# Patient Record
Sex: FEMALE | Race: BLACK OR AFRICAN AMERICAN | Employment: OTHER | URBAN - METROPOLITAN AREA
[De-identification: names, ages, dates, MRNs, and addresses within clinical notes are randomized per-mention and may not be internally consistent; named-entity substitution may affect disease eponyms.]

---

## 2015-03-06 LAB
LEFT VENTRICULAR EJECTION FRACTION MODE: NORMAL
LV EF: 60 %

## 2018-12-13 ENCOUNTER — TELEPHONE (OUTPATIENT)
Dept: ADMINISTRATIVE | Age: 58
End: 2018-12-13

## 2019-01-25 ENCOUNTER — OFFICE VISIT (OUTPATIENT)
Dept: CARDIOLOGY CLINIC | Age: 59
End: 2019-01-25
Payer: MEDICAID

## 2019-01-25 VITALS
SYSTOLIC BLOOD PRESSURE: 138 MMHG | HEART RATE: 66 BPM | DIASTOLIC BLOOD PRESSURE: 80 MMHG | HEIGHT: 65 IN | OXYGEN SATURATION: 98 % | BODY MASS INDEX: 39.25 KG/M2 | RESPIRATION RATE: 20 BRPM | WEIGHT: 235.6 LBS

## 2019-01-25 DIAGNOSIS — E66.9 NON MORBID OBESITY: ICD-10-CM

## 2019-01-25 DIAGNOSIS — M54.42 CHRONIC MIDLINE LOW BACK PAIN WITH LEFT-SIDED SCIATICA: ICD-10-CM

## 2019-01-25 DIAGNOSIS — E11.9 CONTROLLED TYPE 2 DIABETES MELLITUS WITHOUT COMPLICATION, WITHOUT LONG-TERM CURRENT USE OF INSULIN (HCC): ICD-10-CM

## 2019-01-25 DIAGNOSIS — Z86.73 HISTORY OF CVA (CEREBROVASCULAR ACCIDENT): ICD-10-CM

## 2019-01-25 DIAGNOSIS — G89.29 CHRONIC MIDLINE LOW BACK PAIN WITH LEFT-SIDED SCIATICA: ICD-10-CM

## 2019-01-25 DIAGNOSIS — R06.02 SOBOE (SHORTNESS OF BREATH ON EXERTION): ICD-10-CM

## 2019-01-25 DIAGNOSIS — R94.31 ABNORMAL EKG: ICD-10-CM

## 2019-01-25 DIAGNOSIS — Z85.3 HX OF BREAST CANCER: ICD-10-CM

## 2019-01-25 DIAGNOSIS — I11.9 HYPERTENSIVE LEFT VENTRICULAR HYPERTROPHY, WITHOUT HEART FAILURE: ICD-10-CM

## 2019-01-25 DIAGNOSIS — I10 ESSENTIAL HYPERTENSION: Primary | ICD-10-CM

## 2019-01-25 PROBLEM — M54.41 CHRONIC MIDLINE LOW BACK PAIN WITH RIGHT-SIDED SCIATICA: Status: ACTIVE | Noted: 2019-01-25

## 2019-01-25 PROCEDURE — G8427 DOCREV CUR MEDS BY ELIG CLIN: HCPCS | Performed by: INTERNAL MEDICINE

## 2019-01-25 PROCEDURE — 93000 ELECTROCARDIOGRAM COMPLETE: CPT | Performed by: INTERNAL MEDICINE

## 2019-01-25 PROCEDURE — 3017F COLORECTAL CA SCREEN DOC REV: CPT | Performed by: INTERNAL MEDICINE

## 2019-01-25 PROCEDURE — 3046F HEMOGLOBIN A1C LEVEL >9.0%: CPT | Performed by: INTERNAL MEDICINE

## 2019-01-25 PROCEDURE — 1036F TOBACCO NON-USER: CPT | Performed by: INTERNAL MEDICINE

## 2019-01-25 PROCEDURE — 99204 OFFICE O/P NEW MOD 45 MIN: CPT | Performed by: INTERNAL MEDICINE

## 2019-01-25 PROCEDURE — G8484 FLU IMMUNIZE NO ADMIN: HCPCS | Performed by: INTERNAL MEDICINE

## 2019-01-25 PROCEDURE — 2022F DILAT RTA XM EVC RTNOPTHY: CPT | Performed by: INTERNAL MEDICINE

## 2019-01-25 PROCEDURE — G8417 CALC BMI ABV UP PARAM F/U: HCPCS | Performed by: INTERNAL MEDICINE

## 2019-01-25 RX ORDER — LISINOPRIL 20 MG/1
20 TABLET ORAL DAILY
Status: ON HOLD | COMMUNITY
End: 2020-02-08 | Stop reason: HOSPADM

## 2019-01-25 RX ORDER — MELOXICAM 7.5 MG/1
7.5 TABLET ORAL NIGHTLY
Status: ON HOLD | COMMUNITY
End: 2020-02-03

## 2019-01-25 RX ORDER — GLIMEPIRIDE 1 MG/1
1 TABLET ORAL
COMMUNITY
End: 2020-01-27 | Stop reason: ALTCHOICE

## 2019-01-25 RX ORDER — GABAPENTIN 300 MG/1
300 CAPSULE ORAL NIGHTLY
COMMUNITY
End: 2020-11-13

## 2019-01-25 RX ORDER — CYCLOBENZAPRINE HCL 10 MG
5-10 TABLET ORAL NIGHTLY PRN
COMMUNITY
End: 2019-11-06 | Stop reason: ALTCHOICE

## 2019-01-25 RX ORDER — LISINOPRIL AND HYDROCHLOROTHIAZIDE 25; 20 MG/1; MG/1
1 TABLET ORAL DAILY
COMMUNITY
End: 2019-11-06 | Stop reason: ALTCHOICE

## 2019-03-04 ENCOUNTER — TELEPHONE (OUTPATIENT)
Dept: CARDIOLOGY | Age: 59
End: 2019-03-04

## 2019-04-11 ENCOUNTER — HOSPITAL ENCOUNTER (OUTPATIENT)
Dept: CARDIOLOGY | Age: 59
Discharge: HOME | End: 2019-04-11
Payer: MEDICARE

## 2019-04-11 LAB
LV EF: 55 %
LVEF MODALITY: NORMAL

## 2019-04-11 PROCEDURE — 93306 TTE W/DOPPLER COMPLETE: CPT

## 2019-04-24 ENCOUNTER — HOSPITAL ENCOUNTER (OUTPATIENT)
Dept: CARDIOLOGY | Age: 59
Discharge: HOME | End: 2019-04-24
Payer: MEDICARE

## 2019-04-24 VITALS
BODY MASS INDEX: 39.15 KG/M2 | WEIGHT: 235 LBS | HEART RATE: 74 BPM | DIASTOLIC BLOOD PRESSURE: 80 MMHG | HEIGHT: 65 IN | SYSTOLIC BLOOD PRESSURE: 130 MMHG

## 2019-04-24 DIAGNOSIS — Z86.73 HISTORY OF CVA (CEREBROVASCULAR ACCIDENT): ICD-10-CM

## 2019-04-24 DIAGNOSIS — I10 ESSENTIAL HYPERTENSION: ICD-10-CM

## 2019-04-24 DIAGNOSIS — R94.31 ABNORMAL EKG: ICD-10-CM

## 2019-04-24 DIAGNOSIS — R06.02 SOBOE (SHORTNESS OF BREATH ON EXERTION): ICD-10-CM

## 2019-04-24 PROCEDURE — 3430000000 HC RX DIAGNOSTIC RADIOPHARMACEUTICAL: Performed by: INTERNAL MEDICINE

## 2019-04-24 PROCEDURE — 93018 CV STRESS TEST I&R ONLY: CPT | Performed by: INTERNAL MEDICINE

## 2019-04-24 PROCEDURE — A9502 TC99M TETROFOSMIN: HCPCS | Performed by: INTERNAL MEDICINE

## 2019-04-24 PROCEDURE — 6360000002 HC RX W HCPCS: Performed by: INTERNAL MEDICINE

## 2019-04-24 PROCEDURE — 93017 CV STRESS TEST TRACING ONLY: CPT

## 2019-04-24 PROCEDURE — 2580000003 HC RX 258: Performed by: INTERNAL MEDICINE

## 2019-04-24 PROCEDURE — 93016 CV STRESS TEST SUPVJ ONLY: CPT | Performed by: INTERNAL MEDICINE

## 2019-04-24 PROCEDURE — 78452 HT MUSCLE IMAGE SPECT MULT: CPT

## 2019-04-24 RX ORDER — SODIUM CHLORIDE 0.9 % (FLUSH) 0.9 %
10 SYRINGE (ML) INJECTION PRN
Status: DISCONTINUED | OUTPATIENT
Start: 2019-04-24 | End: 2019-04-25 | Stop reason: HOSPADM

## 2019-04-24 RX ADMIN — REGADENOSON 0.4 MG: 0.08 INJECTION, SOLUTION INTRAVENOUS at 10:51

## 2019-04-24 RX ADMIN — TETROFOSMIN 25 MILLICURIE: 0.23 INJECTION, POWDER, LYOPHILIZED, FOR SOLUTION INTRAVENOUS at 10:51

## 2019-04-24 RX ADMIN — Medication 10 ML: at 10:51

## 2019-04-24 RX ADMIN — Medication 10 ML: at 10:45

## 2019-04-24 RX ADMIN — Medication 10 ML: at 09:11

## 2019-04-24 RX ADMIN — TETROFOSMIN 8.5 MILLICURIE: 0.23 INJECTION, POWDER, LYOPHILIZED, FOR SOLUTION INTRAVENOUS at 09:11

## 2019-04-24 NOTE — PROCEDURES
[id number] [facility] and Vascular Lab - [address]  [phone]               Pharmacologic Stress Nuclear Gated SPECT Study    Name: Adrianne Malave Account Number: [de-identified]    :  1960          Sex: female         Date of Study:  2019    Height: 5' 5\" (165.1 cm)         Weight: 235 lb (106.6 kg)     Ordering Provider: Any Franklin          PCP: Imelda Centeno DO      Cardiologist:Jay Whelan             Interpreting Physician: Any Franklin  _________________________________________________________________________________    Indication:   Detecting the presence and location of coronary artery disease    Clinical History:   Patient has no known history of coronary artery disease. Resting ECG:     HR 69 bpm  Normal sinus rhythm    Procedure:   Pharmacologic stress testing was performed with regadenoson 0.4 mg for 15 seconds. The heart rate was 69 at baseline and say to 79 beats during the infusion. The blood pressure at baseline was 150/88 and blood pressure at the end of infusion was 140/88. Blood pressure response was normal during the stress procedure. The patient tolerated the infusion well. ECG during the infusion did not change. IMAGING: Myocardial perfusion imaging was performed at rest 30-35 minutes following the intravenous injection of 8.5 mCi of (Tc-tetrofosmin) followed by 10 ml of Normal Saline. As per infusion protocol, the patient was injected intravenously with 25.0 mCi of (Tc-tetrofosmin) followed by 10 ml of Normal Saline. Gated post-stress tomographic imaging was performed 45 minutes after stress. FINDINGS: The overall quality of the study was good. Left ventricular cavity size was noted to be normal.    Rotational analog analysis demonstrated no patient motion or abnormal extracardiac radioactivity.     A mild defect was present in the apical anterior wall(s) that was  small size on the post regadenoson images           The resting images are show no change. Gated SPECT left ventricular ejection fraction was calculated to be 55%, with normal myocardial thickening and wall motion. Impression:    1. Electrocardiographically normal regadenoson infusion with a clinicallynonischemic response. 2. Myocardial perfusion imaging showed no reversible ischemia, small fixed anteroapical defect. 3. Overall left ventricular systolic function was normal without regional wall motion abnormalities. EF 55%  4. Low risk general pharmacologic stress test    No recent study to compare. Thank you for sending your patient to this [facility].      Electronically signed by Darius Stallings MD

## 2019-05-02 ENCOUNTER — OFFICE VISIT (OUTPATIENT)
Dept: CARDIOLOGY CLINIC | Age: 59
End: 2019-05-02
Payer: MEDICARE

## 2019-05-02 VITALS
SYSTOLIC BLOOD PRESSURE: 132 MMHG | DIASTOLIC BLOOD PRESSURE: 86 MMHG | BODY MASS INDEX: 38.32 KG/M2 | WEIGHT: 230 LBS | RESPIRATION RATE: 16 BRPM | HEART RATE: 72 BPM | HEIGHT: 65 IN

## 2019-05-02 DIAGNOSIS — E11.9 CONTROLLED TYPE 2 DIABETES MELLITUS WITHOUT COMPLICATION, WITHOUT LONG-TERM CURRENT USE OF INSULIN (HCC): ICD-10-CM

## 2019-05-02 DIAGNOSIS — M54.42 CHRONIC MIDLINE LOW BACK PAIN WITH LEFT-SIDED SCIATICA: ICD-10-CM

## 2019-05-02 DIAGNOSIS — G89.29 CHRONIC MIDLINE LOW BACK PAIN WITH LEFT-SIDED SCIATICA: ICD-10-CM

## 2019-05-02 DIAGNOSIS — I10 ESSENTIAL HYPERTENSION: Primary | Chronic | ICD-10-CM

## 2019-05-02 DIAGNOSIS — Z86.73 HISTORY OF CVA (CEREBROVASCULAR ACCIDENT): ICD-10-CM

## 2019-05-02 DIAGNOSIS — R94.31 ABNORMAL EKG: ICD-10-CM

## 2019-05-02 DIAGNOSIS — I11.9 HYPERTENSIVE LEFT VENTRICULAR HYPERTROPHY, WITHOUT HEART FAILURE: ICD-10-CM

## 2019-05-02 PROCEDURE — 3046F HEMOGLOBIN A1C LEVEL >9.0%: CPT | Performed by: INTERNAL MEDICINE

## 2019-05-02 PROCEDURE — 3017F COLORECTAL CA SCREEN DOC REV: CPT | Performed by: INTERNAL MEDICINE

## 2019-05-02 PROCEDURE — G8417 CALC BMI ABV UP PARAM F/U: HCPCS | Performed by: INTERNAL MEDICINE

## 2019-05-02 PROCEDURE — 99214 OFFICE O/P EST MOD 30 MIN: CPT | Performed by: INTERNAL MEDICINE

## 2019-05-02 PROCEDURE — 1036F TOBACCO NON-USER: CPT | Performed by: INTERNAL MEDICINE

## 2019-05-02 PROCEDURE — G8427 DOCREV CUR MEDS BY ELIG CLIN: HCPCS | Performed by: INTERNAL MEDICINE

## 2019-05-02 PROCEDURE — 93000 ELECTROCARDIOGRAM COMPLETE: CPT | Performed by: INTERNAL MEDICINE

## 2019-05-02 PROCEDURE — 2022F DILAT RTA XM EVC RTNOPTHY: CPT | Performed by: INTERNAL MEDICINE

## 2019-05-02 NOTE — PROGRESS NOTES
syncope. Active at home   No orthopnea   Try to watch diet   Compliant with all medications       Past Medical History:   Diagnosis Date    [facility] 2000, 2005    right    Chest pain 02/23/2012    admitted    [facility]     [facility]     Hypertension     Ligament tear     right foot            Past Surgical History:   Procedure Laterality Date    APPENDECTOMY      BREAST RECONSTRUCTION Right     BREAST SURGERY  2000    right masectomy    FOOT SURGERY      right    HYSTERECTOMY      fibroids    KNEE ARTHROSCOPY      left    MASTECTOMY Right 2000    TONSILLECTOMY            Family History   Problem Relation Age of Onset    Stroke Mother     Heart Failure Mother     Other Mother         ICD    Pacemaker Father     Heart Failure Father     Hypertension Sister           Social History     Tobacco Use    Smoking status: Former Smoker     Packs/day: 0.20     Years: 2.00     Pack years: 0.40     Types: Cigarettes    Smokeless tobacco: Never Used    Tobacco comment: as a teen   Substance Use Topics    Alcohol use:  Yes     Alcohol/week: 3.6 oz     Types: 6 Standard drinks or equivalent per week     Comment: social         Review of Systems:  HEENT: negative for acute visual symptoms or auditory problems, no dysphagia  Constitutional: negative for fever and chills, or significant weight loss  Respiratory: negative for cough, wheezing, or hemoptysis  Cardiovascular: negative for chest pain, palpitations, and dyspnea  Gastrointestinal: negative for abdominal pain, diarrhea, nausea and vomiting  Endocrine: Negative for polyuria and polydyspsia  Genitourinary:negative for dysuria and hematuria  Derm: negative for rash and skin lesion(s)  Neurological: negative for tingling, numbness, weakness, seizures and tremors  Endocrine: negative for polydipsia and polyuria  Musculoskeletal: negative for pain or tenderness  Psychiatric: negative for anxiety, depression, or suicidal ideations         O:  COMPLETE PHYSICAL EXAM:       /86   Pulse 72   Resp 16   Ht 5' 5\" (1.651 m)   Wt 230 lb (104.3 kg)   BMI 38.27 kg/m²       General:   Patient alert, comfortable, no distress. Appears stated age. HEENT:    Pupils equal, no icterus, no nasal drainage, tongue moist.   Neck:              No masses, Thyroid not palpable. Chest:   Normal configuration, non tender. Lungs:   Clear to auscultation bilaterally, few scattered rhonchi. Cardiovascular:  Regular rhythm, 1/6 systolic murmur, No S3,  no palpable thrills, No elevated JVD, No carotid bruit. Abdomen:  Soft, Non tender, Bowel sounds normal, no pulsatile abdominal aorta,    Extremities:  No edema. Distal pulses palpable. No cyanosis, no clubbing. Skin:   Good turgor, warm and dry, no cyanosis. Musculoskeletal: No joint swelling or deformity. Neuro:   Cranial nerves grossly intact; No focal neurologic deficit. Psych:   Alert, good mood and effect. REVIEW OF DIAGNOSTIC TESTS:        Electrocardiogram: NSR, Inferior, lateral ST/T chages, OLD      2D Echo: OK     Stress Test: OK               A/P:   ASSESSMENT / PLAN:    Hector was seen today for results. Diagnoses and all orders for this visit:    Essential hypertension - Stable  -     EKG 12 Lead    Hypertensive left ventricular hypertrophy, without heart failure - Stable    Controlled type 2 diabetes mellitus without complication, without long-term current use of insulin (HCC)    Chronic midline low back pain with left-sided sciatica    Abnormal EKG - OLD, inferior and lateral ST/T changes    History of CVA (cerebrovascular accident)  With left side weakness in  2015? - No residual weakness     Preventive Cardiology: Low cholesterol diet, regular exercise as tolerate, and gradual weight loss discussed. Monitor BP and heart rates. Tet sdiscussed   All questions answered about cardiac diagnoses and cardiac medications.    Continue

## 2019-08-06 ENCOUNTER — TELEPHONE (OUTPATIENT)
Dept: CARDIOLOGY CLINIC | Age: 59
End: 2019-08-06

## 2019-08-06 NOTE — TELEPHONE ENCOUNTER
Cleared for her procedure from cardiac stand point - Low cardiac risk  Ok to hold ASA, Plavix- she takes them for her stroke

## 2019-08-06 NOTE — TELEPHONE ENCOUNTER
[facility] [phone]). Carlos Martinez has to have a LEEP procedure by Dr. Alyx Thompson at the end of this month. She is on Plavix and ASA. You see her yearly, last seen in May. Are you able to clear her and can she be off Plavix and/or ASA and what is the risk. Please advise.

## 2019-08-22 ENCOUNTER — HOSPITAL ENCOUNTER (OUTPATIENT)
Age: 59
Discharge: HOME | End: 2019-08-24

## 2019-08-22 PROCEDURE — 88307 TISSUE EXAM BY PATHOLOGIST: CPT

## 2019-10-24 ENCOUNTER — TELEPHONE (OUTPATIENT)
Dept: ONCOLOGY | Age: 59
End: 2019-10-24

## 2019-10-24 ENCOUNTER — TELEPHONE (OUTPATIENT)
Dept: CASE MANAGEMENT | Age: 59
End: 2019-10-24

## 2019-10-24 ENCOUNTER — HOSPITAL ENCOUNTER (OUTPATIENT)
Dept: RADIATION ONCOLOGY | Age: 59
Discharge: HOME | End: 2019-10-24
Payer: MEDICARE

## 2019-10-24 VITALS
HEART RATE: 88 BPM | RESPIRATION RATE: 16 BRPM | DIASTOLIC BLOOD PRESSURE: 86 MMHG | BODY MASS INDEX: 38.61 KG/M2 | SYSTOLIC BLOOD PRESSURE: 140 MMHG | TEMPERATURE: 97.8 F | WEIGHT: 232 LBS

## 2019-10-24 DIAGNOSIS — C53.0 MALIGNANT NEOPLASM OF ENDOCERVIX (HCC): Primary | ICD-10-CM

## 2019-10-24 PROCEDURE — 99204 OFFICE O/P NEW MOD 45 MIN: CPT | Performed by: RADIOLOGY

## 2019-10-24 PROCEDURE — 99205 OFFICE O/P NEW HI 60 MIN: CPT

## 2019-10-25 ENCOUNTER — TELEPHONE (OUTPATIENT)
Dept: RADIATION ONCOLOGY | Age: 59
End: 2019-10-25

## 2019-10-31 ENCOUNTER — TELEPHONE (OUTPATIENT)
Dept: CASE MANAGEMENT | Age: 59
End: 2019-10-31

## 2019-10-31 ENCOUNTER — HOSPITAL ENCOUNTER (OUTPATIENT)
Dept: RADIATION ONCOLOGY | Age: 59
Discharge: HOME | End: 2019-10-31
Attending: RADIOLOGY
Payer: MEDICARE

## 2019-11-06 ENCOUNTER — OFFICE VISIT (OUTPATIENT)
Dept: ONCOLOGY | Age: 59
End: 2019-11-06
Payer: MEDICARE

## 2019-11-06 ENCOUNTER — HOSPITAL ENCOUNTER (OUTPATIENT)
Dept: INFUSION THERAPY | Age: 59
Discharge: HOME | End: 2019-11-06
Payer: MEDICARE

## 2019-11-06 VITALS
HEIGHT: 65 IN | DIASTOLIC BLOOD PRESSURE: 94 MMHG | BODY MASS INDEX: 38.7 KG/M2 | WEIGHT: 232.3 LBS | SYSTOLIC BLOOD PRESSURE: 176 MMHG | HEART RATE: 73 BPM | TEMPERATURE: 97.6 F

## 2019-11-06 DIAGNOSIS — C53.0 CANCER OF ENDOCERVIX (HCC): ICD-10-CM

## 2019-11-06 DIAGNOSIS — C53.0 CANCER OF ENDOCERVIX (HCC): Primary | ICD-10-CM

## 2019-11-06 DIAGNOSIS — C53.9 MALIGNANT NEOPLASM OF CERVIX, UNSPECIFIED SITE (HCC): Primary | ICD-10-CM

## 2019-11-06 LAB
ALBUMIN SERPL-MCNC: 4 G/DL (ref 3.5–5.2)
ALP BLD-CCNC: 101 U/L (ref 35–104)
ALT SERPL-CCNC: 13 U/L (ref 0–32)
ANION GAP SERPL CALCULATED.3IONS-SCNC: 9 MMOL/L (ref 7–16)
AST SERPL-CCNC: 16 U/L (ref 0–31)
BASOPHILS ABSOLUTE: 0.02 E9/L (ref 0–0.2)
BASOPHILS RELATIVE PERCENT: 0.4 % (ref 0–2)
BILIRUB SERPL-MCNC: 0.3 MG/DL (ref 0–1.2)
BUN BLDV-MCNC: 14 MG/DL (ref 8–23)
CA 125: 9.1 U/ML (ref 0–35)
CALCIUM SERPL-MCNC: 9.7 MG/DL (ref 8.6–10.2)
CHLORIDE BLD-SCNC: 109 MMOL/L (ref 98–107)
CO2: 27 MMOL/L (ref 22–29)
CREAT SERPL-MCNC: 0.9 MG/DL (ref 0.5–1)
EOSINOPHILS ABSOLUTE: 0.11 E9/L (ref 0.05–0.5)
EOSINOPHILS RELATIVE PERCENT: 2.3 % (ref 0–6)
GFR AFRICAN AMERICAN: >60
GFR NON-AFRICAN AMERICAN: >60 ML/MIN/1.73
GLUCOSE BLD-MCNC: 124 MG/DL (ref 74–99)
HCT VFR BLD CALC: 41.1 % (ref 34–48)
HEMOGLOBIN: 12.8 G/DL (ref 11.5–15.5)
IMMATURE GRANULOCYTES #: 0.02 E9/L
IMMATURE GRANULOCYTES %: 0.4 % (ref 0–5)
LYMPHOCYTES ABSOLUTE: 1.47 E9/L (ref 1.5–4)
LYMPHOCYTES RELATIVE PERCENT: 30.9 % (ref 20–42)
MCH RBC QN AUTO: 25.6 PG (ref 26–35)
MCHC RBC AUTO-ENTMCNC: 31.1 % (ref 32–34.5)
MCV RBC AUTO: 82.2 FL (ref 80–99.9)
MONOCYTES ABSOLUTE: 0.47 E9/L (ref 0.1–0.95)
MONOCYTES RELATIVE PERCENT: 9.9 % (ref 2–12)
NEUTROPHILS ABSOLUTE: 2.66 E9/L (ref 1.8–7.3)
NEUTROPHILS RELATIVE PERCENT: 56.1 % (ref 43–80)
PDW BLD-RTO: 14.4 FL (ref 11.5–15)
PLATELET # BLD: 294 E9/L (ref 130–450)
PMV BLD AUTO: 11.1 FL (ref 7–12)
POTASSIUM SERPL-SCNC: 4 MMOL/L (ref 3.5–5)
RBC # BLD: 5 E12/L (ref 3.5–5.5)
SODIUM BLD-SCNC: 145 MMOL/L (ref 132–146)
TOTAL PROTEIN: 7.6 G/DL (ref 6.4–8.3)
WBC # BLD: 4.8 E9/L (ref 4.5–11.5)

## 2019-11-06 PROCEDURE — 99205 OFFICE O/P NEW HI 60 MIN: CPT | Performed by: INTERNAL MEDICINE

## 2019-11-06 PROCEDURE — G8484 FLU IMMUNIZE NO ADMIN: HCPCS | Performed by: INTERNAL MEDICINE

## 2019-11-06 PROCEDURE — 86304 IMMUNOASSAY TUMOR CA 125: CPT

## 2019-11-06 PROCEDURE — G8417 CALC BMI ABV UP PARAM F/U: HCPCS | Performed by: INTERNAL MEDICINE

## 2019-11-06 PROCEDURE — G8427 DOCREV CUR MEDS BY ELIG CLIN: HCPCS | Performed by: INTERNAL MEDICINE

## 2019-11-06 PROCEDURE — 85025 COMPLETE CBC W/AUTO DIFF WBC: CPT

## 2019-11-06 PROCEDURE — 99214 OFFICE O/P EST MOD 30 MIN: CPT

## 2019-11-06 PROCEDURE — 36415 COLL VENOUS BLD VENIPUNCTURE: CPT

## 2019-11-06 PROCEDURE — 80053 COMPREHEN METABOLIC PANEL: CPT

## 2019-11-07 ENCOUNTER — TELEPHONE (OUTPATIENT)
Dept: CASE MANAGEMENT | Age: 59
End: 2019-11-07

## 2019-11-07 ENCOUNTER — HOSPITAL ENCOUNTER (OUTPATIENT)
Dept: RADIATION ONCOLOGY | Age: 59
Discharge: HOME | End: 2019-11-07
Attending: RADIOLOGY
Payer: MEDICARE

## 2019-11-07 DIAGNOSIS — C53.9 MALIGNANT NEOPLASM OF CERVIX, UNSPECIFIED SITE (HCC): Primary | ICD-10-CM

## 2019-11-07 PROCEDURE — 77334 RADIATION TREATMENT AID(S): CPT | Performed by: RADIOLOGY

## 2019-11-12 ENCOUNTER — TELEPHONE (OUTPATIENT)
Dept: CASE MANAGEMENT | Age: 59
End: 2019-11-12

## 2019-11-18 PROCEDURE — 77300 RADIATION THERAPY DOSE PLAN: CPT | Performed by: RADIOLOGY

## 2019-11-18 PROCEDURE — 77301 RADIOTHERAPY DOSE PLAN IMRT: CPT | Performed by: RADIOLOGY

## 2019-11-18 PROCEDURE — 77338 DESIGN MLC DEVICE FOR IMRT: CPT | Performed by: RADIOLOGY

## 2019-11-20 ENCOUNTER — HOSPITAL ENCOUNTER (OUTPATIENT)
Dept: INFUSION THERAPY | Age: 59
Discharge: HOME | End: 2019-11-20
Payer: MEDICARE

## 2019-11-20 ENCOUNTER — HOSPITAL ENCOUNTER (OUTPATIENT)
Dept: RADIATION ONCOLOGY | Age: 59
Discharge: HOME | End: 2019-11-20
Attending: RADIOLOGY
Payer: MEDICARE

## 2019-11-20 ENCOUNTER — OFFICE VISIT (OUTPATIENT)
Dept: ONCOLOGY | Age: 59
End: 2019-11-20
Payer: MEDICARE

## 2019-11-20 VITALS
SYSTOLIC BLOOD PRESSURE: 170 MMHG | HEART RATE: 75 BPM | RESPIRATION RATE: 18 BRPM | DIASTOLIC BLOOD PRESSURE: 91 MMHG | TEMPERATURE: 98.3 F

## 2019-11-20 VITALS
TEMPERATURE: 97.6 F | DIASTOLIC BLOOD PRESSURE: 109 MMHG | HEART RATE: 86 BPM | SYSTOLIC BLOOD PRESSURE: 179 MMHG | BODY MASS INDEX: 36.96 KG/M2 | WEIGHT: 230 LBS | HEIGHT: 66 IN

## 2019-11-20 DIAGNOSIS — R11.0 NAUSEA: ICD-10-CM

## 2019-11-20 DIAGNOSIS — C53.9 MALIGNANT NEOPLASM OF CERVIX, UNSPECIFIED SITE (HCC): Primary | ICD-10-CM

## 2019-11-20 DIAGNOSIS — C53.0 CANCER OF ENDOCERVIX (HCC): ICD-10-CM

## 2019-11-20 LAB
ALBUMIN SERPL-MCNC: 4.1 G/DL (ref 3.5–5.2)
ALP BLD-CCNC: 120 U/L (ref 35–104)
ALT SERPL-CCNC: 16 U/L (ref 0–32)
ANION GAP SERPL CALCULATED.3IONS-SCNC: 13 MMOL/L (ref 7–16)
AST SERPL-CCNC: 19 U/L (ref 0–31)
BASOPHILS ABSOLUTE: 0.02 E9/L (ref 0–0.2)
BASOPHILS RELATIVE PERCENT: 0.4 % (ref 0–2)
BILIRUB SERPL-MCNC: <0.2 MG/DL (ref 0–1.2)
BUN BLDV-MCNC: 18 MG/DL (ref 8–23)
CA 125: 9.3 U/ML (ref 0–35)
CALCIUM SERPL-MCNC: 9.6 MG/DL (ref 8.6–10.2)
CHLORIDE BLD-SCNC: 108 MMOL/L (ref 98–107)
CO2: 22 MMOL/L (ref 22–29)
CREAT SERPL-MCNC: 0.8 MG/DL (ref 0.5–1)
EOSINOPHILS ABSOLUTE: 0.09 E9/L (ref 0.05–0.5)
EOSINOPHILS RELATIVE PERCENT: 1.8 % (ref 0–6)
GFR AFRICAN AMERICAN: >60
GFR NON-AFRICAN AMERICAN: >60 ML/MIN/1.73
GLUCOSE BLD-MCNC: 199 MG/DL (ref 74–99)
HCT VFR BLD CALC: 42 % (ref 34–48)
HEMOGLOBIN: 12.8 G/DL (ref 11.5–15.5)
IMMATURE GRANULOCYTES #: 0.02 E9/L
IMMATURE GRANULOCYTES %: 0.4 % (ref 0–5)
LYMPHOCYTES ABSOLUTE: 1.69 E9/L (ref 1.5–4)
LYMPHOCYTES RELATIVE PERCENT: 32.9 % (ref 20–42)
MAGNESIUM: 2.1 MG/DL (ref 1.6–2.6)
MCH RBC QN AUTO: 24.9 PG (ref 26–35)
MCHC RBC AUTO-ENTMCNC: 30.5 % (ref 32–34.5)
MCV RBC AUTO: 81.7 FL (ref 80–99.9)
MONOCYTES ABSOLUTE: 0.47 E9/L (ref 0.1–0.95)
MONOCYTES RELATIVE PERCENT: 9.1 % (ref 2–12)
NEUTROPHILS ABSOLUTE: 2.85 E9/L (ref 1.8–7.3)
NEUTROPHILS RELATIVE PERCENT: 55.4 % (ref 43–80)
PDW BLD-RTO: 14.3 FL (ref 11.5–15)
PLATELET # BLD: 256 E9/L (ref 130–450)
PMV BLD AUTO: 11.3 FL (ref 7–12)
POTASSIUM SERPL-SCNC: 3.5 MMOL/L (ref 3.5–5)
RBC # BLD: 5.14 E12/L (ref 3.5–5.5)
SODIUM BLD-SCNC: 143 MMOL/L (ref 132–146)
TOTAL PROTEIN: 7.8 G/DL (ref 6.4–8.3)
WBC # BLD: 5.1 E9/L (ref 4.5–11.5)

## 2019-11-20 PROCEDURE — 85025 COMPLETE CBC W/AUTO DIFF WBC: CPT

## 2019-11-20 PROCEDURE — 99214 OFFICE O/P EST MOD 30 MIN: CPT | Performed by: INTERNAL MEDICINE

## 2019-11-20 PROCEDURE — 36415 COLL VENOUS BLD VENIPUNCTURE: CPT

## 2019-11-20 PROCEDURE — G8484 FLU IMMUNIZE NO ADMIN: HCPCS | Performed by: INTERNAL MEDICINE

## 2019-11-20 PROCEDURE — 96415 CHEMO IV INFUSION ADDL HR: CPT

## 2019-11-20 PROCEDURE — 96366 THER/PROPH/DIAG IV INF ADDON: CPT

## 2019-11-20 PROCEDURE — 80053 COMPREHEN METABOLIC PANEL: CPT

## 2019-11-20 PROCEDURE — 83735 ASSAY OF MAGNESIUM: CPT

## 2019-11-20 PROCEDURE — 2580000003 HC RX 258: Performed by: INTERNAL MEDICINE

## 2019-11-20 PROCEDURE — G8427 DOCREV CUR MEDS BY ELIG CLIN: HCPCS | Performed by: INTERNAL MEDICINE

## 2019-11-20 PROCEDURE — G8417 CALC BMI ABV UP PARAM F/U: HCPCS | Performed by: INTERNAL MEDICINE

## 2019-11-20 PROCEDURE — 96413 CHEMO IV INFUSION 1 HR: CPT

## 2019-11-20 PROCEDURE — 86304 IMMUNOASSAY TUMOR CA 125: CPT

## 2019-11-20 PROCEDURE — 1036F TOBACCO NON-USER: CPT | Performed by: INTERNAL MEDICINE

## 2019-11-20 PROCEDURE — 6360000002 HC RX W HCPCS: Performed by: INTERNAL MEDICINE

## 2019-11-20 PROCEDURE — 77386 HC NTSTY MODUL RAD TX DLVR CPLX: CPT | Performed by: RADIOLOGY

## 2019-11-20 PROCEDURE — 96375 TX/PRO/DX INJ NEW DRUG ADDON: CPT

## 2019-11-20 PROCEDURE — 3017F COLORECTAL CA SCREEN DOC REV: CPT | Performed by: INTERNAL MEDICINE

## 2019-11-20 RX ORDER — SODIUM CHLORIDE 9 MG/ML
20 INJECTION, SOLUTION INTRAVENOUS ONCE
Status: CANCELLED | OUTPATIENT
Start: 2019-11-20

## 2019-11-20 RX ORDER — SODIUM CHLORIDE 0.9 % (FLUSH) 0.9 %
10 SYRINGE (ML) INJECTION PRN
Status: DISCONTINUED | OUTPATIENT
Start: 2019-11-20 | End: 2019-11-21 | Stop reason: HOSPADM

## 2019-11-20 RX ORDER — ONDANSETRON 4 MG/1
4 TABLET, FILM COATED ORAL EVERY 8 HOURS PRN
Qty: 120 TABLET | Refills: 1 | Status: ON HOLD | OUTPATIENT
Start: 2019-11-20 | End: 2020-02-03

## 2019-11-20 RX ORDER — SODIUM CHLORIDE 0.9 % (FLUSH) 0.9 %
10 SYRINGE (ML) INJECTION PRN
Status: CANCELLED | OUTPATIENT
Start: 2019-11-20

## 2019-11-20 RX ORDER — DEXAMETHASONE SODIUM PHOSPHATE 10 MG/ML
10 INJECTION, SOLUTION INTRAMUSCULAR; INTRAVENOUS ONCE
Status: CANCELLED | OUTPATIENT
Start: 2019-11-20

## 2019-11-20 RX ORDER — SODIUM CHLORIDE 9 MG/ML
20 INJECTION, SOLUTION INTRAVENOUS ONCE
Status: DISCONTINUED | OUTPATIENT
Start: 2019-11-20 | End: 2019-11-21 | Stop reason: HOSPADM

## 2019-11-20 RX ORDER — METHYLPREDNISOLONE SODIUM SUCCINATE 125 MG/2ML
125 INJECTION, POWDER, LYOPHILIZED, FOR SOLUTION INTRAMUSCULAR; INTRAVENOUS ONCE
Status: CANCELLED | OUTPATIENT
Start: 2019-11-20

## 2019-11-20 RX ORDER — EPINEPHRINE 1 MG/ML
0.3 INJECTION, SOLUTION, CONCENTRATE INTRAVENOUS PRN
Status: CANCELLED | OUTPATIENT
Start: 2019-11-20

## 2019-11-20 RX ORDER — SODIUM CHLORIDE 9 MG/ML
INJECTION, SOLUTION INTRAVENOUS CONTINUOUS
Status: CANCELLED | OUTPATIENT
Start: 2019-11-20

## 2019-11-20 RX ORDER — PALONOSETRON HYDROCHLORIDE 0.05 MG/ML
0.25 INJECTION, SOLUTION INTRAVENOUS ONCE
Status: COMPLETED | OUTPATIENT
Start: 2019-11-20 | End: 2019-11-20

## 2019-11-20 RX ORDER — PALONOSETRON HYDROCHLORIDE 0.05 MG/ML
0.25 INJECTION, SOLUTION INTRAVENOUS ONCE
Status: CANCELLED | OUTPATIENT
Start: 2019-11-20

## 2019-11-20 RX ORDER — PROCHLORPERAZINE MALEATE 10 MG
10 TABLET ORAL EVERY 6 HOURS PRN
Qty: 120 TABLET | Refills: 1 | Status: SHIPPED | OUTPATIENT
Start: 2019-11-20 | End: 2020-01-27 | Stop reason: ALTCHOICE

## 2019-11-20 RX ORDER — HEPARIN SODIUM (PORCINE) LOCK FLUSH IV SOLN 100 UNIT/ML 100 UNIT/ML
500 SOLUTION INTRAVENOUS PRN
Status: CANCELLED | OUTPATIENT
Start: 2019-11-20

## 2019-11-20 RX ORDER — DIPHENHYDRAMINE HYDROCHLORIDE 50 MG/ML
50 INJECTION INTRAMUSCULAR; INTRAVENOUS ONCE
Status: CANCELLED | OUTPATIENT
Start: 2019-11-20

## 2019-11-20 RX ORDER — SODIUM CHLORIDE 0.9 % (FLUSH) 0.9 %
5 SYRINGE (ML) INJECTION PRN
Status: CANCELLED | OUTPATIENT
Start: 2019-11-20

## 2019-11-20 RX ORDER — DEXAMETHASONE SODIUM PHOSPHATE 10 MG/ML
10 INJECTION INTRAMUSCULAR; INTRAVENOUS ONCE
Status: COMPLETED | OUTPATIENT
Start: 2019-11-20 | End: 2019-11-20

## 2019-11-20 RX ADMIN — FOSAPREPITANT 150 MG: 150 INJECTION, POWDER, LYOPHILIZED, FOR SOLUTION INTRAVENOUS at 09:43

## 2019-11-20 RX ADMIN — PALONOSETRON 0.25 MG: 0.25 INJECTION, SOLUTION INTRAVENOUS at 09:23

## 2019-11-20 RX ADMIN — DEXAMETHASONE SODIUM PHOSPHATE 10 MG: 10 INJECTION INTRAMUSCULAR; INTRAVENOUS at 09:24

## 2019-11-20 RX ADMIN — SODIUM CHLORIDE 20 ML/HR: 9 INJECTION, SOLUTION INTRAVENOUS at 09:23

## 2019-11-20 RX ADMIN — POTASSIUM CHLORIDE: 2 INJECTION, SOLUTION, CONCENTRATE INTRAVENOUS at 10:18

## 2019-11-20 RX ADMIN — Medication 10 ML: at 09:24

## 2019-11-20 NOTE — PROGRESS NOTES
Patient tolerated anti-cancer therapy treatment well without complaint. IV site removed and looks good. VSS. New Chemo education sheet provided to patient. Patient discharged ambulatory.

## 2019-11-21 ENCOUNTER — HOSPITAL ENCOUNTER (OUTPATIENT)
Dept: RADIATION ONCOLOGY | Age: 59
Discharge: HOME | End: 2019-11-21
Attending: RADIOLOGY
Payer: MEDICARE

## 2019-11-21 ENCOUNTER — TELEPHONE (OUTPATIENT)
Dept: CASE MANAGEMENT | Age: 59
End: 2019-11-21

## 2019-11-21 VITALS — BODY MASS INDEX: 38.35 KG/M2 | WEIGHT: 234 LBS

## 2019-11-21 DIAGNOSIS — C53.0 MALIGNANT NEOPLASM OF ENDOCERVIX (HCC): Primary | ICD-10-CM

## 2019-11-21 PROCEDURE — 77386 HC NTSTY MODUL RAD TX DLVR CPLX: CPT | Performed by: RADIOLOGY

## 2019-11-21 PROCEDURE — 99999 PR OFFICE/OUTPT VISIT,PROCEDURE ONLY: CPT | Performed by: RADIOLOGY

## 2019-11-22 ENCOUNTER — HOSPITAL ENCOUNTER (OUTPATIENT)
Dept: RADIATION ONCOLOGY | Age: 59
Discharge: HOME | End: 2019-11-22
Attending: RADIOLOGY
Payer: MEDICARE

## 2019-11-22 PROCEDURE — 77386 HC NTSTY MODUL RAD TX DLVR CPLX: CPT | Performed by: RADIOLOGY

## 2019-11-24 ENCOUNTER — HOSPITAL ENCOUNTER (OUTPATIENT)
Dept: RADIATION ONCOLOGY | Age: 59
Discharge: HOME | End: 2019-11-24
Attending: RADIOLOGY
Payer: MEDICARE

## 2019-11-24 PROCEDURE — 77386 HC NTSTY MODUL RAD TX DLVR CPLX: CPT | Performed by: RADIOLOGY

## 2019-11-25 ENCOUNTER — HOSPITAL ENCOUNTER (OUTPATIENT)
Dept: RADIATION ONCOLOGY | Age: 59
Discharge: HOME | End: 2019-11-25
Attending: RADIOLOGY
Payer: MEDICARE

## 2019-11-25 ENCOUNTER — TELEPHONE (OUTPATIENT)
Dept: CASE MANAGEMENT | Age: 59
End: 2019-11-25

## 2019-11-25 ENCOUNTER — APPOINTMENT (OUTPATIENT)
Dept: RADIATION ONCOLOGY | Age: 59
End: 2019-11-25
Attending: RADIOLOGY
Payer: MEDICARE

## 2019-11-25 VITALS — BODY MASS INDEX: 38.02 KG/M2 | WEIGHT: 232 LBS

## 2019-11-25 DIAGNOSIS — C53.0 MALIGNANT NEOPLASM OF ENDOCERVIX (HCC): Primary | ICD-10-CM

## 2019-11-25 PROCEDURE — 77336 RADIATION PHYSICS CONSULT: CPT | Performed by: RADIOLOGY

## 2019-11-25 PROCEDURE — 77386 HC NTSTY MODUL RAD TX DLVR CPLX: CPT | Performed by: RADIOLOGY

## 2019-11-25 PROCEDURE — 99999 PR OFFICE/OUTPT VISIT,PROCEDURE ONLY: CPT | Performed by: RADIOLOGY

## 2019-11-26 ENCOUNTER — HOSPITAL ENCOUNTER (OUTPATIENT)
Dept: RADIATION ONCOLOGY | Age: 59
Discharge: HOME | End: 2019-11-26
Attending: RADIOLOGY
Payer: MEDICARE

## 2019-11-26 PROCEDURE — 77386 HC NTSTY MODUL RAD TX DLVR CPLX: CPT | Performed by: RADIOLOGY

## 2019-11-27 ENCOUNTER — HOSPITAL ENCOUNTER (OUTPATIENT)
Dept: INFUSION THERAPY | Age: 59
Discharge: HOME | End: 2019-11-27
Payer: MEDICARE

## 2019-11-27 ENCOUNTER — HOSPITAL ENCOUNTER (OUTPATIENT)
Dept: RADIATION ONCOLOGY | Age: 59
Discharge: HOME | End: 2019-11-27
Attending: RADIOLOGY
Payer: MEDICARE

## 2019-11-27 ENCOUNTER — OFFICE VISIT (OUTPATIENT)
Dept: ONCOLOGY | Age: 59
End: 2019-11-27
Payer: MEDICARE

## 2019-11-27 VITALS
TEMPERATURE: 97.8 F | RESPIRATION RATE: 18 BRPM | DIASTOLIC BLOOD PRESSURE: 91 MMHG | SYSTOLIC BLOOD PRESSURE: 160 MMHG | HEART RATE: 82 BPM

## 2019-11-27 VITALS
WEIGHT: 231.5 LBS | HEART RATE: 78 BPM | HEIGHT: 66 IN | SYSTOLIC BLOOD PRESSURE: 145 MMHG | BODY MASS INDEX: 37.21 KG/M2 | OXYGEN SATURATION: 98 % | DIASTOLIC BLOOD PRESSURE: 93 MMHG | TEMPERATURE: 97.9 F

## 2019-11-27 DIAGNOSIS — C53.0 CANCER OF ENDOCERVIX (HCC): ICD-10-CM

## 2019-11-27 DIAGNOSIS — C53.9 MALIGNANT NEOPLASM OF CERVIX, UNSPECIFIED SITE (HCC): Primary | ICD-10-CM

## 2019-11-27 DIAGNOSIS — Z85.3 HX OF BREAST CANCER: ICD-10-CM

## 2019-11-27 LAB
ALBUMIN SERPL-MCNC: 3.8 G/DL (ref 3.5–5.2)
ALP BLD-CCNC: 106 U/L (ref 35–104)
ALT SERPL-CCNC: 16 U/L (ref 0–32)
ANION GAP SERPL CALCULATED.3IONS-SCNC: 12 MMOL/L (ref 7–16)
AST SERPL-CCNC: 17 U/L (ref 0–31)
BASOPHILS ABSOLUTE: 0.01 E9/L (ref 0–0.2)
BASOPHILS RELATIVE PERCENT: 0.2 % (ref 0–2)
BILIRUB SERPL-MCNC: 0.2 MG/DL (ref 0–1.2)
BUN BLDV-MCNC: 17 MG/DL (ref 8–23)
CA 125: 7.7 U/ML (ref 0–35)
CALCIUM SERPL-MCNC: 9.3 MG/DL (ref 8.6–10.2)
CHLORIDE BLD-SCNC: 103 MMOL/L (ref 98–107)
CO2: 24 MMOL/L (ref 22–29)
CREAT SERPL-MCNC: 0.8 MG/DL (ref 0.5–1)
EOSINOPHILS ABSOLUTE: 0.12 E9/L (ref 0.05–0.5)
EOSINOPHILS RELATIVE PERCENT: 2.9 % (ref 0–6)
GFR AFRICAN AMERICAN: >60
GFR NON-AFRICAN AMERICAN: >60 ML/MIN/1.73
GLUCOSE BLD-MCNC: 143 MG/DL (ref 74–99)
HCT VFR BLD CALC: 41.2 % (ref 34–48)
HEMOGLOBIN: 12.8 G/DL (ref 11.5–15.5)
IMMATURE GRANULOCYTES #: 0.02 E9/L
IMMATURE GRANULOCYTES %: 0.5 % (ref 0–5)
LYMPHOCYTES ABSOLUTE: 1.07 E9/L (ref 1.5–4)
LYMPHOCYTES RELATIVE PERCENT: 25.5 % (ref 20–42)
MAGNESIUM: 2 MG/DL (ref 1.6–2.6)
MCH RBC QN AUTO: 25.4 PG (ref 26–35)
MCHC RBC AUTO-ENTMCNC: 31.1 % (ref 32–34.5)
MCV RBC AUTO: 81.7 FL (ref 80–99.9)
MONOCYTES ABSOLUTE: 0.43 E9/L (ref 0.1–0.95)
MONOCYTES RELATIVE PERCENT: 10.2 % (ref 2–12)
NEUTROPHILS ABSOLUTE: 2.55 E9/L (ref 1.8–7.3)
NEUTROPHILS RELATIVE PERCENT: 60.7 % (ref 43–80)
PDW BLD-RTO: 14.2 FL (ref 11.5–15)
PLATELET # BLD: 249 E9/L (ref 130–450)
PMV BLD AUTO: 11.4 FL (ref 7–12)
POTASSIUM SERPL-SCNC: 4 MMOL/L (ref 3.5–5)
RBC # BLD: 5.04 E12/L (ref 3.5–5.5)
SODIUM BLD-SCNC: 139 MMOL/L (ref 132–146)
TOTAL PROTEIN: 7 G/DL (ref 6.4–8.3)
WBC # BLD: 4.2 E9/L (ref 4.5–11.5)

## 2019-11-27 PROCEDURE — 96375 TX/PRO/DX INJ NEW DRUG ADDON: CPT

## 2019-11-27 PROCEDURE — 96367 TX/PROPH/DG ADDL SEQ IV INF: CPT

## 2019-11-27 PROCEDURE — 80053 COMPREHEN METABOLIC PANEL: CPT

## 2019-11-27 PROCEDURE — 83735 ASSAY OF MAGNESIUM: CPT

## 2019-11-27 PROCEDURE — 86304 IMMUNOASSAY TUMOR CA 125: CPT

## 2019-11-27 PROCEDURE — 3017F COLORECTAL CA SCREEN DOC REV: CPT | Performed by: INTERNAL MEDICINE

## 2019-11-27 PROCEDURE — G8417 CALC BMI ABV UP PARAM F/U: HCPCS | Performed by: INTERNAL MEDICINE

## 2019-11-27 PROCEDURE — 6360000002 HC RX W HCPCS: Performed by: INTERNAL MEDICINE

## 2019-11-27 PROCEDURE — 36415 COLL VENOUS BLD VENIPUNCTURE: CPT

## 2019-11-27 PROCEDURE — 1036F TOBACCO NON-USER: CPT | Performed by: INTERNAL MEDICINE

## 2019-11-27 PROCEDURE — 99214 OFFICE O/P EST MOD 30 MIN: CPT | Performed by: INTERNAL MEDICINE

## 2019-11-27 PROCEDURE — 85025 COMPLETE CBC W/AUTO DIFF WBC: CPT

## 2019-11-27 PROCEDURE — 2580000003 HC RX 258: Performed by: INTERNAL MEDICINE

## 2019-11-27 PROCEDURE — 96413 CHEMO IV INFUSION 1 HR: CPT

## 2019-11-27 PROCEDURE — 77386 HC NTSTY MODUL RAD TX DLVR CPLX: CPT | Performed by: RADIOLOGY

## 2019-11-27 PROCEDURE — G8427 DOCREV CUR MEDS BY ELIG CLIN: HCPCS | Performed by: INTERNAL MEDICINE

## 2019-11-27 PROCEDURE — 96415 CHEMO IV INFUSION ADDL HR: CPT

## 2019-11-27 PROCEDURE — G8484 FLU IMMUNIZE NO ADMIN: HCPCS | Performed by: INTERNAL MEDICINE

## 2019-11-27 RX ORDER — DEXAMETHASONE SODIUM PHOSPHATE 10 MG/ML
10 INJECTION INTRAMUSCULAR; INTRAVENOUS ONCE
Status: COMPLETED | OUTPATIENT
Start: 2019-11-27 | End: 2019-11-27

## 2019-11-27 RX ORDER — PALONOSETRON HYDROCHLORIDE 0.05 MG/ML
0.25 INJECTION, SOLUTION INTRAVENOUS ONCE
Status: CANCELLED | OUTPATIENT
Start: 2019-11-27

## 2019-11-27 RX ORDER — SODIUM CHLORIDE 0.9 % (FLUSH) 0.9 %
10 SYRINGE (ML) INJECTION PRN
Status: DISCONTINUED | OUTPATIENT
Start: 2019-11-27 | End: 2019-11-28 | Stop reason: HOSPADM

## 2019-11-27 RX ORDER — METHYLPREDNISOLONE SODIUM SUCCINATE 125 MG/2ML
125 INJECTION, POWDER, LYOPHILIZED, FOR SOLUTION INTRAMUSCULAR; INTRAVENOUS ONCE
Status: CANCELLED | OUTPATIENT
Start: 2019-11-27

## 2019-11-27 RX ORDER — EPINEPHRINE 1 MG/ML
0.3 INJECTION, SOLUTION, CONCENTRATE INTRAVENOUS PRN
Status: CANCELLED | OUTPATIENT
Start: 2019-11-27

## 2019-11-27 RX ORDER — PALONOSETRON HYDROCHLORIDE 0.05 MG/ML
0.25 INJECTION, SOLUTION INTRAVENOUS ONCE
Status: COMPLETED | OUTPATIENT
Start: 2019-11-27 | End: 2019-11-27

## 2019-11-27 RX ORDER — SODIUM CHLORIDE 0.9 % (FLUSH) 0.9 %
5 SYRINGE (ML) INJECTION PRN
Status: CANCELLED | OUTPATIENT
Start: 2019-11-27

## 2019-11-27 RX ORDER — DEXAMETHASONE SODIUM PHOSPHATE 10 MG/ML
10 INJECTION, SOLUTION INTRAMUSCULAR; INTRAVENOUS ONCE
Status: CANCELLED | OUTPATIENT
Start: 2019-11-27

## 2019-11-27 RX ORDER — HEPARIN SODIUM (PORCINE) LOCK FLUSH IV SOLN 100 UNIT/ML 100 UNIT/ML
500 SOLUTION INTRAVENOUS PRN
Status: CANCELLED | OUTPATIENT
Start: 2019-11-27

## 2019-11-27 RX ORDER — SODIUM CHLORIDE 0.9 % (FLUSH) 0.9 %
10 SYRINGE (ML) INJECTION PRN
Status: CANCELLED | OUTPATIENT
Start: 2019-11-27

## 2019-11-27 RX ORDER — SODIUM CHLORIDE 9 MG/ML
20 INJECTION, SOLUTION INTRAVENOUS ONCE
Status: CANCELLED | OUTPATIENT
Start: 2019-11-27

## 2019-11-27 RX ORDER — SODIUM CHLORIDE 9 MG/ML
20 INJECTION, SOLUTION INTRAVENOUS ONCE
Status: DISCONTINUED | OUTPATIENT
Start: 2019-11-27 | End: 2019-11-28 | Stop reason: HOSPADM

## 2019-11-27 RX ORDER — DIPHENHYDRAMINE HYDROCHLORIDE 50 MG/ML
50 INJECTION INTRAMUSCULAR; INTRAVENOUS ONCE
Status: CANCELLED | OUTPATIENT
Start: 2019-11-27

## 2019-11-27 RX ORDER — SODIUM CHLORIDE 9 MG/ML
INJECTION, SOLUTION INTRAVENOUS CONTINUOUS
Status: CANCELLED | OUTPATIENT
Start: 2019-11-27

## 2019-11-27 RX ADMIN — FOSAPREPITANT 150 MG: 150 INJECTION, POWDER, LYOPHILIZED, FOR SOLUTION INTRAVENOUS at 09:07

## 2019-11-27 RX ADMIN — DEXAMETHASONE SODIUM PHOSPHATE 10 MG: 10 INJECTION INTRAMUSCULAR; INTRAVENOUS at 09:02

## 2019-11-27 RX ADMIN — POTASSIUM CHLORIDE: 2 INJECTION, SOLUTION, CONCENTRATE INTRAVENOUS at 09:44

## 2019-11-27 RX ADMIN — Medication 10 ML: at 08:46

## 2019-11-27 RX ADMIN — PALONOSETRON 0.25 MG: 0.25 INJECTION, SOLUTION INTRAVENOUS at 08:58

## 2019-11-27 RX ADMIN — SODIUM CHLORIDE 20 ML/HR: 9 INJECTION, SOLUTION INTRAVENOUS at 08:57

## 2019-11-27 RX ADMIN — Medication 10 ML: at 09:02

## 2019-11-29 ENCOUNTER — HOSPITAL ENCOUNTER (OUTPATIENT)
Dept: MRI IMAGING | Age: 59
Discharge: HOME | End: 2019-12-01
Payer: MEDICARE

## 2019-11-29 ENCOUNTER — APPOINTMENT (OUTPATIENT)
Dept: RADIATION ONCOLOGY | Age: 59
End: 2019-11-29
Attending: RADIOLOGY
Payer: MEDICARE

## 2019-11-29 DIAGNOSIS — C53.0 MALIGNANT NEOPLASM OF ENDOCERVIX (HCC): ICD-10-CM

## 2019-11-29 PROCEDURE — A9579 GAD-BASE MR CONTRAST NOS,1ML: HCPCS | Performed by: RADIOLOGY

## 2019-11-29 PROCEDURE — 72197 MRI PELVIS W/O & W/DYE: CPT

## 2019-11-29 PROCEDURE — 6360000004 HC RX CONTRAST MEDICATION: Performed by: RADIOLOGY

## 2019-11-29 RX ADMIN — GADOTERIDOL 20 ML: 279.3 INJECTION, SOLUTION INTRAVENOUS at 19:44

## 2019-12-02 ENCOUNTER — HOSPITAL ENCOUNTER (OUTPATIENT)
Dept: RADIATION ONCOLOGY | Age: 59
Discharge: HOME | End: 2019-12-02
Attending: RADIOLOGY
Payer: MEDICARE

## 2019-12-02 PROCEDURE — 77386 HC NTSTY MODUL RAD TX DLVR CPLX: CPT | Performed by: RADIOLOGY

## 2019-12-03 ENCOUNTER — HOSPITAL ENCOUNTER (OUTPATIENT)
Dept: RADIATION ONCOLOGY | Age: 59
Discharge: HOME | End: 2019-12-03
Attending: RADIOLOGY
Payer: MEDICARE

## 2019-12-03 PROCEDURE — 77386 HC NTSTY MODUL RAD TX DLVR CPLX: CPT | Performed by: RADIOLOGY

## 2019-12-04 ENCOUNTER — HOSPITAL ENCOUNTER (OUTPATIENT)
Dept: RADIATION ONCOLOGY | Age: 59
Discharge: HOME | End: 2019-12-04
Attending: RADIOLOGY
Payer: MEDICARE

## 2019-12-04 ENCOUNTER — HOSPITAL ENCOUNTER (OUTPATIENT)
Dept: INFUSION THERAPY | Age: 59
Discharge: HOME | End: 2019-12-04
Payer: MEDICARE

## 2019-12-04 ENCOUNTER — OFFICE VISIT (OUTPATIENT)
Dept: ONCOLOGY | Age: 59
End: 2019-12-04
Payer: MEDICARE

## 2019-12-04 VITALS — WEIGHT: 230 LBS | BODY MASS INDEX: 37.69 KG/M2

## 2019-12-04 VITALS
BODY MASS INDEX: 36.93 KG/M2 | TEMPERATURE: 97.7 F | HEART RATE: 97 BPM | SYSTOLIC BLOOD PRESSURE: 154 MMHG | HEIGHT: 66 IN | DIASTOLIC BLOOD PRESSURE: 86 MMHG | RESPIRATION RATE: 18 BRPM | WEIGHT: 229.8 LBS

## 2019-12-04 VITALS
SYSTOLIC BLOOD PRESSURE: 171 MMHG | RESPIRATION RATE: 18 BRPM | TEMPERATURE: 98.4 F | HEART RATE: 90 BPM | DIASTOLIC BLOOD PRESSURE: 94 MMHG

## 2019-12-04 DIAGNOSIS — C53.9 MALIGNANT NEOPLASM OF CERVIX, UNSPECIFIED SITE (HCC): Primary | ICD-10-CM

## 2019-12-04 DIAGNOSIS — Z85.3 HX OF BREAST CANCER: ICD-10-CM

## 2019-12-04 DIAGNOSIS — C53.0 CANCER OF ENDOCERVIX (HCC): ICD-10-CM

## 2019-12-04 DIAGNOSIS — C53.0 MALIGNANT NEOPLASM OF ENDOCERVIX (HCC): Primary | ICD-10-CM

## 2019-12-04 LAB
ALBUMIN SERPL-MCNC: 3.8 G/DL (ref 3.5–5.2)
ALP BLD-CCNC: 115 U/L (ref 35–104)
ALT SERPL-CCNC: 20 U/L (ref 0–32)
ANION GAP SERPL CALCULATED.3IONS-SCNC: 11 MMOL/L (ref 7–16)
AST SERPL-CCNC: 28 U/L (ref 0–31)
BASOPHILS ABSOLUTE: 0.01 E9/L (ref 0–0.2)
BASOPHILS RELATIVE PERCENT: 0.2 % (ref 0–2)
BILIRUB SERPL-MCNC: <0.2 MG/DL (ref 0–1.2)
BUN BLDV-MCNC: 21 MG/DL (ref 8–23)
CA 125: 8.4 U/ML (ref 0–35)
CALCIUM SERPL-MCNC: 9.3 MG/DL (ref 8.6–10.2)
CHLORIDE BLD-SCNC: 107 MMOL/L (ref 98–107)
CO2: 26 MMOL/L (ref 22–29)
CREAT SERPL-MCNC: 0.9 MG/DL (ref 0.5–1)
EOSINOPHILS ABSOLUTE: 0.1 E9/L (ref 0.05–0.5)
EOSINOPHILS RELATIVE PERCENT: 2.4 % (ref 0–6)
GFR AFRICAN AMERICAN: >60
GFR NON-AFRICAN AMERICAN: >60 ML/MIN/1.73
GLUCOSE BLD-MCNC: 155 MG/DL (ref 74–99)
HCT VFR BLD CALC: 39.2 % (ref 34–48)
HEMOGLOBIN: 12.3 G/DL (ref 11.5–15.5)
IMMATURE GRANULOCYTES #: 0.02 E9/L
IMMATURE GRANULOCYTES %: 0.5 % (ref 0–5)
LYMPHOCYTES ABSOLUTE: 0.63 E9/L (ref 1.5–4)
LYMPHOCYTES RELATIVE PERCENT: 15 % (ref 20–42)
MAGNESIUM: 1.9 MG/DL (ref 1.6–2.6)
MCH RBC QN AUTO: 25.8 PG (ref 26–35)
MCHC RBC AUTO-ENTMCNC: 31.4 % (ref 32–34.5)
MCV RBC AUTO: 82.4 FL (ref 80–99.9)
MONOCYTES ABSOLUTE: 0.4 E9/L (ref 0.1–0.95)
MONOCYTES RELATIVE PERCENT: 9.5 % (ref 2–12)
NEUTROPHILS ABSOLUTE: 3.03 E9/L (ref 1.8–7.3)
NEUTROPHILS RELATIVE PERCENT: 72.4 % (ref 43–80)
PDW BLD-RTO: 14.9 FL (ref 11.5–15)
PLATELET # BLD: 193 E9/L (ref 130–450)
PMV BLD AUTO: 11.2 FL (ref 7–12)
POTASSIUM SERPL-SCNC: 4.8 MMOL/L (ref 3.5–5)
RBC # BLD: 4.76 E12/L (ref 3.5–5.5)
SODIUM BLD-SCNC: 144 MMOL/L (ref 132–146)
TOTAL PROTEIN: 6.9 G/DL (ref 6.4–8.3)
WBC # BLD: 4.2 E9/L (ref 4.5–11.5)

## 2019-12-04 PROCEDURE — 2580000003 HC RX 258

## 2019-12-04 PROCEDURE — 85025 COMPLETE CBC W/AUTO DIFF WBC: CPT

## 2019-12-04 PROCEDURE — 83735 ASSAY OF MAGNESIUM: CPT

## 2019-12-04 PROCEDURE — 77336 RADIATION PHYSICS CONSULT: CPT | Performed by: RADIOLOGY

## 2019-12-04 PROCEDURE — 96415 CHEMO IV INFUSION ADDL HR: CPT

## 2019-12-04 PROCEDURE — 99214 OFFICE O/P EST MOD 30 MIN: CPT | Performed by: INTERNAL MEDICINE

## 2019-12-04 PROCEDURE — 80053 COMPREHEN METABOLIC PANEL: CPT

## 2019-12-04 PROCEDURE — 77386 HC NTSTY MODUL RAD TX DLVR CPLX: CPT | Performed by: RADIOLOGY

## 2019-12-04 PROCEDURE — G8427 DOCREV CUR MEDS BY ELIG CLIN: HCPCS | Performed by: INTERNAL MEDICINE

## 2019-12-04 PROCEDURE — 96413 CHEMO IV INFUSION 1 HR: CPT

## 2019-12-04 PROCEDURE — 6360000002 HC RX W HCPCS: Performed by: INTERNAL MEDICINE

## 2019-12-04 PROCEDURE — G8484 FLU IMMUNIZE NO ADMIN: HCPCS | Performed by: INTERNAL MEDICINE

## 2019-12-04 PROCEDURE — 2580000003 HC RX 258: Performed by: INTERNAL MEDICINE

## 2019-12-04 PROCEDURE — 96375 TX/PRO/DX INJ NEW DRUG ADDON: CPT

## 2019-12-04 PROCEDURE — G8417 CALC BMI ABV UP PARAM F/U: HCPCS | Performed by: INTERNAL MEDICINE

## 2019-12-04 PROCEDURE — 1036F TOBACCO NON-USER: CPT | Performed by: INTERNAL MEDICINE

## 2019-12-04 PROCEDURE — 86304 IMMUNOASSAY TUMOR CA 125: CPT

## 2019-12-04 PROCEDURE — 96366 THER/PROPH/DIAG IV INF ADDON: CPT

## 2019-12-04 PROCEDURE — 99999 PR OFFICE/OUTPT VISIT,PROCEDURE ONLY: CPT | Performed by: RADIOLOGY

## 2019-12-04 PROCEDURE — 3017F COLORECTAL CA SCREEN DOC REV: CPT | Performed by: INTERNAL MEDICINE

## 2019-12-04 RX ORDER — SODIUM CHLORIDE 9 MG/ML
INJECTION, SOLUTION INTRAVENOUS CONTINUOUS
Status: CANCELLED | OUTPATIENT
Start: 2019-12-04

## 2019-12-04 RX ORDER — DEXAMETHASONE SODIUM PHOSPHATE 10 MG/ML
10 INJECTION INTRAMUSCULAR; INTRAVENOUS ONCE
Status: COMPLETED | OUTPATIENT
Start: 2019-12-04 | End: 2019-12-04

## 2019-12-04 RX ORDER — METHYLPREDNISOLONE SODIUM SUCCINATE 125 MG/2ML
125 INJECTION, POWDER, LYOPHILIZED, FOR SOLUTION INTRAMUSCULAR; INTRAVENOUS ONCE
Status: CANCELLED | OUTPATIENT
Start: 2019-12-04

## 2019-12-04 RX ORDER — DIPHENHYDRAMINE HYDROCHLORIDE 50 MG/ML
50 INJECTION INTRAMUSCULAR; INTRAVENOUS ONCE
Status: CANCELLED | OUTPATIENT
Start: 2019-12-04

## 2019-12-04 RX ORDER — SODIUM CHLORIDE 9 MG/ML
20 INJECTION, SOLUTION INTRAVENOUS ONCE
Status: CANCELLED | OUTPATIENT
Start: 2019-12-04

## 2019-12-04 RX ORDER — SODIUM CHLORIDE 0.9 % (FLUSH) 0.9 %
5 SYRINGE (ML) INJECTION PRN
Status: CANCELLED | OUTPATIENT
Start: 2019-12-04

## 2019-12-04 RX ORDER — EPINEPHRINE 1 MG/ML
0.3 INJECTION, SOLUTION, CONCENTRATE INTRAVENOUS PRN
Status: CANCELLED | OUTPATIENT
Start: 2019-12-04

## 2019-12-04 RX ORDER — SODIUM CHLORIDE 9 MG/ML
INJECTION, SOLUTION INTRAVENOUS
Status: DISCONTINUED
Start: 2019-12-04 | End: 2019-12-05 | Stop reason: HOSPADM

## 2019-12-04 RX ORDER — PALONOSETRON HYDROCHLORIDE 0.05 MG/ML
0.25 INJECTION, SOLUTION INTRAVENOUS ONCE
Status: COMPLETED | OUTPATIENT
Start: 2019-12-04 | End: 2019-12-04

## 2019-12-04 RX ORDER — SODIUM CHLORIDE 9 MG/ML
20 INJECTION, SOLUTION INTRAVENOUS ONCE
Status: DISCONTINUED | OUTPATIENT
Start: 2019-12-04 | End: 2019-12-05 | Stop reason: HOSPADM

## 2019-12-04 RX ORDER — PALONOSETRON HYDROCHLORIDE 0.05 MG/ML
0.25 INJECTION, SOLUTION INTRAVENOUS ONCE
Status: CANCELLED | OUTPATIENT
Start: 2019-12-04

## 2019-12-04 RX ORDER — HEPARIN SODIUM (PORCINE) LOCK FLUSH IV SOLN 100 UNIT/ML 100 UNIT/ML
500 SOLUTION INTRAVENOUS PRN
Status: CANCELLED | OUTPATIENT
Start: 2019-12-04

## 2019-12-04 RX ORDER — SODIUM CHLORIDE 0.9 % (FLUSH) 0.9 %
10 SYRINGE (ML) INJECTION PRN
Status: CANCELLED | OUTPATIENT
Start: 2019-12-04

## 2019-12-04 RX ORDER — SODIUM CHLORIDE 0.9 % (FLUSH) 0.9 %
10 SYRINGE (ML) INJECTION PRN
Status: DISCONTINUED | OUTPATIENT
Start: 2019-12-04 | End: 2019-12-05 | Stop reason: HOSPADM

## 2019-12-04 RX ORDER — DEXAMETHASONE SODIUM PHOSPHATE 10 MG/ML
10 INJECTION, SOLUTION INTRAMUSCULAR; INTRAVENOUS ONCE
Status: CANCELLED | OUTPATIENT
Start: 2019-12-04

## 2019-12-04 RX ADMIN — DEXAMETHASONE SODIUM PHOSPHATE 10 MG: 10 INJECTION INTRAMUSCULAR; INTRAVENOUS at 09:05

## 2019-12-04 RX ADMIN — SODIUM CHLORIDE 20 ML/HR: 9 INJECTION, SOLUTION INTRAVENOUS at 09:04

## 2019-12-04 RX ADMIN — PALONOSETRON 0.25 MG: 0.25 INJECTION, SOLUTION INTRAVENOUS at 09:05

## 2019-12-04 RX ADMIN — Medication 10 ML: at 09:05

## 2019-12-04 RX ADMIN — POTASSIUM CHLORIDE: 2 INJECTION, SOLUTION, CONCENTRATE INTRAVENOUS at 10:06

## 2019-12-04 RX ADMIN — FOSAPREPITANT 150 MG: 150 INJECTION, POWDER, LYOPHILIZED, FOR SOLUTION INTRAVENOUS at 09:16

## 2019-12-04 NOTE — PROGRESS NOTES
Patient tolerated anti-cancer therapy treatment well without complaint. IV access removed and site looks good. Patient discharged ambulatory.

## 2019-12-05 ENCOUNTER — TELEPHONE (OUTPATIENT)
Dept: CASE MANAGEMENT | Age: 59
End: 2019-12-05

## 2019-12-05 ENCOUNTER — HOSPITAL ENCOUNTER (OUTPATIENT)
Dept: RADIATION ONCOLOGY | Age: 59
Discharge: HOME | End: 2019-12-05
Attending: RADIOLOGY
Payer: MEDICARE

## 2019-12-05 PROCEDURE — 77386 HC NTSTY MODUL RAD TX DLVR CPLX: CPT | Performed by: RADIOLOGY

## 2019-12-06 ENCOUNTER — HOSPITAL ENCOUNTER (OUTPATIENT)
Dept: RADIATION ONCOLOGY | Age: 59
Discharge: HOME | End: 2019-12-06
Attending: RADIOLOGY
Payer: MEDICARE

## 2019-12-06 PROCEDURE — 77386 HC NTSTY MODUL RAD TX DLVR CPLX: CPT | Performed by: RADIOLOGY

## 2019-12-09 ENCOUNTER — HOSPITAL ENCOUNTER (OUTPATIENT)
Dept: RADIATION ONCOLOGY | Age: 59
Discharge: HOME | End: 2019-12-09
Attending: RADIOLOGY
Payer: MEDICARE

## 2019-12-09 ENCOUNTER — TELEPHONE (OUTPATIENT)
Dept: RADIATION ONCOLOGY | Age: 59
End: 2019-12-09

## 2019-12-09 PROCEDURE — 77386 HC NTSTY MODUL RAD TX DLVR CPLX: CPT | Performed by: RADIOLOGY

## 2019-12-10 ENCOUNTER — HOSPITAL ENCOUNTER (OUTPATIENT)
Dept: RADIATION ONCOLOGY | Age: 59
Discharge: HOME | End: 2019-12-10
Attending: RADIOLOGY
Payer: MEDICARE

## 2019-12-10 PROCEDURE — 77386 HC NTSTY MODUL RAD TX DLVR CPLX: CPT | Performed by: RADIOLOGY

## 2019-12-11 ENCOUNTER — HOSPITAL ENCOUNTER (OUTPATIENT)
Dept: RADIATION ONCOLOGY | Age: 59
Discharge: HOME | End: 2019-12-11
Attending: RADIOLOGY
Payer: MEDICARE

## 2019-12-11 ENCOUNTER — HOSPITAL ENCOUNTER (OUTPATIENT)
Dept: INFUSION THERAPY | Age: 59
Discharge: HOME | End: 2019-12-11
Payer: MEDICARE

## 2019-12-11 ENCOUNTER — OFFICE VISIT (OUTPATIENT)
Dept: ONCOLOGY | Age: 59
End: 2019-12-11
Payer: MEDICARE

## 2019-12-11 VITALS
TEMPERATURE: 97.9 F | HEART RATE: 92 BPM | RESPIRATION RATE: 18 BRPM | DIASTOLIC BLOOD PRESSURE: 96 MMHG | SYSTOLIC BLOOD PRESSURE: 178 MMHG

## 2019-12-11 VITALS
HEIGHT: 66 IN | DIASTOLIC BLOOD PRESSURE: 105 MMHG | BODY MASS INDEX: 37.11 KG/M2 | OXYGEN SATURATION: 98 % | TEMPERATURE: 97.4 F | HEART RATE: 85 BPM | WEIGHT: 230.9 LBS | SYSTOLIC BLOOD PRESSURE: 168 MMHG

## 2019-12-11 DIAGNOSIS — Z85.3 HX OF BREAST CANCER: ICD-10-CM

## 2019-12-11 DIAGNOSIS — C53.9 MALIGNANT NEOPLASM OF CERVIX, UNSPECIFIED SITE (HCC): Primary | ICD-10-CM

## 2019-12-11 DIAGNOSIS — C53.0 CANCER OF ENDOCERVIX (HCC): ICD-10-CM

## 2019-12-11 DIAGNOSIS — C53.0 MALIGNANT NEOPLASM OF ENDOCERVIX (HCC): Primary | ICD-10-CM

## 2019-12-11 LAB
ALBUMIN SERPL-MCNC: 3.7 G/DL (ref 3.5–5.2)
ALP BLD-CCNC: 100 U/L (ref 35–104)
ALT SERPL-CCNC: 20 U/L (ref 0–32)
ANION GAP SERPL CALCULATED.3IONS-SCNC: 12 MMOL/L (ref 7–16)
AST SERPL-CCNC: 18 U/L (ref 0–31)
BASOPHILS ABSOLUTE: 0 E9/L (ref 0–0.2)
BASOPHILS RELATIVE PERCENT: 0.3 % (ref 0–2)
BILIRUB SERPL-MCNC: <0.2 MG/DL (ref 0–1.2)
BUN BLDV-MCNC: 15 MG/DL (ref 8–23)
CA 125: 8.5 U/ML (ref 0–35)
CALCIUM SERPL-MCNC: 9.5 MG/DL (ref 8.6–10.2)
CHLORIDE BLD-SCNC: 103 MMOL/L (ref 98–107)
CO2: 25 MMOL/L (ref 22–29)
CREAT SERPL-MCNC: 0.7 MG/DL (ref 0.5–1)
EOSINOPHILS ABSOLUTE: 0.08 E9/L (ref 0.05–0.5)
EOSINOPHILS RELATIVE PERCENT: 2.6 % (ref 0–6)
GFR AFRICAN AMERICAN: >60
GFR NON-AFRICAN AMERICAN: >60 ML/MIN/1.73
GLUCOSE BLD-MCNC: 163 MG/DL (ref 74–99)
HCT VFR BLD CALC: 39.7 % (ref 34–48)
HEMOGLOBIN: 12.4 G/DL (ref 11.5–15.5)
LYMPHOCYTES ABSOLUTE: 0.19 E9/L (ref 1.5–4)
LYMPHOCYTES RELATIVE PERCENT: 6.1 % (ref 20–42)
MAGNESIUM: 2.1 MG/DL (ref 1.6–2.6)
MCH RBC QN AUTO: 25.6 PG (ref 26–35)
MCHC RBC AUTO-ENTMCNC: 31.2 % (ref 32–34.5)
MCV RBC AUTO: 81.9 FL (ref 80–99.9)
MONOCYTES ABSOLUTE: 0.43 E9/L (ref 0.1–0.95)
MONOCYTES RELATIVE PERCENT: 14 % (ref 2–12)
NEUTROPHILS ABSOLUTE: 2.39 E9/L (ref 1.8–7.3)
NEUTROPHILS RELATIVE PERCENT: 77.2 % (ref 43–80)
NUCLEATED RED BLOOD CELLS: 0.9 /100 WBC
OVALOCYTES: ABNORMAL
PDW BLD-RTO: 15.5 FL (ref 11.5–15)
PLATELET # BLD: 177 E9/L (ref 130–450)
PMV BLD AUTO: 11.3 FL (ref 7–12)
POIKILOCYTES: ABNORMAL
POTASSIUM SERPL-SCNC: 4.1 MMOL/L (ref 3.5–5)
RBC # BLD: 4.85 E12/L (ref 3.5–5.5)
SODIUM BLD-SCNC: 140 MMOL/L (ref 132–146)
TOTAL PROTEIN: 7 G/DL (ref 6.4–8.3)
WBC # BLD: 3.1 E9/L (ref 4.5–11.5)

## 2019-12-11 PROCEDURE — 86304 IMMUNOASSAY TUMOR CA 125: CPT

## 2019-12-11 PROCEDURE — 2580000003 HC RX 258: Performed by: INTERNAL MEDICINE

## 2019-12-11 PROCEDURE — 83735 ASSAY OF MAGNESIUM: CPT

## 2019-12-11 PROCEDURE — 1036F TOBACCO NON-USER: CPT | Performed by: INTERNAL MEDICINE

## 2019-12-11 PROCEDURE — 99999 PR OFFICE/OUTPT VISIT,PROCEDURE ONLY: CPT | Performed by: RADIOLOGY

## 2019-12-11 PROCEDURE — 77336 RADIATION PHYSICS CONSULT: CPT | Performed by: RADIOLOGY

## 2019-12-11 PROCEDURE — 77386 HC NTSTY MODUL RAD TX DLVR CPLX: CPT | Performed by: RADIOLOGY

## 2019-12-11 PROCEDURE — 96415 CHEMO IV INFUSION ADDL HR: CPT

## 2019-12-11 PROCEDURE — 85025 COMPLETE CBC W/AUTO DIFF WBC: CPT

## 2019-12-11 PROCEDURE — 96413 CHEMO IV INFUSION 1 HR: CPT

## 2019-12-11 PROCEDURE — 96366 THER/PROPH/DIAG IV INF ADDON: CPT

## 2019-12-11 PROCEDURE — 80053 COMPREHEN METABOLIC PANEL: CPT

## 2019-12-11 PROCEDURE — G8427 DOCREV CUR MEDS BY ELIG CLIN: HCPCS | Performed by: INTERNAL MEDICINE

## 2019-12-11 PROCEDURE — G8417 CALC BMI ABV UP PARAM F/U: HCPCS | Performed by: INTERNAL MEDICINE

## 2019-12-11 PROCEDURE — 6360000002 HC RX W HCPCS: Performed by: INTERNAL MEDICINE

## 2019-12-11 PROCEDURE — 36415 COLL VENOUS BLD VENIPUNCTURE: CPT

## 2019-12-11 PROCEDURE — G8484 FLU IMMUNIZE NO ADMIN: HCPCS | Performed by: INTERNAL MEDICINE

## 2019-12-11 PROCEDURE — 99214 OFFICE O/P EST MOD 30 MIN: CPT | Performed by: INTERNAL MEDICINE

## 2019-12-11 PROCEDURE — 96375 TX/PRO/DX INJ NEW DRUG ADDON: CPT

## 2019-12-11 PROCEDURE — 3017F COLORECTAL CA SCREEN DOC REV: CPT | Performed by: INTERNAL MEDICINE

## 2019-12-11 RX ORDER — PALONOSETRON HYDROCHLORIDE 0.05 MG/ML
0.25 INJECTION, SOLUTION INTRAVENOUS ONCE
Status: COMPLETED | OUTPATIENT
Start: 2019-12-11 | End: 2019-12-11

## 2019-12-11 RX ORDER — SODIUM CHLORIDE 0.9 % (FLUSH) 0.9 %
10 SYRINGE (ML) INJECTION PRN
Status: CANCELLED | OUTPATIENT
Start: 2019-12-11

## 2019-12-11 RX ORDER — HEPARIN SODIUM (PORCINE) LOCK FLUSH IV SOLN 100 UNIT/ML 100 UNIT/ML
500 SOLUTION INTRAVENOUS PRN
Status: CANCELLED | OUTPATIENT
Start: 2019-12-11

## 2019-12-11 RX ORDER — DIPHENHYDRAMINE HYDROCHLORIDE 50 MG/ML
50 INJECTION INTRAMUSCULAR; INTRAVENOUS ONCE
Status: CANCELLED | OUTPATIENT
Start: 2019-12-11

## 2019-12-11 RX ORDER — DEXAMETHASONE SODIUM PHOSPHATE 10 MG/ML
10 INJECTION, SOLUTION INTRAMUSCULAR; INTRAVENOUS ONCE
Status: CANCELLED | OUTPATIENT
Start: 2019-12-11

## 2019-12-11 RX ORDER — SODIUM CHLORIDE 9 MG/ML
20 INJECTION, SOLUTION INTRAVENOUS ONCE
Status: DISCONTINUED | OUTPATIENT
Start: 2019-12-11 | End: 2019-12-12 | Stop reason: HOSPADM

## 2019-12-11 RX ORDER — PALONOSETRON HYDROCHLORIDE 0.05 MG/ML
0.25 INJECTION, SOLUTION INTRAVENOUS ONCE
Status: CANCELLED | OUTPATIENT
Start: 2019-12-11

## 2019-12-11 RX ORDER — SODIUM CHLORIDE 9 MG/ML
INJECTION, SOLUTION INTRAVENOUS CONTINUOUS
Status: CANCELLED | OUTPATIENT
Start: 2019-12-11

## 2019-12-11 RX ORDER — SODIUM CHLORIDE 0.9 % (FLUSH) 0.9 %
10 SYRINGE (ML) INJECTION PRN
Status: DISCONTINUED | OUTPATIENT
Start: 2019-12-11 | End: 2019-12-12 | Stop reason: HOSPADM

## 2019-12-11 RX ORDER — SODIUM CHLORIDE 9 MG/ML
20 INJECTION, SOLUTION INTRAVENOUS ONCE
Status: CANCELLED | OUTPATIENT
Start: 2019-12-11

## 2019-12-11 RX ORDER — SODIUM CHLORIDE 0.9 % (FLUSH) 0.9 %
5 SYRINGE (ML) INJECTION PRN
Status: CANCELLED | OUTPATIENT
Start: 2019-12-11

## 2019-12-11 RX ORDER — DEXAMETHASONE SODIUM PHOSPHATE 10 MG/ML
10 INJECTION INTRAMUSCULAR; INTRAVENOUS ONCE
Status: COMPLETED | OUTPATIENT
Start: 2019-12-11 | End: 2019-12-11

## 2019-12-11 RX ORDER — METHYLPREDNISOLONE SODIUM SUCCINATE 125 MG/2ML
125 INJECTION, POWDER, LYOPHILIZED, FOR SOLUTION INTRAMUSCULAR; INTRAVENOUS ONCE
Status: CANCELLED | OUTPATIENT
Start: 2019-12-11

## 2019-12-11 RX ORDER — EPINEPHRINE 1 MG/ML
0.3 INJECTION, SOLUTION, CONCENTRATE INTRAVENOUS PRN
Status: CANCELLED | OUTPATIENT
Start: 2019-12-11

## 2019-12-11 RX ADMIN — POTASSIUM CHLORIDE: 2 INJECTION, SOLUTION, CONCENTRATE INTRAVENOUS at 10:29

## 2019-12-11 RX ADMIN — FOSAPREPITANT 150 MG: 150 INJECTION, POWDER, LYOPHILIZED, FOR SOLUTION INTRAVENOUS at 09:55

## 2019-12-11 RX ADMIN — DEXAMETHASONE SODIUM PHOSPHATE 10 MG: 10 INJECTION INTRAMUSCULAR; INTRAVENOUS at 09:24

## 2019-12-11 RX ADMIN — PALONOSETRON 0.25 MG: 0.25 INJECTION, SOLUTION INTRAVENOUS at 09:24

## 2019-12-11 RX ADMIN — SODIUM CHLORIDE 20 ML/HR: 9 INJECTION, SOLUTION INTRAVENOUS at 09:24

## 2019-12-12 ENCOUNTER — HOSPITAL ENCOUNTER (OUTPATIENT)
Dept: RADIATION ONCOLOGY | Age: 59
Discharge: HOME | End: 2019-12-12
Attending: RADIOLOGY
Payer: MEDICARE

## 2019-12-12 ENCOUNTER — TELEPHONE (OUTPATIENT)
Dept: CASE MANAGEMENT | Age: 59
End: 2019-12-12

## 2019-12-12 PROCEDURE — 77386 HC NTSTY MODUL RAD TX DLVR CPLX: CPT | Performed by: RADIOLOGY

## 2019-12-13 ENCOUNTER — HOSPITAL ENCOUNTER (OUTPATIENT)
Dept: RADIATION ONCOLOGY | Age: 59
Discharge: HOME | End: 2019-12-13
Attending: RADIOLOGY
Payer: MEDICARE

## 2019-12-13 PROCEDURE — 77386 HC NTSTY MODUL RAD TX DLVR CPLX: CPT | Performed by: RADIOLOGY

## 2019-12-16 ENCOUNTER — HOSPITAL ENCOUNTER (OUTPATIENT)
Dept: RADIATION ONCOLOGY | Age: 59
Discharge: HOME | End: 2019-12-16
Attending: RADIOLOGY
Payer: MEDICARE

## 2019-12-16 DIAGNOSIS — R21 SKIN RASH: ICD-10-CM

## 2019-12-16 DIAGNOSIS — C53.0 MALIGNANT NEOPLASM OF ENDOCERVIX (HCC): Primary | ICD-10-CM

## 2019-12-16 PROCEDURE — 77386 HC NTSTY MODUL RAD TX DLVR CPLX: CPT | Performed by: RADIOLOGY

## 2019-12-17 ENCOUNTER — TELEPHONE (OUTPATIENT)
Dept: CASE MANAGEMENT | Age: 59
End: 2019-12-17

## 2019-12-17 ENCOUNTER — HOSPITAL ENCOUNTER (OUTPATIENT)
Dept: RADIATION ONCOLOGY | Age: 59
Discharge: HOME | End: 2019-12-17
Attending: RADIOLOGY
Payer: MEDICARE

## 2019-12-17 PROCEDURE — 77386 HC NTSTY MODUL RAD TX DLVR CPLX: CPT | Performed by: RADIOLOGY

## 2019-12-18 ENCOUNTER — TELEPHONE (OUTPATIENT)
Dept: CASE MANAGEMENT | Age: 59
End: 2019-12-18

## 2019-12-18 ENCOUNTER — HOSPITAL ENCOUNTER (OUTPATIENT)
Dept: RADIATION ONCOLOGY | Age: 59
Discharge: HOME | End: 2019-12-18
Attending: RADIOLOGY
Payer: MEDICARE

## 2019-12-18 DIAGNOSIS — C53.0 MALIGNANT NEOPLASM OF ENDOCERVIX (HCC): Primary | ICD-10-CM

## 2019-12-18 PROCEDURE — 99999 PR OFFICE/OUTPT VISIT,PROCEDURE ONLY: CPT | Performed by: RADIOLOGY

## 2019-12-18 PROCEDURE — 77336 RADIATION PHYSICS CONSULT: CPT | Performed by: RADIOLOGY

## 2019-12-18 PROCEDURE — 77386 HC NTSTY MODUL RAD TX DLVR CPLX: CPT | Performed by: RADIOLOGY

## 2019-12-19 ENCOUNTER — HOSPITAL ENCOUNTER (OUTPATIENT)
Dept: INFUSION THERAPY | Age: 59
Discharge: HOME | End: 2019-12-19
Payer: MEDICARE

## 2019-12-19 ENCOUNTER — OFFICE VISIT (OUTPATIENT)
Dept: ONCOLOGY | Age: 59
End: 2019-12-19
Payer: MEDICARE

## 2019-12-19 ENCOUNTER — HOSPITAL ENCOUNTER (OUTPATIENT)
Dept: RADIATION ONCOLOGY | Age: 59
End: 2019-12-19
Attending: RADIOLOGY
Payer: MEDICARE

## 2019-12-19 VITALS
BODY MASS INDEX: 37.35 KG/M2 | HEIGHT: 66 IN | OXYGEN SATURATION: 98 % | HEART RATE: 101 BPM | DIASTOLIC BLOOD PRESSURE: 116 MMHG | SYSTOLIC BLOOD PRESSURE: 192 MMHG | TEMPERATURE: 97.6 F | WEIGHT: 232.4 LBS

## 2019-12-19 VITALS
TEMPERATURE: 96.9 F | OXYGEN SATURATION: 98 % | HEART RATE: 96 BPM | DIASTOLIC BLOOD PRESSURE: 105 MMHG | SYSTOLIC BLOOD PRESSURE: 207 MMHG

## 2019-12-19 DIAGNOSIS — C53.9 MALIGNANT NEOPLASM OF CERVIX, UNSPECIFIED SITE (HCC): Primary | ICD-10-CM

## 2019-12-19 DIAGNOSIS — C53.0 CANCER OF ENDOCERVIX (HCC): ICD-10-CM

## 2019-12-19 DIAGNOSIS — Z85.3 HX OF BREAST CANCER: ICD-10-CM

## 2019-12-19 LAB
ALBUMIN SERPL-MCNC: 3.9 G/DL (ref 3.5–5.2)
ALP BLD-CCNC: 102 U/L (ref 35–104)
ALT SERPL-CCNC: 18 U/L (ref 0–32)
ANION GAP SERPL CALCULATED.3IONS-SCNC: 14 MMOL/L (ref 7–16)
ANISOCYTOSIS: ABNORMAL
AST SERPL-CCNC: 19 U/L (ref 0–31)
BASOPHILS ABSOLUTE: 0.01 E9/L (ref 0–0.2)
BASOPHILS RELATIVE PERCENT: 0.5 % (ref 0–2)
BILIRUB SERPL-MCNC: <0.2 MG/DL (ref 0–1.2)
BUN BLDV-MCNC: 12 MG/DL (ref 8–23)
CA 125: 9.7 U/ML (ref 0–35)
CALCIUM SERPL-MCNC: 9.2 MG/DL (ref 8.6–10.2)
CHLORIDE BLD-SCNC: 108 MMOL/L (ref 98–107)
CO2: 24 MMOL/L (ref 22–29)
CREAT SERPL-MCNC: 0.7 MG/DL (ref 0.5–1)
EOSINOPHILS ABSOLUTE: 0.04 E9/L (ref 0.05–0.5)
EOSINOPHILS RELATIVE PERCENT: 2 % (ref 0–6)
GFR AFRICAN AMERICAN: >60
GFR NON-AFRICAN AMERICAN: >60 ML/MIN/1.73
GLUCOSE BLD-MCNC: 154 MG/DL (ref 74–99)
HCT VFR BLD CALC: 37.8 % (ref 34–48)
HEMOGLOBIN: 12 G/DL (ref 11.5–15.5)
IMMATURE GRANULOCYTES #: 0.01 E9/L
IMMATURE GRANULOCYTES %: 0.5 % (ref 0–5)
LYMPHOCYTES ABSOLUTE: 0.33 E9/L (ref 1.5–4)
LYMPHOCYTES RELATIVE PERCENT: 16.8 % (ref 20–42)
MAGNESIUM: 1.8 MG/DL (ref 1.6–2.6)
MCH RBC QN AUTO: 26.1 PG (ref 26–35)
MCHC RBC AUTO-ENTMCNC: 31.7 % (ref 32–34.5)
MCV RBC AUTO: 82.4 FL (ref 80–99.9)
MONOCYTES ABSOLUTE: 0.34 E9/L (ref 0.1–0.95)
MONOCYTES RELATIVE PERCENT: 17.3 % (ref 2–12)
NEUTROPHILS ABSOLUTE: 1.23 E9/L (ref 1.8–7.3)
NEUTROPHILS RELATIVE PERCENT: 62.9 % (ref 43–80)
OVALOCYTES: ABNORMAL
PDW BLD-RTO: 16 FL (ref 11.5–15)
PLATELET # BLD: 147 E9/L (ref 130–450)
PMV BLD AUTO: 11 FL (ref 7–12)
POIKILOCYTES: ABNORMAL
POLYCHROMASIA: ABNORMAL
POTASSIUM SERPL-SCNC: 4.2 MMOL/L (ref 3.5–5)
RBC # BLD: 4.59 E12/L (ref 3.5–5.5)
SODIUM BLD-SCNC: 146 MMOL/L (ref 132–146)
TOTAL PROTEIN: 7.3 G/DL (ref 6.4–8.3)
WBC # BLD: 2 E9/L (ref 4.5–11.5)

## 2019-12-19 PROCEDURE — 80053 COMPREHEN METABOLIC PANEL: CPT

## 2019-12-19 PROCEDURE — 85025 COMPLETE CBC W/AUTO DIFF WBC: CPT

## 2019-12-19 PROCEDURE — 83735 ASSAY OF MAGNESIUM: CPT

## 2019-12-19 PROCEDURE — 96375 TX/PRO/DX INJ NEW DRUG ADDON: CPT

## 2019-12-19 PROCEDURE — 96366 THER/PROPH/DIAG IV INF ADDON: CPT

## 2019-12-19 PROCEDURE — 99214 OFFICE O/P EST MOD 30 MIN: CPT | Performed by: INTERNAL MEDICINE

## 2019-12-19 PROCEDURE — 96415 CHEMO IV INFUSION ADDL HR: CPT

## 2019-12-19 PROCEDURE — 6360000002 HC RX W HCPCS: Performed by: INTERNAL MEDICINE

## 2019-12-19 PROCEDURE — 2580000003 HC RX 258

## 2019-12-19 PROCEDURE — 96413 CHEMO IV INFUSION 1 HR: CPT

## 2019-12-19 PROCEDURE — 3017F COLORECTAL CA SCREEN DOC REV: CPT | Performed by: INTERNAL MEDICINE

## 2019-12-19 PROCEDURE — 86304 IMMUNOASSAY TUMOR CA 125: CPT

## 2019-12-19 PROCEDURE — 2580000003 HC RX 258: Performed by: INTERNAL MEDICINE

## 2019-12-19 PROCEDURE — G8417 CALC BMI ABV UP PARAM F/U: HCPCS | Performed by: INTERNAL MEDICINE

## 2019-12-19 PROCEDURE — G8427 DOCREV CUR MEDS BY ELIG CLIN: HCPCS | Performed by: INTERNAL MEDICINE

## 2019-12-19 PROCEDURE — 1036F TOBACCO NON-USER: CPT | Performed by: INTERNAL MEDICINE

## 2019-12-19 PROCEDURE — G8484 FLU IMMUNIZE NO ADMIN: HCPCS | Performed by: INTERNAL MEDICINE

## 2019-12-19 RX ORDER — SODIUM CHLORIDE 9 MG/ML
INJECTION, SOLUTION INTRAVENOUS
Status: DISCONTINUED
Start: 2019-12-19 | End: 2019-12-20 | Stop reason: HOSPADM

## 2019-12-19 RX ORDER — SODIUM CHLORIDE 9 MG/ML
20 INJECTION, SOLUTION INTRAVENOUS ONCE
Status: CANCELLED | OUTPATIENT
Start: 2019-12-19

## 2019-12-19 RX ORDER — SODIUM CHLORIDE 0.9 % (FLUSH) 0.9 %
5 SYRINGE (ML) INJECTION PRN
Status: CANCELLED | OUTPATIENT
Start: 2019-12-19

## 2019-12-19 RX ORDER — SODIUM CHLORIDE 9 MG/ML
INJECTION, SOLUTION INTRAVENOUS CONTINUOUS
Status: CANCELLED | OUTPATIENT
Start: 2019-12-19

## 2019-12-19 RX ORDER — DEXAMETHASONE SODIUM PHOSPHATE 10 MG/ML
10 INJECTION, SOLUTION INTRAMUSCULAR; INTRAVENOUS ONCE
Status: CANCELLED | OUTPATIENT
Start: 2019-12-19

## 2019-12-19 RX ORDER — SODIUM CHLORIDE 0.9 % (FLUSH) 0.9 %
10 SYRINGE (ML) INJECTION PRN
Status: DISCONTINUED | OUTPATIENT
Start: 2019-12-19 | End: 2019-12-20 | Stop reason: HOSPADM

## 2019-12-19 RX ORDER — HEPARIN SODIUM (PORCINE) LOCK FLUSH IV SOLN 100 UNIT/ML 100 UNIT/ML
500 SOLUTION INTRAVENOUS PRN
Status: CANCELLED | OUTPATIENT
Start: 2019-12-19

## 2019-12-19 RX ORDER — METHYLPREDNISOLONE SODIUM SUCCINATE 125 MG/2ML
125 INJECTION, POWDER, LYOPHILIZED, FOR SOLUTION INTRAMUSCULAR; INTRAVENOUS ONCE
Status: CANCELLED | OUTPATIENT
Start: 2019-12-19

## 2019-12-19 RX ORDER — EPINEPHRINE 1 MG/ML
0.3 INJECTION, SOLUTION, CONCENTRATE INTRAVENOUS PRN
Status: CANCELLED | OUTPATIENT
Start: 2019-12-19

## 2019-12-19 RX ORDER — PALONOSETRON HYDROCHLORIDE 0.05 MG/ML
0.25 INJECTION, SOLUTION INTRAVENOUS ONCE
Status: COMPLETED | OUTPATIENT
Start: 2019-12-19 | End: 2019-12-19

## 2019-12-19 RX ORDER — SODIUM CHLORIDE 0.9 % (FLUSH) 0.9 %
10 SYRINGE (ML) INJECTION PRN
Status: CANCELLED | OUTPATIENT
Start: 2019-12-19

## 2019-12-19 RX ORDER — PALONOSETRON HYDROCHLORIDE 0.05 MG/ML
0.25 INJECTION, SOLUTION INTRAVENOUS ONCE
Status: CANCELLED | OUTPATIENT
Start: 2019-12-19

## 2019-12-19 RX ORDER — SODIUM CHLORIDE 9 MG/ML
20 INJECTION, SOLUTION INTRAVENOUS ONCE
Status: DISCONTINUED | OUTPATIENT
Start: 2019-12-19 | End: 2019-12-20 | Stop reason: HOSPADM

## 2019-12-19 RX ORDER — DEXAMETHASONE SODIUM PHOSPHATE 10 MG/ML
10 INJECTION INTRAMUSCULAR; INTRAVENOUS ONCE
Status: COMPLETED | OUTPATIENT
Start: 2019-12-19 | End: 2019-12-19

## 2019-12-19 RX ORDER — DIPHENHYDRAMINE HYDROCHLORIDE 50 MG/ML
50 INJECTION INTRAMUSCULAR; INTRAVENOUS ONCE
Status: CANCELLED | OUTPATIENT
Start: 2019-12-19

## 2019-12-19 RX ADMIN — SODIUM CHLORIDE 20 ML/HR: 9 INJECTION, SOLUTION INTRAVENOUS at 11:20

## 2019-12-19 RX ADMIN — FOSAPREPITANT 150 MG: 150 INJECTION, POWDER, LYOPHILIZED, FOR SOLUTION INTRAVENOUS at 11:22

## 2019-12-19 RX ADMIN — POTASSIUM CHLORIDE: 2 INJECTION, SOLUTION, CONCENTRATE INTRAVENOUS at 12:26

## 2019-12-19 RX ADMIN — SODIUM CHLORIDE, PRESERVATIVE FREE 10 ML: 5 INJECTION INTRAVENOUS at 12:25

## 2019-12-19 RX ADMIN — PALONOSETRON 0.25 MG: 0.25 INJECTION, SOLUTION INTRAVENOUS at 11:59

## 2019-12-19 RX ADMIN — DEXAMETHASONE SODIUM PHOSPHATE 10 MG: 10 INJECTION INTRAMUSCULAR; INTRAVENOUS at 12:03

## 2019-12-19 NOTE — PROGRESS NOTES
Spoke with IV team via phone, IV team aware of patient need for IV access.  They will come when available

## 2019-12-19 NOTE — PROGRESS NOTES
Patient /105 - Patient said that she has not taken her BP meds for 3 days, but was planning on taking them when she got home today from her treatments. Patient stated, \"I don't want anything else, I'm ready to go. \" NP, Anthony Donahue made aware and at patient chairside for evaluation. Patient denies headache -  Tearful, telling provider that her family came in here and made her all anxious. BP teaching reinforced.  OK for discharge per NP.

## 2019-12-20 ENCOUNTER — HOSPITAL ENCOUNTER (OUTPATIENT)
Dept: RADIATION ONCOLOGY | Age: 59
Discharge: HOME | End: 2019-12-20
Attending: RADIOLOGY
Payer: MEDICARE

## 2019-12-20 PROCEDURE — 77386 HC NTSTY MODUL RAD TX DLVR CPLX: CPT | Performed by: RADIOLOGY

## 2019-12-23 ENCOUNTER — HOSPITAL ENCOUNTER (OUTPATIENT)
Dept: RADIATION ONCOLOGY | Age: 59
Discharge: HOME | End: 2019-12-23
Attending: RADIOLOGY
Payer: MEDICARE

## 2019-12-23 PROCEDURE — 77386 HC NTSTY MODUL RAD TX DLVR CPLX: CPT | Performed by: RADIOLOGY

## 2019-12-24 ENCOUNTER — HOSPITAL ENCOUNTER (OUTPATIENT)
Dept: RADIATION ONCOLOGY | Age: 59
Discharge: HOME | End: 2019-12-24
Attending: RADIOLOGY
Payer: MEDICARE

## 2019-12-24 VITALS
OXYGEN SATURATION: 97 % | WEIGHT: 231.4 LBS | SYSTOLIC BLOOD PRESSURE: 150 MMHG | RESPIRATION RATE: 16 BRPM | BODY MASS INDEX: 37.92 KG/M2 | DIASTOLIC BLOOD PRESSURE: 102 MMHG | HEART RATE: 102 BPM

## 2019-12-24 DIAGNOSIS — C53.0 MALIGNANT NEOPLASM OF ENDOCERVIX (HCC): Primary | ICD-10-CM

## 2019-12-24 PROCEDURE — 99999 PR OFFICE/OUTPT VISIT,PROCEDURE ONLY: CPT | Performed by: RADIOLOGY

## 2019-12-24 PROCEDURE — 77386 HC NTSTY MODUL RAD TX DLVR CPLX: CPT | Performed by: RADIOLOGY

## 2019-12-26 ENCOUNTER — HOSPITAL ENCOUNTER (OUTPATIENT)
Dept: RADIATION ONCOLOGY | Age: 59
End: 2019-12-26
Attending: RADIOLOGY
Payer: MEDICARE

## 2019-12-26 PROCEDURE — 77386 HC NTSTY MODUL RAD TX DLVR CPLX: CPT | Performed by: RADIOLOGY

## 2019-12-27 ENCOUNTER — HOSPITAL ENCOUNTER (OUTPATIENT)
Dept: RADIATION ONCOLOGY | Age: 59
Discharge: HOME | End: 2019-12-27
Attending: RADIOLOGY
Payer: MEDICARE

## 2019-12-27 PROCEDURE — 77336 RADIATION PHYSICS CONSULT: CPT | Performed by: RADIOLOGY

## 2019-12-27 PROCEDURE — 77386 HC NTSTY MODUL RAD TX DLVR CPLX: CPT | Performed by: RADIOLOGY

## 2019-12-30 ENCOUNTER — TELEPHONE (OUTPATIENT)
Dept: CASE MANAGEMENT | Age: 59
End: 2019-12-30

## 2020-01-10 ENCOUNTER — TELEPHONE (OUTPATIENT)
Dept: CASE MANAGEMENT | Age: 60
End: 2020-01-10

## 2020-01-10 NOTE — TELEPHONE ENCOUNTER
Patient stopped in the office to advise that her HDR brachytherapy / interstitial treatment had to be rescheduled from 1/6/20 - 1/10/20 to 1/13/20 - 1/16/20 due to her blood counts being low. She brought [facility] paperwork be filled out for her son. Patient also states that she is scheduled for her left breast mastectomy at [facility] per Dr. Rylee Jaimes on 2/4/20.

## 2020-01-29 ENCOUNTER — PREP FOR PROCEDURE (OUTPATIENT)
Dept: SURGERY | Age: 60
End: 2020-01-29

## 2020-01-29 RX ORDER — SODIUM CHLORIDE 9 MG/ML
INJECTION, SOLUTION INTRAVENOUS CONTINUOUS
Status: CANCELLED | OUTPATIENT
Start: 2020-01-29

## 2020-01-29 RX ORDER — SODIUM CHLORIDE 0.9 % (FLUSH) 0.9 %
10 SYRINGE (ML) INJECTION EVERY 12 HOURS SCHEDULED
Status: CANCELLED | OUTPATIENT
Start: 2020-01-29

## 2020-02-03 ENCOUNTER — HOSPITAL ENCOUNTER (INPATIENT)
Age: 60
LOS: 5 days | Discharge: HOME | DRG: 377 | End: 2020-02-08
Attending: EMERGENCY MEDICINE | Admitting: INTERNAL MEDICINE
Payer: MEDICARE

## 2020-02-03 ENCOUNTER — APPOINTMENT (OUTPATIENT)
Dept: GENERAL RADIOLOGY | Age: 60
DRG: 377 | End: 2020-02-03
Payer: MEDICARE

## 2020-02-03 ENCOUNTER — TELEPHONE (OUTPATIENT)
Dept: ONCOLOGY | Age: 60
End: 2020-02-03

## 2020-02-03 PROBLEM — K92.2 ACUTE GI BLEEDING: Status: ACTIVE | Noted: 2020-02-03

## 2020-02-03 LAB
ABO/RH: NORMAL
ANION GAP SERPL CALCULATED.3IONS-SCNC: 14 MMOL/L (ref 7–16)
ANISOCYTOSIS: ABNORMAL
ANTIBODY SCREEN: NORMAL
BASOPHILS ABSOLUTE: 0 E9/L (ref 0–0.2)
BASOPHILS RELATIVE PERCENT: 0 % (ref 0–2)
BLOOD BANK DISPENSE STATUS: NORMAL
BLOOD BANK PRODUCT CODE: NORMAL
BPU ID: NORMAL
BUN BLDV-MCNC: 28 MG/DL (ref 8–23)
CALCIUM SERPL-MCNC: 9.1 MG/DL (ref 8.6–10.2)
CHLORIDE BLD-SCNC: 105 MMOL/L (ref 98–107)
CO2: 22 MMOL/L (ref 22–29)
CREAT SERPL-MCNC: 1.1 MG/DL (ref 0.5–1)
DESCRIPTION BLOOD BANK: NORMAL
EKG ATRIAL RATE: 109 BPM
EKG P AXIS: 62 DEGREES
EKG P-R INTERVAL: 120 MS
EKG Q-T INTERVAL: 354 MS
EKG QRS DURATION: 80 MS
EKG QTC CALCULATION (BAZETT): 476 MS
EKG R AXIS: 33 DEGREES
EKG T AXIS: -123 DEGREES
EKG VENTRICULAR RATE: 109 BPM
EOSINOPHILS ABSOLUTE: 0.1 E9/L (ref 0.05–0.5)
EOSINOPHILS RELATIVE PERCENT: 1.8 % (ref 0–6)
GFR AFRICAN AMERICAN: >60
GFR NON-AFRICAN AMERICAN: >60 ML/MIN/1.73
GLUCOSE BLD-MCNC: 181 MG/DL (ref 74–99)
HCT VFR BLD CALC: 21.1 % (ref 34–48)
HCT VFR BLD CALC: 22.6 % (ref 34–48)
HEMOGLOBIN: 6.5 G/DL (ref 11.5–15.5)
HEMOGLOBIN: 7.1 G/DL (ref 11.5–15.5)
HYPOCHROMIA: ABNORMAL
LYMPHOCYTES ABSOLUTE: 0.16 E9/L (ref 1.5–4)
LYMPHOCYTES RELATIVE PERCENT: 2.6 % (ref 20–42)
MCH RBC QN AUTO: 27.7 PG (ref 26–35)
MCHC RBC AUTO-ENTMCNC: 30.8 % (ref 32–34.5)
MCV RBC AUTO: 89.8 FL (ref 80–99.9)
METAMYELOCYTES RELATIVE PERCENT: 0.9 % (ref 0–1)
METER GLUCOSE: 111 MG/DL (ref 74–99)
MONOCYTES ABSOLUTE: 0.22 E9/L (ref 0.1–0.95)
MONOCYTES RELATIVE PERCENT: 4.4 % (ref 2–12)
NEUTROPHILS ABSOLUTE: 4.86 E9/L (ref 1.8–7.3)
NEUTROPHILS RELATIVE PERCENT: 89.4 % (ref 43–80)
NUCLEATED RED BLOOD CELLS: 0 /100 WBC
OVALOCYTES: ABNORMAL
PDW BLD-RTO: 21 FL (ref 11.5–15)
PLATELET # BLD: 205 E9/L (ref 130–450)
PMV BLD AUTO: 10.9 FL (ref 7–12)
POIKILOCYTES: ABNORMAL
POLYCHROMASIA: ABNORMAL
POTASSIUM SERPL-SCNC: 4.4 MMOL/L (ref 3.5–5)
PRO-BNP: 449 PG/ML (ref 0–125)
PROMYELOCYTES PERCENT: 0.9 % (ref 0–0)
RBC # BLD: 2.35 E12/L (ref 3.5–5.5)
SODIUM BLD-SCNC: 141 MMOL/L (ref 132–146)
TEAR DROP CELLS: ABNORMAL
TROPONIN: 0.05 NG/ML (ref 0–0.03)
WBC # BLD: 5.4 E9/L (ref 4.5–11.5)

## 2020-02-03 PROCEDURE — 99285 EMERGENCY DEPT VISIT HI MDM: CPT

## 2020-02-03 PROCEDURE — C9113 INJ PANTOPRAZOLE SODIUM, VIA: HCPCS | Performed by: EMERGENCY MEDICINE

## 2020-02-03 PROCEDURE — 85025 COMPLETE CBC W/AUTO DIFF WBC: CPT

## 2020-02-03 PROCEDURE — 71046 X-RAY EXAM CHEST 2 VIEWS: CPT

## 2020-02-03 PROCEDURE — 6370000000 HC RX 637 (ALT 250 FOR IP): Performed by: INTERNAL MEDICINE

## 2020-02-03 PROCEDURE — 83880 ASSAY OF NATRIURETIC PEPTIDE: CPT

## 2020-02-03 PROCEDURE — 6360000002 HC RX W HCPCS: Performed by: EMERGENCY MEDICINE

## 2020-02-03 PROCEDURE — 93010 ELECTROCARDIOGRAM REPORT: CPT | Performed by: INTERNAL MEDICINE

## 2020-02-03 PROCEDURE — 2060000000 HC ICU INTERMEDIATE R&B

## 2020-02-03 PROCEDURE — 86900 BLOOD TYPING SEROLOGIC ABO: CPT

## 2020-02-03 PROCEDURE — 99223 1ST HOSP IP/OBS HIGH 75: CPT | Performed by: INTERNAL MEDICINE

## 2020-02-03 PROCEDURE — 82962 GLUCOSE BLOOD TEST: CPT

## 2020-02-03 PROCEDURE — 86920 COMPATIBILITY TEST SPIN: CPT

## 2020-02-03 PROCEDURE — 2580000003 HC RX 258: Performed by: EMERGENCY MEDICINE

## 2020-02-03 PROCEDURE — 84484 ASSAY OF TROPONIN QUANT: CPT

## 2020-02-03 PROCEDURE — 93005 ELECTROCARDIOGRAM TRACING: CPT | Performed by: PHYSICIAN ASSISTANT

## 2020-02-03 PROCEDURE — 85014 HEMATOCRIT: CPT

## 2020-02-03 PROCEDURE — 2580000003 HC RX 258: Performed by: INTERNAL MEDICINE

## 2020-02-03 PROCEDURE — 36415 COLL VENOUS BLD VENIPUNCTURE: CPT

## 2020-02-03 PROCEDURE — 85018 HEMOGLOBIN: CPT

## 2020-02-03 PROCEDURE — 86850 RBC ANTIBODY SCREEN: CPT

## 2020-02-03 PROCEDURE — 94760 N-INVAS EAR/PLS OXIMETRY 1: CPT

## 2020-02-03 PROCEDURE — 80048 BASIC METABOLIC PNL TOTAL CA: CPT

## 2020-02-03 PROCEDURE — 36430 TRANSFUSION BLD/BLD COMPNT: CPT

## 2020-02-03 PROCEDURE — 86901 BLOOD TYPING SEROLOGIC RH(D): CPT

## 2020-02-03 PROCEDURE — P9016 RBC LEUKOCYTES REDUCED: HCPCS

## 2020-02-03 RX ORDER — GABAPENTIN 300 MG/1
300 CAPSULE ORAL NIGHTLY
Status: DISCONTINUED | OUTPATIENT
Start: 2020-02-03 | End: 2020-02-08 | Stop reason: HOSPADM

## 2020-02-03 RX ORDER — DEXTROSE MONOHYDRATE 50 MG/ML
100 INJECTION, SOLUTION INTRAVENOUS PRN
Status: DISCONTINUED | OUTPATIENT
Start: 2020-02-03 | End: 2020-02-08 | Stop reason: HOSPADM

## 2020-02-03 RX ORDER — DEXTROSE AND SODIUM CHLORIDE 5; .45 G/100ML; G/100ML
INJECTION, SOLUTION INTRAVENOUS CONTINUOUS
Status: DISCONTINUED | OUTPATIENT
Start: 2020-02-03 | End: 2020-02-05

## 2020-02-03 RX ORDER — ONDANSETRON 2 MG/ML
4 INJECTION INTRAMUSCULAR; INTRAVENOUS EVERY 6 HOURS PRN
Status: DISCONTINUED | OUTPATIENT
Start: 2020-02-03 | End: 2020-02-08 | Stop reason: HOSPADM

## 2020-02-03 RX ORDER — ACETAMINOPHEN 325 MG/1
650 TABLET ORAL EVERY 4 HOURS PRN
Status: DISCONTINUED | OUTPATIENT
Start: 2020-02-03 | End: 2020-02-08 | Stop reason: HOSPADM

## 2020-02-03 RX ORDER — SODIUM CHLORIDE 0.9 % (FLUSH) 0.9 %
10 SYRINGE (ML) INJECTION PRN
Status: DISCONTINUED | OUTPATIENT
Start: 2020-02-03 | End: 2020-02-08 | Stop reason: HOSPADM

## 2020-02-03 RX ORDER — 0.9 % SODIUM CHLORIDE 0.9 %
20 INTRAVENOUS SOLUTION INTRAVENOUS ONCE
Status: COMPLETED | OUTPATIENT
Start: 2020-02-03 | End: 2020-02-03

## 2020-02-03 RX ORDER — ATORVASTATIN CALCIUM 40 MG/1
40 TABLET, FILM COATED ORAL NIGHTLY
Status: DISCONTINUED | OUTPATIENT
Start: 2020-02-03 | End: 2020-02-08 | Stop reason: HOSPADM

## 2020-02-03 RX ORDER — SODIUM CHLORIDE 0.9 % (FLUSH) 0.9 %
10 SYRINGE (ML) INJECTION EVERY 12 HOURS SCHEDULED
Status: DISCONTINUED | OUTPATIENT
Start: 2020-02-03 | End: 2020-02-08 | Stop reason: HOSPADM

## 2020-02-03 RX ORDER — PANTOPRAZOLE SODIUM 40 MG/10ML
40 INJECTION, POWDER, LYOPHILIZED, FOR SOLUTION INTRAVENOUS ONCE
Status: COMPLETED | OUTPATIENT
Start: 2020-02-03 | End: 2020-02-03

## 2020-02-03 RX ORDER — NICOTINE POLACRILEX 4 MG
15 LOZENGE BUCCAL PRN
Status: DISCONTINUED | OUTPATIENT
Start: 2020-02-03 | End: 2020-02-08 | Stop reason: HOSPADM

## 2020-02-03 RX ORDER — DEXTROSE MONOHYDRATE 25 G/50ML
12.5 INJECTION, SOLUTION INTRAVENOUS PRN
Status: DISCONTINUED | OUTPATIENT
Start: 2020-02-03 | End: 2020-02-08 | Stop reason: HOSPADM

## 2020-02-03 RX ADMIN — SODIUM CHLORIDE 20 ML: 9 INJECTION, SOLUTION INTRAVENOUS at 18:56

## 2020-02-03 RX ADMIN — DEXTROSE AND SODIUM CHLORIDE: 5; 450 INJECTION, SOLUTION INTRAVENOUS at 23:41

## 2020-02-03 RX ADMIN — ATORVASTATIN CALCIUM 40 MG: 40 TABLET, FILM COATED ORAL at 21:15

## 2020-02-03 RX ADMIN — ACETAMINOPHEN 650 MG: 325 TABLET ORAL at 22:48

## 2020-02-03 RX ADMIN — PANTOPRAZOLE SODIUM 40 MG: 40 INJECTION, POWDER, FOR SOLUTION INTRAVENOUS at 16:44

## 2020-02-03 RX ADMIN — Medication 10 ML: at 21:15

## 2020-02-03 RX ADMIN — GABAPENTIN 300 MG: 300 CAPSULE ORAL at 21:15

## 2020-02-03 ASSESSMENT — PAIN DESCRIPTION - FREQUENCY: FREQUENCY: CONTINUOUS

## 2020-02-03 ASSESSMENT — ENCOUNTER SYMPTOMS
VOMITING: 0
SHORTNESS OF BREATH: 1
SORE THROAT: 0
COUGH: 0
BACK PAIN: 0
SINUS PRESSURE: 0
ABDOMINAL DISTENTION: 0
NAUSEA: 0
EYE PAIN: 0
EYE DISCHARGE: 0
DIARRHEA: 0
EYE REDNESS: 0
WHEEZING: 0

## 2020-02-03 ASSESSMENT — PAIN DESCRIPTION - DESCRIPTORS
DESCRIPTORS: ACHING
DESCRIPTORS: ACHING;DISCOMFORT

## 2020-02-03 ASSESSMENT — PAIN SCALES - GENERAL
PAINLEVEL_OUTOF10: 3
PAINLEVEL_OUTOF10: 7
PAINLEVEL_OUTOF10: 0
PAINLEVEL_OUTOF10: 0

## 2020-02-03 ASSESSMENT — PAIN DESCRIPTION - ORIENTATION
ORIENTATION: RIGHT
ORIENTATION: LEFT

## 2020-02-03 ASSESSMENT — PAIN DESCRIPTION - PAIN TYPE
TYPE: ACUTE PAIN
TYPE: ACUTE PAIN

## 2020-02-03 ASSESSMENT — PAIN DESCRIPTION - ONSET: ONSET: ON-GOING

## 2020-02-03 ASSESSMENT — PAIN DESCRIPTION - LOCATION
LOCATION: ARM
LOCATION: ARM

## 2020-02-03 ASSESSMENT — PAIN - FUNCTIONAL ASSESSMENT: PAIN_FUNCTIONAL_ASSESSMENT: ACTIVITIES ARE NOT PREVENTED

## 2020-02-03 ASSESSMENT — PAIN DESCRIPTION - PROGRESSION: CLINICAL_PROGRESSION: GRADUALLY WORSENING

## 2020-02-03 NOTE — ED NOTES
Lab called Pt Hemoglobin 6.5 Critical Value. Dr. Jai Almendarez.      Paulette Aguayo, RN  02/03/20 0258

## 2020-02-03 NOTE — TELEPHONE ENCOUNTER
Called patient to reschedule cancelled 01/16/20 follow up appointment. Patient having surgery 02/04/20 to remove left breast, did not want to reschedule at this time. Will call our office to reschedule once she is recovered.

## 2020-02-03 NOTE — ED PROVIDER NOTES
79-year-old female who presents for evaluation of chest pain and shortness of breath. Patient reports the past 3 days experiencing exertional chest pressure/heaviness with associated shortness of breath and difficulty walking that she describes as a lightheadedness from walking. He has never experienced these symptoms before. She denies any current symptoms. Complains of an ache to her left upper arm as well. She denies any actual numbness to her arm to me. Of note patient has past history of stroke with baseline left-sided weakness and left-sided Bell's palsy that is at her baseline she denies any increased weakness. Of note patient recently underwent admission for cervical cancer and completed radiation few weeks ago. She is scheduled to have a left mastectomy for a lump tomorrow by Dr. Bibiana Toribio. Review of Systems   Constitutional: Negative for chills and fever. HENT: Negative for ear pain, sinus pressure and sore throat. Eyes: Negative for pain, discharge and redness. Respiratory: Positive for shortness of breath. Negative for cough and wheezing. Cardiovascular: Positive for chest pain. Negative for palpitations and leg swelling. Gastrointestinal: Negative for abdominal distention, diarrhea, nausea and vomiting. Genitourinary: Negative for dysuria and frequency. Musculoskeletal: Negative for arthralgias and back pain. Skin: Negative for rash and wound. Neurological: Positive for light-headedness. Negative for numbness and headaches. Weakness: at baseline. Hematological: Negative for adenopathy. All other systems reviewed and are negative. Physical Exam  Vitals signs and nursing note reviewed. Constitutional:       Appearance: She is well-developed. HENT:      Head: Normocephalic and atraumatic. Eyes:      Conjunctiva/sclera: Conjunctivae normal.   Neck:      Musculoskeletal: Normal range of motion and neck supple.    Cardiovascular:      Rate and Rhythm: Regular rhythm. Tachycardia present. Pulses: Normal pulses. Heart sounds: Normal heart sounds. No murmur. Pulmonary:      Effort: Pulmonary effort is normal. No respiratory distress. Breath sounds: Wheezing present. No rales. Abdominal:      General: Bowel sounds are normal.      Palpations: Abdomen is soft. Tenderness: There is no abdominal tenderness. There is no guarding or rebound. Skin:     General: Skin is warm and dry. Neurological:      General: No focal deficit present. Mental Status: She is alert and oriented to person, place, and time. Cranial Nerves: No cranial nerve deficit. Sensory: No sensory deficit. Motor: Weakness (left leg at baseline) present. Coordination: Coordination normal.      Comments: Left facial droop at baseline          Procedures     MDM       --------------------------------------------- PAST HISTORY ---------------------------------------------  Past Medical History:  has a past medical history of [facility] cancer ([facility] Breast cancer ([facility] CVA (cerebral vascular accident) ([facility] DM [facility]diabetes mellitus[facility] Hyperlipidemia, and Hypertension. Past Surgical History:  has a past surgical history that includes Breast surgery (2000); Hysterectomy; Appendectomy; Knee arthroscopy; Foot surgery; Tonsillectomy; Breast reconstruction (Right); Mastectomy (Right, 2000); Colonoscopy; and Upper gastrointestinal endoscopy. Social History:  reports that she has quit smoking. Her smoking use included cigarettes. She has a 0.40 pack-year smoking history. She has never used smokeless tobacco. She reports previous alcohol use of about 6.0 standard drinks of alcohol per week. She reports current drug use. Frequency: 2.00 times per week. Drug: Marijuana. Family History: family history includes Breast Cancer in her mother; Heart Failure in her father and mother; Hypertension in her sister;  Other in her mother; Pacemaker in her father; Stroke in her found to be acutely anemic her hemoglobin dropping from 12-6 in the last 2 months. Reevaluation patient does admit to black tarry stools recently. Patient is guaiac positive on exam with dark stools therefore she is given Protonix for likely upper GI bleed blood transfusion admit for further evaluation and treatment. Counseling: The emergency provider has spoken with the patient and discussed todays results, in addition to providing specific details for the plan of care and counseling regarding the diagnosis and prognosis. Questions are answered at this time and they are agreeable with the plan.          --------------------------------- IMPRESSION AND DISPOSITION ---------------------------------    IMPRESSION  1. Gastrointestinal hemorrhage, unspecified gastrointestinal hemorrhage type        DISPOSITION  Disposition: Admit to telemetry  Patient condition is stable    Current Discharge Medication List          NOTE: This report was transcribed using voice recognition software.  Every effort was made to ensure accuracy; however, inadvertent computerized transcription errors may be present           Paula Parmar DO  Resident  02/04/20 4516

## 2020-02-04 ENCOUNTER — ANESTHESIA (OUTPATIENT)
Dept: ENDOSCOPY | Age: 60
DRG: 377 | End: 2020-02-04
Payer: MEDICARE

## 2020-02-04 ENCOUNTER — ANESTHESIA EVENT (OUTPATIENT)
Dept: ENDOSCOPY | Age: 60
DRG: 377 | End: 2020-02-04
Payer: MEDICARE

## 2020-02-04 LAB
ALBUMIN SERPL-MCNC: 3.3 G/DL (ref 3.5–5.2)
ALP BLD-CCNC: 65 U/L (ref 35–104)
ALT SERPL-CCNC: 11 U/L (ref 0–32)
ANION GAP SERPL CALCULATED.3IONS-SCNC: 8 MMOL/L (ref 7–16)
ANISOCYTOSIS: ABNORMAL
APTT: 32.3 SEC (ref 24.5–35.1)
AST SERPL-CCNC: 33 U/L (ref 0–31)
BASOPHILS ABSOLUTE: 0.01 E9/L (ref 0–0.2)
BASOPHILS RELATIVE PERCENT: 0.2 % (ref 0–2)
BILIRUB SERPL-MCNC: 0.2 MG/DL (ref 0–1.2)
BUN BLDV-MCNC: 24 MG/DL (ref 8–23)
CALCIUM SERPL-MCNC: 8.8 MG/DL (ref 8.6–10.2)
CHLORIDE BLD-SCNC: 111 MMOL/L (ref 98–107)
CHOLESTEROL, TOTAL: 93 MG/DL (ref 0–199)
CK MB: 22.3 NG/ML (ref 0–4.3)
CK MB: 35.7 NG/ML (ref 0–4.3)
CO2: 21 MMOL/L (ref 22–29)
CREAT SERPL-MCNC: 1 MG/DL (ref 0.5–1)
EOSINOPHILS ABSOLUTE: 0.04 E9/L (ref 0.05–0.5)
EOSINOPHILS RELATIVE PERCENT: 0.9 % (ref 0–6)
GFR AFRICAN AMERICAN: >60
GFR NON-AFRICAN AMERICAN: >60 ML/MIN/1.73
GLUCOSE BLD-MCNC: 132 MG/DL (ref 74–99)
HCT VFR BLD CALC: 24 % (ref 34–48)
HCT VFR BLD CALC: 25.5 % (ref 34–48)
HDLC SERPL-MCNC: 29 MG/DL
HEMOGLOBIN: 7.3 G/DL (ref 11.5–15.5)
HEMOGLOBIN: 8.1 G/DL (ref 11.5–15.5)
IMMATURE GRANULOCYTES #: 0.03 E9/L
IMMATURE GRANULOCYTES %: 0.7 % (ref 0–5)
INR BLD: 1
LDL CHOLESTEROL CALCULATED: 35 MG/DL (ref 0–99)
LYMPHOCYTES ABSOLUTE: 0.5 E9/L (ref 1.5–4)
LYMPHOCYTES RELATIVE PERCENT: 11.8 % (ref 20–42)
MAGNESIUM: 2 MG/DL (ref 1.6–2.6)
MCH RBC QN AUTO: 28.1 PG (ref 26–35)
MCHC RBC AUTO-ENTMCNC: 30.4 % (ref 32–34.5)
MCV RBC AUTO: 92.3 FL (ref 80–99.9)
METER GLUCOSE: 127 MG/DL (ref 74–99)
METER GLUCOSE: 144 MG/DL (ref 74–99)
METER GLUCOSE: 153 MG/DL (ref 74–99)
MONOCYTES ABSOLUTE: 0.57 E9/L (ref 0.1–0.95)
MONOCYTES RELATIVE PERCENT: 13.4 % (ref 2–12)
NEUTROPHILS ABSOLUTE: 3.1 E9/L (ref 1.8–7.3)
NEUTROPHILS RELATIVE PERCENT: 73 % (ref 43–80)
OVALOCYTES: ABNORMAL
PDW BLD-RTO: 19.9 FL (ref 11.5–15)
PLATELET # BLD: 190 E9/L (ref 130–450)
PMV BLD AUTO: 10.8 FL (ref 7–12)
POIKILOCYTES: ABNORMAL
POLYCHROMASIA: ABNORMAL
POTASSIUM REFLEX MAGNESIUM: 4.1 MMOL/L (ref 3.5–5)
PROTHROMBIN TIME: 11.6 SEC (ref 9.3–12.4)
RBC # BLD: 2.6 E12/L (ref 3.5–5.5)
SODIUM BLD-SCNC: 140 MMOL/L (ref 132–146)
T4 FREE: 1.17 NG/DL (ref 0.93–1.7)
TOTAL CK: 263 U/L (ref 20–180)
TOTAL CK: 337 U/L (ref 20–180)
TOTAL PROTEIN: 5.8 G/DL (ref 6.4–8.3)
TRIGL SERPL-MCNC: 147 MG/DL (ref 0–149)
TROPONIN: 0.26 NG/ML (ref 0–0.03)
TROPONIN: 0.32 NG/ML (ref 0–0.03)
TROPONIN: 0.36 NG/ML (ref 0–0.03)
TSH SERPL DL<=0.05 MIU/L-ACNC: 1.5 UIU/ML (ref 0.27–4.2)
VLDLC SERPL CALC-MCNC: 29 MG/DL
WBC # BLD: 4.3 E9/L (ref 4.5–11.5)

## 2020-02-04 PROCEDURE — 83735 ASSAY OF MAGNESIUM: CPT

## 2020-02-04 PROCEDURE — 2580000003 HC RX 258: Performed by: INTERNAL MEDICINE

## 2020-02-04 PROCEDURE — 93005 ELECTROCARDIOGRAM TRACING: CPT | Performed by: NURSE PRACTITIONER

## 2020-02-04 PROCEDURE — 6370000000 HC RX 637 (ALT 250 FOR IP): Performed by: INTERNAL MEDICINE

## 2020-02-04 PROCEDURE — 99222 1ST HOSP IP/OBS MODERATE 55: CPT | Performed by: INTERNAL MEDICINE

## 2020-02-04 PROCEDURE — 36415 COLL VENOUS BLD VENIPUNCTURE: CPT

## 2020-02-04 PROCEDURE — 2060000000 HC ICU INTERMEDIATE R&B

## 2020-02-04 PROCEDURE — 85018 HEMOGLOBIN: CPT

## 2020-02-04 PROCEDURE — 82550 ASSAY OF CK (CPK): CPT

## 2020-02-04 PROCEDURE — 99232 SBSQ HOSP IP/OBS MODERATE 35: CPT | Performed by: INTERNAL MEDICINE

## 2020-02-04 PROCEDURE — C9113 INJ PANTOPRAZOLE SODIUM, VIA: HCPCS | Performed by: STUDENT IN AN ORGANIZED HEALTH CARE EDUCATION/TRAINING PROGRAM

## 2020-02-04 PROCEDURE — 82962 GLUCOSE BLOOD TEST: CPT

## 2020-02-04 PROCEDURE — 85610 PROTHROMBIN TIME: CPT

## 2020-02-04 PROCEDURE — 80053 COMPREHEN METABOLIC PANEL: CPT

## 2020-02-04 PROCEDURE — P9016 RBC LEUKOCYTES REDUCED: HCPCS

## 2020-02-04 PROCEDURE — 84443 ASSAY THYROID STIM HORMONE: CPT

## 2020-02-04 PROCEDURE — 80061 LIPID PANEL: CPT

## 2020-02-04 PROCEDURE — APPSS180 APP SPLIT SHARED TIME > 60 MINUTES: Performed by: NURSE PRACTITIONER

## 2020-02-04 PROCEDURE — 84484 ASSAY OF TROPONIN QUANT: CPT

## 2020-02-04 PROCEDURE — 82553 CREATINE MB FRACTION: CPT

## 2020-02-04 PROCEDURE — 85014 HEMATOCRIT: CPT

## 2020-02-04 PROCEDURE — 85025 COMPLETE CBC W/AUTO DIFF WBC: CPT

## 2020-02-04 PROCEDURE — 6360000002 HC RX W HCPCS: Performed by: STUDENT IN AN ORGANIZED HEALTH CARE EDUCATION/TRAINING PROGRAM

## 2020-02-04 PROCEDURE — 84439 ASSAY OF FREE THYROXINE: CPT

## 2020-02-04 PROCEDURE — 36430 TRANSFUSION BLD/BLD COMPNT: CPT

## 2020-02-04 PROCEDURE — 85730 THROMBOPLASTIN TIME PARTIAL: CPT

## 2020-02-04 RX ORDER — METOPROLOL SUCCINATE 25 MG/1
25 TABLET, EXTENDED RELEASE ORAL DAILY
Status: DISCONTINUED | OUTPATIENT
Start: 2020-02-04 | End: 2020-02-05

## 2020-02-04 RX ORDER — PANTOPRAZOLE SODIUM 40 MG/10ML
40 INJECTION, POWDER, LYOPHILIZED, FOR SOLUTION INTRAVENOUS 2 TIMES DAILY
Status: DISCONTINUED | OUTPATIENT
Start: 2020-02-04 | End: 2020-02-08 | Stop reason: HOSPADM

## 2020-02-04 RX ORDER — 0.9 % SODIUM CHLORIDE 0.9 %
20 INTRAVENOUS SOLUTION INTRAVENOUS ONCE
Status: COMPLETED | OUTPATIENT
Start: 2020-02-04 | End: 2020-02-04

## 2020-02-04 RX ADMIN — GABAPENTIN 300 MG: 300 CAPSULE ORAL at 21:22

## 2020-02-04 RX ADMIN — Medication 10 ML: at 21:23

## 2020-02-04 RX ADMIN — METOPROLOL SUCCINATE 25 MG: 25 TABLET, FILM COATED, EXTENDED RELEASE ORAL at 14:52

## 2020-02-04 RX ADMIN — Medication 10 ML: at 08:42

## 2020-02-04 RX ADMIN — PANTOPRAZOLE SODIUM 40 MG: 40 INJECTION, POWDER, FOR SOLUTION INTRAVENOUS at 21:23

## 2020-02-04 RX ADMIN — PANTOPRAZOLE SODIUM 40 MG: 40 INJECTION, POWDER, FOR SOLUTION INTRAVENOUS at 08:42

## 2020-02-04 RX ADMIN — ATORVASTATIN CALCIUM 40 MG: 40 TABLET, FILM COATED ORAL at 21:22

## 2020-02-04 RX ADMIN — DEXTROSE AND SODIUM CHLORIDE: 5; 450 INJECTION, SOLUTION INTRAVENOUS at 13:46

## 2020-02-04 RX ADMIN — SODIUM CHLORIDE 20 ML: 9 INJECTION, SOLUTION INTRAVENOUS at 14:52

## 2020-02-04 ASSESSMENT — PAIN SCALES - GENERAL
PAINLEVEL_OUTOF10: 0

## 2020-02-04 NOTE — PROGRESS NOTES
[facility]   History and Physical      CHIEF COMPLAINT:  Shortness of breath    History of Present Illness: pt is a 60 y.o. female who presents to the hospital for shortness of breath. Pt states that she was doing well on Friday and symptoms started on Saturday. Pt noted dark stools on Saturday and Sunday. As the weekend went on, pt noticed that she was having dizziness, lightheadedness, and increasing sob and snowden. Pt states that prior to coming to ER, she was feeling her symptoms worsen and felt like she was going to pass out. Pt denies fevers, chills,n/v, abd pain, diarrhea, constipation, hematochezia, change in urination, dysuria, or hematuria. Pt was supposed to have mastectomy tomorrow with her surgeon for breast cancer in left breast. Pt has had hx of cancer in right breast and cervical ca in which she has had chemo/radiation. approx 4 years ago, she had cva which affected her left side. Pt has noted worsening of her weakness on left side within last few days but no new neuro symptoms    REVIEW OF SYSTEMS:    A detailed system review was conducted with patient and is negative unless stated in hpi. PMH:  Past Medical History:   Diagnosis Date    Breast cancer ([address] 2000, 2005    right    Breast cancer ([address] 2020    left    CVA (cerebral vascular accident) ([address]     DM (diabetes mellitus) ([address]     Hyperlipidemia     Hypertension        Surgical History:  Past Surgical History:   Procedure Laterality Date    APPENDECTOMY      BREAST RECONSTRUCTION Right     BREAST SURGERY  2000    right masectomy    COLONOSCOPY      FOOT SURGERY      right    HYSTERECTOMY      fibroids    KNEE ARTHROSCOPY      left    MASTECTOMY Right 2000    TONSILLECTOMY      UPPER GASTROINTESTINAL ENDOSCOPY         Medications Prior to Admission:    Prior to Admission medications    Medication Sig Start Date End Date Taking?  Authorizing Provider   glimepiride (AMARYL) 1 MG tablet Take rigidity. Breast/Rectal/Genitourinary: not pertinent. Extremities:  Negative for lower extremity edema  Skin:  Warm and dry  Vascular: 2/4 Dorsalis Pedis pulses bilaterally. Neuro:  Cranial nerves 2-12 grossly intact, no focal weakness or change in sensation noted. Extraocular muscles intact. Pupils equal, round, reactive to light. LABS:  Recent Labs     20  1455      K 4.4      CO2 22   BUN 28*   CREATININE 1.1*   GLUCOSE 181*   CALCIUM 9.1       Recent Labs     20  1455   WBC 5.4   RBC 2.35*   HGB 6.5*   HCT 21.1*   MCV 89.8   MCH 27.7   MCHC 30.8*   RDW 21.0*      MPV 10.9       No results for input(s): POCGLU in the last 72 hours. CBC with Differential:    Lab Results   Component Value Date    WBC 5.4 2020    RBC 2.35 2020    HGB 6.5 2020    HCT 21.1 2020     2020    MCV 89.8 2020    MCH 27.7 2020    MCHC 30.8 2020    RDW 21.0 2020    NRBC 0.0 2020    METASPCT 0.9 2020    LYMPHOPCT 2.6 2020    PROMYELOPCT 0.9 2020    MONOPCT 4.4 2020    BASOPCT 0.0 2020    MONOSABS 0.22 2020    LYMPHSABS 0.16 2020    EOSABS 0.10 2020    BASOSABS 0.00 2020     BMP:    Lab Results   Component Value Date     2020    K 4.4 2020     2020    CO2 22 2020    BUN 28 2020    LABALBU 3.9 2019    LABALBU 4.2 2012    CREATININE 1.1 2020    CALCIUM 9.1 2020    GFRAA >60 2020    LABGLOM >60 2020    GLUCOSE 181 2020    GLUCOSE 96 2012       Radiology: Xr Chest Standard (2 Vw)    Result Date: 2/3/2020  Patient MRN:  [id number] : 1960 Age: 60 years Gender: Female Order Date:  2/3/2020 2:45 PM TECHNIQUE/NUMBER OF IMAGES/COMPARISON/CLINICAL HISTORY: Chest PA and lateral views Of the images 2 views Comparison 2012 Chest pain, difficult breathing.  FINDINGS: Patient had previous surgery to the area of the right breast. Lungs are clear, no infiltrates, consolidations or pleural effusions are seen. Heart has normal size, mediastinum unremarkable. There is no perihilar vascular congestion. No acute cardiopulmonary process. ASSESSMENT:      Active Problems:    Acute GI bleeding  Resolved Problems:    * No resolved hospital problems. *      PLAN:    1. Gi bleed with melena npo. Surgery consult. ppi. Iv fluids. Hold asa/plavix  2. Hypovolemia due to gi bleed iv fluids monitor bp  3. Anemia due to acute blood loss monitor hgb and transfuse prn  4. Left breast ca. Surgery consulted for gi bleed. Surgery likely will have to be postponed until more stable. 5. Dm type 2 controlled monitor bs and tx with insulin  6. htn monitor bp and tx as able  7.  Hyperlipidemia continue med            Electronically signed by Snehal Townsend DO on 2/3/2020 at 7:36 PM

## 2020-02-04 NOTE — CONSULTS
GENERAL SURGERY  CONSULT NOTE  2/4/2020    Physician Consulted: Dr. Nohemi Shaffer  Reason for Consult: GIB  Referring Physician: Dr. Clarence Frey is a 60 y.o. female who presents for evaluation of chest pain, SOB, R arm numbness for past 4 days, also states has had melenic stools since then. Denies any nausea, vomiting, abdominal pain. Hgb 6.5, down from 12s in December. Also found to have elevated troponin. No Hx of ulcers, reflux, NSAIDs. Scheduled to undergo Left Mastectomy today with Dr Nohemi Shaffer for DCIS  Underwent radiation 3 weeks ago for cervical [facility]. Last Colonoscopy normal in 2019. No prior EGD. Past Medical History:   Diagnosis Date    Breast cancer ([address] 2000, 2005    right    Breast cancer ([address] 2020    left    CVA (cerebral vascular accident) ([address]     DM (diabetes mellitus) ([address]     Hyperlipidemia     Hypertension        Past Surgical History:   Procedure Laterality Date    APPENDECTOMY      BREAST RECONSTRUCTION Right     BREAST SURGERY  2000    right masectomy    COLONOSCOPY      FOOT SURGERY      right    HYSTERECTOMY      fibroids    KNEE ARTHROSCOPY      left    MASTECTOMY Right 2000    TONSILLECTOMY      UPPER GASTROINTESTINAL ENDOSCOPY         Medications Prior to Admission    Prior to Admission medications    Medication Sig Start Date End Date Taking? Authorizing Provider   glimepiride (AMARYL) 1 MG tablet Take 1 mg by mouth every morning (before breakfast)   Yes Historical Provider, MD   gabapentin (NEURONTIN) 300 MG capsule Take 300 mg by mouth nightly. Natalie Rodrigues Historical Provider, MD   aspirin 81 MG tablet Take 81 mg by mouth daily Ld 1/25/2020   Yes Historical Provider, MD   lisinopril (PRINIVIL;ZESTRIL) 20 MG tablet Take 20 mg by mouth daily    Historical Provider, MD   metFORMIN (GLUCOPHAGE) 500 MG tablet Take 500 mg by mouth 2 times daily (with meals)    Historical Provider, MD   atorvastatin (LIPITOR) 40 MG tablet Take 1 tablet by mouth nightly. 3/17/15   Micky Jarvis MD   amLODIPine (NORVASC) 10 MG tablet Take 1 tablet by mouth daily. 3/17/15   Micky Jarvis MD   clopidogrel (PLAVIX) 75 MG tablet Take 1 tablet by mouth daily. 3/17/15   Micky Carrasco MD       Allergies   Allergen Reactions    Pcn [Penicillins] Hives and Rash       Family History   Problem Relation Age of Onset    Stroke Mother     Heart Failure Mother     Other Mother         ICD    Breast Cancer Mother     Pacemaker Father     Heart Failure Father     Hypertension Sister        Social History     Tobacco Use    Smoking status: Former Smoker     Packs/day: 0.20     Years: 2.00     Pack years: 0.40     Types: Cigarettes    Smokeless tobacco: Never Used    Tobacco comment: as a teen   Substance Use Topics    Alcohol use: Not Currently     Alcohol/week: 6.0 standard drinks     Types: 6 Standard drinks or equivalent per week     Comment: social    Drug use: Yes     Frequency: 2.0 times per week     Types: Marijuana         Review of Systems: pertinent ROS listed in HPI, all others negative       PHYSICAL EXAM:    Vitals:    02/03/20 2246   BP: 122/76   Pulse: 85   Resp: 18   Temp: 98.1 °F (36.7 °C)   SpO2:        GENERAL:  NAD. A&Ox3. HEAD:  Normocephalic. Atraumatic. EYES:   No scleral icterus. LUNGS:  No increased work of breathing. CARDIOVASCULAR: RR  ABDOMEN:  Soft, non-distended, non-tender. No guarding, rigidity, rebound. EXTREMITIES:   MAEx4. Atraumatic. No LE edema. SKIN:  Warm and dry      ASSESSMENT/PLAN:  Anemia & Melena -- suspect upper GI bleed  Presented with chest pain, SOB, elevated troponin    Initially scheduled for Mastectomy today for Left DCIS -- canceled  Will arrange for EGD today  Pain and nausea control prn  Trend Hgb -- Transfuse for Hb <7 or symptomatic  IV PPI BID  NPO, IVF    Plan discussed with Dr. Danielle Jones.     Morales Laird, DO  Resident, PGY-2  2/4/2020  6:36 AM    Seen/examined  Agree with above  Will reschedule mastectomy once acute issue resolved  Adrienne

## 2020-02-04 NOTE — PROGRESS NOTES
140   K 4.4 4.1    111*   CO2 22 21*   BUN 28* 24*   CREATININE 1.1* 1.0   GLUCOSE 181* 132*   CALCIUM 9.1 8.8       Recent Labs     02/03/20  1455 02/03/20  2350 02/04/20  0658   WBC 5.4  --  4.3*   RBC 2.35*  --  2.60*   HGB 6.5* 7.1* 7.3*   HCT 21.1* 22.6* 24.0*   MCV 89.8  --  92.3   MCH 27.7  --  28.1   MCHC 30.8*  --  30.4*   RDW 21.0*  --  19.9*     --  190   MPV 10.9  --  10.8       Labs and images reviewed     Radiology:   XR CHEST STANDARD (2 VW)   Final Result   No acute cardiopulmonary process. Assessment:    Active Problems:    Acute GI bleeding    Gastrointestinal hemorrhage    Anemia due to acute blood loss    Controlled type 2 diabetes mellitus without complication ([facility] [address] [address]  Resolved Problems:    * No resolved hospital problems. *      Plan:  Chest pain /Dyspnea/dizziness on admission with V tach on tele and elevated trops - Card is consulted. Currently CP free  ,will monitor on tele floor. Acute blood loss anemia secondary to acute GI bleed/melena-surgery consulted and planning EGD. On PPI/IV fluids. Hold Plavix and aspirin. Monitor H&H and transfuse as needed. Left breast CA-was supposed to have left mastectomy, surgery is consulted for GI bleed. Most likely surgery will be postponed and she will follow-up with them as outpatient. Hypertension-monitor and treat as needed. Type 2 diabetes-on insulin sliding scale    Hyperlipidemia-resume as per home. Cervical  CA status post chemoradiation-outpatient follow-up. Status post CVA with left weakness.     On SCDs for DVT prophylaxis full code    Full code              Electronically signed by Meron Zapata MD on 2/4/2020 at 8:21 AM

## 2020-02-04 NOTE — ANESTHESIA PRE PROCEDURE
Aroldo Hric, DO   40 mg at 02/03/20 2115    sodium chloride flush 0.9 % injection 10 mL  10 mL Intravenous 2 times per day Aorldo Hric, DO   10 mL at 02/04/20 0842    sodium chloride flush 0.9 % injection 10 mL  10 mL Intravenous PRN Aroldo Hric, DO        magnesium hydroxide (MILK OF MAGNESIA) 400 MG/5ML suspension 30 mL  30 mL Oral Daily PRN Aroldo Hric, DO        ondansetron (ZOFRAN) injection 4 mg  4 mg Intravenous Q6H PRN Aroldo Hric, DO        acetaminophen (TYLENOL) tablet 650 mg  650 mg Oral Q4H PRN Aroldo Hric, DO   650 mg at 02/03/20 2248    dextrose 5 % and 0.45 % sodium chloride infusion   Intravenous Continuous Aroldo Hric, DO 75 mL/hr at 02/04/20 1346      glucose (GLUTOSE) 40 % oral gel 15 g  15 g Oral PRN Aroldo Hric, DO        dextrose 50 % IV solution  12.5 g Intravenous PRN Aroldo Hric, DO        glucagon (rDNA) injection 1 mg  1 mg Intramuscular PRN Aroldo Hric, DO        dextrose 5 % solution  100 mL/hr Intravenous PRN Aroldo Hric, DO        insulin lispro (HUMALOG) injection vial 0-6 Units  0-6 Units Subcutaneous TID WC Aroldo Hric, DO   Stopped at 02/03/20 2016    insulin lispro (HUMALOG) injection vial 0-3 Units  0-3 Units Subcutaneous Nightly Aroldo Hric, DO   Stopped at 02/03/20 2016       Allergies:     Allergies   Allergen Reactions    Pcn [Penicillins] Hives and Rash       Problem List:    Patient Active Problem List   Diagnosis Code    Chest pain R07.9    Hypertension I10    Hx of breast cancer Z85.3    Hypertension I10    Breast cancer ([facility] C50.919    Left-sided weakness R53.1    Controlled type 2 diabetes mellitus without complication, without long-term current use of insulin (HCC) E11.9    History of CVA (cerebrovascular accident) Z80.78    Hypertensive left ventricular hypertrophy, without heart failure I11.9    Abnormal EKG R94.31    Chronic midline low back pain with left-sided sciatica M54.42, G89.29    Non morbid obesity E66.9    Cervical cancer ([facility] C53.9    Malignant neoplasm of endocervix (HCC) C53.0    Acute GI bleeding K92.2    Gastrointestinal hemorrhage K92.2    Anemia due to acute blood loss D62    Controlled type 2 diabetes mellitus without complication ([facility] Z19.1       Past Medical History:        Diagnosis Date    Breast cancer ([facility] 2000, 2005    right    Breast cancer ([facility] 2020    left    CVA (cerebral vascular accident) ([facility]     DM (diabetes mellitus) ([facility]     Hyperlipidemia     Hypertension        Past Surgical History:        Procedure Laterality Date    APPENDECTOMY      BREAST RECONSTRUCTION Right     BREAST SURGERY  2000    right masectomy    COLONOSCOPY      FOOT SURGERY      right    HYSTERECTOMY      fibroids    KNEE ARTHROSCOPY      left    MASTECTOMY Right 2000    TONSILLECTOMY      UPPER GASTROINTESTINAL ENDOSCOPY         Social History:    Social History     Tobacco Use    Smoking status: Former Smoker     Packs/day: 0.20     Years: 2.00     Pack years: 0.40     Types: Cigarettes    Smokeless tobacco: Never Used    Tobacco comment: as a teen   Substance Use Topics    Alcohol use: Not Currently     Alcohol/week: 6.0 standard drinks     Types: 6 Standard drinks or equivalent per week     Comment: social                                Counseling given: Not Answered  Comment: as a teen      Vital Signs (Current):   Vitals:    02/03/20 1950 02/03/20 2246 02/04/20 0400 02/04/20 0830   BP:  122/76  134/66   Pulse:  85  85   Resp:  18  16   Temp:  36.7 °C (98.1 °F)  36.6 °C (97.8 °F)   TempSrc:  Oral  Oral   SpO2: 97%   100%   Weight:   235 lb 3.2 oz (106.7 kg)    Height:                                                  BP Readings from Last 3 Encounters:   02/04/20 134/66   12/24/19 (!) 150/102   12/19/19 (!) 207/105       NPO Status:  Pt instructed not to eat or drink anything after midnight prior to procedure 2/5/2020                                                                               BMI:

## 2020-02-04 NOTE — PLAN OF CARE
Problem: Bleeding:  Goal: Will show no signs and symptoms of excessive bleeding  Description  Will show no signs and symptoms of excessive bleeding  Outcome: Ongoing     Problem: Safety:  Goal: Ability to remain free from injury will improve  Description  Ability to remain free from injury will improve  Outcome: Met This Shift     Problem: Falls - Risk of:  Goal: Will remain free from falls  Description  Will remain free from falls  Outcome: Met This Shift

## 2020-02-04 NOTE — CONSULTS
Inpatient Cardiology Consultation      Reason for Consult:  Chest Pain, SOB, NSVT, and elevated troponin    Consulting Physician: Dr Anabell Price    Requesting Physician:  Dr Teresa Cooper    Date of Consultation: 2/4/2020    HISTORY OF PRESENT ILLNESS: 59 yo AAF not previously known to any cardiologist.  Gladys Brenner: HTN, HLD, T2DM, obesity, breast carcinoma s/p mastectomy with return of R breast carcinoma in 2015 s/p XRT, cervical carcinoma currently undergoing XRT, chronic back pain, CVA in 2015, ex binge drinker, smoked in her 20's, 4/2019 non ischemic stress, and TTE 4/2019 EF 55% with no VHD. Dark stools x 3 days with chest pressure radiating to L arm with ROMERO improved with rest worse with ambulation. 2/3/2020 developed CP with diaphoresis and SOB and it did not resolve with rest presented to  [facility] 2/3/2020 for evaluation. Upon arrival /75  and ST. Na 141, Bun/Cr 28/1.1-->24/1.0, p-, troponin 0.05-->0.26-->0.32 with  and CKMB 35.7, T chol 93, HDL 29, LDL 35, triglycerides 147, albumin 3.3, AST 33, T protein 5.8, Hgb 6.5-->7.1-->7.3 transfused 1 unit PRBC, INR 1.0. 2 view CXR showing no CHF or infiltrates. Please note: past medical records were reviewed per electronic medical record (EMR) - see detailed reports under Past Medical/ Surgical History. Past Medical/Surgical History:   1. Smoked in her 19's  2. Hx Binge drinking  3. HTN  4. HLD  5. T2DM  6. history of R breast cancer in 2000 with recurrence in 2015 s/p total mastectomy with reconstruction/TRAM flap + chest wall XRT  7. CVA in 2015 with L sided weakness (minimal)  8. Hx Bell's palsy in 2016  9. 8/22/2019 LEEP-->Invasive poorly-differentiated adenocarcinoma. The invasive adenocarcinoma involves the deep (radial) excision margin.   10. FIGO stage IB invasive squamous cell carcinoma of the cervical stump (pt is s/p supracervical hysterectomy in the 80s), now s/p chemo-XRT to 45 Gy in 25 fx with concurrent weekly cisplatin, Current Facility-Administered Medications: pantoprazole (PROTONIX) injection 40 mg, 40 mg, Intravenous, BID  perflutren lipid microspheres (DEFINITY) injection 1.65 mg, 1.5 mL, Intravenous, ONCE PRN  metoprolol succinate (TOPROL XL) extended release tablet 25 mg, 25 mg, Oral, Daily  0.9 % sodium chloride bolus, 20 mL, Intravenous, Once  gabapentin (NEURONTIN) capsule 300 mg, 300 mg, Oral, Nightly  atorvastatin (LIPITOR) tablet 40 mg, 40 mg, Oral, Nightly  sodium chloride flush 0.9 % injection 10 mL, 10 mL, Intravenous, 2 times per day  sodium chloride flush 0.9 % injection 10 mL, 10 mL, Intravenous, PRN  magnesium hydroxide (MILK OF MAGNESIA) 400 MG/5ML suspension 30 mL, 30 mL, Oral, Daily PRN  ondansetron (ZOFRAN) injection 4 mg, 4 mg, Intravenous, Q6H PRN  acetaminophen (TYLENOL) tablet 650 mg, 650 mg, Oral, Q4H PRN  dextrose 5 % and 0.45 % sodium chloride infusion, , Intravenous, Continuous  glucose (GLUTOSE) 40 % oral gel 15 g, 15 g, Oral, PRN  dextrose 50 % IV solution, 12.5 g, Intravenous, PRN  glucagon (rDNA) injection 1 mg, 1 mg, Intramuscular, PRN  dextrose 5 % solution, 100 mL/hr, Intravenous, PRN  insulin lispro (HUMALOG) injection vial 0-6 Units, 0-6 Units, Subcutaneous, TID WC  insulin lispro (HUMALOG) injection vial 0-3 Units, 0-3 Units, Subcutaneous, Nightly    Allergies:  Pcn [penicillins]: rash/hives    Social History:    Smoked cigarettes in her 19's  Hx Bunge drinking  Hx Marijuana use  Activity: Lives alone in [facility] home. + CP and ROMERO with activity. Hasn't exercised at [facility] since 10/2019 (water aerobics). No assistive devices  Code Status: Full Code      Family History: Mother alive age [de-identified] with HTN, DM. No reported CAD or PCI  Father  age 80 from Alzheimer's. HTN, HLD, CHF, PPM.No reported CAD/PCI  Sister alive age 58 with HTN.  No reported CAD or DM        REVIEW OF SYSTEMS:     · Constitutional: Denies fatigue, fevers, chills or night sweats  · Eyes: Denies to GI bleed - Nonischemic stress 4/2019, - Check 2D Echo for any WMA and LV function, Needs Anemia w/u prior to invasive cardiac testing.     Severe Anemia due to GI bleed - s/p PRBC transfusion, Keep Hgb >8     GI bleed - Surgery on case, Cleared for Endosocpy - No further CP, No acute CHF;   Moderate cardiac risk (she is aware)     NS VT - Asymptomatic, Mag levels normal, Check Echo for LV Function     Hx of CVA     HTN - Controlled     Cervical cancer     Breast cancer, left              No family at bed side     Thank you for the consult.           Yessenia Torres MD  2/4/2020  [facility]) Cardiology

## 2020-02-04 NOTE — FLOWSHEET NOTE
Dr Dom Bolanos called on call for Dr Milla Edwards informed of pt admitted for Hgb of 6.5 and was to get surgery 2/4 with Dr Milla Edwards.

## 2020-02-04 NOTE — PROGRESS NOTES
Reported episode of V-tach and elevated troponin on a.m. labs to Dr. Betsy Ventura. New orders obtained to consult cardiology.   Electronically signed by Staci Carrera RN on 2/4/2020 at 9:49 AM

## 2020-02-04 NOTE — PROGRESS NOTES
[facility] Quality Flow/Interdisciplinary Rounds Progress Note        Quality Flow Rounds held on February 4, 2020    Disciplines Attending:  Bedside Nurse, ,  and Nursing Unit Leadership    Rivera Gama was admitted on 2/3/2020  2:18 PM    Anticipated Discharge Date:  Expected Discharge Date: 02/05/20    Disposition:    Rojas Score:  Rojas Scale Score: 21    Readmission Risk              Risk of Unplanned Readmission:        13           Discussed patient goal for the day, patient clinical progression, and barriers to discharge. The following Goal(s) of the Day/Commitment(s) have been identified:  Check surg plan, possible left side mastectomy today.       Sheeba Hollis  February 4, 2020

## 2020-02-04 NOTE — PROGRESS NOTES
Per [facility] Cardiology recommendation-patient hgb should be 8.0 or better before EGD, call placed to Dr. Funes Dural order. Per [facility] Cardiology ok for diet now and NPO after Midnight. Electronically signed by Renato Watson RN on 2/4/2020 at 12:23 PM     Message sent to Gen Surg resident to update on plan of care recommended by Cardiology.   Electronically signed by Renato Watson RN on 2/4/2020 at 12:26 PM

## 2020-02-04 NOTE — PROGRESS NOTES
Updated Endo on v-tach and elevated troponin. EGD on hold until eval by cardiology.   Electronically signed by Pino High RN on 2/4/2020 at 9:57 AM

## 2020-02-04 NOTE — PLAN OF CARE
Problem: Falls - Risk of:  Goal: Will remain free from falls  Description  Will remain free from falls  Outcome: Met This Shift     Problem: Safety:  Goal: Ability to remain free from injury will improve  Description  Ability to remain free from injury will improve  Outcome: Met This Shift

## 2020-02-05 VITALS — DIASTOLIC BLOOD PRESSURE: 78 MMHG | SYSTOLIC BLOOD PRESSURE: 122 MMHG | OXYGEN SATURATION: 100 %

## 2020-02-05 LAB
ALBUMIN SERPL-MCNC: 3.1 G/DL (ref 3.5–5.2)
ALP BLD-CCNC: 66 U/L (ref 35–104)
ALT SERPL-CCNC: 14 U/L (ref 0–32)
ANION GAP SERPL CALCULATED.3IONS-SCNC: 9 MMOL/L (ref 7–16)
AST SERPL-CCNC: 32 U/L (ref 0–31)
BILIRUB SERPL-MCNC: <0.2 MG/DL (ref 0–1.2)
BUN BLDV-MCNC: 18 MG/DL (ref 8–23)
CALCIUM SERPL-MCNC: 8.7 MG/DL (ref 8.6–10.2)
CHLORIDE BLD-SCNC: 111 MMOL/L (ref 98–107)
CK MB: 12.2 NG/ML (ref 0–4.3)
CK MB: 6.3 NG/ML (ref 0–4.3)
CK MB: 8 NG/ML (ref 0–4.3)
CO2: 21 MMOL/L (ref 22–29)
CREAT SERPL-MCNC: 1.2 MG/DL (ref 0.5–1)
EKG ATRIAL RATE: 92 BPM
EKG P AXIS: 33 DEGREES
EKG P-R INTERVAL: 126 MS
EKG Q-T INTERVAL: 338 MS
EKG QRS DURATION: 72 MS
EKG QTC CALCULATION (BAZETT): 417 MS
EKG R AXIS: -13 DEGREES
EKG T AXIS: -90 DEGREES
EKG VENTRICULAR RATE: 92 BPM
GFR AFRICAN AMERICAN: 55
GFR NON-AFRICAN AMERICAN: 55 ML/MIN/1.73
GLUCOSE BLD-MCNC: 126 MG/DL (ref 74–99)
HCT VFR BLD CALC: 25.1 % (ref 34–48)
HCT VFR BLD CALC: 26.3 % (ref 34–48)
HCT VFR BLD CALC: 27.1 % (ref 34–48)
HEMOGLOBIN: 7.9 G/DL (ref 11.5–15.5)
HEMOGLOBIN: 8.1 G/DL (ref 11.5–15.5)
HEMOGLOBIN: 8.6 G/DL (ref 11.5–15.5)
LV EF: 57 %
LVEF MODALITY: NORMAL
MAGNESIUM: 2 MG/DL (ref 1.6–2.6)
MCH RBC QN AUTO: 28.7 PG (ref 26–35)
MCHC RBC AUTO-ENTMCNC: 30 % (ref 32–34.5)
MCV RBC AUTO: 95.6 FL (ref 80–99.9)
METER GLUCOSE: 133 MG/DL (ref 74–99)
METER GLUCOSE: 135 MG/DL (ref 74–99)
METER GLUCOSE: 138 MG/DL (ref 74–99)
METER GLUCOSE: 148 MG/DL (ref 74–99)
PDW BLD-RTO: 19.4 FL (ref 11.5–15)
PLATELET # BLD: 180 E9/L (ref 130–450)
PMV BLD AUTO: 10.7 FL (ref 7–12)
POTASSIUM SERPL-SCNC: 4.1 MMOL/L (ref 3.5–5)
RBC # BLD: 2.75 E12/L (ref 3.5–5.5)
SODIUM BLD-SCNC: 141 MMOL/L (ref 132–146)
TOTAL CK: 158 U/L (ref 20–180)
TOTAL CK: 169 U/L (ref 20–180)
TOTAL CK: 197 U/L (ref 20–180)
TOTAL PROTEIN: 5.6 G/DL (ref 6.4–8.3)
TROPONIN: 0.47 NG/ML (ref 0–0.03)
TROPONIN: 0.56 NG/ML (ref 0–0.03)
TROPONIN: 0.61 NG/ML (ref 0–0.03)
WBC # BLD: 4.9 E9/L (ref 4.5–11.5)

## 2020-02-05 PROCEDURE — 6360000002 HC RX W HCPCS: Performed by: STUDENT IN AN ORGANIZED HEALTH CARE EDUCATION/TRAINING PROGRAM

## 2020-02-05 PROCEDURE — 6370000000 HC RX 637 (ALT 250 FOR IP): Performed by: NURSE PRACTITIONER

## 2020-02-05 PROCEDURE — 3E0G8GC INTRODUCTION OF OTHER THERAPEUTIC SUBSTANCE INTO UPPER GI, VIA NATURAL OR ARTIFICIAL OPENING ENDOSCOPIC: ICD-10-PCS | Performed by: SURGERY

## 2020-02-05 PROCEDURE — 83735 ASSAY OF MAGNESIUM: CPT

## 2020-02-05 PROCEDURE — 2580000003 HC RX 258: Performed by: NURSE ANESTHETIST, CERTIFIED REGISTERED

## 2020-02-05 PROCEDURE — 88342 IMHCHEM/IMCYTCHM 1ST ANTB: CPT

## 2020-02-05 PROCEDURE — 6370000000 HC RX 637 (ALT 250 FOR IP): Performed by: SURGERY

## 2020-02-05 PROCEDURE — 2580000003 HC RX 258: Performed by: SURGERY

## 2020-02-05 PROCEDURE — 82550 ASSAY OF CK (CPK): CPT

## 2020-02-05 PROCEDURE — 82962 GLUCOSE BLOOD TEST: CPT

## 2020-02-05 PROCEDURE — C9113 INJ PANTOPRAZOLE SODIUM, VIA: HCPCS | Performed by: STUDENT IN AN ORGANIZED HEALTH CARE EDUCATION/TRAINING PROGRAM

## 2020-02-05 PROCEDURE — 36415 COLL VENOUS BLD VENIPUNCTURE: CPT

## 2020-02-05 PROCEDURE — 93306 TTE W/DOPPLER COMPLETE: CPT

## 2020-02-05 PROCEDURE — 84484 ASSAY OF TROPONIN QUANT: CPT

## 2020-02-05 PROCEDURE — C9113 INJ PANTOPRAZOLE SODIUM, VIA: HCPCS | Performed by: SURGERY

## 2020-02-05 PROCEDURE — 0DB78ZX EXCISION OF STOMACH, PYLORUS, VIA NATURAL OR ARTIFICIAL OPENING ENDOSCOPIC, DIAGNOSTIC: ICD-10-PCS | Performed by: SURGERY

## 2020-02-05 PROCEDURE — 85018 HEMOGLOBIN: CPT

## 2020-02-05 PROCEDURE — 7100000010 HC PHASE II RECOVERY - FIRST 15 MIN: Performed by: SURGERY

## 2020-02-05 PROCEDURE — 2709999900 HC NON-CHARGEABLE SUPPLY: Performed by: SURGERY

## 2020-02-05 PROCEDURE — 82553 CREATINE MB FRACTION: CPT

## 2020-02-05 PROCEDURE — 85027 COMPLETE CBC AUTOMATED: CPT

## 2020-02-05 PROCEDURE — 99232 SBSQ HOSP IP/OBS MODERATE 35: CPT | Performed by: INTERNAL MEDICINE

## 2020-02-05 PROCEDURE — 3609012400 HC EGD TRANSORAL BIOPSY SINGLE/MULTIPLE: Performed by: SURGERY

## 2020-02-05 PROCEDURE — 3700000000 HC ANESTHESIA ATTENDED CARE: Performed by: SURGERY

## 2020-02-05 PROCEDURE — 85014 HEMATOCRIT: CPT

## 2020-02-05 PROCEDURE — 6360000002 HC RX W HCPCS: Performed by: SURGERY

## 2020-02-05 PROCEDURE — 7100000011 HC PHASE II RECOVERY - ADDTL 15 MIN: Performed by: SURGERY

## 2020-02-05 PROCEDURE — 99233 SBSQ HOSP IP/OBS HIGH 50: CPT | Performed by: INTERNAL MEDICINE

## 2020-02-05 PROCEDURE — 6360000002 HC RX W HCPCS: Performed by: NURSE ANESTHETIST, CERTIFIED REGISTERED

## 2020-02-05 PROCEDURE — 88305 TISSUE EXAM BY PATHOLOGIST: CPT

## 2020-02-05 PROCEDURE — 2580000003 HC RX 258: Performed by: INTERNAL MEDICINE

## 2020-02-05 PROCEDURE — 80053 COMPREHEN METABOLIC PANEL: CPT

## 2020-02-05 PROCEDURE — 2060000000 HC ICU INTERMEDIATE R&B

## 2020-02-05 PROCEDURE — 93010 ELECTROCARDIOGRAM REPORT: CPT | Performed by: INTERNAL MEDICINE

## 2020-02-05 RX ORDER — SODIUM CHLORIDE 9 MG/ML
INJECTION, SOLUTION INTRAVENOUS CONTINUOUS PRN
Status: DISCONTINUED | OUTPATIENT
Start: 2020-02-05 | End: 2020-02-05 | Stop reason: SDUPTHER

## 2020-02-05 RX ORDER — METOPROLOL SUCCINATE 50 MG/1
50 TABLET, EXTENDED RELEASE ORAL 2 TIMES DAILY
Status: DISCONTINUED | OUTPATIENT
Start: 2020-02-05 | End: 2020-02-08 | Stop reason: HOSPADM

## 2020-02-05 RX ORDER — METOPROLOL SUCCINATE 50 MG/1
50 TABLET, EXTENDED RELEASE ORAL DAILY
Status: DISCONTINUED | OUTPATIENT
Start: 2020-02-05 | End: 2020-02-05

## 2020-02-05 RX ORDER — PROPOFOL 10 MG/ML
INJECTION, EMULSION INTRAVENOUS PRN
Status: DISCONTINUED | OUTPATIENT
Start: 2020-02-05 | End: 2020-02-05 | Stop reason: SDUPTHER

## 2020-02-05 RX ORDER — SUCRALFATE 1 G/1
1 TABLET ORAL
Status: DISCONTINUED | OUTPATIENT
Start: 2020-02-05 | End: 2020-02-08 | Stop reason: HOSPADM

## 2020-02-05 RX ADMIN — Medication 10 ML: at 20:24

## 2020-02-05 RX ADMIN — SUCRALFATE 1 G: 1 TABLET ORAL at 20:25

## 2020-02-05 RX ADMIN — PROPOFOL 180 MG: 10 INJECTION, EMULSION INTRAVENOUS at 13:36

## 2020-02-05 RX ADMIN — PANTOPRAZOLE SODIUM 40 MG: 40 INJECTION, POWDER, FOR SOLUTION INTRAVENOUS at 20:24

## 2020-02-05 RX ADMIN — METOPROLOL SUCCINATE 50 MG: 50 TABLET, EXTENDED RELEASE ORAL at 20:25

## 2020-02-05 RX ADMIN — ATORVASTATIN CALCIUM 40 MG: 40 TABLET, FILM COATED ORAL at 20:25

## 2020-02-05 RX ADMIN — SODIUM CHLORIDE: 9 INJECTION, SOLUTION INTRAVENOUS at 13:21

## 2020-02-05 RX ADMIN — GABAPENTIN 300 MG: 300 CAPSULE ORAL at 20:25

## 2020-02-05 RX ADMIN — SUCRALFATE 1 G: 1 TABLET ORAL at 17:33

## 2020-02-05 RX ADMIN — METOPROLOL SUCCINATE 50 MG: 50 TABLET, EXTENDED RELEASE ORAL at 08:41

## 2020-02-05 RX ADMIN — PANTOPRAZOLE SODIUM 40 MG: 40 INJECTION, POWDER, FOR SOLUTION INTRAVENOUS at 08:42

## 2020-02-05 RX ADMIN — DEXTROSE AND SODIUM CHLORIDE: 5; 450 INJECTION, SOLUTION INTRAVENOUS at 06:17

## 2020-02-05 ASSESSMENT — PAIN SCALES - GENERAL
PAINLEVEL_OUTOF10: 0

## 2020-02-05 NOTE — PROGRESS NOTES
[facility]) Physicians        CARDIOLOGY                 INPATIENT PROGRESS NOTE          PATIENT SEEN IN FOLLOW UP FOR: Elevated Troponin, CP    Hospital Day: 3     Rona Prasad is a 60year old patient known to me       SUBJECTIVE: Denies any CP or SOB, No PND, No nausea, feeling better; No cough, No palpitaitons    ROS: Review of rest of 10 systems negative except as mentioned above    OBJECTIVE: No acute distress. See Assessment     Diagnostics:       Telemetry: sinus, brief WCT        Intake/Output Summary (Last 24 hours) at 2/5/2020 0820  Last data filed at 2/5/2020 0617  Gross per 24 hour   Intake 2333.01 ml   Output 1200 ml   Net 1133.01 ml       Labs:   CBC:   Recent Labs     02/04/20  0658 02/04/20  1840 02/05/20  0300   WBC 4.3*  --  4.9   HGB 7.3* 8.1* 7.9*   HCT 24.0* 25.5* 26.3*     --  180     BMP:   Recent Labs     02/04/20  0658 02/05/20  0300    141   K 4.1 4.1   CO2 21* 21*   BUN 24* 18   CREATININE 1.0 1.2*   LABGLOM >60 55   CALCIUM 8.8 8.7     Mag:   Recent Labs     02/04/20  1210   MG 2.0     Phos: No results for input(s): PHOS in the last 72 hours. TSH:   Recent Labs     02/04/20  0658   TSH 1.500     HgA1c:     BNP: No results for input(s): BNP in the last 72 hours.   PT/INR:   Recent Labs     02/04/20  0658   PROTIME 11.6   INR 1.0     APTT:  Recent Labs     02/04/20  0658   APTT 32.3     CARDIAC ENZYMES:  Recent Labs     02/04/20  0658 02/04/20  1210 02/04/20  1840   CKTOTAL  --  337* 263*   CKMB  --  35.7* 22.3*   TROPONINI 0.26* 0.32* 0.36*     FASTING LIPID PANEL:  Lab Results   Component Value Date    CHOL 93 02/04/2020    HDL 29 02/04/2020    LDLCALC 35 02/04/2020    TRIG 147 02/04/2020     LIVER PROFILE:  Recent Labs     02/04/20  0658 02/05/20  0300   AST 33* 32*   ALT 11 14   LABALBU 3.3* 3.1*       Current Inpatient Medications:   metoprolol succinate  50 mg Oral Daily    pantoprazole  40 mg Intravenous BID    gabapentin  300 mg Oral Nightly    further CP, No acute CHF;   Moderate cardiac risk (she is aware)     NS VT - Asymptomatic, Mag levels normal, Check Echo for LV Function, Increase beta blockers, Monitor BP/HR     Hx of CVA     HTN - Controlled     Cervical cancer     Breast cancer, left                 No family at bed side      Electronically signed by Melvin Mejia MD on 2/5/2020   [address]  Cardiology

## 2020-02-05 NOTE — PLAN OF CARE
Problem: Falls - Risk of:  Goal: Will remain free from falls  Description  Will remain free from falls  2/5/2020 1013 by Manju Ibarra RN  Outcome: Met This Shift  2/4/2020 2212 by Remi Maurice RN  Outcome: Met This Shift

## 2020-02-05 NOTE — OP NOTE
Olive Hatchet  YOB: 1960  [id number]    Pre-operative Diagnosis: UGI bleed    Post-operative Diagnosis: gastric and duodenal ulcers    Procedure: EGD with biopsies    Anesthesia: [facility]    Surgeon: Nikki Pradhan MD    Assistant: None    Estimated Blood Loss: none    Complications: none    Specimens: antrum    Procedure:  Pt was taken to the endoscopy suite and placed on the endoscopy table in a left lateral decubitus position. LMAC anesthesia was administered and a bite block was inserted. A lubricated gastroscope was inserted into the oropharynx and advanced into the esophagus. The esophagus was inspected throughout its length. There were no varices. No evidence of esophagitis. The GE junction was sharp and located at 36 cm. The stomach was entered and insufflated. There were small antral ulcers. Biopsies were taken for H Pylori. The pylorus was intubated. There was a large duodenal bulb ulcer without adherent clot or visible vessel. A smaller second portion ulcer was seen as well. The scope was pulled back into the antrum and retroflexed. The angle of the stomach was normal.  The proximal greater and lesser curves were normal.  The fundus was normal.  At the GE junction, there was no evidence of hiatal hernia. The stomach was deflated and the scope was withdrawn and removed. The patient tolerated the procedure well.     Impression: gastric and duodenal ulcers    Plan:PPI Carafate bland diet      Timmy Haas MD

## 2020-02-05 NOTE — PROGRESS NOTES
Inpatient Cardiology Progress note     PATIENT IS BEING FOLLOWED FOR: Chest Pain, SOB, NSVT, and elevated troponin    Abena Condon is a 60 y.o. AAF     SUBJECTIVE: Denies CP or SOB. OBJECTIVE: No apparent distress     ROS:  Consist: Denies fevers, chills or night sweats  Heart: Denies chest pain, palpitations, lightheadedness, dizziness or syncope  Lungs: Denies SOB, cough, wheezing, orthopnea or PND  GI: Denies abdominal pain, vomiting or diarrhea    PHYSICAL EXAM:   /71   Pulse 80   Temp 98.6 °F (37 °C) (Oral)   Resp 18   Ht 5' 5\" (1.651 m)   Wt 230 lb 8 oz (104.6 kg)   SpO2 100%   BMI 38.36 kg/m²    B/P Range last 24 hours: Systolic (34TQA), KLJ:351 , Min:109 , WMO:306    Diastolic (84PJU), ZAO:13, Min:66, Max:84  CONST:  Well developed, AAF who appears of stated age. Awake, alert and cooperative. No apparent distress. HEENT:   Head- Normocephalic, atraumatic   Eyes- Conjunctivae pink, anicteric  Throat- Oral mucosa pink and moist  Neck-  No stridor, trachea midline, no jugular venous distention. No carotid bruit. CHEST: Chest symmetrical and non-tender to palpation. No accessory muscle use or intercostal retractions  RESPIRATORY: Lung sounds - clear throughout fields   CARDIOVASCULAR:     Heart Ausculation- Regular rate and rhythm, no murmur. PV: No lower extremity edema. No varicosities. Pedal pulses palpable, no clubbing or cyanosis   ABDOMEN: Soft, non-tender to light palpation. Bowel sounds present. No palpable masses; no abdominal bruit  MS: Good muscle strength and tone. No atrophy or abnormal movements. : Deferred  SKIN: Warm and dry no statis dermatitis or ulcers   NEURO / PSYCH: Oriented to person, place and time. Speech clear and appropriate. Follows all commands. Pleasant affect         Intake/Output Summary (Last 24 hours) at 2/5/2020 0822  Last data filed at 2/5/2020 0617  Gross per 24 hour   Intake 2333.01 ml   Output 1200 ml   Net 1133.01 ml       Weight:    Wt 29 LDL 35 Triglycerides 147 on 2/4/2020  6. T2DM  7. Obesity  8. CVA in 2015 on Plavix/ASA (both on hold)  9. Cervical carcinoma undergoing XRT  10. R breast carcinoma in 2000 with recurrence in 2015 s/p total mastectomy with reconstruction/TRAM flap + chest wall XRT  11. L breast DCIS (was scheduled for mastectomy 2/4/2020 which was cancelled)  12. NSVT 2/4/2020 at 0930 (asymptomatic) again 2/4/2020 at 1825--> Magnesium 2.0/K+ 4.1  13. ARTURO    14. Hypoalbuminemia       PLAN:  1. Increase BB give dose this am prior to EGD  2. Will read TTE when done  3. EGD today  4. Further recommendations pending result of EGD and general surgery recommendations    Discussed with Dr David Walker  Electronically signed by Spencer Aleman. ROCHELLE Grimes on 2/5/2020 at 8:22 AM       Addendum:    As above, Agree    PATIENT SEEN IN FOLLOW UP FOR: Elevated Troponin, NS VT, CP     Hospital Day: 3     Shahida Farah is a 60year old patient known to me         SUBJECTIVE: Denies any CP or SOB, No PND, No nausea, feeling better; No cough, No palpitaitons     ROS: Review of rest of 10 systems negative except as mentioned above     OBJECTIVE: No acute distress. See Assessment      Diagnostics:       Telemetry: sinus, brief WCT           Intake/Output Summary (Last 24 hours) at 2/5/2020 0820  Last data filed at 2/5/2020 0617      Gross per 24 hour   Intake 2333.01 ml   Output 1200 ml   Net 1133.01 ml         Labs:   CBC:         Recent Labs     02/04/20  0658 02/04/20  1840 02/05/20  0300   WBC 4.3*  --  4.9   HGB 7.3* 8.1* 7.9*   HCT 24.0* 25.5* 26.3*     --  180      BMP:        Recent Labs     02/04/20  0658 02/05/20  0300    141   K 4.1 4.1   CO2 21* 21*   BUN 24* 18   CREATININE 1.0 1.2*   LABGLOM >60 55   CALCIUM 8.8 8.7      Mag:       Recent Labs     02/04/20  1210   MG 2.0      Phos: No results for input(s): PHOS in the last 72 hours.   TSH:       Recent Labs     02/04/20  0658   TSH 1.500      HgA1c:      BNP: No results for input(s): BNP in the last 72 hours. PT/INR:       Recent Labs     20  0658   PROTIME 11.6   INR 1.0      APTT:      Recent Labs     20  0658   APTT 32.3      CARDIAC ENZYMES:        Recent Labs     20  0658 20  1210 20  1840   CKTOTAL  --  337* 263*   CKMB  --  35.7* 22.3*   TROPONINI 0.26* 0.32* 0.36*      FASTING LIPID PANEL:        Lab Results   Component Value Date     CHOL 93 2020     HDL 29 2020     LDLCALC 35 2020     TRIG 147 2020      LIVER PROFILE:       Recent Labs     20  0658 20  0300   AST 33* 32*   ALT 11 14   LABALBU 3.3* 3.1*         Current Inpatient Medications:  Scheduled Medications    metoprolol succinate  50 mg Oral Daily    pantoprazole  40 mg Intravenous BID    gabapentin  300 mg Oral Nightly    atorvastatin  40 mg Oral Nightly    sodium chloride flush  10 mL Intravenous 2 times per day    insulin lispro  0-6 Units Subcutaneous TID WC    insulin lispro  0-3 Units Subcutaneous Nightly            IV Infusions (if any): Infusions Meds    dextrose 5 % and 0.45 % NaCl 75 mL/hr at 20 0617    dextrose                 PHYSICAL EXAM:      CONSTITUTIONAL:   /71   Pulse 80   Temp 98.6 °F (37 °C) (Oral)   Resp 18   Ht 5' 5\" (1.651 m)   Wt 230 lb 8 oz (104.6 kg)   SpO2 100%   BMI 38.36 kg/m²   Pulse  Av.3  Min: 80  Max: 97  Systolic (25QHV), CKH:661 , Min:109 , CHV:717    Diastolic (91BQK), RVA:15, Min:66, Max:84           In general, this is a well developed, well nourished who appears stated age. awake, alert, cooperative, no apparent distress     HEENT: eyes -conjunctivae pink,   Neck-  no stridor, no carotid bruit.  no jugular venous distention   RESPIRATORY: Chest non-tender to palpation.  No accessory muscles use.   Lung auscultation - clear to auscultation except few rhonchi  CARDIOVASCULAR:     Heart Inspection shows no noted pulsations  Heart Palpation - no palpable thrills  Heart Ausculation - Regular rate and rhythm, 1/6 systolic murmur, No s3 or rub. No lower extremity edema, no varicosities. Distal pulses palpable, no clubbing or cyanosis   ABDOMEN: Soft, nontender,  Obese, Bowel sounds present. MS: n/a. : Deferred  Rectal Exam: Deferred  SKIN: warm and dry  NEURO / PSYCH: oriented to person, place           Impression/Recommendations:     Elevated Troponin - Likely Type II NSTEMI from demand ischemia - Continue Betablockers, hold any anticaogulation and Aspirin due to GI bleed and severe anemia -  Nonischemic stress test in 4/2019.  Check 2D Echo for any WMA and LV function, Not stable for cardiac interventional procedures - Needs Anemia w/u prior to invasive cardiac testing.     Severe Anemia due to GI bleed - s/p PRBC transfusion, Keep Hgb >8     GI bleed - Surgery on case, Cleared for Endosocpy - No further CP, No acute CHF;  Moderate cardiac risk (she is aware)     NS VT - Asymptomatic, Mag levels normal, Check Echo for LV Function, Increase beta blockers, Monitor BP/HR     Hx of CVA     HTN - Controlled     Cervical cancer     Breast cancer, left                     No family at bed side        Electronically signed by Myra Tucker MD on 2/5/2020   [facility] Cardiology

## 2020-02-05 NOTE — PROGRESS NOTES
[facility] Quality Flow/Interdisciplinary Rounds Progress Note        Quality Flow Rounds held on February 5, 2020    Disciplines Attending:  Bedside Nurse, ,  and Nursing Unit Leadership    Rivera Gama was admitted on 2/3/2020  2:18 PM    Anticipated Discharge Date:  Expected Discharge Date: 02/05/20    Disposition:    Rojas Score:  Rojas Scale Score: 21    Readmission Risk              Risk of Unplanned Readmission:        12           Discussed patient goal for the day, patient clinical progression, and barriers to discharge.   The following Goal(s) of the Day/Commitment(s) have been identified:  Monitor H/H, for EGD today      Joanie Torres  February 5, 2020

## 2020-02-05 NOTE — PROGRESS NOTES
111* 111*   CO2 22 21* 21*   BUN 28* 24* 18   CREATININE 1.1* 1.0 1.2*   GLUCOSE 181* 132* 126*   CALCIUM 9.1 8.8 8.7       Recent Labs     02/03/20  1455  02/04/20  0658 02/04/20  1840 02/05/20  0300   WBC 5.4  --  4.3*  --  4.9   RBC 2.35*  --  2.60*  --  2.75*   HGB 6.5*   < > 7.3* 8.1* 7.9*   HCT 21.1*   < > 24.0* 25.5* 26.3*   MCV 89.8  --  92.3  --  95.6   MCH 27.7  --  28.1  --  28.7   MCHC 30.8*  --  30.4*  --  30.0*   RDW 21.0*  --  19.9*  --  19.4*     --  190  --  180   MPV 10.9  --  10.8  --  10.7    < > = values in this interval not displayed. Labs and images reviewed     Radiology:   XR CHEST STANDARD (2 VW)   Final Result   No acute cardiopulmonary process. Assessment:    Active Problems:    Acute GI bleeding    Gastrointestinal hemorrhage    Anemia due to acute blood loss    Controlled type 2 diabetes mellitus without complication ([address]  Resolved Problems:    * No resolved hospital problems. *      Plan:  Chest pain /Dyspnea/dizziness on admission with V tach on tele and elevated trops -likely sec to NSTEMI from demand ischemia , Card is following. Currently CP free  ,will monitor on tele floor. On BB,echo pending. Needs anemia work up prior to invasive cardiac testing. Had V tach K/Mg normal -monitor on tele ,no further v tach . Acute blood loss anemia secondary to acute GI bleed/melena-surgery following and is scheduled for  EGD today. On PPI/IV fluids. Hold Plavix and aspirin. Monitor H&H and transfuse as needed. as per card keep hgb >8    Left breast CA-was supposed to have left mastectomy, surgery is consulted for GI bleed. Most likely surgery will be postponed and she will follow-up with them as outpatient. Hypertension-on BB ,monitor. Type 2 diabetes-on insulin sliding scale    Hyperlipidemia-resume as per home. Cervical  CA status post chemoradiation-outpatient follow-up.     Status post CVA with left weakness.- on asa ,plavix but on hold sec to GIB

## 2020-02-05 NOTE — PLAN OF CARE
Problem: Falls - Risk of:  Goal: Will remain free from falls  Description  Will remain free from falls  Outcome: Met This Shift     Problem: Pain:  Goal: Control of acute pain  Description  Control of acute pain  Outcome: Met This Shift     Problem: Bleeding:  Goal: Will show no signs and symptoms of excessive bleeding  Description  Will show no signs and symptoms of excessive bleeding  Outcome: Ongoing

## 2020-02-05 NOTE — H&P
[facility]   History and Physical        CHIEF COMPLAINT:  Shortness of breath     History of Present Illness: pt is a 60 y.o. female who presents to the hospital for shortness of breath. Pt states that she was doing well on Friday and symptoms started on Saturday. Pt noted dark stools on Saturday and Sunday. As the weekend went on, pt noticed that she was having dizziness, lightheadedness, and increasing sob and snowden. Pt states that prior to coming to ER, she was feeling her symptoms worsen and felt like she was going to pass out. Pt denies fevers, chills,n/v, abd pain, diarrhea, constipation, hematochezia, change in urination, dysuria, or hematuria. Pt was supposed to have mastectomy tomorrow with her surgeon for breast cancer in left breast. Pt has had hx of cancer in right breast and cervical ca in which she has had chemo/radiation. approx 4 years ago, she had cva which affected her left side.  Pt has noted worsening of her weakness on left side within last few days but no new neuro symptoms     REVIEW OF SYSTEMS:     A detailed system review was conducted with patient and is negative unless stated in hpi.           PMH:  Past Medical History        Past Medical History:   Diagnosis Date    Breast cancer ([address] 2000, 2005     right    Breast cancer ([address] 2020     left    CVA (cerebral vascular accident) ([address]      DM (diabetes mellitus) ([address]      Hyperlipidemia      Hypertension              Surgical History:  Past Surgical History         Past Surgical History:   Procedure Laterality Date    APPENDECTOMY        BREAST RECONSTRUCTION Right      BREAST SURGERY   2000     right masectomy    COLONOSCOPY        FOOT SURGERY         right    HYSTERECTOMY         fibroids    KNEE ARTHROSCOPY         left    MASTECTOMY Right 2000    TONSILLECTOMY        UPPER GASTROINTESTINAL ENDOSCOPY                Medications Prior to Admission:    Home Medications           Prior to Admission medications    Medication Sig Start Date End Date Taking? Authorizing Provider   glimepiride (AMARYL) 1 MG tablet Take 1 mg by mouth every morning (before breakfast)     Yes Historical Provider, MD   gabapentin (NEURONTIN) 300 MG capsule Take 300 mg by mouth nightly. .     Yes Historical Provider, MD   aspirin 81 MG tablet Take 81 mg by mouth daily Ld 1/25/2020     Yes Historical Provider, MD   lisinopril (PRINIVIL;ZESTRIL) 20 MG tablet Take 20 mg by mouth daily       Historical Provider, MD   metFORMIN (GLUCOPHAGE) 500 MG tablet Take 500 mg by mouth 2 times daily (with meals)       Historical Provider, MD   atorvastatin (LIPITOR) 40 MG tablet Take 1 tablet by mouth nightly. 3/17/15     Micky Nunez MD   amLODIPine (NORVASC) 10 MG tablet Take 1 tablet by mouth daily. 3/17/15     Micky uNnez MD   clopidogrel (PLAVIX) 75 MG tablet Take 1 tablet by mouth daily. 3/17/15     iMcky Nunez MD            Allergies:    Pcn [penicillins]     Social History:    reports that she has quit smoking. Her smoking use included cigarettes. She has a 0.40 pack-year smoking history. She has never used smokeless tobacco. She reports previous alcohol use of about 6.0 standard drinks of alcohol per week. She reports current drug use. Frequency: 2.00 times per week. Drug: Marijuana.     Family History:   Family History             Problem Relation Age of Onset    Stroke Mother      Heart Failure Mother      Other Mother           ICD    Breast Cancer Mother      Pacemaker Father      Heart Failure Father      Hypertension Sister                       PHYSICAL EXAM:     Vitals:  BP (!) 101/59   Pulse 106   Temp 98.1 °F (36.7 °C) (Oral)   Resp 20   Ht 5' 5\" (1.651 m)   Wt 230 lb 12.8 oz (104.7 kg)   SpO2 98%   BMI 38.41 kg/m²      General:  Appears comfortable. Answers questions appropriately and cooperative with exam  HEENT:  Mucous membranes moist. No erythema, rhinorrhea, or post-nasal drip noted.   Neck: No carotid bruits. Heart:  Rhythm regular at rate of 100  Lungs:  CTA. No wheeze, rales, or rhonchi  Abdomen:  Positive bowel sounds positive. Soft. Non-tender. No guarding, rebound or rigidity. Breast/Rectal/Genitourinary: not pertinent. Extremities:  Negative for lower extremity edema  Skin:  Warm and dry  Vascular: 2/4 Dorsalis Pedis pulses bilaterally. Neuro:  Cranial nerves 2-12 grossly intact, no focal weakness or change in sensation noted. Extraocular muscles intact.   Pupils equal, round, reactive to light.                      LABS:      Recent Labs     20  1455      K 4.4      CO2 22   BUN 28*   CREATININE 1.1*   GLUCOSE 181*   CALCIUM 9.1             Recent Labs     20  1455   WBC 5.4   RBC 2.35*   HGB 6.5*   HCT 21.1*   MCV 89.8   MCH 27.7   MCHC 30.8*   RDW 21.0*      MPV 10.9         No results for input(s): POCGLU in the last 72 hours.     CBC with Differential:          Lab Results   Component Value Date     WBC 5.4 2020     RBC 2.35 2020     HGB 6.5 2020     HCT 21.1 2020      2020     MCV 89.8 2020     MCH 27.7 2020     MCHC 30.8 2020     RDW 21.0 2020     NRBC 0.0 2020     METASPCT 0.9 2020     LYMPHOPCT 2.6 2020     PROMYELOPCT 0.9 2020     MONOPCT 4.4 2020     BASOPCT 0.0 2020     MONOSABS 0.22 2020     LYMPHSABS 0.16 2020     EOSABS 0.10 2020     BASOSABS 0.00 2020      BMP:          Lab Results   Component Value Date      2020     K 4.4 2020      2020     CO2 22 2020     BUN 28 2020     LABALBU 3.9 2019     LABALBU 4.2 2012     CREATININE 1.1 2020     CALCIUM 9.1 2020     GFRAA >60 2020     LABGLOM >60 2020     GLUCOSE 181 2020     GLUCOSE 96 2012         Radiology: Xr Chest Standard (2 Vw)     Result Date: 2/3/2020  Patient MRN:  [id number] :

## 2020-02-06 LAB
ANION GAP SERPL CALCULATED.3IONS-SCNC: 11 MMOL/L (ref 7–16)
BUN BLDV-MCNC: 14 MG/DL (ref 8–23)
CALCIUM SERPL-MCNC: 8.5 MG/DL (ref 8.6–10.2)
CHLORIDE BLD-SCNC: 106 MMOL/L (ref 98–107)
CO2: 20 MMOL/L (ref 22–29)
CREAT SERPL-MCNC: 1.1 MG/DL (ref 0.5–1)
GFR AFRICAN AMERICAN: >60
GFR NON-AFRICAN AMERICAN: >60 ML/MIN/1.73
GLUCOSE BLD-MCNC: 120 MG/DL (ref 74–99)
HCT VFR BLD CALC: 24.3 % (ref 34–48)
HCT VFR BLD CALC: 25.1 % (ref 34–48)
HCT VFR BLD CALC: 26.2 % (ref 34–48)
HEMOGLOBIN: 7.6 G/DL (ref 11.5–15.5)
HEMOGLOBIN: 8.1 G/DL (ref 11.5–15.5)
HEMOGLOBIN: 8.2 G/DL (ref 11.5–15.5)
MAGNESIUM: 1.8 MG/DL (ref 1.6–2.6)
MCH RBC QN AUTO: 29.2 PG (ref 26–35)
MCHC RBC AUTO-ENTMCNC: 31.3 % (ref 32–34.5)
MCV RBC AUTO: 93.5 FL (ref 80–99.9)
METER GLUCOSE: 134 MG/DL (ref 74–99)
METER GLUCOSE: 141 MG/DL (ref 74–99)
METER GLUCOSE: 143 MG/DL (ref 74–99)
METER GLUCOSE: 181 MG/DL (ref 74–99)
PDW BLD-RTO: 19.4 FL (ref 11.5–15)
PLATELET # BLD: 174 E9/L (ref 130–450)
PMV BLD AUTO: 11 FL (ref 7–12)
POTASSIUM SERPL-SCNC: 4 MMOL/L (ref 3.5–5)
RBC # BLD: 2.6 E12/L (ref 3.5–5.5)
SODIUM BLD-SCNC: 137 MMOL/L (ref 132–146)
WBC # BLD: 5 E9/L (ref 4.5–11.5)

## 2020-02-06 PROCEDURE — 2580000003 HC RX 258: Performed by: SURGERY

## 2020-02-06 PROCEDURE — 82962 GLUCOSE BLOOD TEST: CPT

## 2020-02-06 PROCEDURE — 6360000002 HC RX W HCPCS: Performed by: SURGERY

## 2020-02-06 PROCEDURE — 2060000000 HC ICU INTERMEDIATE R&B

## 2020-02-06 PROCEDURE — 36415 COLL VENOUS BLD VENIPUNCTURE: CPT

## 2020-02-06 PROCEDURE — C9113 INJ PANTOPRAZOLE SODIUM, VIA: HCPCS | Performed by: SURGERY

## 2020-02-06 PROCEDURE — 99232 SBSQ HOSP IP/OBS MODERATE 35: CPT | Performed by: INTERNAL MEDICINE

## 2020-02-06 PROCEDURE — 83735 ASSAY OF MAGNESIUM: CPT

## 2020-02-06 PROCEDURE — 85018 HEMOGLOBIN: CPT

## 2020-02-06 PROCEDURE — 85027 COMPLETE CBC AUTOMATED: CPT

## 2020-02-06 PROCEDURE — 6370000000 HC RX 637 (ALT 250 FOR IP): Performed by: NURSE PRACTITIONER

## 2020-02-06 PROCEDURE — 85014 HEMATOCRIT: CPT

## 2020-02-06 PROCEDURE — 6370000000 HC RX 637 (ALT 250 FOR IP): Performed by: SURGERY

## 2020-02-06 PROCEDURE — 80048 BASIC METABOLIC PNL TOTAL CA: CPT

## 2020-02-06 RX ORDER — SUCRALFATE 1 G/1
1 TABLET ORAL
Qty: 120 TABLET | Refills: 3 | Status: ON HOLD | OUTPATIENT
Start: 2020-02-06 | End: 2020-06-13 | Stop reason: HOSPADM

## 2020-02-06 RX ORDER — 0.9 % SODIUM CHLORIDE 0.9 %
20 INTRAVENOUS SOLUTION INTRAVENOUS ONCE
Status: DISCONTINUED | OUTPATIENT
Start: 2020-02-06 | End: 2020-02-08 | Stop reason: HOSPADM

## 2020-02-06 RX ORDER — PANTOPRAZOLE SODIUM 40 MG/1
40 TABLET, DELAYED RELEASE ORAL
Qty: 180 TABLET | Refills: 1 | Status: ON HOLD | OUTPATIENT
Start: 2020-02-06 | End: 2020-06-13 | Stop reason: SDUPTHER

## 2020-02-06 RX ADMIN — INSULIN LISPRO 1 UNITS: 100 INJECTION, SOLUTION INTRAVENOUS; SUBCUTANEOUS at 18:17

## 2020-02-06 RX ADMIN — INSULIN LISPRO 1 UNITS: 100 INJECTION, SOLUTION INTRAVENOUS; SUBCUTANEOUS at 12:29

## 2020-02-06 RX ADMIN — PANTOPRAZOLE SODIUM 40 MG: 40 INJECTION, POWDER, FOR SOLUTION INTRAVENOUS at 20:22

## 2020-02-06 RX ADMIN — INSULIN LISPRO 1 UNITS: 100 INJECTION, SOLUTION INTRAVENOUS; SUBCUTANEOUS at 20:30

## 2020-02-06 RX ADMIN — ATORVASTATIN CALCIUM 40 MG: 40 TABLET, FILM COATED ORAL at 20:20

## 2020-02-06 RX ADMIN — METOPROLOL SUCCINATE 50 MG: 50 TABLET, EXTENDED RELEASE ORAL at 09:58

## 2020-02-06 RX ADMIN — SUCRALFATE 1 G: 1 TABLET ORAL at 20:20

## 2020-02-06 RX ADMIN — Medication 10 ML: at 09:59

## 2020-02-06 RX ADMIN — GABAPENTIN 300 MG: 300 CAPSULE ORAL at 20:20

## 2020-02-06 RX ADMIN — SUCRALFATE 1 G: 1 TABLET ORAL at 12:16

## 2020-02-06 RX ADMIN — Medication 10 ML: at 20:24

## 2020-02-06 RX ADMIN — PANTOPRAZOLE SODIUM 40 MG: 40 INJECTION, POWDER, FOR SOLUTION INTRAVENOUS at 09:58

## 2020-02-06 RX ADMIN — SUCRALFATE 1 G: 1 TABLET ORAL at 09:58

## 2020-02-06 RX ADMIN — SUCRALFATE 1 G: 1 TABLET ORAL at 18:16

## 2020-02-06 RX ADMIN — ACETAMINOPHEN 650 MG: 325 TABLET ORAL at 18:10

## 2020-02-06 RX ADMIN — METOPROLOL SUCCINATE 50 MG: 50 TABLET, EXTENDED RELEASE ORAL at 20:20

## 2020-02-06 ASSESSMENT — PAIN DESCRIPTION - ORIENTATION
ORIENTATION: LOWER;MID
ORIENTATION: LOWER;MID

## 2020-02-06 ASSESSMENT — PAIN DESCRIPTION - DESCRIPTORS
DESCRIPTORS: ACHING
DESCRIPTORS: ACHING

## 2020-02-06 ASSESSMENT — PAIN SCALES - GENERAL
PAINLEVEL_OUTOF10: 6
PAINLEVEL_OUTOF10: 3

## 2020-02-06 ASSESSMENT — PAIN DESCRIPTION - PAIN TYPE: TYPE: ACUTE PAIN

## 2020-02-06 ASSESSMENT — PAIN DESCRIPTION - LOCATION
LOCATION: ABDOMEN;BACK
LOCATION: ABDOMEN;BACK

## 2020-02-06 NOTE — PLAN OF CARE
Problem: Falls - Risk of:  Goal: Will remain free from falls  Description  Will remain free from falls  2/5/2020 2150 by Sai Warren RN  Outcome: Met This Shift     Problem: Bleeding:  Goal: Will show no signs and symptoms of excessive bleeding  Description  Will show no signs and symptoms of excessive bleeding  Outcome: Met This Shift     Problem: Cardiovascular  Goal: Hemodynamic stability  Outcome: Met This Shift     Problem: Cardiovascular  Goal: Anticoagulate/Hct stable  Outcome: Met This Shift

## 2020-02-06 NOTE — PROGRESS NOTES
[facility]   [facility] Note    Admitting Date and Time: 2/3/2020  2:18 PM  Admit Dx: Acute GI bleeding [K92.2]  Acute GI bleeding [K92.2]     Seen for follow on Acute blood loss anemia /GIB     Subjective:  Denies CP ,sob or abd pain. Uneventful night. D/w nursing. Will transfuse 1 PRBC today. She says card wants her to have stress test -tomorrow. ROS: denies fever, chills, cp, sob, n/v, HA unless stated above.      sucralfate  1 g Oral 4x Daily WC    metoprolol succinate  50 mg Oral BID    pantoprazole  40 mg Intravenous BID    gabapentin  300 mg Oral Nightly    atorvastatin  40 mg Oral Nightly    sodium chloride flush  10 mL Intravenous 2 times per day    insulin lispro  0-6 Units Subcutaneous TID WC    insulin lispro  0-3 Units Subcutaneous Nightly     perflutren lipid microspheres, 1.5 mL, ONCE PRN  sodium chloride flush, 10 mL, PRN  magnesium hydroxide, 30 mL, Daily PRN  ondansetron, 4 mg, Q6H PRN  acetaminophen, 650 mg, Q4H PRN  glucose, 15 g, PRN  dextrose, 12.5 g, PRN  glucagon (rDNA), 1 mg, PRN  dextrose, 100 mL/hr, PRN         Objective:    /72   Pulse 76   Temp 98.2 °F (36.8 °C) (Oral)   Resp 18   Ht 5' 5\" (1.651 m)   Wt 234 lb 11.2 oz (106.5 kg)   SpO2 97%   BMI 39.06 kg/m²   General Appearance: alert and oriented to person, place and time,overweight, in no acute distress  Skin: warm and dry, no rash or erythema  Head: normocephalic and atraumatic  Eyes: pupils equal, round, and reactive to light, extraocular eye movements intact, conjunctivae normal  Neck: supple and non-tender without mass  Pulmonary/Chest: clear to auscultation bilaterally  Cardiovascular: normal rate, regular rhythm, normal S1 and S2  Abdomen: soft, non-tender, non-distended, normal bowel sounds, no masses or organomegaly  Extremities: no cyanosis, clubbing  Musculoskeletal: normal range of motion, no joint swelling, deformity or tenderness  Neurologic:  no cranial nerve deficit,speech normal      Recent Labs     02/04/20  0658 02/05/20  0300 02/06/20  0210    141 137   K 4.1 4.1 4.0   * 111* 106   CO2 21* 21* 20*   BUN 24* 18 14   CREATININE 1.0 1.2* 1.1*   GLUCOSE 132* 126* 120*   CALCIUM 8.8 8.7 8.5*       Recent Labs     02/04/20  0658  02/05/20  0300 02/05/20  1010 02/05/20  1720 02/06/20  0210   WBC 4.3*  --  4.9  --   --  5.0   RBC 2.60*  --  2.75*  --   --  2.60*   HGB 7.3*   < > 7.9* 8.1* 8.6* 7.6*   HCT 24.0*   < > 26.3* 25.1* 27.1* 24.3*   MCV 92.3  --  95.6  --   --  93.5   MCH 28.1  --  28.7  --   --  29.2   MCHC 30.4*  --  30.0*  --   --  31.3*   RDW 19.9*  --  19.4*  --   --  19.4*     --  180  --   --  174   MPV 10.8  --  10.7  --   --  11.0    < > = values in this interval not displayed. Labs and images reviewed     Radiology:   XR CHEST STANDARD (2 VW)   Final Result   No acute cardiopulmonary process. Assessment:    Active Problems:    Acute GI bleeding    Gastrointestinal hemorrhage    Anemia due to acute blood loss    Controlled type 2 diabetes mellitus without complication ([facility]  Resolved Problems:    * No resolved hospital problems. *      Plan:  Chest pain /Dyspnea/dizziness on admission with V tach on tele and elevated trops -likely sec to NSTEMI from demand ischemia , Card is following. Currently CP free  ,will monitor on tele floor. On BB,echo pending. Needs anemia work up prior to invasive cardiac testing. Had V tach K/Mg normal -monitor on tele ,no further v tach . As above for st tomorrow. Acute blood loss anemia secondary to acute GI bleed/melena-surgery following ,EGD on 2/5 with gastric & Duodenal ulcers. On PPI/carafate/IV fluids. Hold Plavix and aspirin. Monitor H&H and transfuse as needed. as per card keep hgb >8    Left breast CA-was supposed to have left mastectomy, surgery is consulted for GI bleed. Most likely surgery will be postponed and she will follow-up with them as outpatient. Hypertension-on BB ,monitor.     Type 2

## 2020-02-06 NOTE — PROGRESS NOTES
succinate  50 mg Oral BID    pantoprazole  40 mg Intravenous BID    gabapentin  300 mg Oral Nightly    atorvastatin  40 mg Oral Nightly    sodium chloride flush  10 mL Intravenous 2 times per day    insulin lispro  0-6 Units Subcutaneous TID WC    insulin lispro  0-3 Units Subcutaneous Nightly       IV Infusions (if any):   dextrose           PHYSICAL EXAM:     CONSTITUTIONAL:   /87   Pulse 87   Temp 98.9 °F (37.2 °C) (Oral)   Resp 16   Ht 5' 5\" (1.651 m)   Wt 234 lb 11.2 oz (106.5 kg)   SpO2 98%   BMI 39.06 kg/m²   Pulse  Av.2  Min: 81  Max: 87  Systolic (94DGE), EUR:920 , Min:116 , OJ    Diastolic (26FGJ), MUP:88, Min:78, Max:108      In general, this is a well developed, well nourished who appears stated age. awake, alert, cooperative, no apparent distress     HEENT: eyes -conjunctivae pink,   Neck-  no stridor, no carotid bruit. no jugular venous distention   RESPIRATORY: Chest non-tender to palpation.  No accessory muscles use.   Lung auscultation - clear to auscultation except few rhonchi  CARDIOVASCULAR:     Heart Inspection shows no noted pulsations  Heart Palpation - no palpable thrills  Heart Ausculation - Regular rate and rhythm, 1/6 systolic murmur, No s3 or rub. No lower extremity edema, no varicosities. Distal pulses palpable, no clubbing or cyanosis   ABDOMEN: Soft, nontender,  Obese, Bowel sounds present. MS: n/a. : Deferred  Rectal Exam: Deferred  SKIN: warm and dry  NEURO / PSYCH: oriented to person, place           Impression/Recommendations:     Elevated Troponin - Likely Type II NSTEMI from demand ischemia - Continue Betablockers, hold any anticaogulation and Aspirin due to GI bleed and severe anemia -  Nonischemic stress test in 2019.  2D Echo reviewed;  Lexiscan stress prior to discharge once H/H stable and Hgb >8     Severe Anemia due to GI bleed - s/p PRBC transfusion, Keep Hgb >8     GI bleed - Surgery on case, had Endosocpy - No further CP, No acute CHIEF COMPLAINT: transfer for critical PS    HISTORY OF PRESENT ILLNESS: FEMALE SADE is a 4 day old girl born at 38w+6d to a 23 yo  complicated by oligohydramnios. 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 She was born via , Apgars 9,9 and was taken to the NICU where the diagnosis has been confirmed and she was started on sildenafil. He SpO2 has by in the low90s/high 80s. She has been tolerating adlib feeds and has intermittently been on CPAP with FiO2. [place] [facility] [place] balloon valvul[place].       REVIEW OF SYSTEMS:  Constitutional - no irritability, no fever, no recent weight loss, no poor weight gain.  Eyes - no conjunctivitis, no discharge.  Ears / Nose / Mouth / Throat - no rhinorrhea, no congestion, no stridor.  Respiratory - no tachypnea, no increased work of breathing, no cough.  Cardiovascular - no chest pain, no palpitations, no diaphoresis, no cyanosis, no syncope.  Gastrointestinal - no change in appetite, no vomiting, no diarrhea.  Genitourinary - no change in urination, no hematuria.  Integumentary - no rash, no jaundice, no pallor, no color change.  Musculoskeletal - no joint swelling, no joint stiffness.  Endocrine - no heat or cold intolerance, no jitteriness, no failure to thrive.  Hematologic / Lymphatic - no easy bruising, no bleeding, no lymphadenopathy.  Neurological - no seizures, no change in activity level, no developmental delay.  All Other Systems - reviewed, negative.    PAST MEDICAL HISTORY:  Birth History - The patient was born at 38.6 weeks gestation, with *no pregnancy or  complications.  Medical Problems - The patient has no other medical problems.  Hospitalizations - The patient has had no prior hospitalizations.  Allergies - No Known Allergies    PAST SURGICAL HISTORY:  The patient has had no prior surgeries.    MEDICATIONS:  alprostadil Infusion - Ped/Eligio 0.05MICROgram(s)/kG/Min IV Continuous <Continuous>  dextrose 10% + sodium chloride 0.225% with potassium chloride 10 mEq/L + calcium gluconate 4,000 mG/L -  250milliLiter(s) IV Continuous <Continuous>    FAMILY HISTORY:  There is no history of congenital heart disease, arrhythmias, or sudden cardiac death in family members.    SOCIAL HISTORY:  The patient lives in NICU    PHYSICAL EXAMINATION:  Vital signs - Weight (kg): 2.8 ( @ 17:16)  T(C): 37.3, Max: 37.8 ( @ 14:49)  HR: 140 (124 - 166)  BP: 64/39 (55/38 - 64/45)  ABP: --  RR: 39 (27 - 48)  SpO2: 91% (81% - 94%)  Wt(kg): --  CVP(mm Hg): --  General - non-dysmorphic appearance, well-developed, in no distress.  Skin - no rash, no desquamation, no cyanosis.  Eyes / ENT - no conjunctival injection, sclerae anicteric, external ears & nares normal, mucous membranes moist.  Pulmonary - normal inspiratory effort, no retractions, lungs clear to auscultation bilaterally, no wheezes, no rales.  Cardiovascular - normal rate, regular rhythm, normal S1 & S2, 3/6 harsh systolic ejection murmur along LSB, no rubs, no gallops, capillary refill < 2sec, normal pulses.  Gastrointestinal - soft, non-distended, non-tender, no hepatosplenomegaly (liver palpable *cm below right costal margin).  Musculoskeletal - no joint swelling, no clubbing, no edema.  Neurologic / Psychiatric - alert, oriented as age-appropriate, affect appropriate, moves all extremities, normal tone.    LABORATORY TESTS:                          18.2  CBC:   16.47 )-----------( 186   (17 @ 20:04)                          51.8               144   |  105   |  2                  Ca: 9.5    BMP:   ----------------------------< 109    M.3   (17 @ 20:04)             3.9    |  22    | 0.34               Ph: 5.1        CBG:   pH: 7.38 / pCO2: 42 / pO2: 42.2 / HCO3: 24 / Base Excess: -0.1 / Lactate: 1.9   (17 @ 20:50)      IMAGING STUDIES:  Electrocardiogram - pending    Telemetry -  normal sinus rhythm, occasional PAC, no arrhythmias.    Chest x-ray - (*date)     Echocardiogram - (*date)     Other - (*date) CHIEF COMPLAINT: transfer for critical PS    HISTORY OF PRESENT ILLNESS: FEMALE SADE is a 4 day old girl born at 38w+6d to a 23 yo  complicated by oligohydramnios. 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 She was born via , Apgars 9,9 and was taken to the NICU where the diagnosis has been confirmed and she was started on sildenafil. He SpO2 has by in the low90s/high 80s. She has been tolerating adlib feeds and has intermittently been on CPAP with FiO2. She was transferred to [facility] [place] for transcath pulmonary balloon valvuloplasty.       REVIEW OF SYSTEMS:  Constitutional - no irritability, no fever, no recent weight loss, no poor weight gain.  Eyes - no conjunctivitis, no discharge.  Ears / Nose / Mouth / Throat - no rhinorrhea, no congestion, no stridor.  Respiratory - no tachypnea, no increased work of breathing, no cough.  Cardiovascular - no chest pain, no palpitations, no diaphoresis, no cyanosis, no syncope.  Gastrointestinal - no change in appetite, no vomiting, no diarrhea.  Genitourinary - no change in urination, no hematuria.  Integumentary - no rash, no jaundice, no pallor, no color change.  Musculoskeletal - no joint swelling, no joint stiffness.  Endocrine - no heat or cold intolerance, no jitteriness, no failure to thrive.  Hematologic / Lymphatic - no easy bruising, no bleeding, no lymphadenopathy.  Neurological - no seizures, no change in activity level, no developmental delay.  All Other Systems - reviewed, negative.    PAST MEDICAL HISTORY:  Birth History - The patient was born at 38.6 weeks gestation, with *no pregnancy or  complications.  Medical Problems - The patient has no other medical problems.  Hospitalizations - The patient has had no prior hospitalizations.  Allergies - No Known Allergies    PAST SURGICAL HISTORY:  The patient has had no prior surgeries.    MEDICATIONS:  alprostadil Infusion - Ped/Eligio 0.05MICROgram(s)/kG/Min IV Continuous <Continuous>  dextrose 10% + sodium chloride 0.225% with potassium chloride 10 mEq/L + calcium gluconate 4,000 mG/L -  250milliLiter(s) IV Continuous <Continuous>    FAMILY HISTORY:  There is no history of congenital heart disease, arrhythmias, or sudden cardiac death in family members.    SOCIAL HISTORY:  The patient lives in NICU    PHYSICAL EXAMINATION:  Vital signs - Weight (kg): 2.8 ( @ 17:16)  T(C): 37.3, Max: 37.8 ( @ 14:49)  HR: 140 (124 - 166)  BP: 64/39 (55/38 - 64/45)  ABP: --  RR: 39 (27 - 48)  SpO2: 91% (81% - 94%)  Wt(kg): --  CVP(mm Hg): --  General - non-dysmorphic appearance, well-developed, in no distress.  Skin - no rash, no desquamation, no cyanosis.  Eyes / ENT - no conjunctival injection, sclerae anicteric, external ears & nares normal, mucous membranes moist.  Pulmonary - normal inspiratory effort, no retractions, lungs clear to auscultation bilaterally, no wheezes, no rales.  Cardiovascular - normal rate, regular rhythm, normal S1 & S2, 3/6 harsh systolic ejection murmur along LSB, no rubs, no gallops, capillary refill < 2sec, normal pulses.  Gastrointestinal - soft, non-distended, non-tender, no hepatosplenomegaly (liver palpable *cm below right costal margin).  Musculoskeletal - no joint swelling, no clubbing, no edema.  Neurologic / Psychiatric - alert, oriented as age-appropriate, affect appropriate, moves all extremities, normal tone.    LABORATORY TESTS:                          18.2  CBC:   16.47 )-----------( 186   (17 @ 20:04)                          51.8               144   |  105   |  2                  Ca: 9.5    BMP:   ----------------------------< 109    M.3   (17 @ 20:04)             3.9    |  22    | 0.34               Ph: 5.1        CBG:   pH: 7.38 / pCO2: 42 / pO2: 42.2 / HCO3: 24 / Base Excess: -0.1 / Lactate: 1.9   (17 @ 20:50)      IMAGING STUDIES:  Electrocardiogram - pending    Telemetry -  normal sinus rhythm, occasional PAC, no arrhythmias.    Chest x-ray - 16 clear lungs, no cardiomegaly    Echocardiogram - 16 prelim: normal cardiac architecture, severe valvar pulmonary stenosis, dysplastic and thickened pulmmonary valve, pulmonary valve annulus approximately 5mm, PSIG ~90mmHg across pulmonary valve, PFO with continuous right to left flow, RVH, normal function with no effusion. No PDA. CHIEF COMPLAINT: transfer for critical PS    HISTORY OF PRESENT ILLNESS: FEMALE SADE is a 4 day old girl born at 38w+6d to a 23 yo  complicated by oligohydramnios. 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 She was born via , Apgars 9,9 and was taken to the NICU where the diagnosis has been confirmed and she was started on Prostin. He SpO2 has by in the low90s/high 80s. She has been tolerating adlib feeds and has intermittently been on CPAP with FiO2. She was transferred to [facility] [place] transcath pulmonary balloon valvuloplasty.       REVIEW OF SYSTEMS:  Constitutional - no irritability, no fever, no recent weight loss, no poor weight gain.  Eyes - no conjunctivitis, no discharge.  Ears / Nose / Mouth / Throat - no rhinorrhea, no congestion, no stridor.  Respiratory - no tachypnea, no increased work of breathing, no cough.  Cardiovascular - no chest pain, no palpitations, no diaphoresis, no cyanosis, no syncope.  Gastrointestinal - no change in appetite, no vomiting, no diarrhea.  Genitourinary - no change in urination, no hematuria.  Integumentary - no rash, no jaundice, no pallor, no color change.  Musculoskeletal - no joint swelling, no joint stiffness.  Endocrine - no heat or cold intolerance, no jitteriness, no failure to thrive.  Hematologic / Lymphatic - no easy bruising, no bleeding, no lymphadenopathy.  Neurological - no seizures, no change in activity level, no developmental delay.  All Other Systems - reviewed, negative.    PAST MEDICAL HISTORY:  Birth History - The patient was born at 38.6 weeks gestation, with *no pregnancy or  complications.  Medical Problems - The patient has no other medical problems.  Hospitalizations - The patient has had no prior hospitalizations.  Allergies - No Known Allergies    PAST SURGICAL HISTORY:  The patient has had no prior surgeries.    MEDICATIONS:  alprostadil Infusion - Ped/Eligio 0.05MICROgram(s)/kG/Min IV Continuous <Continuous>  dextrose 10% + sodium chloride 0.225% with potassium chloride 10 mEq/L + calcium gluconate 4,000 mG/L -  250milliLiter(s) IV Continuous <Continuous>    FAMILY HISTORY:  There is no history of congenital heart disease, arrhythmias, or sudden cardiac death in family members.    SOCIAL HISTORY:  The patient lives in NICU    PHYSICAL EXAMINATION:  Vital signs - Weight (kg): 2.8 ( @ 17:16)  T(C): 37.3, Max: 37.8 ( @ 14:49)  HR: 140 (124 - 166)  BP: 64/39 (55/38 - 64/45)  ABP: --  RR: 39 (27 - 48)  SpO2: 91% (81% - 94%)  Wt(kg): --  CVP(mm Hg): --  General - non-dysmorphic appearance, well-developed, in no distress.  Skin - no rash, no desquamation, no cyanosis.  Eyes / ENT - no conjunctival injection, sclerae anicteric, external ears & nares normal, mucous membranes moist.  Pulmonary - normal inspiratory effort, no retractions, lungs clear to auscultation bilaterally, no wheezes, no rales.  Cardiovascular - normal rate, regular rhythm, normal S1 & S2, 3/6 harsh systolic ejection murmur along LSB, no rubs, no gallops, capillary refill < 2sec, normal pulses.  Gastrointestinal - soft, non-distended, non-tender, no hepatosplenomegaly (liver palpable *cm below right costal margin).  Musculoskeletal - no joint swelling, no clubbing, no edema.  Neurologic / Psychiatric - alert, oriented as age-appropriate, affect appropriate, moves all extremities, normal tone.    LABORATORY TESTS:                          18.2  CBC:   16.47 )-----------( 186   (17 @ 20:04)                          51.8               144   |  105   |  2                  Ca: 9.5    BMP:   ----------------------------< 109    M.3   (17 @ 20:04)             3.9    |  22    | 0.34               Ph: 5.1        CBG:   pH: 7.38 / pCO2: 42 / pO2: 42.2 / HCO3: 24 / Base Excess: -0.1 / Lactate: 1.9   (17 @ 20:50)      IMAGING STUDIES:  Electrocardiogram - pending    Telemetry -  normal sinus rhythm, occasional PAC, no arrhythmias.    Chest x-ray - 16 clear lungs, no cardiomegaly    Echocardiogram - 16 prelim: normal cardiac architecture, severe valvar pulmonary stenosis, dysplastic and thickened pulmmonary valve, pulmonary valve annulus approximately 5mm, PSIG ~90mmHg across pulmonary valve, PFO with continuous right to left flow, RVH, normal function with no effusion. No PDA. CHIEF COMPLAINT: transfer for critical PS    HISTORY OF PRESENT ILLNESS: FEMALE SADE is a 4 day old girl born at 38w+6d to a 21 yo  complicated by oligohydramnios. 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 She was born via , Apgars 9,9 and was taken to the NICU where the diagnosis has been confirmed and she was started on Prostin. He SpO2 has by in the low90s/high 80s. She has been tolerating adlib feeds and has intermittently been on CPAP with FiO2. She was transferred to [facility] [place] transc[place] pulmonary balloon valvuloplasty.       REVIEW OF SYSTEMS:  Constitutional - no irritability, no fever, no recent weight loss, no poor weight gain.  Eyes - no conjunctivitis, no discharge.  Ears / Nose / Mouth / Throat - no rhinorrhea, no congestion, no stridor.  Respiratory - no tachypnea, no increased work of breathing, no cough.  Cardiovascular - no chest pain, no palpitations, no diaphoresis, no cyanosis, no syncope.  Gastrointestinal - no change in appetite, no vomiting, no diarrhea.  Genitourinary - no change in urination, no hematuria.  Integumentary - no rash, no jaundice, no pallor, no color change.  Musculoskeletal - no joint swelling, no joint stiffness.  Endocrine - no heat or cold intolerance, no jitteriness, no failure to thrive.  Hematologic / Lymphatic - no easy bruising, no bleeding, no lymphadenopathy.  Neurological - no seizures, no change in activity level, no developmental delay.  All Other Systems - reviewed, negative.    PAST MEDICAL HISTORY:  Birth History - The patient was born at 38.6 weeks gestation, with *no pregnancy or  complications.  Medical Problems - The patient has no other medical problems.  Hospitalizations - The patient has had no prior hospitalizations.  Allergies - No Known Allergies    PAST SURGICAL HISTORY:  The patient has had no prior surgeries.    MEDICATIONS:  alprostadil Infusion - Ped/Eligio 0.05MICROgram(s)/kG/Min IV Continuous <Continuous>  dextrose 10% + sodium chloride 0.225% with potassium chloride 10 mEq/L + calcium gluconate 4,000 mG/L -  250milliLiter(s) IV Continuous <Continuous>    FAMILY HISTORY:  There is no history of congenital heart disease, arrhythmias, or sudden cardiac death in family members.    SOCIAL HISTORY:  The patient lives in NICU    PHYSICAL EXAMINATION:  Vital signs - Weight (kg): 2.8 ( @ 17:16)  T(C): 37.3, Max: 37.8 ( @ 14:49)  HR: 140 (124 - 166)  BP: 64/39 (55/38 - 64/45)  ABP: --  RR: 39 (27 - 48)  SpO2: 91% (81% - 94%)  Wt(kg): --  CVP(mm Hg): --  General - non-dysmorphic appearance, well-developed, in no distress.  Skin - no rash, no desquamation, no cyanosis.  Eyes / ENT - no conjunctival injection, sclerae anicteric, external ears & nares normal, mucous membranes moist.  Pulmonary - normal inspiratory effort, no retractions, lungs clear to auscultation bilaterally, no wheezes, no rales.  Cardiovascular - normal rate, regular rhythm, normal S1 & S2, 3/6 harsh systolic ejection murmur along LSB, no rubs, no gallops, capillary refill < 2sec, normal pulses.  Gastrointestinal - soft, non-distended, non-tender, no hepatosplenomegaly .  Musculoskeletal - no joint swelling, no clubbing, no edema.  Neurologic / Psychiatric - alert, oriented as age-appropriate, affect appropriate, moves all extremities, normal tone.    LABORATORY TESTS:                          18.2  CBC:   16.47 )-----------( 186   (17 @ 20:04)                          51.8               144   |  105   |  2                  Ca: 9.5    BMP:   ----------------------------< 109    M.3   (17 @ 20:04)             3.9    |  22    | 0.34               Ph: 5.1        CBG:   pH: 7.38 / pCO2: 42 / pO2: 42.2 / HCO3: 24 / Base Excess: -0.1 / Lactate: 1.9   (17 @ 20:50)      IMAGING STUDIES:  Electrocardiogram - pending    Telemetry -  normal sinus rhythm, occasional PAC, no arrhythmias.    Chest x-ray - 16 clear lungs, no cardiomegaly    Echocardiogram - 16   severe valvar pulmonary stenosis, dysplastic and thickened pulmmonary valve, pulmonary valve annulus approximately 5mm, PSIG ~90mmHg across pulmonary valve, PFO with right to left flow, RVH, normal function with no effusion. No PDA.

## 2020-02-06 NOTE — PROGRESS NOTES
GENERAL SURGERY  DAILY PROGRESS NOTE  2/6/2020    Subjective:  No issues overnight s/p EGD. Denies abdominal pain, n/v or bloody stools.      Objective:  /72   Pulse 76   Temp 98.2 °F (36.8 °C) (Oral)   Resp 18   Ht 5' 5\" (1.651 m)   Wt 234 lb 11.2 oz (106.5 kg)   SpO2 97%   BMI 39.06 kg/m²     GENERAL:  Laying in bed, awake, alert, cooperative, no apparent distress  HEAD: Normocephalic, atraumatic  EYES: No sclera icterus, pupils equal  LUNGS:  No increased work of breathing  CARDIOVASCULAR:  Reg rate  ABDOMEN:  Soft, non-tender, non-distended  EXTREMITIES: No edema or swelling  SKIN: Warm and dry    Assessment/Plan:  60 y.o. female with anemia and FOBT + stools  - EGD showed gastric and duodenal ulcers  - continue PPI and carafate  - Peptic ulcer diet  - if hgb stable today, ok for d/c from surgical perspective    Electronically signed by Fadia Schwartz MD on 2/6/2020 at 2:27 PM     Seen/examined  Agree with above  [email or web address]

## 2020-02-06 NOTE — CARE COORDINATION
CM & SW met with pt at bedside to discuss role, anticipated LOS & current plan of care. Discussed DX, current meds & TX. Reports living alone in split level home & uses cane to get around. She is iIn the process of obtaining [facility]p [facility] admission. States feeling better & plans to return home. States children are very supportive & assist her with anything she needs. Declines C. PCP is Dr. Kraig Jolley but is switching to Dr. Li in [facility]. Will continue to follow.

## 2020-02-07 ENCOUNTER — APPOINTMENT (OUTPATIENT)
Dept: NON INVASIVE DIAGNOSTICS | Age: 60
DRG: 377 | End: 2020-02-07
Payer: MEDICARE

## 2020-02-07 ENCOUNTER — APPOINTMENT (OUTPATIENT)
Dept: NUCLEAR MEDICINE | Age: 60
DRG: 377 | End: 2020-02-07
Payer: MEDICARE

## 2020-02-07 LAB
ANION GAP SERPL CALCULATED.3IONS-SCNC: 14 MMOL/L (ref 7–16)
BLOOD BANK DISPENSE STATUS: NORMAL
BLOOD BANK DISPENSE STATUS: NORMAL
BLOOD BANK PRODUCT CODE: NORMAL
BLOOD BANK PRODUCT CODE: NORMAL
BPU ID: NORMAL
BPU ID: NORMAL
BUN BLDV-MCNC: 13 MG/DL (ref 8–23)
CALCIUM SERPL-MCNC: 9.1 MG/DL (ref 8.6–10.2)
CHLORIDE BLD-SCNC: 107 MMOL/L (ref 98–107)
CO2: 21 MMOL/L (ref 22–29)
CREAT SERPL-MCNC: 0.9 MG/DL (ref 0.5–1)
DESCRIPTION BLOOD BANK: NORMAL
DESCRIPTION BLOOD BANK: NORMAL
GFR AFRICAN AMERICAN: >60
GFR NON-AFRICAN AMERICAN: >60 ML/MIN/1.73
GLUCOSE BLD-MCNC: 131 MG/DL (ref 74–99)
HCT VFR BLD CALC: 27.1 % (ref 34–48)
HCT VFR BLD CALC: 27.6 % (ref 34–48)
HEMOGLOBIN: 8.7 G/DL (ref 11.5–15.5)
HEMOGLOBIN: 8.9 G/DL (ref 11.5–15.5)
LV EF: 34 %
LVEF MODALITY: NORMAL
MCH RBC QN AUTO: 29.3 PG (ref 26–35)
MCHC RBC AUTO-ENTMCNC: 32.2 % (ref 32–34.5)
MCV RBC AUTO: 90.8 FL (ref 80–99.9)
METER GLUCOSE: 129 MG/DL (ref 74–99)
METER GLUCOSE: 133 MG/DL (ref 74–99)
METER GLUCOSE: 167 MG/DL (ref 74–99)
METER GLUCOSE: 208 MG/DL (ref 74–99)
PDW BLD-RTO: 19.1 FL (ref 11.5–15)
PLATELET # BLD: 203 E9/L (ref 130–450)
PMV BLD AUTO: 10.9 FL (ref 7–12)
POTASSIUM SERPL-SCNC: 4.2 MMOL/L (ref 3.5–5)
RBC # BLD: 3.04 E12/L (ref 3.5–5.5)
SODIUM BLD-SCNC: 142 MMOL/L (ref 132–146)
WBC # BLD: 7.3 E9/L (ref 4.5–11.5)

## 2020-02-07 PROCEDURE — 85018 HEMOGLOBIN: CPT

## 2020-02-07 PROCEDURE — 93016 CV STRESS TEST SUPVJ ONLY: CPT | Performed by: INTERNAL MEDICINE

## 2020-02-07 PROCEDURE — 36415 COLL VENOUS BLD VENIPUNCTURE: CPT

## 2020-02-07 PROCEDURE — 6370000000 HC RX 637 (ALT 250 FOR IP): Performed by: NURSE PRACTITIONER

## 2020-02-07 PROCEDURE — 85014 HEMATOCRIT: CPT

## 2020-02-07 PROCEDURE — 82962 GLUCOSE BLOOD TEST: CPT

## 2020-02-07 PROCEDURE — 6360000002 HC RX W HCPCS: Performed by: INTERNAL MEDICINE

## 2020-02-07 PROCEDURE — 6370000000 HC RX 637 (ALT 250 FOR IP): Performed by: SURGERY

## 2020-02-07 PROCEDURE — 80048 BASIC METABOLIC PNL TOTAL CA: CPT

## 2020-02-07 PROCEDURE — 99232 SBSQ HOSP IP/OBS MODERATE 35: CPT | Performed by: INTERNAL MEDICINE

## 2020-02-07 PROCEDURE — 2580000003 HC RX 258: Performed by: SURGERY

## 2020-02-07 PROCEDURE — 3430000000 HC RX DIAGNOSTIC RADIOPHARMACEUTICAL: Performed by: RADIOLOGY

## 2020-02-07 PROCEDURE — A9500 TC99M SESTAMIBI: HCPCS | Performed by: RADIOLOGY

## 2020-02-07 PROCEDURE — C9113 INJ PANTOPRAZOLE SODIUM, VIA: HCPCS | Performed by: SURGERY

## 2020-02-07 PROCEDURE — 2060000000 HC ICU INTERMEDIATE R&B

## 2020-02-07 PROCEDURE — 6360000002 HC RX W HCPCS: Performed by: SURGERY

## 2020-02-07 PROCEDURE — 85027 COMPLETE CBC AUTOMATED: CPT

## 2020-02-07 PROCEDURE — 93018 CV STRESS TEST I&R ONLY: CPT | Performed by: INTERNAL MEDICINE

## 2020-02-07 PROCEDURE — 78452 HT MUSCLE IMAGE SPECT MULT: CPT

## 2020-02-07 PROCEDURE — 93017 CV STRESS TEST TRACING ONLY: CPT

## 2020-02-07 RX ORDER — ASPIRIN 81 MG/1
81 TABLET, CHEWABLE ORAL DAILY
Status: DISCONTINUED | OUTPATIENT
Start: 2020-02-07 | End: 2020-02-07 | Stop reason: SDUPTHER

## 2020-02-07 RX ORDER — ISOSORBIDE MONONITRATE 30 MG/1
30 TABLET, EXTENDED RELEASE ORAL DAILY
Status: DISCONTINUED | OUTPATIENT
Start: 2020-02-07 | End: 2020-02-08 | Stop reason: HOSPADM

## 2020-02-07 RX ORDER — ASPIRIN 81 MG/1
81 TABLET ORAL DAILY
Status: DISCONTINUED | OUTPATIENT
Start: 2020-02-07 | End: 2020-02-08 | Stop reason: HOSPADM

## 2020-02-07 RX ADMIN — Medication 10 MILLICURIE: at 10:27

## 2020-02-07 RX ADMIN — INSULIN LISPRO 1 UNITS: 100 INJECTION, SOLUTION INTRAVENOUS; SUBCUTANEOUS at 14:04

## 2020-02-07 RX ADMIN — PANTOPRAZOLE SODIUM 40 MG: 40 INJECTION, POWDER, FOR SOLUTION INTRAVENOUS at 08:50

## 2020-02-07 RX ADMIN — METOPROLOL SUCCINATE 50 MG: 50 TABLET, EXTENDED RELEASE ORAL at 20:58

## 2020-02-07 RX ADMIN — SUCRALFATE 1 G: 1 TABLET ORAL at 08:49

## 2020-02-07 RX ADMIN — METOPROLOL SUCCINATE 50 MG: 50 TABLET, EXTENDED RELEASE ORAL at 08:49

## 2020-02-07 RX ADMIN — SUCRALFATE 1 G: 1 TABLET ORAL at 14:03

## 2020-02-07 RX ADMIN — Medication 10 ML: at 08:50

## 2020-02-07 RX ADMIN — ACETAMINOPHEN 650 MG: 325 TABLET ORAL at 20:58

## 2020-02-07 RX ADMIN — ACETAMINOPHEN 650 MG: 325 TABLET ORAL at 04:03

## 2020-02-07 RX ADMIN — SUCRALFATE 1 G: 1 TABLET ORAL at 17:26

## 2020-02-07 RX ADMIN — INSULIN LISPRO 2 UNITS: 100 INJECTION, SOLUTION INTRAVENOUS; SUBCUTANEOUS at 17:28

## 2020-02-07 RX ADMIN — ATORVASTATIN CALCIUM 40 MG: 40 TABLET, FILM COATED ORAL at 20:58

## 2020-02-07 RX ADMIN — SUCRALFATE 1 G: 1 TABLET ORAL at 20:58

## 2020-02-07 RX ADMIN — REGADENOSON 0.4 MG: 0.08 INJECTION, SOLUTION INTRAVENOUS at 10:56

## 2020-02-07 RX ADMIN — Medication 10 ML: at 20:59

## 2020-02-07 RX ADMIN — GABAPENTIN 300 MG: 300 CAPSULE ORAL at 20:58

## 2020-02-07 RX ADMIN — PANTOPRAZOLE SODIUM 40 MG: 40 INJECTION, POWDER, FOR SOLUTION INTRAVENOUS at 20:59

## 2020-02-07 RX ADMIN — ASPIRIN 81 MG: 81 TABLET, COATED ORAL at 17:26

## 2020-02-07 RX ADMIN — ISOSORBIDE MONONITRATE 30 MG: 30 TABLET, EXTENDED RELEASE ORAL at 14:03

## 2020-02-07 RX ADMIN — Medication 30 MILLICURIE: at 10:27

## 2020-02-07 ASSESSMENT — PAIN SCALES - GENERAL
PAINLEVEL_OUTOF10: 0
PAINLEVEL_OUTOF10: 8
PAINLEVEL_OUTOF10: 5
PAINLEVEL_OUTOF10: 0

## 2020-02-07 NOTE — PROGRESS NOTES
Stress test images reviewed by Dr Bam Ortiz  Add ASA 81 mg QD IF okay with general surgery  Add Imdur 30 mg QD  Ambulate in calderon  Okay from cardiology standpoint for her her L mastectomy (date to be determined)   Okay for discharge from cardiology standpoint when okay with others  Follow up with Dr Bam Ortiz in office. Electronically signed by Hermelinda Mujica.  ROCHELLE Pompa on 2/7/2020 at 1:04 PM

## 2020-02-07 NOTE — PROGRESS NOTES
[facility]) Physicians        CARDIOLOGY                 INPATIENT PROGRESS NOTE          PATIENT SEEN IN FOLLOW UP FOR:  Chest Pain,  and elevated troponin      Hospital Day: [address] is a 60year old patient known to me. SUBJECTIVE: Denies any CP or SOB, No cough or hemoptysis; No PND or orthopnea, No N/V/D; No fever    ROS: Review of rest of 10 systems negative except as mentioned above    OBJECTIVE: No acute distress. See Assessment     Diagnostics:       Telemetry: Sinus        Intake/Output Summary (Last 24 hours) at 2/7/2020 0840  Last data filed at 2/6/2020 1933  Gross per 24 hour   Intake 242 ml   Output 400 ml   Net -158 ml       Labs:   CBC:   Recent Labs     02/06/20  0210  02/06/20  2137 02/07/20  0420   WBC 5.0  --   --  7.3   HGB 7.6*   < > 8.2* 8.9*   HCT 24.3*   < > 26.2* 27.6*     --   --  203    < > = values in this interval not displayed. BMP:   Recent Labs     02/06/20  0210 02/07/20  0420    142   K 4.0 4.2   CO2 20* 21*   BUN 14 13   CREATININE 1.1* 0.9   LABGLOM >60 >60   CALCIUM 8.5* 9.1     Mag:   Recent Labs     02/05/20  0300 02/06/20  0210   MG 2.0 1.8     Phos: No results for input(s): PHOS in the last 72 hours. TSH:   No results for input(s): TSH in the last 72 hours. HgA1c:     BNP: No results for input(s): BNP in the last 72 hours. PT/INR:   No results for input(s): PROTIME, INR in the last 72 hours. APTT:  No results for input(s): APTT in the last 72 hours.   CARDIAC ENZYMES:  Recent Labs     02/05/20  0300 02/05/20  1010 02/05/20  1720   CKTOTAL 197* 169 158   CKMB 12.2* 8.0* 6.3*   TROPONINI 0.47* 0.56* 0.61*     FASTING LIPID PANEL:  Lab Results   Component Value Date    CHOL 93 02/04/2020    HDL 29 02/04/2020    LDLCALC 35 02/04/2020    TRIG 147 02/04/2020     LIVER PROFILE:  Recent Labs     02/05/20  0300   AST 32*   ALT 14   LABALBU 3.1*       Current Inpatient Medications:   sodium chloride  20 mL Intravenous Once   

## 2020-02-07 NOTE — PROGRESS NOTES
[facility] Quality Flow/Interdisciplinary Rounds Progress Note        Quality Flow Rounds held on February 7, 2020    Disciplines Attending:  Bedside Nurse, ,  and Nursing Unit Leadership    Corinne Carlisle was admitted on 2/3/2020  2:18 PM    Anticipated Discharge Date:  Expected Discharge Date: 02/05/20    Disposition:    Rojas Score:  Rojas Scale Score: 21    Readmission Risk              Risk of Unplanned Readmission:        12           Discussed patient goal for the day, patient clinical progression, and barriers to discharge. The following Goal(s) of the Day/Commitment(s) have been identified:  Stress test. Check Cardiology plan.       Lois Olivarez  February 7, 2020

## 2020-02-07 NOTE — PROGRESS NOTES
[facility] Progress Note    Admitting Date and Time: 2/3/2020  2:18 PM  Admit Dx: Acute GI bleeding [K92.2]  Acute GI bleeding [K92.2]    Subjective:  Patient is being followed for Acute GI bleeding [K92.2]  Acute GI bleeding [K92.2]   Patient feels better, denies any chest pain or shortness of breath. ROS: denies fever, chills, cp, sob, n/v, HA unless stated above.       aspirin  81 mg Oral Daily    isosorbide mononitrate  30 mg Oral Daily    sodium chloride  20 mL Intravenous Once    sucralfate  1 g Oral 4x Daily WC    metoprolol succinate  50 mg Oral BID    pantoprazole  40 mg Intravenous BID    gabapentin  300 mg Oral Nightly    atorvastatin  40 mg Oral Nightly    sodium chloride flush  10 mL Intravenous 2 times per day    insulin lispro  0-6 Units Subcutaneous TID WC    insulin lispro  0-3 Units Subcutaneous Nightly     perflutren lipid microspheres, 1.5 mL, ONCE PRN  sodium chloride flush, 10 mL, PRN  magnesium hydroxide, 30 mL, Daily PRN  ondansetron, 4 mg, Q6H PRN  acetaminophen, 650 mg, Q4H PRN  glucose, 15 g, PRN  dextrose, 12.5 g, PRN  glucagon (rDNA), 1 mg, PRN  dextrose, 100 mL/hr, PRN         Objective:    /78   Pulse 91   Temp 98.9 °F (37.2 °C) (Oral)   Resp 20   Ht 5' 5\" (1.651 m)   Wt 228 lb (103.4 kg)   SpO2 97%   BMI 37.94 kg/m²   General Appearance: alert and oriented to person, place and time and in no acute distress  Skin: warm and dry  Head: normocephalic and atraumatic  Eyes: pupils equal, round, and reactive to light, extraocular eye movements intact, conjunctivae normal  Neck: neck supple and non tender without mass   Pulmonary/Chest: clear to auscultation bilaterally- no wheezes, rales or rhonchi, normal air movement, no respiratory distress  Cardiovascular: normal rate, normal S1 and S2 and no carotid bruits  Abdomen: soft, non-tender, non-distended, normal bowel sounds, no masses or organomegaly  Extremities: no cyanosis, no clubbing and no

## 2020-02-08 VITALS
DIASTOLIC BLOOD PRESSURE: 81 MMHG | OXYGEN SATURATION: 98 % | SYSTOLIC BLOOD PRESSURE: 133 MMHG | WEIGHT: 229.9 LBS | TEMPERATURE: 98.6 F | HEART RATE: 83 BPM | HEIGHT: 65 IN | RESPIRATION RATE: 18 BRPM | BODY MASS INDEX: 38.3 KG/M2

## 2020-02-08 LAB
ANION GAP SERPL CALCULATED.3IONS-SCNC: 11 MMOL/L (ref 7–16)
BUN BLDV-MCNC: 15 MG/DL (ref 8–23)
CALCIUM SERPL-MCNC: 8.6 MG/DL (ref 8.6–10.2)
CHLORIDE BLD-SCNC: 104 MMOL/L (ref 98–107)
CO2: 22 MMOL/L (ref 22–29)
CREAT SERPL-MCNC: 1.2 MG/DL (ref 0.5–1)
GFR AFRICAN AMERICAN: 55
GFR NON-AFRICAN AMERICAN: 55 ML/MIN/1.73
GLUCOSE BLD-MCNC: 158 MG/DL (ref 74–99)
HCT VFR BLD CALC: 24.9 % (ref 34–48)
HEMOGLOBIN: 7.8 G/DL (ref 11.5–15.5)
MCH RBC QN AUTO: 28.8 PG (ref 26–35)
MCHC RBC AUTO-ENTMCNC: 31.3 % (ref 32–34.5)
MCV RBC AUTO: 91.9 FL (ref 80–99.9)
METER GLUCOSE: 131 MG/DL (ref 74–99)
PDW BLD-RTO: 18.9 FL (ref 11.5–15)
PLATELET # BLD: 194 E9/L (ref 130–450)
PMV BLD AUTO: 10.5 FL (ref 7–12)
POTASSIUM SERPL-SCNC: 3.6 MMOL/L (ref 3.5–5)
RBC # BLD: 2.71 E12/L (ref 3.5–5.5)
SODIUM BLD-SCNC: 137 MMOL/L (ref 132–146)
WBC # BLD: 5.5 E9/L (ref 4.5–11.5)

## 2020-02-08 PROCEDURE — C9113 INJ PANTOPRAZOLE SODIUM, VIA: HCPCS | Performed by: SURGERY

## 2020-02-08 PROCEDURE — 36415 COLL VENOUS BLD VENIPUNCTURE: CPT

## 2020-02-08 PROCEDURE — 6370000000 HC RX 637 (ALT 250 FOR IP): Performed by: SURGERY

## 2020-02-08 PROCEDURE — 80048 BASIC METABOLIC PNL TOTAL CA: CPT

## 2020-02-08 PROCEDURE — 82962 GLUCOSE BLOOD TEST: CPT

## 2020-02-08 PROCEDURE — 99239 HOSP IP/OBS DSCHRG MGMT >30: CPT | Performed by: INTERNAL MEDICINE

## 2020-02-08 PROCEDURE — 6370000000 HC RX 637 (ALT 250 FOR IP): Performed by: NURSE PRACTITIONER

## 2020-02-08 PROCEDURE — 2580000003 HC RX 258: Performed by: SURGERY

## 2020-02-08 PROCEDURE — 6360000002 HC RX W HCPCS: Performed by: SURGERY

## 2020-02-08 PROCEDURE — 85027 COMPLETE CBC AUTOMATED: CPT

## 2020-02-08 RX ORDER — METOPROLOL SUCCINATE 50 MG/1
50 TABLET, EXTENDED RELEASE ORAL 2 TIMES DAILY
Qty: 30 TABLET | Refills: 0 | Status: ON HOLD | OUTPATIENT
Start: 2020-02-08 | End: 2020-02-26 | Stop reason: SDUPTHER

## 2020-02-08 RX ADMIN — Medication 10 ML: at 09:09

## 2020-02-08 RX ADMIN — ASPIRIN 81 MG: 81 TABLET, COATED ORAL at 09:08

## 2020-02-08 RX ADMIN — PANTOPRAZOLE SODIUM 40 MG: 40 INJECTION, POWDER, FOR SOLUTION INTRAVENOUS at 09:08

## 2020-02-08 RX ADMIN — ISOSORBIDE MONONITRATE 30 MG: 30 TABLET, EXTENDED RELEASE ORAL at 09:08

## 2020-02-08 RX ADMIN — METOPROLOL SUCCINATE 50 MG: 50 TABLET, EXTENDED RELEASE ORAL at 09:08

## 2020-02-08 RX ADMIN — SUCRALFATE 1 G: 1 TABLET ORAL at 09:08

## 2020-02-08 ASSESSMENT — PAIN SCALES - GENERAL
PAINLEVEL_OUTOF10: 0
PAINLEVEL_OUTOF10: 0

## 2020-02-08 NOTE — DISCHARGE SUMMARY
31.3*   RDW 19.4*  --  19.1*  --  18.9*     --  203  --  194   MPV 11.0  --  10.9  --  10.5    < > = values in this interval not displayed. No results for input(s): POCGLU in the last 72 hours. Imaging:  Xr Chest Standard (2 Vw)    Result Date: 2/3/2020  Patient MRN:  [id number] : 1960 Age: 60 years Gender: Female Order Date:  2/3/2020 2:45 PM TECHNIQUE/NUMBER OF IMAGES/COMPARISON/CLINICAL HISTORY: Chest PA and lateral views Of the images 2 views Comparison 2012 Chest pain, difficult breathing. FINDINGS: Patient had previous surgery to the area of the right breast. Lungs are clear, no infiltrates, consolidations or pleural effusions are seen. Heart has normal size, mediastinum unremarkable. There is no perihilar vascular congestion. No acute cardiopulmonary process.       Patient Instructions:      Medication List      START taking these medications    metoprolol succinate 50 MG extended release tablet  Commonly known as:  TOPROL XL  Take 1 tablet by mouth 2 times daily     pantoprazole 40 MG tablet  Commonly known as:  PROTONIX  Take 1 tablet by mouth 2 times daily (before meals)     sucralfate 1 GM tablet  Commonly known as:  CARAFATE  Take 1 tablet by mouth 4 times daily (with meals and nightly)        CONTINUE taking these medications    aspirin 81 MG tablet     atorvastatin 40 MG tablet  Commonly known as:  LIPITOR  Take 1 tablet by mouth nightly.     gabapentin 300 MG capsule  Commonly known as:  NEURONTIN     glimepiride 1 MG tablet  Commonly known as:  AMARYL     metFORMIN 500 MG tablet  Commonly known as:  GLUCOPHAGE        STOP taking these medications    amLODIPine 10 MG tablet  Commonly known as:  NORVASC     clopidogrel 75 MG tablet  Commonly known as:  PLAVIX     lisinopril 20 MG tablet  Commonly known as:  PRINIVIL;ZESTRIL     Mobic 7.5 MG tablet  Generic drug:  meloxicam     ondansetron 4 MG tablet  Commonly known as:  Rasheed Settles           Where to Get Your Medications      These medications were sent to [address], [place] [address]    Phone:  [phone]   · pantoprazole 40 MG tablet  · sucralfate 1 GM tablet     You can get these medications from any pharmacy    Bring a paper prescription for each of these medications  · metoprolol succinate 50 MG extended release tablet           Note that more than 30 minutes was spent in preparing discharge papers, discussing discharge with patient, medication review, etc.    Signed:  Electronically signed by Yun Sinha MD on 2/8/2020 at 11:48 AM

## 2020-02-12 ENCOUNTER — TELEPHONE (OUTPATIENT)
Dept: CASE MANAGEMENT | Age: 60
End: 2020-02-12

## 2020-02-12 NOTE — TELEPHONE ENCOUNTER
While reviewing patient's chart, it was discovered that patient saw Dr. Inder Valdes on 1/8/2020 per referral of Dr. Dario Almeida. Patient has recently completed chemotherapy per Dr. Kamille Edwards along with concurrent radiation therapy per Dr. Dain Strickland. I called patient to inquire if she was going to be switching Medical Oncologists. Patient states Dr. Dario Almeida referred her to Dr. Inder Valdes one week prior to her HDR brachytherapy (including interstitial) at [facility] due to patient's blood counts being low. Dr. Inder Valdes was going to give patient GCSF 300 mcg x 3 doses to prevent neutropenic complications for 2/23/8871 scheduled surgery. Patient states that she did not realize that \"Dr. Inder Valdes and Dr. Kamille Edwards were the same kind of doctor\" and that she only saw Dr. Anuradha Araujo a one time thing\". Patient states that Dr. Oj Espino office recently called her to schedule a follow up appointment, she didn't understand why they called, and she would like to continue following with Dr. Kamille Edwards for Medical Oncology. I called Dr. Oj Espino office and spoke with Nancy Garcia RN to explain situation. Jocy Escobedo states that patient ended up not requiring GCSF previously as her blood counts were too high for insurance to approve. Jocy Escobedo will relay message to their scheduling department that patient wishes to continue care with Dr. Kamille Edwards. I also called [facility] again and spoke with Erick Nation requesting patient's 12/17/2019 breast core biopsy results and recent breast imaging again after many previous attempts. Erick Nation faxed these results over and all will be scanned into patient's Epic chart.      Patient advises that she is now scheduled for her left breast mastectomy per Dr. Chepe Ballesteros at [facility] on 2/25/2020 at 10 am.

## 2020-02-12 NOTE — TELEPHONE ENCOUNTER
I called patient to inquire if her left mastectomy surgery has been rescheduled yet. This was supposed to be done on 2/4/2020 but was canceled as patient was admitted into hospital secondary to a GI bleed. Patient states Dr. Francy Maravilla will probably do the surgery \"within the next few weeks\" but she does not have a date as of yet. Patient states she is very weak at this time. Patient states she received a blood transfusion and also had a heart attack while she was recently in-patient. Patient currently has the flu and is feeling \"lousy\". Patient lives alone but her son and daughter are available to help her with any needs. Patient states her previous pelvic radiation site feels fine and her skin looks good. Due to her current status, patient does not believe that she will feel well enough to keep her 2/20/2020 post radiation follow up appointment with Abbey Rodriguez CNP. I informed patient that I will cancel this appointment for her and will call her back next week to check on her again. She also does not currently have a follow up appointment scheduled with Dr. Madison Sheppard. Both appointments will be rescheduled asap depending on her health status and surgery.

## 2020-02-13 ENCOUNTER — PREP FOR PROCEDURE (OUTPATIENT)
Dept: SURGERY | Age: 60
End: 2020-02-13

## 2020-02-13 RX ORDER — SODIUM CHLORIDE 0.9 % (FLUSH) 0.9 %
10 SYRINGE (ML) INJECTION EVERY 12 HOURS SCHEDULED
Status: CANCELLED | OUTPATIENT
Start: 2020-02-13

## 2020-02-13 RX ORDER — SODIUM CHLORIDE 9 MG/ML
INJECTION, SOLUTION INTRAVENOUS CONTINUOUS
Status: CANCELLED | OUTPATIENT
Start: 2020-02-13

## 2020-02-20 NOTE — PROGRESS NOTES
from registration    [x] You can expect a call the business day prior to procedure to notify you if your arrival time changes    [x] If you receive a survey after surgery we would greatly appreciate your comments    [] Parent/guardian of a minor must accompany their child and remain on the premises  the entire time they are under our care     [] Pediatric patients may bring favorite toy, blanket or comfort item with them    [] A caregiver or family member must remain with the patient during their stay if they are mentally handicapped, have dementia, disoriented or unable to use a call light or would be a safety concern if left unattended    [x] Please notify surgeon if you develop any illness between now and time of surgery (cold, cough, sore throat, fever, nausea, vomiting) or any signs of infections  including skin, wounds, and dental.    [x]  The Outpatient Pharmacy is available to fill your prescription here on your day of surgery, ask your preop nurse for details    [x] Other instructions: Wear comfortable clothing    EDUCATIONAL MATERIALS PROVIDED:    [] PAT Preoperative Education Packet/Booklet     [] Medication List    [] Fluoroscopy Information Pamphlet    [] Transfusion bracelet applied with instructions    [] Joint replacement video reviewed    [] Shower with soap, lather and rinse well, and use CHG wipes provided the evening before surgery as instructed

## 2020-02-25 ENCOUNTER — ANESTHESIA (OUTPATIENT)
Dept: OPERATING ROOM | Age: 60
DRG: 582 | End: 2020-02-25
Payer: MEDICARE

## 2020-02-25 ENCOUNTER — ANESTHESIA EVENT (OUTPATIENT)
Dept: OPERATING ROOM | Age: 60
DRG: 582 | End: 2020-02-25
Payer: MEDICARE

## 2020-02-25 ENCOUNTER — HOSPITAL ENCOUNTER (INPATIENT)
Age: 60
LOS: 2 days | Discharge: HOME HEALTH | DRG: 582 | End: 2020-02-28
Attending: SURGERY | Admitting: SURGERY
Payer: MEDICARE

## 2020-02-25 VITALS — SYSTOLIC BLOOD PRESSURE: 92 MMHG | DIASTOLIC BLOOD PRESSURE: 59 MMHG | OXYGEN SATURATION: 100 % | TEMPERATURE: 95.9 F

## 2020-02-25 VITALS — DIASTOLIC BLOOD PRESSURE: 87 MMHG | SYSTOLIC BLOOD PRESSURE: 137 MMHG | OXYGEN SATURATION: 96 %

## 2020-02-25 PROBLEM — D05.12 INTRADUCTAL CARCINOMA OF LEFT BREAST: Status: ACTIVE | Noted: 2020-02-25

## 2020-02-25 LAB
ABO/RH: NORMAL
ALBUMIN SERPL-MCNC: 2.8 G/DL (ref 3.5–5.2)
ALP BLD-CCNC: 72 U/L (ref 35–104)
ALT SERPL-CCNC: 9 U/L (ref 0–32)
ANION GAP SERPL CALCULATED.3IONS-SCNC: 11 MMOL/L (ref 7–16)
ANION GAP SERPL CALCULATED.3IONS-SCNC: 14 MMOL/L (ref 7–16)
ANISOCYTOSIS: ABNORMAL
ANTIBODY SCREEN: NORMAL
AST SERPL-CCNC: 13 U/L (ref 0–31)
BASOPHILS ABSOLUTE: 0 E9/L (ref 0–0.2)
BASOPHILS RELATIVE PERCENT: 0 % (ref 0–2)
BILIRUB SERPL-MCNC: <0.2 MG/DL (ref 0–1.2)
BUN BLDV-MCNC: 17 MG/DL (ref 8–23)
BUN BLDV-MCNC: 19 MG/DL (ref 8–23)
CALCIUM SERPL-MCNC: 8 MG/DL (ref 8.6–10.2)
CALCIUM SERPL-MCNC: 9.4 MG/DL (ref 8.6–10.2)
CHLORIDE BLD-SCNC: 104 MMOL/L (ref 98–107)
CHLORIDE BLD-SCNC: 106 MMOL/L (ref 98–107)
CK MB: 1.6 NG/ML (ref 0–4.3)
CO2: 23 MMOL/L (ref 22–29)
CO2: 25 MMOL/L (ref 22–29)
CREAT SERPL-MCNC: 1.3 MG/DL (ref 0.5–1)
CREAT SERPL-MCNC: 1.3 MG/DL (ref 0.5–1)
EOSINOPHILS ABSOLUTE: 0 E9/L (ref 0.05–0.5)
EOSINOPHILS RELATIVE PERCENT: 0 % (ref 0–6)
GFR AFRICAN AMERICAN: 50
GFR AFRICAN AMERICAN: 50
GFR NON-AFRICAN AMERICAN: 50 ML/MIN/1.73
GFR NON-AFRICAN AMERICAN: 50 ML/MIN/1.73
GLUCOSE BLD-MCNC: 106 MG/DL (ref 74–99)
GLUCOSE BLD-MCNC: 314 MG/DL (ref 74–99)
HCT VFR BLD CALC: 20.9 % (ref 34–48)
HCT VFR BLD CALC: 23.2 % (ref 34–48)
HCT VFR BLD CALC: 30.1 % (ref 34–48)
HEMOGLOBIN: 6.2 G/DL (ref 11.5–15.5)
HEMOGLOBIN: 6.9 G/DL (ref 11.5–15.5)
HEMOGLOBIN: 9.2 G/DL (ref 11.5–15.5)
HYPOCHROMIA: ABNORMAL
IMMATURE GRANULOCYTES #: 0.7 E9/L
IMMATURE GRANULOCYTES %: 0.7 % (ref 0–5)
LYMPHOCYTES ABSOLUTE: 0 E9/L (ref 1.5–4)
LYMPHOCYTES RELATIVE PERCENT: 0 % (ref 20–42)
MAGNESIUM: 1.4 MG/DL (ref 1.6–2.6)
MCH RBC QN AUTO: 27.5 PG (ref 26–35)
MCH RBC QN AUTO: 27.6 PG (ref 26–35)
MCHC RBC AUTO-ENTMCNC: 29.7 % (ref 32–34.5)
MCHC RBC AUTO-ENTMCNC: 30.6 % (ref 32–34.5)
MCV RBC AUTO: 90.1 FL (ref 80–99.9)
MCV RBC AUTO: 92.9 FL (ref 80–99.9)
METER GLUCOSE: 283 MG/DL (ref 74–99)
METER GLUCOSE: 315 MG/DL (ref 74–99)
MONOCYTES ABSOLUTE: 0 E9/L (ref 0.1–0.95)
MONOCYTES RELATIVE PERCENT: 0 % (ref 2–12)
NEUTROPHILS ABSOLUTE: 0 E9/L (ref 1.8–7.3)
NEUTROPHILS RELATIVE PERCENT: 0 % (ref 43–80)
OVALOCYTES: ABNORMAL
PDW BLD-RTO: 18.9 FL (ref 11.5–15)
PDW BLD-RTO: 19.1 FL (ref 11.5–15)
PHOSPHORUS: 3.6 MG/DL (ref 2.5–4.5)
PLATELET # BLD: 328 E9/L (ref 130–450)
PLATELET # BLD: 385 E9/L (ref 130–450)
PMV BLD AUTO: 10.3 FL (ref 7–12)
PMV BLD AUTO: 10.5 FL (ref 7–12)
POIKILOCYTES: ABNORMAL
POLYCHROMASIA: ABNORMAL
POTASSIUM SERPL-SCNC: 4.4 MMOL/L (ref 3.5–5)
POTASSIUM SERPL-SCNC: 5 MMOL/L (ref 3.5–5)
RBC # BLD: 2.25 E12/L (ref 3.5–5.5)
RBC # BLD: 3.34 E12/L (ref 3.5–5.5)
SODIUM BLD-SCNC: 140 MMOL/L (ref 132–146)
SODIUM BLD-SCNC: 143 MMOL/L (ref 132–146)
TOTAL PROTEIN: 5.1 G/DL (ref 6.4–8.3)
TROPONIN: <0.01 NG/ML (ref 0–0.03)
WBC # BLD: 13.2 E9/L (ref 4.5–11.5)
WBC # BLD: 5.1 E9/L (ref 4.5–11.5)

## 2020-02-25 PROCEDURE — 3600000013 HC SURGERY LEVEL 3 ADDTL 15MIN: Performed by: SURGERY

## 2020-02-25 PROCEDURE — 2580000003 HC RX 258: Performed by: NURSE ANESTHETIST, CERTIFIED REGISTERED

## 2020-02-25 PROCEDURE — 6360000002 HC RX W HCPCS: Performed by: STUDENT IN AN ORGANIZED HEALTH CARE EDUCATION/TRAINING PROGRAM

## 2020-02-25 PROCEDURE — 2500000003 HC RX 250 WO HCPCS: Performed by: NURSE ANESTHETIST, CERTIFIED REGISTERED

## 2020-02-25 PROCEDURE — 6360000002 HC RX W HCPCS: Performed by: SURGERY

## 2020-02-25 PROCEDURE — 6360000002 HC RX W HCPCS: Performed by: NURSE ANESTHETIST, CERTIFIED REGISTERED

## 2020-02-25 PROCEDURE — 88307 TISSUE EXAM BY PATHOLOGIST: CPT

## 2020-02-25 PROCEDURE — 36430 TRANSFUSION BLD/BLD COMPNT: CPT

## 2020-02-25 PROCEDURE — 86850 RBC ANTIBODY SCREEN: CPT

## 2020-02-25 PROCEDURE — 85027 COMPLETE CBC AUTOMATED: CPT

## 2020-02-25 PROCEDURE — 86900 BLOOD TYPING SEROLOGIC ABO: CPT

## 2020-02-25 PROCEDURE — 85014 HEMATOCRIT: CPT

## 2020-02-25 PROCEDURE — 2709999900 HC NON-CHARGEABLE SUPPLY: Performed by: SURGERY

## 2020-02-25 PROCEDURE — 7100000000 HC PACU RECOVERY - FIRST 15 MIN: Performed by: SURGERY

## 2020-02-25 PROCEDURE — 6360000002 HC RX W HCPCS: Performed by: ANESTHESIOLOGY

## 2020-02-25 PROCEDURE — 0HRU078 REPLACEMENT OF LEFT BREAST USING SUPERFICIAL INFERIOR EPIGASTRIC ARTERY FLAP, OPEN APPROACH: ICD-10-PCS | Performed by: SURGERY

## 2020-02-25 PROCEDURE — 2500000003 HC RX 250 WO HCPCS: Performed by: SURGERY

## 2020-02-25 PROCEDURE — 6370000000 HC RX 637 (ALT 250 FOR IP): Performed by: STUDENT IN AN ORGANIZED HEALTH CARE EDUCATION/TRAINING PROGRAM

## 2020-02-25 PROCEDURE — 2580000003 HC RX 258: Performed by: STUDENT IN AN ORGANIZED HEALTH CARE EDUCATION/TRAINING PROGRAM

## 2020-02-25 PROCEDURE — 83735 ASSAY OF MAGNESIUM: CPT

## 2020-02-25 PROCEDURE — 2580000003 HC RX 258: Performed by: SURGERY

## 2020-02-25 PROCEDURE — 80048 BASIC METABOLIC PNL TOTAL CA: CPT

## 2020-02-25 PROCEDURE — 86923 COMPATIBILITY TEST ELECTRIC: CPT

## 2020-02-25 PROCEDURE — 82962 GLUCOSE BLOOD TEST: CPT

## 2020-02-25 PROCEDURE — 0HCU0ZZ EXTIRPATION OF MATTER FROM LEFT BREAST, OPEN APPROACH: ICD-10-PCS | Performed by: SURGERY

## 2020-02-25 PROCEDURE — L8000 MASTECTOMY BRA: HCPCS | Performed by: SURGERY

## 2020-02-25 PROCEDURE — 3700000000 HC ANESTHESIA ATTENDED CARE: Performed by: SURGERY

## 2020-02-25 PROCEDURE — P9016 RBC LEUKOCYTES REDUCED: HCPCS

## 2020-02-25 PROCEDURE — 85025 COMPLETE CBC W/AUTO DIFF WBC: CPT

## 2020-02-25 PROCEDURE — 86901 BLOOD TYPING SEROLOGIC RH(D): CPT

## 2020-02-25 PROCEDURE — 88360 TUMOR IMMUNOHISTOCHEM/MANUAL: CPT

## 2020-02-25 PROCEDURE — 3700000001 HC ADD 15 MINUTES (ANESTHESIA): Performed by: SURGERY

## 2020-02-25 PROCEDURE — 3600000003 HC SURGERY LEVEL 3 BASE: Performed by: SURGERY

## 2020-02-25 PROCEDURE — 85018 HEMOGLOBIN: CPT

## 2020-02-25 PROCEDURE — 0HTU0ZZ RESECTION OF LEFT BREAST, OPEN APPROACH: ICD-10-PCS | Performed by: SURGERY

## 2020-02-25 PROCEDURE — 7100000001 HC PACU RECOVERY - ADDTL 15 MIN: Performed by: SURGERY

## 2020-02-25 PROCEDURE — 82553 CREATINE MB FRACTION: CPT

## 2020-02-25 PROCEDURE — 36415 COLL VENOUS BLD VENIPUNCTURE: CPT

## 2020-02-25 PROCEDURE — 84484 ASSAY OF TROPONIN QUANT: CPT

## 2020-02-25 PROCEDURE — 80053 COMPREHEN METABOLIC PANEL: CPT

## 2020-02-25 PROCEDURE — 84100 ASSAY OF PHOSPHORUS: CPT

## 2020-02-25 RX ORDER — HYDROCODONE BITARTRATE AND ACETAMINOPHEN 5; 325 MG/1; MG/1
1 TABLET ORAL PRN
Status: DISCONTINUED | OUTPATIENT
Start: 2020-02-25 | End: 2020-02-25 | Stop reason: HOSPADM

## 2020-02-25 RX ORDER — PROPOFOL 10 MG/ML
INJECTION, EMULSION INTRAVENOUS PRN
Status: DISCONTINUED | OUTPATIENT
Start: 2020-02-25 | End: 2020-02-25 | Stop reason: SDUPTHER

## 2020-02-25 RX ORDER — SODIUM CHLORIDE 9 MG/ML
1000 INJECTION, SOLUTION INTRAVENOUS ONCE
Status: COMPLETED | OUTPATIENT
Start: 2020-02-25 | End: 2020-02-25

## 2020-02-25 RX ORDER — SODIUM CHLORIDE 0.9 % (FLUSH) 0.9 %
10 SYRINGE (ML) INJECTION EVERY 12 HOURS SCHEDULED
Status: DISCONTINUED | OUTPATIENT
Start: 2020-02-25 | End: 2020-02-28 | Stop reason: HOSPADM

## 2020-02-25 RX ORDER — NICOTINE POLACRILEX 4 MG
15 LOZENGE BUCCAL PRN
Status: DISCONTINUED | OUTPATIENT
Start: 2020-02-25 | End: 2020-02-28 | Stop reason: HOSPADM

## 2020-02-25 RX ORDER — GLIMEPIRIDE 1 MG/1
1 TABLET ORAL
Status: DISCONTINUED | OUTPATIENT
Start: 2020-02-26 | End: 2020-02-28 | Stop reason: HOSPADM

## 2020-02-25 RX ORDER — ONDANSETRON 2 MG/ML
4 INJECTION INTRAMUSCULAR; INTRAVENOUS EVERY 6 HOURS PRN
Status: DISCONTINUED | OUTPATIENT
Start: 2020-02-25 | End: 2020-02-28 | Stop reason: HOSPADM

## 2020-02-25 RX ORDER — HYDROCODONE BITARTRATE AND ACETAMINOPHEN 5; 325 MG/1; MG/1
2 TABLET ORAL PRN
Status: DISCONTINUED | OUTPATIENT
Start: 2020-02-25 | End: 2020-02-25 | Stop reason: HOSPADM

## 2020-02-25 RX ORDER — SODIUM CHLORIDE 9 MG/ML
INJECTION, SOLUTION INTRAVENOUS CONTINUOUS PRN
Status: DISCONTINUED | OUTPATIENT
Start: 2020-02-25 | End: 2020-02-25 | Stop reason: SDUPTHER

## 2020-02-25 RX ORDER — MORPHINE SULFATE 4 MG/ML
4 INJECTION, SOLUTION INTRAMUSCULAR; INTRAVENOUS
Status: DISCONTINUED | OUTPATIENT
Start: 2020-02-25 | End: 2020-02-27

## 2020-02-25 RX ORDER — LIDOCAINE HYDROCHLORIDE 20 MG/ML
INJECTION, SOLUTION EPIDURAL; INFILTRATION; INTRACAUDAL; PERINEURAL PRN
Status: DISCONTINUED | OUTPATIENT
Start: 2020-02-25 | End: 2020-02-25 | Stop reason: SDUPTHER

## 2020-02-25 RX ORDER — DEXAMETHASONE SODIUM PHOSPHATE 4 MG/ML
INJECTION, SOLUTION INTRA-ARTICULAR; INTRALESIONAL; INTRAMUSCULAR; INTRAVENOUS; SOFT TISSUE PRN
Status: DISCONTINUED | OUTPATIENT
Start: 2020-02-25 | End: 2020-02-25 | Stop reason: SDUPTHER

## 2020-02-25 RX ORDER — CLINDAMYCIN PHOSPHATE 900 MG/50ML
900 INJECTION INTRAVENOUS
Status: COMPLETED | OUTPATIENT
Start: 2020-02-25 | End: 2020-02-25

## 2020-02-25 RX ORDER — SODIUM CHLORIDE 0.9 % (FLUSH) 0.9 %
10 SYRINGE (ML) INJECTION EVERY 12 HOURS SCHEDULED
Status: DISCONTINUED | OUTPATIENT
Start: 2020-02-25 | End: 2020-02-25 | Stop reason: HOSPADM

## 2020-02-25 RX ORDER — ROCURONIUM BROMIDE 10 MG/ML
INJECTION, SOLUTION INTRAVENOUS PRN
Status: DISCONTINUED | OUTPATIENT
Start: 2020-02-25 | End: 2020-02-25 | Stop reason: SDUPTHER

## 2020-02-25 RX ORDER — ONDANSETRON 2 MG/ML
INJECTION INTRAMUSCULAR; INTRAVENOUS PRN
Status: DISCONTINUED | OUTPATIENT
Start: 2020-02-25 | End: 2020-02-25 | Stop reason: SDUPTHER

## 2020-02-25 RX ORDER — 0.9 % SODIUM CHLORIDE 0.9 %
20 INTRAVENOUS SOLUTION INTRAVENOUS ONCE
Status: DISCONTINUED | OUTPATIENT
Start: 2020-02-25 | End: 2020-02-25 | Stop reason: SDUPTHER

## 2020-02-25 RX ORDER — SUCRALFATE 1 G/1
1 TABLET ORAL
Status: DISCONTINUED | OUTPATIENT
Start: 2020-02-25 | End: 2020-02-28 | Stop reason: HOSPADM

## 2020-02-25 RX ORDER — MIDAZOLAM HYDROCHLORIDE 1 MG/ML
INJECTION INTRAMUSCULAR; INTRAVENOUS PRN
Status: DISCONTINUED | OUTPATIENT
Start: 2020-02-25 | End: 2020-02-25 | Stop reason: SDUPTHER

## 2020-02-25 RX ORDER — DEXTROSE MONOHYDRATE 50 MG/ML
100 INJECTION, SOLUTION INTRAVENOUS PRN
Status: DISCONTINUED | OUTPATIENT
Start: 2020-02-25 | End: 2020-02-28 | Stop reason: HOSPADM

## 2020-02-25 RX ORDER — GABAPENTIN 300 MG/1
300 CAPSULE ORAL NIGHTLY
Status: DISCONTINUED | OUTPATIENT
Start: 2020-02-25 | End: 2020-02-27

## 2020-02-25 RX ORDER — 0.9 % SODIUM CHLORIDE 0.9 %
20 INTRAVENOUS SOLUTION INTRAVENOUS ONCE
Status: DISCONTINUED | OUTPATIENT
Start: 2020-02-25 | End: 2020-02-28 | Stop reason: HOSPADM

## 2020-02-25 RX ORDER — SODIUM CHLORIDE 0.9 % (FLUSH) 0.9 %
10 SYRINGE (ML) INJECTION PRN
Status: DISCONTINUED | OUTPATIENT
Start: 2020-02-25 | End: 2020-02-28 | Stop reason: HOSPADM

## 2020-02-25 RX ORDER — SODIUM CHLORIDE 9 MG/ML
INJECTION, SOLUTION INTRAVENOUS CONTINUOUS
Status: DISCONTINUED | OUTPATIENT
Start: 2020-02-25 | End: 2020-02-25

## 2020-02-25 RX ORDER — LIDOCAINE HYDROCHLORIDE 20 MG/ML
INJECTION, SOLUTION INFILTRATION; PERINEURAL PRN
Status: DISCONTINUED | OUTPATIENT
Start: 2020-02-25 | End: 2020-02-25 | Stop reason: SDUPTHER

## 2020-02-25 RX ORDER — ATORVASTATIN CALCIUM 40 MG/1
40 TABLET, FILM COATED ORAL NIGHTLY
Status: DISCONTINUED | OUTPATIENT
Start: 2020-02-25 | End: 2020-02-28 | Stop reason: HOSPADM

## 2020-02-25 RX ORDER — CEFAZOLIN SODIUM 2 G/50ML
2 SOLUTION INTRAVENOUS ONCE
Status: COMPLETED | OUTPATIENT
Start: 2020-02-25 | End: 2020-02-25

## 2020-02-25 RX ORDER — MEPERIDINE HYDROCHLORIDE 25 MG/ML
25 INJECTION INTRAMUSCULAR; INTRAVENOUS; SUBCUTANEOUS EVERY 5 MIN PRN
Status: DISCONTINUED | OUTPATIENT
Start: 2020-02-25 | End: 2020-02-25 | Stop reason: HOSPADM

## 2020-02-25 RX ORDER — LIDOCAINE HYDROCHLORIDE 10 MG/ML
INJECTION, SOLUTION INFILTRATION; PERINEURAL PRN
Status: DISCONTINUED | OUTPATIENT
Start: 2020-02-25 | End: 2020-02-25 | Stop reason: ALTCHOICE

## 2020-02-25 RX ORDER — OXYCODONE HYDROCHLORIDE AND ACETAMINOPHEN 5; 325 MG/1; MG/1
1 TABLET ORAL EVERY 6 HOURS PRN
Qty: 20 TABLET | Refills: 0 | Status: SHIPPED | OUTPATIENT
Start: 2020-02-25 | End: 2020-03-01

## 2020-02-25 RX ORDER — SUCCINYLCHOLINE/SOD CL,ISO/PF 200MG/10ML
SYRINGE (ML) INTRAVENOUS PRN
Status: DISCONTINUED | OUTPATIENT
Start: 2020-02-25 | End: 2020-02-25 | Stop reason: SDUPTHER

## 2020-02-25 RX ORDER — CLINDAMYCIN PHOSPHATE 900 MG/50ML
INJECTION INTRAVENOUS
Status: DISPENSED
Start: 2020-02-25 | End: 2020-02-25

## 2020-02-25 RX ORDER — DIPHENHYDRAMINE HYDROCHLORIDE 50 MG/ML
12.5 INJECTION INTRAMUSCULAR; INTRAVENOUS
Status: DISCONTINUED | OUTPATIENT
Start: 2020-02-25 | End: 2020-02-25 | Stop reason: HOSPADM

## 2020-02-25 RX ORDER — OXYCODONE HYDROCHLORIDE 5 MG/1
5 TABLET ORAL EVERY 4 HOURS PRN
Status: DISCONTINUED | OUTPATIENT
Start: 2020-02-25 | End: 2020-02-27

## 2020-02-25 RX ORDER — FENTANYL CITRATE 50 UG/ML
INJECTION, SOLUTION INTRAMUSCULAR; INTRAVENOUS PRN
Status: DISCONTINUED | OUTPATIENT
Start: 2020-02-25 | End: 2020-02-25 | Stop reason: SDUPTHER

## 2020-02-25 RX ORDER — EPHEDRINE SULFATE/0.9% NACL/PF 50 MG/5 ML
SYRINGE (ML) INTRAVENOUS PRN
Status: DISCONTINUED | OUTPATIENT
Start: 2020-02-25 | End: 2020-02-25 | Stop reason: SDUPTHER

## 2020-02-25 RX ORDER — PANTOPRAZOLE SODIUM 40 MG/1
40 TABLET, DELAYED RELEASE ORAL
Status: DISCONTINUED | OUTPATIENT
Start: 2020-02-25 | End: 2020-02-27

## 2020-02-25 RX ORDER — FENTANYL CITRATE 50 UG/ML
INJECTION, SOLUTION INTRAMUSCULAR; INTRAVENOUS PRN
Status: DISCONTINUED | OUTPATIENT
Start: 2020-02-25 | End: 2020-02-25

## 2020-02-25 RX ORDER — DEXTROSE MONOHYDRATE 25 G/50ML
12.5 INJECTION, SOLUTION INTRAVENOUS PRN
Status: DISCONTINUED | OUTPATIENT
Start: 2020-02-25 | End: 2020-02-28 | Stop reason: HOSPADM

## 2020-02-25 RX ORDER — MORPHINE SULFATE 2 MG/ML
2 INJECTION, SOLUTION INTRAMUSCULAR; INTRAVENOUS
Status: DISCONTINUED | OUTPATIENT
Start: 2020-02-25 | End: 2020-02-27

## 2020-02-25 RX ORDER — ONDANSETRON 2 MG/ML
4 INJECTION INTRAMUSCULAR; INTRAVENOUS
Status: DISCONTINUED | OUTPATIENT
Start: 2020-02-25 | End: 2020-02-25 | Stop reason: HOSPADM

## 2020-02-25 RX ORDER — OXYCODONE HYDROCHLORIDE 5 MG/1
10 TABLET ORAL EVERY 4 HOURS PRN
Status: DISCONTINUED | OUTPATIENT
Start: 2020-02-25 | End: 2020-02-27

## 2020-02-25 RX ORDER — MEPERIDINE HYDROCHLORIDE 25 MG/ML
12.5 INJECTION INTRAMUSCULAR; INTRAVENOUS; SUBCUTANEOUS EVERY 5 MIN PRN
Status: DISCONTINUED | OUTPATIENT
Start: 2020-02-25 | End: 2020-02-25 | Stop reason: HOSPADM

## 2020-02-25 RX ORDER — METOPROLOL SUCCINATE 50 MG/1
50 TABLET, EXTENDED RELEASE ORAL 2 TIMES DAILY
Status: DISCONTINUED | OUTPATIENT
Start: 2020-02-25 | End: 2020-02-28 | Stop reason: HOSPADM

## 2020-02-25 RX ADMIN — FENTANYL CITRATE 50 MCG: 50 INJECTION, SOLUTION INTRAMUSCULAR; INTRAVENOUS at 09:48

## 2020-02-25 RX ADMIN — HYDROMORPHONE HYDROCHLORIDE 0.5 MG: 1 INJECTION, SOLUTION INTRAMUSCULAR; INTRAVENOUS; SUBCUTANEOUS at 19:55

## 2020-02-25 RX ADMIN — METOPROLOL SUCCINATE 50 MG: 50 TABLET, EXTENDED RELEASE ORAL at 22:25

## 2020-02-25 RX ADMIN — SUCRALFATE 1 G: 1 TABLET ORAL at 13:20

## 2020-02-25 RX ADMIN — HYDROMORPHONE HYDROCHLORIDE 0.5 MG: 1 INJECTION, SOLUTION INTRAMUSCULAR; INTRAVENOUS; SUBCUTANEOUS at 20:24

## 2020-02-25 RX ADMIN — LIDOCAINE HYDROCHLORIDE 100 MG: 20 INJECTION, SOLUTION INFILTRATION; PERINEURAL at 08:50

## 2020-02-25 RX ADMIN — ENOXAPARIN SODIUM 40 MG: 40 INJECTION SUBCUTANEOUS at 13:20

## 2020-02-25 RX ADMIN — OXYCODONE HYDROCHLORIDE 10 MG: 5 TABLET ORAL at 12:12

## 2020-02-25 RX ADMIN — PHENYLEPHRINE HYDROCHLORIDE 100 MCG: 10 INJECTION INTRAVENOUS at 18:57

## 2020-02-25 RX ADMIN — CEFAZOLIN SODIUM 2 G: 2 SOLUTION INTRAVENOUS at 18:59

## 2020-02-25 RX ADMIN — PHENYLEPHRINE HYDROCHLORIDE 100 MCG: 10 INJECTION INTRAVENOUS at 19:15

## 2020-02-25 RX ADMIN — SODIUM CHLORIDE: 9 INJECTION, SOLUTION INTRAVENOUS at 18:20

## 2020-02-25 RX ADMIN — PHENYLEPHRINE HYDROCHLORIDE 100 MCG: 10 INJECTION INTRAVENOUS at 19:00

## 2020-02-25 RX ADMIN — PROPOFOL 150 MG: 10 INJECTION, EMULSION INTRAVENOUS at 18:36

## 2020-02-25 RX ADMIN — Medication 10 MG: at 09:30

## 2020-02-25 RX ADMIN — MIDAZOLAM 2 MG: 1 INJECTION INTRAMUSCULAR; INTRAVENOUS at 08:43

## 2020-02-25 RX ADMIN — FENTANYL CITRATE 50 MCG: 50 INJECTION, SOLUTION INTRAMUSCULAR; INTRAVENOUS at 09:18

## 2020-02-25 RX ADMIN — LIDOCAINE HYDROCHLORIDE 100 MG: 20 INJECTION, SOLUTION EPIDURAL; INFILTRATION; INTRACAUDAL; PERINEURAL at 18:36

## 2020-02-25 RX ADMIN — ONDANSETRON HYDROCHLORIDE 4 MG: 2 INJECTION, SOLUTION INTRAMUSCULAR; INTRAVENOUS at 19:21

## 2020-02-25 RX ADMIN — SUCRALFATE 1 G: 1 TABLET ORAL at 22:25

## 2020-02-25 RX ADMIN — SODIUM CHLORIDE, PRESERVATIVE FREE 10 ML: 5 INJECTION INTRAVENOUS at 22:33

## 2020-02-25 RX ADMIN — HYDROMORPHONE HYDROCHLORIDE 0.5 MG: 1 INJECTION, SOLUTION INTRAMUSCULAR; INTRAVENOUS; SUBCUTANEOUS at 20:17

## 2020-02-25 RX ADMIN — MORPHINE SULFATE 2 MG: 2 INJECTION, SOLUTION INTRAMUSCULAR; INTRAVENOUS at 17:48

## 2020-02-25 RX ADMIN — SODIUM CHLORIDE, PRESERVATIVE FREE 10 ML: 5 INJECTION INTRAVENOUS at 17:48

## 2020-02-25 RX ADMIN — DEXAMETHASONE SODIUM PHOSPHATE 10 MG: 4 INJECTION, SOLUTION INTRAMUSCULAR; INTRAVENOUS at 08:54

## 2020-02-25 RX ADMIN — Medication 200 MG: at 18:37

## 2020-02-25 RX ADMIN — SODIUM CHLORIDE 1000 ML: 9 INJECTION, SOLUTION INTRAVENOUS at 16:49

## 2020-02-25 RX ADMIN — PHENYLEPHRINE HYDROCHLORIDE 100 MCG: 10 INJECTION INTRAVENOUS at 18:42

## 2020-02-25 RX ADMIN — ATORVASTATIN CALCIUM 40 MG: 40 TABLET, FILM COATED ORAL at 22:25

## 2020-02-25 RX ADMIN — PHENYLEPHRINE HYDROCHLORIDE 100 MCG: 10 INJECTION INTRAVENOUS at 18:46

## 2020-02-25 RX ADMIN — SODIUM CHLORIDE, PRESERVATIVE FREE 10 ML: 5 INJECTION INTRAVENOUS at 13:18

## 2020-02-25 RX ADMIN — PROPOFOL 150 MG: 10 INJECTION, EMULSION INTRAVENOUS at 08:50

## 2020-02-25 RX ADMIN — ROCURONIUM BROMIDE 10 MG: 10 SOLUTION INTRAVENOUS at 18:37

## 2020-02-25 RX ADMIN — SODIUM CHLORIDE: 9 INJECTION, SOLUTION INTRAVENOUS at 08:43

## 2020-02-25 RX ADMIN — FENTANYL CITRATE 100 MCG: 50 INJECTION, SOLUTION INTRAMUSCULAR; INTRAVENOUS at 08:50

## 2020-02-25 RX ADMIN — ONDANSETRON HYDROCHLORIDE 4 MG: 2 INJECTION, SOLUTION INTRAMUSCULAR; INTRAVENOUS at 08:54

## 2020-02-25 RX ADMIN — CLINDAMYCIN IN 5 PERCENT DEXTROSE 900 MG: 18 INJECTION, SOLUTION INTRAVENOUS at 08:43

## 2020-02-25 RX ADMIN — PHENYLEPHRINE HYDROCHLORIDE 100 MCG: 10 INJECTION INTRAVENOUS at 19:12

## 2020-02-25 ASSESSMENT — PULMONARY FUNCTION TESTS
PIF_VALUE: 22
PIF_VALUE: 21
PIF_VALUE: 24
PIF_VALUE: 17
PIF_VALUE: 1
PIF_VALUE: 21
PIF_VALUE: 4
PIF_VALUE: 21
PIF_VALUE: 15
PIF_VALUE: 1
PIF_VALUE: 2
PIF_VALUE: 1
PIF_VALUE: 0
PIF_VALUE: 21
PIF_VALUE: 0
PIF_VALUE: 22
PIF_VALUE: 25
PIF_VALUE: 0
PIF_VALUE: 15
PIF_VALUE: 22
PIF_VALUE: 20
PIF_VALUE: 20
PIF_VALUE: 21
PIF_VALUE: 1
PIF_VALUE: 0
PIF_VALUE: 15
PIF_VALUE: 20
PIF_VALUE: 20
PIF_VALUE: 21
PIF_VALUE: 16
PIF_VALUE: 21
PIF_VALUE: 21
PIF_VALUE: 24
PIF_VALUE: 0
PIF_VALUE: 17
PIF_VALUE: 22
PIF_VALUE: 22
PIF_VALUE: 17
PIF_VALUE: 15
PIF_VALUE: 25
PIF_VALUE: 21
PIF_VALUE: 20
PIF_VALUE: 22
PIF_VALUE: 0
PIF_VALUE: 15
PIF_VALUE: 21
PIF_VALUE: 20
PIF_VALUE: 24
PIF_VALUE: 21
PIF_VALUE: 15
PIF_VALUE: 0
PIF_VALUE: 21
PIF_VALUE: 20
PIF_VALUE: 20
PIF_VALUE: 0
PIF_VALUE: 21
PIF_VALUE: 21
PIF_VALUE: 29
PIF_VALUE: 21
PIF_VALUE: 1
PIF_VALUE: 15
PIF_VALUE: 23
PIF_VALUE: 20
PIF_VALUE: 15
PIF_VALUE: 21

## 2020-02-25 ASSESSMENT — PAIN SCALES - GENERAL
PAINLEVEL_OUTOF10: 0
PAINLEVEL_OUTOF10: 7
PAINLEVEL_OUTOF10: 6
PAINLEVEL_OUTOF10: 8
PAINLEVEL_OUTOF10: 8
PAINLEVEL_OUTOF10: 7
PAINLEVEL_OUTOF10: 8
PAINLEVEL_OUTOF10: 6
PAINLEVEL_OUTOF10: 8
PAINLEVEL_OUTOF10: 7
PAINLEVEL_OUTOF10: 6
PAINLEVEL_OUTOF10: 6
PAINLEVEL_OUTOF10: 0
PAINLEVEL_OUTOF10: 6

## 2020-02-25 ASSESSMENT — PAIN DESCRIPTION - DESCRIPTORS
DESCRIPTORS: DISCOMFORT

## 2020-02-25 ASSESSMENT — PAIN DESCRIPTION - ORIENTATION
ORIENTATION: LEFT

## 2020-02-25 ASSESSMENT — PAIN DESCRIPTION - FREQUENCY
FREQUENCY: CONTINUOUS

## 2020-02-25 ASSESSMENT — PAIN DESCRIPTION - ONSET: ONSET: GRADUAL

## 2020-02-25 ASSESSMENT — PAIN DESCRIPTION - PAIN TYPE
TYPE: SURGICAL PAIN

## 2020-02-25 ASSESSMENT — PAIN DESCRIPTION - PROGRESSION
CLINICAL_PROGRESSION: NOT CHANGED
CLINICAL_PROGRESSION: NOT CHANGED
CLINICAL_PROGRESSION: GRADUALLY IMPROVING

## 2020-02-25 ASSESSMENT — PAIN DESCRIPTION - LOCATION
LOCATION: BREAST

## 2020-02-25 NOTE — PROGRESS NOTES
Consulted to draw labs on patient. After finishing, patient states her left arm is numb, when I asked if she had that before or if she had told her RN or . She said no. RN notified of this and went into room immediately to assess this on patient. 2/25/2020 3:46 PM BERNIE KWON, VA-BC

## 2020-02-25 NOTE — H&P
[facility]  Name: Guerrero Garrido                 : 1960 Sex: F  Age: 60 yrs  Acct#:  [id number]             Patient was referred by Daniel Diaz M.D..  Patient's primary care provider is Mahnaz Zaragoza DO.     CHIEF COMPLAINT: Left breast cancer     HPI:  71-year-old female with a new diagnosis of left breast DCIS. She has a personal history of breast cancer on the right, status post mastectomy and reconstruction with TRAM flap. She had a recurrence of breast cancer on that right side and received radiation therapy. She is also currently undergoing radiation treatment for cervical cancer. A mammogram of the left side showed microcalcifications. Stereotactic core biopsy showed DCIS.     Meds Prior to Visit:  Glimepiride         Allergies:       PMH:  Health Maintenance:  Mammogram - (2019)  (Medical Problems)  Surgical Hx:  Total Hysterectomy  Excision Of Breast Mass - RT  LT Shoulder - (2019)  Appendectomy - ()  Colonoscopy - (2019)  Reviewed and updated. SURGERIES:[ ]  FH:  Father:  . Mother:  Alive. Reviewed and updated. [ ]  SH:  Personal Habits:  Smoking: Patient is a former smoker. Alcohol: Current Alcohol Use; Social.Drug Use: Current Drug User. Daily Caffeine: Does Not Consume Caffeine. Reviewed and updated. [ ]       Was the patient queried about smoking behavior? Yes  No  Does the patient currently smoke? Smoking: Patient is a former smoker. [ ]  ROS:  Const: Reports weight gain, but denies anorexia, anxiety, fatigue, night sweats and weight loss. Eyes: Denies eye symptoms. ENMT: Denies ear symptoms. Denies nasal symptoms. Denies mouth or throat symptoms. CV: Denies hypertension and other cardiovascular symptoms. Resp: Denies respiratory symptoms. GI: Denies hepatitis, liver disease and other gastrointestinal symptoms. Musculo: Denies musculoskeletal symptoms. Skin: Denies skin, hair and nail symptoms. Breast: Denies breast problems.   Neuro:

## 2020-02-25 NOTE — ANESTHESIA POSTPROCEDURE EVALUATION
Department of Anesthesiology  Postprocedure Note    Patient: Izabel Hickey  MRN: [id number]  [place]: 1960  Date of evaluation: 2/25/2020  Time:  11:41 AM     Procedure Summary     Date:  02/25/20 Room / Location:  [place] [address] / [address] [place] [address]    Anesthesia Start:  8392 Anesthesia Stop:  3935    Procedure:  LEFT BREAST SIMPLE MASTECTOMY (Left Breast) Diagnosis:  (BREAST CA)    Surgeon:  Lluvia Onofre MD Responsible Provider:  Farhan Maya MD    Anesthesia Type:  MAC, general ASA Status:  4          Anesthesia Type: MAC, general    Sherlyn Phase I: Sherlyn Score: 10    Sherlyn Phase II:      Last vitals: Reviewed and per EMR flowsheets.        Anesthesia Post Evaluation    Patient location during evaluation: PACU  Patient participation: complete - patient participated  Level of consciousness: awake and alert  Airway patency: patent  Nausea & Vomiting: no nausea and no vomiting  Complications: no  Cardiovascular status: hemodynamically stable  Respiratory status: acceptable  Hydration status: euvolemic

## 2020-02-25 NOTE — ANESTHESIA PRE PROCEDURE
Department of Anesthesiology  Preprocedure Note       Name:  Staci Martino   Age:  60 y.o.  :  1960                                          MRN:  [id number]         Date:  2020      Surgeon: Remberto Prasad):  Artur Shah MD    Procedure: LEFT BREAST SIMPLE MASTECTOMY (Left Breast)    Medications prior to admission:   Prior to Admission medications    Medication Sig Start Date End Date Taking? Authorizing Provider   metoprolol succinate (TOPROL XL) 50 MG extended release tablet Take 1 tablet by mouth 2 times daily 20  Yes Jessica Miller MD   sucralfate (CARAFATE) 1 GM tablet Take 1 tablet by mouth 4 times daily (with meals and nightly) 20  Yes Oneida Figueroa MD   pantoprazole (PROTONIX) 40 MG tablet Take 1 tablet by mouth 2 times daily (before meals) 20  Yes Oneida Figueroa MD   glimepiride (AMARYL) 1 MG tablet Take 1 mg by mouth every morning (before breakfast)   Yes Historical Provider, MD   metFORMIN (GLUCOPHAGE) 500 MG tablet Take 500 mg by mouth 2 times daily (with meals)   Yes Historical Provider, MD   gabapentin (NEURONTIN) 300 MG capsule Take 300 mg by mouth nightly. .   Yes Historical Provider, MD   atorvastatin (LIPITOR) 40 MG tablet Take 1 tablet by mouth nightly.  3/17/15  Yes Micky Suero MD   aspirin 81 MG tablet Take 81 mg by mouth daily Last dose 20   Yes Historical Provider, MD       Current medications:    Current Facility-Administered Medications   Medication Dose Route Frequency Provider Last Rate Last Dose    0.9 % sodium chloride infusion   Intravenous Continuous Artur Shah MD        clindamycin (CLEOCIN) 900 mg in dextrose 5 % 50 mL IVPB  900 mg Intravenous On Call to [address] [place] [address] MD Yelena        sodium chloride flush 0.9 % injection 10 mL  10 mL Intravenous 2 times per day Artur Shah MD        meperidine (DEMEROL) injection 12.5 mg  12.5 mg Intravenous Q5 Min PRN David Bae MD        diphenhydrAMINE (BENADRYL) injection 12.5 mg  12.5 mg Intravenous Once PRN Zoe Siddiqui MD        HYDROcodone-acetaminophen [facility]) 5-325 MG per tablet 1 tablet  1 tablet Oral PRN Zoe Siddiqui MD        Or    HYDROcodone-acetaminophen [facility]) 5-325 MG per tablet 2 tablet  2 tablet Oral PRN Zoe Siddiqui MD        HYDROmorphone (DILAUDID) injection 0.5 mg  0.5 mg Intravenous Q5 Min PRN Zoe Siddiqui MD        HYDROmorphone (DILAUDID) injection 0.25 mg  0.25 mg Intravenous Q5 Min PRN Zoe Siddiqui MD        clindamycin (CLEOCIN) 900 MG/50ML IVPB                Allergies:     Allergies   Allergen Reactions    Pcn [Penicillins] Hives and Rash       Problem List:    Patient Active Problem List   Diagnosis Code    Chest pain R07.9    Hypertension I10    Hx of breast cancer Z85.3    Hypertension I10    Breast cancer ([facility] C50.919    Left-sided weakness R53.1    Controlled type 2 diabetes mellitus without complication, without long-term current use of insulin (HCC) E11.9    History of CVA (cerebrovascular accident) Z80.78    Hypertensive left ventricular hypertrophy, without heart failure I11.9    Abnormal EKG R94.31    Chronic midline low back pain with left-sided sciatica M54.42, G89.29    Non morbid obesity E66.9    Cervical cancer (HCC) C53.9    Malignant neoplasm of endocervix (HCC) C53.0    Acute GI bleeding K92.2    Gastrointestinal hemorrhage K92.2    Anemia due to acute blood loss D62    Controlled type 2 diabetes mellitus without complication (HCC) K24.3    Elevated troponin R79.89    Shortness of breath R06.02    Wide-complex tachycardia (HCC) I47.2    Intraductal carcinoma of left breast D05.12       Past Medical History:        Diagnosis Date    Breast cancer ([facility] 2000, 2005    right    Breast cancer ([facility] 2020    left    CAD (coronary artery disease)     follows with Dr. Ava Evans CVA (cerebral vascular accident) ([facility] 2015    no deficits    DM (diabetes mellitus) ([facility]     H/O: GI bleed     Hyperlipidemia     Hypertension Past Surgical History:        Procedure Laterality Date    APPENDECTOMY      BREAST RECONSTRUCTION Right     BREAST SURGERY  2000    right masectomy    COLONOSCOPY      FOOT SURGERY      right    HYSTERECTOMY      fibroids    KNEE ARTHROSCOPY      left    MASTECTOMY Right 2000    TONSILLECTOMY      UPPER GASTROINTESTINAL ENDOSCOPY      UPPER GASTROINTESTINAL ENDOSCOPY N/A 2/5/2020    EGD BIOPSY performed by Lorene Mckeon MD at [facility] History:    Social History     Tobacco Use    Smoking status: Former Smoker     Packs/day: 0.20     Years: 2.00     Pack years: 0.40     Types: Cigarettes    Smokeless tobacco: Never Used    Tobacco comment: as a teen   Substance Use Topics    Alcohol use: Not Currently     Alcohol/week: 6.0 standard drinks     Types: 6 Standard drinks or equivalent per week                                Counseling given: Not Answered  Comment: as a teen      Vital Signs (Current):   Vitals:    02/20/20 1412   Weight: 222 lb (100.7 kg)   Height: 5' 5\" (1.651 m)                                              BP Readings from Last 3 Encounters:   02/08/20 133/81   02/05/20 122/78   12/24/19 (!) 150/102       NPO Status: Time of last liquid consumption: 2300Pt instructed not to eat or drink anything after midnight prior to procedure 2/5/2020                        Time of last solid consumption: 2300                        Date of last liquid consumption: 02/24/20                        Date of last solid food consumption: 02/24/20    BMI:   Wt Readings from Last 3 Encounters:   02/20/20 222 lb (100.7 kg)   02/08/20 229 lb 14.4 oz (104.3 kg)   12/24/19 231 lb 6.4 oz (105 kg)     Body mass index is 36.94 kg/m².     CBC:   Lab Results   Component Value Date    WBC 5.5 02/08/2020    RBC 2.71 02/08/2020    HGB 7.8 02/08/2020    HCT 24.9 02/08/2020    MCV 91.9 02/08/2020    RDW 18.9 02/08/2020     02/08/2020       CMP:   Lab Results   Component Value Date     full  Mouth opening: > = 3 FB Dental:    (+) edentulous      Pulmonary:Negative Pulmonary ROS breath sounds clear to auscultation                             Cardiovascular:  Exercise tolerance: poor (<4 METS),   (+) hypertension:, CAD:, CHF (Ejection fraction is 34 %): systolic, ROMERO:, hyperlipidemia      ECG reviewed  Rhythm: regular  Rate: normal  Echocardiogram reviewed  Stress test reviewed       Beta Blocker:  Dose within 24 Hrs      ROS comment: Pt presented 2/3/2020 with chest pain and ROMERO     Neuro/Psych:   (+) CVA (Left sided weakness): residual symptoms, neuromuscular disease:,             GI/Hepatic/Renal: Neg GI/Hepatic/Renal ROS            Endo/Other:    (+) DiabetesType II DM, , blood dyscrasia (Pt states she last took her plavix 1/28/2020): anticoagulation therapy:., malignancy/cancer (Right mastectomy). ROS comment: obese Abdominal:           Vascular: negative vascular ROS. Anesthesia Plan      MAC and general     ASA 4     (#4 LMA)  Induction: intravenous. MIPS: Postoperative opioids intended and Prophylactic antiemetics administered. Anesthetic plan and risks discussed with patient. Plan discussed with CRNA.                   Renan Leyva MD   2/25/2020

## 2020-02-25 NOTE — PROGRESS NOTES
Seen/examined  Chest wall hematoma  Hypotensive  Acute blood loss anemia  Return to OR for evacuation of hematoma and control of hemorrhage  Adrienne

## 2020-02-25 NOTE — SIGNIFICANT EVENT
RRT called. I was there along with Dr. Kyaw Mcwilliams. The patient was s/p L mastectomy this morning and passed out while going to the bathroom. She never lost pulses. The patient's initial SBP was in the upper 80's. HR in the 50's. She was bolused NS and her BP improved to 113/57. She had a moderate amount of blood in her PASCUAL which was emptied. A NR facemask was started and the patient was saturating 100%. The patient was AAOx3 and was able to tell me which procedure she just had done. She recalls taking oxycodone around 1 pm. Likely vasovagal response as the patient was in the restroom. Recommend continuing IVF and minimizing pain meds. Monitor hemodynamics. Critical Care/RRT time: 31 minutes.     Brenda Hawkins MD

## 2020-02-25 NOTE — OP NOTE
inferior. The wound was irrigated and hemostasis was achieved with cautery. A 15 fr round PASCUAL drain was then placed in the mastectomy site. The wound was irrigated with local anesthetic. The skin edges were cut to remove excess tissue with the cautery. Deep dermis was then approximated with 3-0 vicryl suture. Skin was closed with running 4-0 vicryl subcuticular stitch. Benzoin and steri strips were applied. Sterile dressings were placed as well as a surgical bra. All sponge and instrument counts were correct x2. Dr. Danielle Jones was present for the entire procedure. The patient tolerated the procedure well and was sent to PACU in stable condition.      Electronically signed by Allie Fisher MD on 2/25/20 at 10:21 AM

## 2020-02-26 PROBLEM — D62 POSTOPERATIVE ANEMIA DUE TO ACUTE BLOOD LOSS: Status: ACTIVE | Noted: 2020-02-26

## 2020-02-26 LAB
ALBUMIN SERPL-MCNC: 2.3 G/DL (ref 3.5–5.2)
ALP BLD-CCNC: 50 U/L (ref 35–104)
ALT SERPL-CCNC: 14 U/L (ref 0–32)
ANION GAP SERPL CALCULATED.3IONS-SCNC: 10 MMOL/L (ref 7–16)
ANION GAP SERPL CALCULATED.3IONS-SCNC: 12 MMOL/L (ref 7–16)
ANISOCYTOSIS: ABNORMAL
AST SERPL-CCNC: 90 U/L (ref 0–31)
BASOPHILS ABSOLUTE: 0.01 E9/L (ref 0–0.2)
BASOPHILS RELATIVE PERCENT: 0.1 % (ref 0–2)
BILIRUB SERPL-MCNC: <0.2 MG/DL (ref 0–1.2)
BUN BLDV-MCNC: 25 MG/DL (ref 8–23)
BUN BLDV-MCNC: 26 MG/DL (ref 8–23)
CALCIUM SERPL-MCNC: 6.4 MG/DL (ref 8.6–10.2)
CALCIUM SERPL-MCNC: 7.4 MG/DL (ref 8.6–10.2)
CHLORIDE BLD-SCNC: 109 MMOL/L (ref 98–107)
CHLORIDE BLD-SCNC: 114 MMOL/L (ref 98–107)
CO2: 16 MMOL/L (ref 22–29)
CO2: 20 MMOL/L (ref 22–29)
CREAT SERPL-MCNC: 1.7 MG/DL (ref 0.5–1)
CREAT SERPL-MCNC: 1.7 MG/DL (ref 0.5–1)
EOSINOPHILS ABSOLUTE: 0 E9/L (ref 0.05–0.5)
EOSINOPHILS RELATIVE PERCENT: 0 % (ref 0–6)
GFR AFRICAN AMERICAN: 37
GFR AFRICAN AMERICAN: 37
GFR NON-AFRICAN AMERICAN: 37 ML/MIN/1.73
GFR NON-AFRICAN AMERICAN: 37 ML/MIN/1.73
GLUCOSE BLD-MCNC: 108 MG/DL (ref 74–99)
GLUCOSE BLD-MCNC: 205 MG/DL (ref 74–99)
HCT VFR BLD CALC: 26.9 % (ref 34–48)
HCT VFR BLD CALC: 27 % (ref 34–48)
HCT VFR BLD CALC: 28.6 % (ref 34–48)
HCT VFR BLD CALC: 31.4 % (ref 34–48)
HCT VFR BLD CALC: 32.5 % (ref 34–48)
HEMOGLOBIN: 10.1 G/DL (ref 11.5–15.5)
HEMOGLOBIN: 8.5 G/DL (ref 11.5–15.5)
HEMOGLOBIN: 8.7 G/DL (ref 11.5–15.5)
HEMOGLOBIN: 8.8 G/DL (ref 11.5–15.5)
HEMOGLOBIN: 9.8 G/DL (ref 11.5–15.5)
IMMATURE GRANULOCYTES #: 0.04 E9/L
IMMATURE GRANULOCYTES %: 0.4 % (ref 0–5)
LYMPHOCYTES ABSOLUTE: 0.51 E9/L (ref 1.5–4)
LYMPHOCYTES RELATIVE PERCENT: 5 % (ref 20–42)
MAGNESIUM: 1.4 MG/DL (ref 1.6–2.6)
MCH RBC QN AUTO: 28.3 PG (ref 26–35)
MCH RBC QN AUTO: 29 PG (ref 26–35)
MCHC RBC AUTO-ENTMCNC: 31.2 % (ref 32–34.5)
MCHC RBC AUTO-ENTMCNC: 32.2 % (ref 32–34.5)
MCV RBC AUTO: 90 FL (ref 80–99.9)
MCV RBC AUTO: 90.8 FL (ref 80–99.9)
METER GLUCOSE: 143 MG/DL (ref 74–99)
METER GLUCOSE: 168 MG/DL (ref 74–99)
METER GLUCOSE: 197 MG/DL (ref 74–99)
MONOCYTES ABSOLUTE: 1.03 E9/L (ref 0.1–0.95)
MONOCYTES RELATIVE PERCENT: 10.1 % (ref 2–12)
NEUTROPHILS ABSOLUTE: 8.57 E9/L (ref 1.8–7.3)
NEUTROPHILS RELATIVE PERCENT: 84.4 % (ref 43–80)
OVALOCYTES: ABNORMAL
PDW BLD-RTO: 17 FL (ref 11.5–15)
PDW BLD-RTO: 17.5 FL (ref 11.5–15)
PHOSPHORUS: 3.6 MG/DL (ref 2.5–4.5)
PLATELET # BLD: 254 E9/L (ref 130–450)
PLATELET # BLD: 278 E9/L (ref 130–450)
PMV BLD AUTO: 10.4 FL (ref 7–12)
PMV BLD AUTO: 11 FL (ref 7–12)
POIKILOCYTES: ABNORMAL
POLYCHROMASIA: ABNORMAL
POTASSIUM REFLEX MAGNESIUM: 5.5 MMOL/L (ref 3.5–5)
POTASSIUM SERPL-SCNC: 3.8 MMOL/L (ref 3.5–5)
RBC # BLD: 3 E12/L (ref 3.5–5.5)
RBC # BLD: 3.46 E12/L (ref 3.5–5.5)
SODIUM BLD-SCNC: 140 MMOL/L (ref 132–146)
SODIUM BLD-SCNC: 141 MMOL/L (ref 132–146)
TOTAL PROTEIN: 4.3 G/DL (ref 6.4–8.3)
WBC # BLD: 10.2 E9/L (ref 4.5–11.5)
WBC # BLD: 12.5 E9/L (ref 4.5–11.5)

## 2020-02-26 PROCEDURE — 6360000002 HC RX W HCPCS: Performed by: SURGERY

## 2020-02-26 PROCEDURE — 6360000002 HC RX W HCPCS: Performed by: STUDENT IN AN ORGANIZED HEALTH CARE EDUCATION/TRAINING PROGRAM

## 2020-02-26 PROCEDURE — 80053 COMPREHEN METABOLIC PANEL: CPT

## 2020-02-26 PROCEDURE — 85025 COMPLETE CBC W/AUTO DIFF WBC: CPT

## 2020-02-26 PROCEDURE — 80048 BASIC METABOLIC PNL TOTAL CA: CPT

## 2020-02-26 PROCEDURE — 83735 ASSAY OF MAGNESIUM: CPT

## 2020-02-26 PROCEDURE — 2580000003 HC RX 258: Performed by: STUDENT IN AN ORGANIZED HEALTH CARE EDUCATION/TRAINING PROGRAM

## 2020-02-26 PROCEDURE — 85027 COMPLETE CBC AUTOMATED: CPT

## 2020-02-26 PROCEDURE — 2060000000 HC ICU INTERMEDIATE R&B

## 2020-02-26 PROCEDURE — 6370000000 HC RX 637 (ALT 250 FOR IP): Performed by: STUDENT IN AN ORGANIZED HEALTH CARE EDUCATION/TRAINING PROGRAM

## 2020-02-26 PROCEDURE — 82962 GLUCOSE BLOOD TEST: CPT

## 2020-02-26 PROCEDURE — 85014 HEMATOCRIT: CPT

## 2020-02-26 PROCEDURE — 93005 ELECTROCARDIOGRAM TRACING: CPT | Performed by: STUDENT IN AN ORGANIZED HEALTH CARE EDUCATION/TRAINING PROGRAM

## 2020-02-26 PROCEDURE — 2700000000 HC OXYGEN THERAPY PER DAY

## 2020-02-26 PROCEDURE — 84100 ASSAY OF PHOSPHORUS: CPT

## 2020-02-26 PROCEDURE — 36415 COLL VENOUS BLD VENIPUNCTURE: CPT

## 2020-02-26 PROCEDURE — 85018 HEMOGLOBIN: CPT

## 2020-02-26 PROCEDURE — 97161 PT EVAL LOW COMPLEX 20 MIN: CPT

## 2020-02-26 RX ORDER — MAGNESIUM SULFATE IN WATER 40 MG/ML
2 INJECTION, SOLUTION INTRAVENOUS ONCE
Status: COMPLETED | OUTPATIENT
Start: 2020-02-26 | End: 2020-02-26

## 2020-02-26 RX ORDER — METOPROLOL SUCCINATE 50 MG/1
50 TABLET, EXTENDED RELEASE ORAL 2 TIMES DAILY
Qty: 30 TABLET | Refills: 3 | Status: ON HOLD | OUTPATIENT
Start: 2020-02-26 | End: 2020-03-08 | Stop reason: HOSPADM

## 2020-02-26 RX ADMIN — SODIUM CHLORIDE, PRESERVATIVE FREE 10 ML: 5 INJECTION INTRAVENOUS at 09:52

## 2020-02-26 RX ADMIN — MAGNESIUM SULFATE HEPTAHYDRATE 2 G: 40 INJECTION, SOLUTION INTRAVENOUS at 20:12

## 2020-02-26 RX ADMIN — ONDANSETRON 4 MG: 2 INJECTION INTRAMUSCULAR; INTRAVENOUS at 11:22

## 2020-02-26 RX ADMIN — OXYCODONE HYDROCHLORIDE 10 MG: 5 TABLET ORAL at 05:30

## 2020-02-26 RX ADMIN — OXYCODONE HYDROCHLORIDE 10 MG: 5 TABLET ORAL at 19:42

## 2020-02-26 RX ADMIN — SUCRALFATE 1 G: 1 TABLET ORAL at 08:41

## 2020-02-26 RX ADMIN — OXYCODONE HYDROCHLORIDE 10 MG: 5 TABLET ORAL at 09:47

## 2020-02-26 RX ADMIN — METOPROLOL SUCCINATE 50 MG: 50 TABLET, EXTENDED RELEASE ORAL at 20:12

## 2020-02-26 RX ADMIN — MORPHINE SULFATE 4 MG: 4 INJECTION, SOLUTION INTRAMUSCULAR; INTRAVENOUS at 03:41

## 2020-02-26 RX ADMIN — OXYCODONE HYDROCHLORIDE 10 MG: 5 TABLET ORAL at 00:09

## 2020-02-26 RX ADMIN — METFORMIN HYDROCHLORIDE 500 MG: 500 TABLET ORAL at 08:42

## 2020-02-26 RX ADMIN — ATORVASTATIN CALCIUM 40 MG: 40 TABLET, FILM COATED ORAL at 20:12

## 2020-02-26 RX ADMIN — OXYCODONE HYDROCHLORIDE 10 MG: 5 TABLET ORAL at 23:43

## 2020-02-26 RX ADMIN — GABAPENTIN 300 MG: 300 CAPSULE ORAL at 20:12

## 2020-02-26 RX ADMIN — SODIUM CHLORIDE, PRESERVATIVE FREE 10 ML: 5 INJECTION INTRAVENOUS at 20:18

## 2020-02-26 RX ADMIN — GLIMEPIRIDE 1 MG: 1 TABLET ORAL at 06:30

## 2020-02-26 RX ADMIN — OXYCODONE HYDROCHLORIDE 5 MG: 5 TABLET ORAL at 16:08

## 2020-02-26 RX ADMIN — SUCRALFATE 1 G: 1 TABLET ORAL at 16:54

## 2020-02-26 RX ADMIN — ONDANSETRON 4 MG: 2 INJECTION INTRAMUSCULAR; INTRAVENOUS at 03:42

## 2020-02-26 RX ADMIN — SUCRALFATE 1 G: 1 TABLET ORAL at 20:12

## 2020-02-26 RX ADMIN — PANTOPRAZOLE SODIUM 40 MG: 40 TABLET, DELAYED RELEASE ORAL at 05:29

## 2020-02-26 RX ADMIN — METOPROLOL SUCCINATE 50 MG: 50 TABLET, EXTENDED RELEASE ORAL at 08:42

## 2020-02-26 RX ADMIN — PANTOPRAZOLE SODIUM 40 MG: 40 TABLET, DELAYED RELEASE ORAL at 16:08

## 2020-02-26 RX ADMIN — SUCRALFATE 1 G: 1 TABLET ORAL at 11:54

## 2020-02-26 RX ADMIN — METFORMIN HYDROCHLORIDE 500 MG: 500 TABLET ORAL at 16:08

## 2020-02-26 ASSESSMENT — PAIN DESCRIPTION - PAIN TYPE
TYPE: SURGICAL PAIN

## 2020-02-26 ASSESSMENT — PAIN DESCRIPTION - FREQUENCY
FREQUENCY: CONTINUOUS

## 2020-02-26 ASSESSMENT — PAIN SCALES - GENERAL
PAINLEVEL_OUTOF10: 4
PAINLEVEL_OUTOF10: 5
PAINLEVEL_OUTOF10: 7
PAINLEVEL_OUTOF10: 7
PAINLEVEL_OUTOF10: 8
PAINLEVEL_OUTOF10: 7
PAINLEVEL_OUTOF10: 7
PAINLEVEL_OUTOF10: 3
PAINLEVEL_OUTOF10: 4
PAINLEVEL_OUTOF10: 6
PAINLEVEL_OUTOF10: 3
PAINLEVEL_OUTOF10: 7

## 2020-02-26 ASSESSMENT — PAIN DESCRIPTION - ORIENTATION
ORIENTATION: LEFT

## 2020-02-26 ASSESSMENT — PAIN DESCRIPTION - PROGRESSION
CLINICAL_PROGRESSION: GRADUALLY WORSENING
CLINICAL_PROGRESSION: NOT CHANGED

## 2020-02-26 ASSESSMENT — PAIN DESCRIPTION - LOCATION
LOCATION: BREAST

## 2020-02-26 ASSESSMENT — PAIN DESCRIPTION - DESCRIPTORS
DESCRIPTORS: PRESSURE;SORE
DESCRIPTORS: PRESSURE

## 2020-02-26 ASSESSMENT — PAIN - FUNCTIONAL ASSESSMENT
PAIN_FUNCTIONAL_ASSESSMENT: ACTIVITIES ARE NOT PREVENTED
PAIN_FUNCTIONAL_ASSESSMENT: PREVENTS OR INTERFERES SOME ACTIVE ACTIVITIES AND ADLS

## 2020-02-26 ASSESSMENT — PAIN DESCRIPTION - ONSET
ONSET: ON-GOING
ONSET: GRADUAL

## 2020-02-26 NOTE — CARE COORDINATION
CM met with pt & friend at bedside to discuss role, anticipated LOS & current plan of care. Pt known to CM/TARUN from recent admission. Received order for home care. Discussed with pt & friend. [address] list provided to both per request. Pt chose [facility]. Referral made to [facility]. Will continue to follow. The Plan for Transition of Care is related to the following treatment goals: [address]    The Patient and/or patient representative (pt) was provided with a choice of provider and agrees   with the discharge plan. [x] Yes [] No    Freedom of choice list was provided with basic dialogue that supports the patient's individualized plan of care/goals, treatment preferences and shares the quality data associated with the providers.  [x] Yes [] No

## 2020-02-26 NOTE — PROGRESS NOTES
Perfect Serve sent to surgery with the following message: Patient had 5 beats of Vtach this morning. Dr. Marin Galarza from cardiology was following on her previous admission for Type II NSTEMI from demand ischemia. Hgb was 9.8 at 0400 and 8.8 at 1000.   Electronically signed by Nela Phillips RN on 2/26/2020 at 11:01 AM    :

## 2020-02-26 NOTE — ANESTHESIA POSTPROCEDURE EVALUATION
Department of Anesthesiology  Postprocedure Note    Patient: Lizbet Pavon  MRN: [id number]  YOB: 1960  Date of evaluation: 2/25/2020  Time:  8:00 PM     Procedure Summary     Date:  02/25/20 Room / Location:  [facility] OR [address] [place] [address]    Anesthesia Start:  4214 Anesthesia Stop:  1947    Procedure:  LEFT BREAST MASTECTOMY POST OP BLEED CONTROL, EVACUATION OF LEFT CHEST WALL HEMATOMA (Left Breast) Diagnosis:  (POST OP MASTECTOMY BLEED)    Surgeon:  General Neymar MD Responsible Provider:  Trey Bravo MD    Anesthesia Type:  general ASA Status:  5 - Emergent          Anesthesia Type: No value filed. Sherlyn Phase I: Sherlyn Score: 8    Sherlyn Phase II:      Last vitals: Reviewed and per EMR flowsheets.        Anesthesia Post Evaluation    Patient location during evaluation: PACU  Patient participation: complete - patient participated  Level of consciousness: awake and alert  Pain score: 3  Airway patency: patent  Nausea & Vomiting: no nausea  Complications: no  Cardiovascular status: blood pressure returned to baseline  Respiratory status: acceptable  Hydration status: euvolemic

## 2020-02-26 NOTE — OP NOTE
incision was dressed with sterile dressings and a surgical bra was placed. The patient tolerated the procedure well and was sent to PACU in stable condition.      Electronically signed by Ignacia Collazo MD on 2/25/20 at 7:41 PM

## 2020-02-26 NOTE — PROGRESS NOTES
Physical Therapy    Facility/Department: [facility] MED SURG  Initial Assessment    NAME: Cristina Wilks  : 1960  MRN: [id number]    Date of Service: 2020       REQUIRES PT FOLLOW UP: Yes       Patient Diagnosis(es): The encounter diagnosis was S/P mastectomy, left.     has a past medical history of Breast cancer ([facility] Breast cancer ([facility] CAD (coronary artery disease), CVA (cerebral vascular accident) ([facility] DM (diabetes mellitus) ([facility] H/O: GI bleed, Hyperlipidemia, and Hypertension. has a past surgical history that includes Breast surgery (); Hysterectomy; Appendectomy; Knee arthroscopy; Foot surgery; Tonsillectomy; Breast reconstruction (Right); Mastectomy (Right, ); Colonoscopy; Upper gastrointestinal endoscopy; Upper gastrointestinal endoscopy (N/A, 2020); Mastectomy, modified radical (Left, 2020); and Mastectomy, modified radical (Left, 2020). Evaluating Therapist: Kayla Leonard, PT     Referring Provider:  Cassidy Wilcox MD    Room #: 228   DIAGNOSIS: L breast CA s/p simple mastectomy and reconstruction 2020 . Returned to surgery for evacuation of hematoma and exploration of wound     PRECAUTIONS:  Falls     Social:  Pt lives alone  in a split level  floor plan  3  steps and  B  rails to enter. B HRs between levels per pt   Prior to admission pt walked with  std walker or rollator      Initial Evaluation  Date:  2020 Treatment      Short Term/ Long Term   Goals   Was pt agreeable to Eval/treatment? yes      Does pt have pain?   L upper chest/breast area      Bed Mobility  Rolling: NT   Supine to sit: independent    Sit to supine: independent   Scooting:  Independent    independent    Transfers Sit to stand:  SBA   Stand to sit: SBA    Stand pivot:  SBA   independent    Ambulation     100 feet with  ww  with  SBA    200  feet with ww  with  Independent        Stair negotiation: ascended and descended Pt refused    4-10  steps with  B  rail with S/ I    LE ROM  [facility] LE strength  4/ 5      AM- PAC RAW score  19/24            Pt is alert and Oriented x 3. Questionable historian      Balance: SBA , no LOB with gait, but all risk due to pain and weakness     Endurance: decreased        ASSESSMENT  Pt displays functional ability as noted in the objective portion of this evaluation. Treatment/Education:    Cues for safety with mobility and proper ww use. Pt refused steps, stating \" I ain't doing no steps today. \"     Pt educated on fall risk,  Safety with mobility        Patient response to education:   Pt verbalized understanding Pt demonstrated skill Pt requires further education in this area   x Needs cues  x       Comments:  Pt left  In bed after session, with call light in reach. Rehab potential is Good for reaching above PT goals. Pts/ family goals   1. None stated     Patient and or family understand(s) diagnosis, prognosis, and plan of care. -  Yes     PLAN  PT care will be provided in accordance with the objectives noted above. Whenever appropriate, clear delegation orders will be provided for nursing staff. Exercises and functional mobility practice will be used as well as appropriate assistive devices or modalities to obtain goals. Patient and family education will also be administered as needed. Frequency of treatments will be 2-5x/week x  days. Time in: 1044   Time out: 1054       Evaluation Time includes thorough review of current medical information, gathering information on past medical history/social history and prior level of function, completion of standardized testing/informal observation of tasks, assessment of data and education on plan of care and goals.     CPT codes:  [x] Low Complexity PT evaluation 30978  [] Moderate Complexity PT evaluation 53423  [] High Complexity PT evaluation 83293  [] PT Re-evaluation 68926  [] Gait training 07479  minutes  [] Therapeutic activities 49314  minutes  [] Therapeutic exercises 48540 minutes  [] Neuromuscular reeducation 87038  minutes       Man 18 number:  PT 3430

## 2020-02-26 NOTE — HOME CARE
Referral received for home health care. Met with patient at bedside. Candaceos verified. Will plan to see after discharge. Intake to follow.    Dalila Licona LPN/VINNY

## 2020-02-26 NOTE — ANESTHESIA PRE PROCEDURE
MD Gilmer        0.9 % sodium chloride bolus  20 mL Intravenous Once Curlene Night, MD        0.9 % sodium chloride bolus  20 mL Intravenous Once Curlene Night, MD           Allergies:     Allergies   Allergen Reactions    Pcn [Penicillins] Hives and Rash       Problem List:    Patient Active Problem List   Diagnosis Code    Chest pain R07.9    Hypertension I10    Hx of breast cancer Z85.3    Hypertension I10    Breast cancer ([facility] C50.919    Left-sided weakness R53.1    Controlled type 2 diabetes mellitus without complication, without long-term current use of insulin (HCC) E11.9    History of CVA (cerebrovascular accident) Z80.78    Hypertensive left ventricular hypertrophy, without heart failure I11.9    Abnormal EKG R94.31    Chronic midline low back pain with left-sided sciatica M54.42, G89.29    Non morbid obesity E66.9    Cervical cancer (HCC) C53.9    Malignant neoplasm of endocervix ([facility]) C53.0    Acute GI bleeding K92.2    Gastrointestinal hemorrhage K92.2    Anemia due to acute blood loss D62    Controlled type 2 diabetes mellitus without complication ([facility]) E46.6    Elevated troponin R79.89    Shortness of breath R06.02    Wide-complex tachycardia ([facility]) I47.2    Intraductal carcinoma of left breast D05.12       Past Medical History:        Diagnosis Date    [facility] ([facility] 2000, 2005    right    [facility] cancer ([facility] 2020    left    CAD (coronary artery disease)     follows with Dr. Isabel Harper CVA (cerebral vascular accident) ([facility] 2015    no deficits    DM (diabetes mellitus) ([facility]     H/O: GI bleed     Hyperlipidemia     Hypertension        Past Surgical History:        Procedure Laterality Date    APPENDECTOMY      BREAST RECONSTRUCTION Right     BREAST SURGERY  2000    right masectomy    COLONOSCOPY      FOOT SURGERY      right    HYSTERECTOMY      fibroids    KNEE ARTHROSCOPY      left    MASTECTOMY Right 2000    MASTECTOMY, MODIFIED RADICAL Left reviewed  Stress test reviewed       Beta Blocker:  Dose within 24 Hrs      ROS comment: Pt presented 2/3/2020 with chest pain and ROMERO     Neuro/Psych:   (+) CVA (Left sided weakness): residual symptoms, neuromuscular disease:,             GI/Hepatic/Renal: Neg GI/Hepatic/Renal ROS            Endo/Other:    (+) DiabetesType II DM, , blood dyscrasia (Pt states she last took her plavix 1/28/2020): anticoagulation therapy:., malignancy/cancer (Right mastectomy). ROS comment: Obese. Pt returning to OR. Post mastectomy earlier in the day with bleeding at surgical site and decreasing H/H. Abdominal:           Vascular: negative vascular ROS. Anesthesia Plan      general     ASA 5 - emergent       Induction: intravenous. BIS  MIPS: Postoperative opioids intended and Prophylactic antiemetics administered. Anesthetic plan and risks discussed with patient. Use of blood products discussed with patient whom consented to blood products. Plan discussed with attending.                   ANNA MARIE San - CRNA   2/25/2020

## 2020-02-27 ENCOUNTER — APPOINTMENT (OUTPATIENT)
Dept: GENERAL RADIOLOGY | Age: 60
DRG: 582 | End: 2020-02-27
Attending: SURGERY
Payer: MEDICARE

## 2020-02-27 LAB
ANION GAP SERPL CALCULATED.3IONS-SCNC: 12 MMOL/L (ref 7–16)
APTT: 29.3 SEC (ref 24.5–35.1)
BLOOD BANK DISPENSE STATUS: NORMAL
BLOOD BANK PRODUCT CODE: NORMAL
BPU ID: NORMAL
BUN BLDV-MCNC: 28 MG/DL (ref 8–23)
CALCIUM SERPL-MCNC: 8.5 MG/DL (ref 8.6–10.2)
CHLORIDE BLD-SCNC: 107 MMOL/L (ref 98–107)
CO2: 23 MMOL/L (ref 22–29)
CREAT SERPL-MCNC: 1.7 MG/DL (ref 0.5–1)
DESCRIPTION BLOOD BANK: NORMAL
EKG ATRIAL RATE: 64 BPM
EKG P AXIS: 35 DEGREES
EKG P-R INTERVAL: 126 MS
EKG Q-T INTERVAL: 416 MS
EKG QRS DURATION: 74 MS
EKG QTC CALCULATION (BAZETT): 429 MS
EKG R AXIS: -11 DEGREES
EKG T AXIS: -89 DEGREES
EKG VENTRICULAR RATE: 64 BPM
GFR AFRICAN AMERICAN: 37
GFR NON-AFRICAN AMERICAN: 37 ML/MIN/1.73
GLUCOSE BLD-MCNC: 101 MG/DL (ref 74–99)
HCT VFR BLD CALC: 21.6 % (ref 34–48)
HCT VFR BLD CALC: 27.9 % (ref 34–48)
HCT VFR BLD CALC: 33 % (ref 34–48)
HEMOGLOBIN: 10.5 G/DL (ref 11.5–15.5)
HEMOGLOBIN: 6.5 G/DL (ref 11.5–15.5)
HEMOGLOBIN: 8.6 G/DL (ref 11.5–15.5)
INR BLD: 1.1
MAGNESIUM: 2.2 MG/DL (ref 1.6–2.6)
METER GLUCOSE: 106 MG/DL (ref 74–99)
METER GLUCOSE: 109 MG/DL (ref 74–99)
METER GLUCOSE: 149 MG/DL (ref 74–99)
METER GLUCOSE: 171 MG/DL (ref 74–99)
POTASSIUM SERPL-SCNC: 4.7 MMOL/L (ref 3.5–5)
PROTHROMBIN TIME: 12.3 SEC (ref 9.3–12.4)
SODIUM BLD-SCNC: 142 MMOL/L (ref 132–146)

## 2020-02-27 PROCEDURE — 2580000003 HC RX 258: Performed by: STUDENT IN AN ORGANIZED HEALTH CARE EDUCATION/TRAINING PROGRAM

## 2020-02-27 PROCEDURE — 6370000000 HC RX 637 (ALT 250 FOR IP): Performed by: STUDENT IN AN ORGANIZED HEALTH CARE EDUCATION/TRAINING PROGRAM

## 2020-02-27 PROCEDURE — 6360000002 HC RX W HCPCS: Performed by: STUDENT IN AN ORGANIZED HEALTH CARE EDUCATION/TRAINING PROGRAM

## 2020-02-27 PROCEDURE — 2060000000 HC ICU INTERMEDIATE R&B

## 2020-02-27 PROCEDURE — P9016 RBC LEUKOCYTES REDUCED: HCPCS

## 2020-02-27 PROCEDURE — C9113 INJ PANTOPRAZOLE SODIUM, VIA: HCPCS | Performed by: STUDENT IN AN ORGANIZED HEALTH CARE EDUCATION/TRAINING PROGRAM

## 2020-02-27 PROCEDURE — 83735 ASSAY OF MAGNESIUM: CPT

## 2020-02-27 PROCEDURE — 85018 HEMOGLOBIN: CPT

## 2020-02-27 PROCEDURE — 97530 THERAPEUTIC ACTIVITIES: CPT

## 2020-02-27 PROCEDURE — 80048 BASIC METABOLIC PNL TOTAL CA: CPT

## 2020-02-27 PROCEDURE — 36430 TRANSFUSION BLD/BLD COMPNT: CPT

## 2020-02-27 PROCEDURE — 71045 X-RAY EXAM CHEST 1 VIEW: CPT

## 2020-02-27 PROCEDURE — 82962 GLUCOSE BLOOD TEST: CPT

## 2020-02-27 PROCEDURE — 85730 THROMBOPLASTIN TIME PARTIAL: CPT

## 2020-02-27 PROCEDURE — 36415 COLL VENOUS BLD VENIPUNCTURE: CPT

## 2020-02-27 PROCEDURE — 93010 ELECTROCARDIOGRAM REPORT: CPT | Performed by: INTERNAL MEDICINE

## 2020-02-27 PROCEDURE — 85014 HEMATOCRIT: CPT

## 2020-02-27 PROCEDURE — 85610 PROTHROMBIN TIME: CPT

## 2020-02-27 RX ORDER — SODIUM CHLORIDE 9 MG/ML
INJECTION, SOLUTION INTRAVENOUS CONTINUOUS
Status: DISCONTINUED | OUTPATIENT
Start: 2020-02-27 | End: 2020-02-28 | Stop reason: HOSPADM

## 2020-02-27 RX ORDER — HYDROCODONE BITARTRATE AND ACETAMINOPHEN 5; 325 MG/1; MG/1
2 TABLET ORAL EVERY 4 HOURS PRN
Status: DISCONTINUED | OUTPATIENT
Start: 2020-02-27 | End: 2020-02-28 | Stop reason: HOSPADM

## 2020-02-27 RX ORDER — 0.9 % SODIUM CHLORIDE 0.9 %
20 INTRAVENOUS SOLUTION INTRAVENOUS ONCE
Status: COMPLETED | OUTPATIENT
Start: 2020-02-27 | End: 2020-02-27

## 2020-02-27 RX ORDER — PANTOPRAZOLE SODIUM 40 MG/10ML
40 INJECTION, POWDER, LYOPHILIZED, FOR SOLUTION INTRAVENOUS 2 TIMES DAILY
Status: DISCONTINUED | OUTPATIENT
Start: 2020-02-27 | End: 2020-02-28 | Stop reason: HOSPADM

## 2020-02-27 RX ORDER — HYDROCODONE BITARTRATE AND ACETAMINOPHEN 5; 325 MG/1; MG/1
1 TABLET ORAL EVERY 4 HOURS PRN
Status: DISCONTINUED | OUTPATIENT
Start: 2020-02-27 | End: 2020-02-28 | Stop reason: HOSPADM

## 2020-02-27 RX ORDER — SODIUM CHLORIDE 9 MG/ML
10 INJECTION INTRAVENOUS 2 TIMES DAILY
Status: DISCONTINUED | OUTPATIENT
Start: 2020-02-27 | End: 2020-02-28 | Stop reason: HOSPADM

## 2020-02-27 RX ADMIN — SODIUM CHLORIDE 20 ML: 9 INJECTION, SOLUTION INTRAVENOUS at 17:16

## 2020-02-27 RX ADMIN — SODIUM CHLORIDE: 9 INJECTION, SOLUTION INTRAVENOUS at 12:26

## 2020-02-27 RX ADMIN — SODIUM CHLORIDE: 9 INJECTION, SOLUTION INTRAVENOUS at 21:16

## 2020-02-27 RX ADMIN — METFORMIN HYDROCHLORIDE 500 MG: 500 TABLET ORAL at 09:46

## 2020-02-27 RX ADMIN — SODIUM CHLORIDE, PRESERVATIVE FREE 5 ML: 5 INJECTION INTRAVENOUS at 21:31

## 2020-02-27 RX ADMIN — METOPROLOL SUCCINATE 50 MG: 50 TABLET, EXTENDED RELEASE ORAL at 21:30

## 2020-02-27 RX ADMIN — ATORVASTATIN CALCIUM 40 MG: 40 TABLET, FILM COATED ORAL at 21:29

## 2020-02-27 RX ADMIN — METFORMIN HYDROCHLORIDE 500 MG: 500 TABLET ORAL at 17:15

## 2020-02-27 RX ADMIN — ONDANSETRON 4 MG: 2 INJECTION INTRAMUSCULAR; INTRAVENOUS at 12:23

## 2020-02-27 RX ADMIN — HYDROCODONE BITARTRATE AND ACETAMINOPHEN 1 TABLET: 5; 325 TABLET ORAL at 17:19

## 2020-02-27 RX ADMIN — GLIMEPIRIDE 1 MG: 1 TABLET ORAL at 06:36

## 2020-02-27 RX ADMIN — PANTOPRAZOLE SODIUM 40 MG: 40 INJECTION, POWDER, FOR SOLUTION INTRAVENOUS at 21:29

## 2020-02-27 RX ADMIN — PANTOPRAZOLE SODIUM 40 MG: 40 TABLET, DELAYED RELEASE ORAL at 06:36

## 2020-02-27 RX ADMIN — METOPROLOL SUCCINATE 50 MG: 50 TABLET, EXTENDED RELEASE ORAL at 09:46

## 2020-02-27 RX ADMIN — SODIUM CHLORIDE, PRESERVATIVE FREE 10 ML: 5 INJECTION INTRAVENOUS at 09:47

## 2020-02-27 RX ADMIN — SUCRALFATE 1 G: 1 TABLET ORAL at 09:46

## 2020-02-27 RX ADMIN — HYDROCODONE BITARTRATE AND ACETAMINOPHEN 1 TABLET: 5; 325 TABLET ORAL at 21:30

## 2020-02-27 RX ADMIN — SUCRALFATE 1 G: 1 TABLET ORAL at 17:15

## 2020-02-27 RX ADMIN — SODIUM CHLORIDE 20 ML: 9 INJECTION, SOLUTION INTRAVENOUS at 13:57

## 2020-02-27 RX ADMIN — SUCRALFATE 1 G: 1 TABLET ORAL at 21:29

## 2020-02-27 RX ADMIN — SODIUM CHLORIDE 10 ML: 9 INJECTION INTRAMUSCULAR; INTRAVENOUS; SUBCUTANEOUS at 12:32

## 2020-02-27 RX ADMIN — OXYCODONE HYDROCHLORIDE 10 MG: 5 TABLET ORAL at 11:02

## 2020-02-27 RX ADMIN — SUCRALFATE 1 G: 1 TABLET ORAL at 11:02

## 2020-02-27 RX ADMIN — OXYCODONE HYDROCHLORIDE 10 MG: 5 TABLET ORAL at 06:36

## 2020-02-27 RX ADMIN — PANTOPRAZOLE SODIUM 40 MG: 40 INJECTION, POWDER, FOR SOLUTION INTRAVENOUS at 12:32

## 2020-02-27 ASSESSMENT — PAIN DESCRIPTION - PROGRESSION
CLINICAL_PROGRESSION: GRADUALLY WORSENING
CLINICAL_PROGRESSION: GRADUALLY WORSENING
CLINICAL_PROGRESSION: NOT CHANGED

## 2020-02-27 ASSESSMENT — PAIN DESCRIPTION - FREQUENCY
FREQUENCY: CONTINUOUS

## 2020-02-27 ASSESSMENT — PAIN - FUNCTIONAL ASSESSMENT
PAIN_FUNCTIONAL_ASSESSMENT: PREVENTS OR INTERFERES SOME ACTIVE ACTIVITIES AND ADLS

## 2020-02-27 ASSESSMENT — PAIN SCALES - GENERAL
PAINLEVEL_OUTOF10: 7
PAINLEVEL_OUTOF10: 6
PAINLEVEL_OUTOF10: 6
PAINLEVEL_OUTOF10: 2
PAINLEVEL_OUTOF10: 7

## 2020-02-27 ASSESSMENT — PAIN DESCRIPTION - LOCATION
LOCATION: BREAST;CHEST
LOCATION: BREAST
LOCATION: BREAST

## 2020-02-27 ASSESSMENT — PAIN DESCRIPTION - PAIN TYPE
TYPE: SURGICAL PAIN

## 2020-02-27 ASSESSMENT — PAIN DESCRIPTION - ORIENTATION
ORIENTATION: LEFT

## 2020-02-27 ASSESSMENT — PAIN DESCRIPTION - ONSET
ONSET: ON-GOING

## 2020-02-27 ASSESSMENT — PAIN DESCRIPTION - DESCRIPTORS
DESCRIPTORS: PRESSURE;SORE
DESCRIPTORS: SORE
DESCRIPTORS: SORE

## 2020-02-27 NOTE — CARE COORDINATION
Social work / Discharge Planning:       Social work updated liaison for [facility]) that patient has a discharge order and home care orders written.   Electronically signed by MAURICIO Murphy on 2/27/2020 at 9:13 AM

## 2020-02-27 NOTE — PROGRESS NOTES
Physical Therapy    Facility/Department: [facility]W Med/Surgical  Treatment note    NAME: Corinne Carlisle  : 1960  MRN: [id number]    Date of Service: 2020               Patient Diagnosis(es): The encounter diagnosis was S/P mastectomy, left.     has a past medical history of Breast cancer ([facility] Breast cancer ([facility] CAD (coronary artery disease), CVA (cerebral vascular accident) ([facility] DM (diabetes mellitus) ([facility] H/O: GI bleed, Hyperlipidemia, and Hypertension. has a past surgical history that includes Breast surgery (); Hysterectomy; Appendectomy; Knee arthroscopy; Foot surgery; Tonsillectomy; Breast reconstruction (Right); Mastectomy (Right, ); Colonoscopy; Upper gastrointestinal endoscopy; Upper gastrointestinal endoscopy (N/A, 2020); Mastectomy, modified radical (Left, 2020); and Mastectomy, modified radical (Left, 2020). Evaluating Therapist: Carlos Brasher, PT     Referring Provider:  Anahi Thurston MD    Room #: 920   DIAGNOSIS: L breast CA s/p simple mastectomy and reconstruction 2020 . Returned to surgery for evacuation of hematoma and exploration of wound     PRECAUTIONS:  Falls     Social:  Pt lives alone  in a split level  floor plan  3  steps and  B  rails to enter. B HRs between levels per pt   Prior to admission pt walked with  std walker or rollator      Initial Evaluation  Date:  2020 Treatment  2020    Short Term/ Long Term   Goals   Was pt agreeable to Eval/treatment?  yes  Yes, agreed to transfer bed to chair only to eat lunch    Does pt have pain?   L upper chest/breast area  L breast area    Bed Mobility  Rolling: NT   Supine to sit: independent    Sit to supine: independent   Scooting:  Independent  Rolling: NT  Supine to sit: Min A  Scooting: Min A to EOB  independent    Transfers Sit to stand:  SBA   Stand to sit: SBA    Stand pivot:  SBA Sit to stand: Min A  Stand to sit: Min A  Stand Pivot: Min A without A/D  independent    Ambulation     100

## 2020-02-27 NOTE — PROGRESS NOTES
Perfect serve to Dr. Abbey Bacon to report magnesium of 1.4 and calcium of 6.4. Surgical residents added labs following report of VTach.   Electronically signed by Nela Phillips RN on 2/26/2020 at 7:28 PM

## 2020-02-27 NOTE — PROGRESS NOTES
GENERAL SURGERY  DAILY PROGRESS NOTE  2/27/2020    Subjective:  Patient seen and examined on rounds this morning. She was feeling much better. Color was improved and she was tolerating a diet with decreasing drain output. Re-examined this afternoon; she was complaining of dizziness with standing as well as nausea. More lethargic Hgb dropped to 6.5. Vitals remained stable.      Objective:  /76   Pulse 93   Temp 98.6 °F (37 °C) (Oral)   Resp 18   Ht 5' 5\" (1.651 m)   Wt 231 lb 3.2 oz (104.9 kg)   SpO2 91%   BMI 38.47 kg/m²     GENERAL:  Laying in bed, awake, slightly lethargic. pale  HEAD: Normocephalic, atraumatic  EYES: No sclera icterus, pupils equal  LUNGS:  No increased work of breathing  CARDIOVASCULAR:  Reg rate, normotensive  ABDOMEN:  Soft, non-tender, non-distended  EXTREMITIES: No edema or swelling  SKIN: Warm and dry, left mastectomy site with increase in hematoma PASCUAL drains x2 serosanguinous    Assessment/Plan:  60 y.o. female with postoperative hematoma s/p left mastectomy for DCIS  - Transfuse 2 units  - Check coagulation studies  - CXR  - May need return to OR if hematoma expanding  - NPO      Electronically signed by Loco Moreau MD on 2/27/2020 at 1:17 PM     Levi Samuel for diet  Does not need return to OR  Adrienne

## 2020-02-28 VITALS
HEIGHT: 65 IN | WEIGHT: 230.6 LBS | HEART RATE: 92 BPM | BODY MASS INDEX: 38.42 KG/M2 | TEMPERATURE: 98.2 F | SYSTOLIC BLOOD PRESSURE: 129 MMHG | DIASTOLIC BLOOD PRESSURE: 83 MMHG | RESPIRATION RATE: 20 BRPM | OXYGEN SATURATION: 93 %

## 2020-02-28 LAB
METER GLUCOSE: 125 MG/DL (ref 74–99)
METER GLUCOSE: 96 MG/DL (ref 74–99)

## 2020-02-28 PROCEDURE — 82962 GLUCOSE BLOOD TEST: CPT

## 2020-02-28 PROCEDURE — 2580000003 HC RX 258: Performed by: STUDENT IN AN ORGANIZED HEALTH CARE EDUCATION/TRAINING PROGRAM

## 2020-02-28 PROCEDURE — 6360000002 HC RX W HCPCS: Performed by: STUDENT IN AN ORGANIZED HEALTH CARE EDUCATION/TRAINING PROGRAM

## 2020-02-28 PROCEDURE — 6370000000 HC RX 637 (ALT 250 FOR IP): Performed by: STUDENT IN AN ORGANIZED HEALTH CARE EDUCATION/TRAINING PROGRAM

## 2020-02-28 PROCEDURE — C9113 INJ PANTOPRAZOLE SODIUM, VIA: HCPCS | Performed by: STUDENT IN AN ORGANIZED HEALTH CARE EDUCATION/TRAINING PROGRAM

## 2020-02-28 RX ADMIN — SODIUM CHLORIDE: 9 INJECTION, SOLUTION INTRAVENOUS at 15:49

## 2020-02-28 RX ADMIN — SODIUM CHLORIDE: 9 INJECTION, SOLUTION INTRAVENOUS at 05:27

## 2020-02-28 RX ADMIN — SODIUM CHLORIDE, PRESERVATIVE FREE 10 ML: 5 INJECTION INTRAVENOUS at 08:38

## 2020-02-28 RX ADMIN — SUCRALFATE 1 G: 1 TABLET ORAL at 16:25

## 2020-02-28 RX ADMIN — METOPROLOL SUCCINATE 50 MG: 50 TABLET, EXTENDED RELEASE ORAL at 08:38

## 2020-02-28 RX ADMIN — HYDROCODONE BITARTRATE AND ACETAMINOPHEN 2 TABLET: 5; 325 TABLET ORAL at 05:31

## 2020-02-28 RX ADMIN — SUCRALFATE 1 G: 1 TABLET ORAL at 11:08

## 2020-02-28 RX ADMIN — ONDANSETRON 4 MG: 2 INJECTION INTRAMUSCULAR; INTRAVENOUS at 11:09

## 2020-02-28 RX ADMIN — PANTOPRAZOLE SODIUM 40 MG: 40 INJECTION, POWDER, FOR SOLUTION INTRAVENOUS at 08:39

## 2020-02-28 RX ADMIN — METFORMIN HYDROCHLORIDE 500 MG: 500 TABLET ORAL at 08:38

## 2020-02-28 RX ADMIN — METFORMIN HYDROCHLORIDE 500 MG: 500 TABLET ORAL at 16:25

## 2020-02-28 RX ADMIN — SUCRALFATE 1 G: 1 TABLET ORAL at 08:38

## 2020-02-28 RX ADMIN — GLIMEPIRIDE 1 MG: 1 TABLET ORAL at 07:40

## 2020-02-28 RX ADMIN — SODIUM CHLORIDE 10 ML: 9 INJECTION INTRAMUSCULAR; INTRAVENOUS; SUBCUTANEOUS at 08:39

## 2020-02-28 RX ADMIN — HYDROCODONE BITARTRATE AND ACETAMINOPHEN 1 TABLET: 5; 325 TABLET ORAL at 14:11

## 2020-02-28 ASSESSMENT — PAIN SCALES - GENERAL
PAINLEVEL_OUTOF10: 5
PAINLEVEL_OUTOF10: 0
PAINLEVEL_OUTOF10: 3
PAINLEVEL_OUTOF10: 5

## 2020-02-28 NOTE — DISCHARGE SUMMARY
Discharge Medications:       Medication List      START taking these medications    oxyCODONE-acetaminophen 5-325 MG per tablet  Commonly known as:  Percocet  Take 1 tablet by mouth every 6 hours as needed for Pain for up to 5 days. Intended supply: 3 days. Take lowest dose possible to manage pain        CONTINUE taking these medications    atorvastatin 40 MG tablet  Commonly known as:  LIPITOR  Take 1 tablet by mouth nightly.     gabapentin 300 MG capsule  Commonly known as:  NEURONTIN     glimepiride 1 MG tablet  Commonly known as:  AMARYL     metFORMIN 500 MG tablet  Commonly known as:  GLUCOPHAGE     metoprolol succinate 50 MG extended release tablet  Commonly known as:  TOPROL XL  Take 1 tablet by mouth 2 times daily     pantoprazole 40 MG tablet  Commonly known as:  PROTONIX  Take 1 tablet by mouth 2 times daily (before meals)     sucralfate 1 GM tablet  Commonly known as:  CARAFATE  Take 1 tablet by mouth 4 times daily (with meals and nightly)        STOP taking these medications    aspirin 81 MG tablet           Where to Get Your Medications      These medications were [facility] to [facility] [address] [place] [address], [address] [place] [address] [phone] [facility] [phone] [address]    Phone:  [phone]   · metoprolol succinate 50 MG extended release tablet     You can get these medications from any pharmacy    Bring a paper prescription for each of these medications  · oxyCODONE-acetaminophen 5-325 MG per tablet         Disposition: home    Patient Instructions: Activity: Per discharge instructions  Diet per Discharge instructions   Wound Care: Per discharge instructions   Follow-up with per discharge instructions         Signed:   Mckenzie Markham  2/28/2020  6:32 AM

## 2020-02-28 NOTE — PROGRESS NOTES
CLINICAL PHARMACY NOTE: MEDS TO [address] [facility] Patient?: No  Total # of Prescriptions Filled: 1   The following medications were delivered to the patient:  · Percocet 5/325mg  Total # of Interventions Completed: 4  Time Spent (min): 45    Additional Documentation:

## 2020-02-28 NOTE — PROGRESS NOTES
Physical Therapy    Pt kindly refused Rx, states already OOB, wants to stay in bed and rest. Wishes respected.      Abhinav Damian PTA [id number]

## 2020-02-28 NOTE — CARE COORDINATION
Social work / Discharge Planning:       [facility] liaison continues to follow and aware that discharge is anticipated.   Electronically signed by MAURICIO Lloyd on 2/28/2020 at 3:22 PM

## 2020-03-03 ENCOUNTER — HOSPITAL ENCOUNTER (EMERGENCY)
Age: 60
Discharge: ANOTHER HOSPITAL | End: 2020-03-03
Attending: EMERGENCY MEDICINE
Payer: MEDICARE

## 2020-03-03 ENCOUNTER — HOSPITAL ENCOUNTER (OUTPATIENT)
Age: 60
Discharge: HOME | End: 2020-03-03
Payer: MEDICARE

## 2020-03-03 ENCOUNTER — APPOINTMENT (OUTPATIENT)
Dept: ULTRASOUND IMAGING | Age: 60
End: 2020-03-03
Payer: MEDICARE

## 2020-03-03 ENCOUNTER — HOSPITAL ENCOUNTER (OUTPATIENT)
Age: 60
Discharge: HOME | DRG: 248 | End: 2020-03-03
Attending: INTERNAL MEDICINE
Payer: MEDICARE

## 2020-03-03 ENCOUNTER — APPOINTMENT (OUTPATIENT)
Dept: GENERAL RADIOLOGY | Age: 60
End: 2020-03-03
Payer: MEDICARE

## 2020-03-03 ENCOUNTER — HOSPITAL ENCOUNTER (INPATIENT)
Age: 60
LOS: 5 days | Discharge: HOSPICE | DRG: 248 | End: 2020-03-08
Attending: INTERNAL MEDICINE | Admitting: INTERNAL MEDICINE
Payer: MEDICARE

## 2020-03-03 ENCOUNTER — APPOINTMENT (OUTPATIENT)
Dept: CT IMAGING | Age: 60
End: 2020-03-03
Payer: MEDICARE

## 2020-03-03 VITALS
TEMPERATURE: 97.7 F | SYSTOLIC BLOOD PRESSURE: 154 MMHG | DIASTOLIC BLOOD PRESSURE: 103 MMHG | HEART RATE: 96 BPM | WEIGHT: 230 LBS | OXYGEN SATURATION: 96 % | RESPIRATION RATE: 16 BRPM | BODY MASS INDEX: 38.32 KG/M2 | HEIGHT: 65 IN

## 2020-03-03 PROBLEM — I21.4 NSTEMI (NON-ST ELEVATED MYOCARDIAL INFARCTION) (HCC): Status: ACTIVE | Noted: 2020-03-03

## 2020-03-03 PROBLEM — E87.20 METABOLIC ACIDOSIS: Status: ACTIVE | Noted: 2020-03-03

## 2020-03-03 LAB
ALBUMIN SERPL-MCNC: 3.1 G/DL (ref 3.5–5.2)
ALP BLD-CCNC: 110 U/L (ref 35–104)
ALT SERPL-CCNC: 21 U/L (ref 0–32)
ANION GAP SERPL CALCULATED.3IONS-SCNC: 13 MMOL/L (ref 7–16)
APTT: 35.3 SEC (ref 24.5–35.1)
APTT: 62.5 SEC (ref 24.5–35.1)
AST SERPL-CCNC: 29 U/L (ref 0–31)
BASOPHILS ABSOLUTE: 0 E9/L (ref 0–0.2)
BASOPHILS RELATIVE PERCENT: 0 % (ref 0–2)
BILIRUB SERPL-MCNC: 0.4 MG/DL (ref 0–1.2)
BILIRUBIN DIRECT: <0.2 MG/DL (ref 0–0.3)
BILIRUBIN, INDIRECT: ABNORMAL MG/DL (ref 0–1)
BUN BLDV-MCNC: 15 MG/DL (ref 8–23)
CALCIUM SERPL-MCNC: 9.1 MG/DL (ref 8.6–10.2)
CHLORIDE BLD-SCNC: 110 MMOL/L (ref 98–107)
CK MB: 2 NG/ML (ref 0–4.3)
CO2: 21 MMOL/L (ref 22–29)
CREAT SERPL-MCNC: 1.1 MG/DL (ref 0.5–1)
D DIMER: 481 NG/ML DDU
EKG ATRIAL RATE: 82 BPM
EKG ATRIAL RATE: 95 BPM
EKG P AXIS: 25 DEGREES
EKG P AXIS: 27 DEGREES
EKG P-R INTERVAL: 136 MS
EKG P-R INTERVAL: 144 MS
EKG Q-T INTERVAL: 396 MS
EKG Q-T INTERVAL: 422 MS
EKG QRS DURATION: 72 MS
EKG QRS DURATION: 72 MS
EKG QTC CALCULATION (BAZETT): 493 MS
EKG QTC CALCULATION (BAZETT): 497 MS
EKG R AXIS: -25 DEGREES
EKG R AXIS: -32 DEGREES
EKG T AXIS: -178 DEGREES
EKG T AXIS: 175 DEGREES
EKG VENTRICULAR RATE: 82 BPM
EKG VENTRICULAR RATE: 95 BPM
EOSINOPHILS ABSOLUTE: 0 E9/L (ref 0.05–0.5)
EOSINOPHILS RELATIVE PERCENT: 0 % (ref 0–6)
GFR AFRICAN AMERICAN: >60
GFR NON-AFRICAN AMERICAN: >60 ML/MIN/1.73
GLUCOSE BLD-MCNC: 90 MG/DL (ref 74–99)
HCT VFR BLD CALC: 31.7 % (ref 34–48)
HEMOGLOBIN: 10.2 G/DL (ref 11.5–15.5)
HYPOCHROMIA: ABNORMAL
INR BLD: 1.2
LACTIC ACID: 1.4 MMOL/L (ref 0.5–2.2)
LIPASE: 21 U/L (ref 13–60)
LYMPHOCYTES ABSOLUTE: 0.4 E9/L (ref 1.5–4)
LYMPHOCYTES RELATIVE PERCENT: 6 % (ref 20–42)
MAGNESIUM: 1.7 MG/DL (ref 1.6–2.6)
MCH RBC QN AUTO: 28.7 PG (ref 26–35)
MCHC RBC AUTO-ENTMCNC: 32.2 % (ref 32–34.5)
MCV RBC AUTO: 89 FL (ref 80–99.9)
METER GLUCOSE: 115 MG/DL (ref 74–99)
MONOCYTES ABSOLUTE: 0.46 E9/L (ref 0.1–0.95)
MONOCYTES RELATIVE PERCENT: 7 % (ref 2–12)
NEUTROPHILS ABSOLUTE: 5.74 E9/L (ref 1.8–7.3)
NEUTROPHILS RELATIVE PERCENT: 87 % (ref 43–80)
PDW BLD-RTO: 15.5 FL (ref 11.5–15)
PLATELET # BLD: 240 E9/L (ref 130–450)
PMV BLD AUTO: 11.3 FL (ref 7–12)
POIKILOCYTES: ABNORMAL
POLYCHROMASIA: ABNORMAL
POTASSIUM REFLEX MAGNESIUM: 4.2 MMOL/L (ref 3.5–5)
PRO-BNP: ABNORMAL PG/ML (ref 0–125)
PROTHROMBIN TIME: 13.4 SEC (ref 9.3–12.4)
RBC # BLD: 3.56 E12/L (ref 3.5–5.5)
SODIUM BLD-SCNC: 144 MMOL/L (ref 132–146)
TARGET CELLS: ABNORMAL
TOTAL CK: 95 U/L (ref 20–180)
TOTAL PROTEIN: 6.7 G/DL (ref 6.4–8.3)
TROPONIN: 1.77 NG/ML (ref 0–0.03)
TROPONIN: 1.83 NG/ML (ref 0–0.03)
WBC # BLD: 6.6 E9/L (ref 4.5–11.5)

## 2020-03-03 PROCEDURE — 36415 COLL VENOUS BLD VENIPUNCTURE: CPT

## 2020-03-03 PROCEDURE — 71275 CT ANGIOGRAPHY CHEST: CPT

## 2020-03-03 PROCEDURE — A0425 GROUND MILEAGE: HCPCS

## 2020-03-03 PROCEDURE — 85378 FIBRIN DEGRADE SEMIQUANT: CPT

## 2020-03-03 PROCEDURE — 36591 DRAW BLOOD OFF VENOUS DEVICE: CPT

## 2020-03-03 PROCEDURE — 85730 THROMBOPLASTIN TIME PARTIAL: CPT

## 2020-03-03 PROCEDURE — 2580000003 HC RX 258: Performed by: EMERGENCY MEDICINE

## 2020-03-03 PROCEDURE — 83735 ASSAY OF MAGNESIUM: CPT

## 2020-03-03 PROCEDURE — 2500000003 HC RX 250 WO HCPCS: Performed by: EMERGENCY MEDICINE

## 2020-03-03 PROCEDURE — 71045 X-RAY EXAM CHEST 1 VIEW: CPT

## 2020-03-03 PROCEDURE — 6370000000 HC RX 637 (ALT 250 FOR IP): Performed by: EMERGENCY MEDICINE

## 2020-03-03 PROCEDURE — 99291 CRITICAL CARE FIRST HOUR: CPT

## 2020-03-03 PROCEDURE — 83605 ASSAY OF LACTIC ACID: CPT

## 2020-03-03 PROCEDURE — 84484 ASSAY OF TROPONIN QUANT: CPT

## 2020-03-03 PROCEDURE — 82550 ASSAY OF CK (CPK): CPT

## 2020-03-03 PROCEDURE — 85610 PROTHROMBIN TIME: CPT

## 2020-03-03 PROCEDURE — 82962 GLUCOSE BLOOD TEST: CPT

## 2020-03-03 PROCEDURE — 96374 THER/PROPH/DIAG INJ IV PUSH: CPT

## 2020-03-03 PROCEDURE — 83880 ASSAY OF NATRIURETIC PEPTIDE: CPT

## 2020-03-03 PROCEDURE — 2700000000 HC OXYGEN THERAPY PER DAY

## 2020-03-03 PROCEDURE — 99223 1ST HOSP IP/OBS HIGH 75: CPT | Performed by: INTERNAL MEDICINE

## 2020-03-03 PROCEDURE — 6370000000 HC RX 637 (ALT 250 FOR IP): Performed by: FAMILY MEDICINE

## 2020-03-03 PROCEDURE — 6360000002 HC RX W HCPCS: Performed by: EMERGENCY MEDICINE

## 2020-03-03 PROCEDURE — 2000000000 HC ICU R&B

## 2020-03-03 PROCEDURE — 93005 ELECTROCARDIOGRAM TRACING: CPT | Performed by: EMERGENCY MEDICINE

## 2020-03-03 PROCEDURE — 93971 EXTREMITY STUDY: CPT

## 2020-03-03 PROCEDURE — A0426 ALS 1: HCPCS

## 2020-03-03 PROCEDURE — 80048 BASIC METABOLIC PNL TOTAL CA: CPT

## 2020-03-03 PROCEDURE — 2580000003 HC RX 258: Performed by: FAMILY MEDICINE

## 2020-03-03 PROCEDURE — 82553 CREATINE MB FRACTION: CPT

## 2020-03-03 PROCEDURE — 6360000004 HC RX CONTRAST MEDICATION: Performed by: RADIOLOGY

## 2020-03-03 PROCEDURE — 83690 ASSAY OF LIPASE: CPT

## 2020-03-03 PROCEDURE — 85025 COMPLETE CBC W/AUTO DIFF WBC: CPT

## 2020-03-03 PROCEDURE — 80076 HEPATIC FUNCTION PANEL: CPT

## 2020-03-03 RX ORDER — ONDANSETRON 4 MG/1
4 TABLET, ORALLY DISINTEGRATING ORAL EVERY 8 HOURS PRN
Status: DISCONTINUED | OUTPATIENT
Start: 2020-03-03 | End: 2020-03-06

## 2020-03-03 RX ORDER — ACETAMINOPHEN 325 MG/1
650 TABLET ORAL EVERY 6 HOURS PRN
Status: DISCONTINUED | OUTPATIENT
Start: 2020-03-03 | End: 2020-03-08 | Stop reason: HOSPADM

## 2020-03-03 RX ORDER — ACETAMINOPHEN 650 MG/1
650 SUPPOSITORY RECTAL EVERY 6 HOURS PRN
Status: DISCONTINUED | OUTPATIENT
Start: 2020-03-03 | End: 2020-03-06

## 2020-03-03 RX ORDER — SODIUM CHLORIDE 0.9 % (FLUSH) 0.9 %
10 SYRINGE (ML) INJECTION EVERY 12 HOURS SCHEDULED
Status: DISCONTINUED | OUTPATIENT
Start: 2020-03-03 | End: 2020-03-04

## 2020-03-03 RX ORDER — ASPIRIN 81 MG/1
81 TABLET, CHEWABLE ORAL DAILY
Status: DISCONTINUED | OUTPATIENT
Start: 2020-03-04 | End: 2020-03-06

## 2020-03-03 RX ORDER — SODIUM CHLORIDE 0.9 % (FLUSH) 0.9 %
10 SYRINGE (ML) INJECTION PRN
Status: DISCONTINUED | OUTPATIENT
Start: 2020-03-03 | End: 2020-03-03 | Stop reason: HOSPADM

## 2020-03-03 RX ORDER — SODIUM CHLORIDE 0.9 % (FLUSH) 0.9 %
10 SYRINGE (ML) INJECTION PRN
Status: DISCONTINUED | OUTPATIENT
Start: 2020-03-03 | End: 2020-03-04

## 2020-03-03 RX ORDER — ASPIRIN 325 MG
325 TABLET ORAL ONCE
Status: COMPLETED | OUTPATIENT
Start: 2020-03-03 | End: 2020-03-03

## 2020-03-03 RX ORDER — GABAPENTIN 300 MG/1
300 CAPSULE ORAL NIGHTLY
Status: DISCONTINUED | OUTPATIENT
Start: 2020-03-03 | End: 2020-03-08 | Stop reason: HOSPADM

## 2020-03-03 RX ORDER — SUCRALFATE 1 G/1
1 TABLET ORAL
Status: DISCONTINUED | OUTPATIENT
Start: 2020-03-03 | End: 2020-03-08 | Stop reason: HOSPADM

## 2020-03-03 RX ORDER — HEPARIN SODIUM 1000 [USP'U]/ML
2000 INJECTION, SOLUTION INTRAVENOUS; SUBCUTANEOUS PRN
Status: DISCONTINUED | OUTPATIENT
Start: 2020-03-03 | End: 2020-03-04

## 2020-03-03 RX ORDER — NICOTINE POLACRILEX 4 MG
15 LOZENGE BUCCAL PRN
Status: DISCONTINUED | OUTPATIENT
Start: 2020-03-03 | End: 2020-03-08 | Stop reason: HOSPADM

## 2020-03-03 RX ORDER — MORPHINE SULFATE 2 MG/ML
2 INJECTION, SOLUTION INTRAMUSCULAR; INTRAVENOUS EVERY 4 HOURS PRN
Status: DISCONTINUED | OUTPATIENT
Start: 2020-03-03 | End: 2020-03-08 | Stop reason: HOSPADM

## 2020-03-03 RX ORDER — NITROGLYCERIN 20 MG/100ML
5 INJECTION INTRAVENOUS CONTINUOUS
Status: DISCONTINUED | OUTPATIENT
Start: 2020-03-03 | End: 2020-03-04

## 2020-03-03 RX ORDER — DEXTROSE MONOHYDRATE 50 MG/ML
100 INJECTION, SOLUTION INTRAVENOUS PRN
Status: DISCONTINUED | OUTPATIENT
Start: 2020-03-03 | End: 2020-03-08 | Stop reason: HOSPADM

## 2020-03-03 RX ORDER — HEPARIN SODIUM 1000 [USP'U]/ML
2000 INJECTION, SOLUTION INTRAVENOUS; SUBCUTANEOUS PRN
Status: DISCONTINUED | OUTPATIENT
Start: 2020-03-03 | End: 2020-03-03 | Stop reason: HOSPADM

## 2020-03-03 RX ORDER — NITROGLYCERIN 20 MG/100ML
5 INJECTION INTRAVENOUS CONTINUOUS
Status: DISCONTINUED | OUTPATIENT
Start: 2020-03-03 | End: 2020-03-03 | Stop reason: HOSPADM

## 2020-03-03 RX ORDER — HEPARIN SODIUM 1000 [USP'U]/ML
4000 INJECTION, SOLUTION INTRAVENOUS; SUBCUTANEOUS PRN
Status: DISCONTINUED | OUTPATIENT
Start: 2020-03-03 | End: 2020-03-03 | Stop reason: HOSPADM

## 2020-03-03 RX ORDER — HEPARIN SODIUM 10000 [USP'U]/100ML
1000 INJECTION, SOLUTION INTRAVENOUS CONTINUOUS
Status: DISCONTINUED | OUTPATIENT
Start: 2020-03-03 | End: 2020-03-03 | Stop reason: HOSPADM

## 2020-03-03 RX ORDER — POLYETHYLENE GLYCOL 3350 17 G/17G
17 POWDER, FOR SOLUTION ORAL DAILY PRN
Status: DISCONTINUED | OUTPATIENT
Start: 2020-03-03 | End: 2020-03-08 | Stop reason: HOSPADM

## 2020-03-03 RX ORDER — ONDANSETRON 2 MG/ML
4 INJECTION INTRAMUSCULAR; INTRAVENOUS EVERY 6 HOURS PRN
Status: DISCONTINUED | OUTPATIENT
Start: 2020-03-03 | End: 2020-03-06

## 2020-03-03 RX ORDER — HEPARIN SODIUM 10000 [USP'U]/100ML
12 INJECTION, SOLUTION INTRAVENOUS CONTINUOUS
Status: DISCONTINUED | OUTPATIENT
Start: 2020-03-03 | End: 2020-03-04 | Stop reason: SDUPTHER

## 2020-03-03 RX ORDER — ATORVASTATIN CALCIUM 40 MG/1
40 TABLET, FILM COATED ORAL NIGHTLY
Status: DISCONTINUED | OUTPATIENT
Start: 2020-03-03 | End: 2020-03-08 | Stop reason: HOSPADM

## 2020-03-03 RX ORDER — HEPARIN SODIUM 1000 [USP'U]/ML
4000 INJECTION, SOLUTION INTRAVENOUS; SUBCUTANEOUS PRN
Status: DISCONTINUED | OUTPATIENT
Start: 2020-03-03 | End: 2020-03-04

## 2020-03-03 RX ORDER — SODIUM CHLORIDE 0.9 % (FLUSH) 0.9 %
10 SYRINGE (ML) INJECTION 2 TIMES DAILY
Status: DISCONTINUED | OUTPATIENT
Start: 2020-03-03 | End: 2020-03-03 | Stop reason: HOSPADM

## 2020-03-03 RX ORDER — HEPARIN SODIUM 1000 [USP'U]/ML
4000 INJECTION, SOLUTION INTRAVENOUS; SUBCUTANEOUS ONCE
Status: COMPLETED | OUTPATIENT
Start: 2020-03-03 | End: 2020-03-03

## 2020-03-03 RX ORDER — NITROGLYCERIN 0.4 MG/1
0.4 TABLET SUBLINGUAL ONCE
Status: COMPLETED | OUTPATIENT
Start: 2020-03-03 | End: 2020-03-03

## 2020-03-03 RX ORDER — HYDROCODONE BITARTRATE AND ACETAMINOPHEN 5; 325 MG/1; MG/1
1 TABLET ORAL EVERY 6 HOURS PRN
Status: DISCONTINUED | OUTPATIENT
Start: 2020-03-03 | End: 2020-03-08 | Stop reason: HOSPADM

## 2020-03-03 RX ORDER — PANTOPRAZOLE SODIUM 40 MG/1
40 TABLET, DELAYED RELEASE ORAL
Status: DISCONTINUED | OUTPATIENT
Start: 2020-03-04 | End: 2020-03-08 | Stop reason: HOSPADM

## 2020-03-03 RX ORDER — GLIMEPIRIDE 1 MG/1
1 TABLET ORAL
Status: DISCONTINUED | OUTPATIENT
Start: 2020-03-04 | End: 2020-03-04

## 2020-03-03 RX ORDER — DEXTROSE MONOHYDRATE 25 G/50ML
12.5 INJECTION, SOLUTION INTRAVENOUS PRN
Status: DISCONTINUED | OUTPATIENT
Start: 2020-03-03 | End: 2020-03-08 | Stop reason: HOSPADM

## 2020-03-03 RX ORDER — METOPROLOL SUCCINATE 50 MG/1
50 TABLET, EXTENDED RELEASE ORAL 2 TIMES DAILY
Status: DISCONTINUED | OUTPATIENT
Start: 2020-03-03 | End: 2020-03-08

## 2020-03-03 RX ADMIN — HYDROCODONE BITARTRATE AND ACETAMINOPHEN 1 TABLET: 5; 325 TABLET ORAL at 21:10

## 2020-03-03 RX ADMIN — HEPARIN SODIUM 1000 UNITS/HR: 10000 INJECTION, SOLUTION INTRAVENOUS at 16:45

## 2020-03-03 RX ADMIN — Medication 10 ML: at 15:39

## 2020-03-03 RX ADMIN — SUCRALFATE 1 G: 1 TABLET ORAL at 23:37

## 2020-03-03 RX ADMIN — ATORVASTATIN CALCIUM 40 MG: 40 TABLET, FILM COATED ORAL at 23:53

## 2020-03-03 RX ADMIN — ASPIRIN 325 MG: 325 TABLET, FILM COATED ORAL at 15:31

## 2020-03-03 RX ADMIN — IOPAMIDOL 80 ML: 755 INJECTION, SOLUTION INTRAVENOUS at 15:42

## 2020-03-03 RX ADMIN — METOPROLOL SUCCINATE 50 MG: 50 TABLET, EXTENDED RELEASE ORAL at 23:36

## 2020-03-03 RX ADMIN — NITROGLYCERIN 15 MCG/MIN: 20 INJECTION INTRAVENOUS at 18:26

## 2020-03-03 RX ADMIN — NITROGLYCERIN 10 MCG/MIN: 20 INJECTION INTRAVENOUS at 18:06

## 2020-03-03 RX ADMIN — NITROGLYCERIN 0.4 MG: 0.4 TABLET, ORALLY DISINTEGRATING SUBLINGUAL at 15:31

## 2020-03-03 RX ADMIN — HEPARIN SODIUM 4000 UNITS: 1000 INJECTION, SOLUTION INTRAVENOUS; SUBCUTANEOUS at 16:42

## 2020-03-03 RX ADMIN — GABAPENTIN 300 MG: 300 CAPSULE ORAL at 23:35

## 2020-03-03 RX ADMIN — METOPROLOL TARTRATE 25 MG: 25 TABLET, FILM COATED ORAL at 18:24

## 2020-03-03 RX ADMIN — Medication 10 ML: at 14:04

## 2020-03-03 RX ADMIN — Medication 10 ML: at 14:02

## 2020-03-03 RX ADMIN — SODIUM CHLORIDE, PRESERVATIVE FREE 10 ML: 5 INJECTION INTRAVENOUS at 23:36

## 2020-03-03 RX ADMIN — SODIUM CHLORIDE, PRESERVATIVE FREE 10 ML: 5 INJECTION INTRAVENOUS at 16:27

## 2020-03-03 RX ADMIN — NITROGLYCERIN 5 MCG/MIN: 20 INJECTION INTRAVENOUS at 16:23

## 2020-03-03 ASSESSMENT — ENCOUNTER SYMPTOMS
ABDOMINAL DISTENTION: 0
BACK PAIN: 0
TROUBLE SWALLOWING: 0
PHOTOPHOBIA: 0
SHORTNESS OF BREATH: 1
SINUS PAIN: 0
COLOR CHANGE: 0
CHEST TIGHTNESS: 1
EYE REDNESS: 0
ABDOMINAL PAIN: 0
WHEEZING: 0
COUGH: 0

## 2020-03-03 ASSESSMENT — PAIN DESCRIPTION - LOCATION
LOCATION: CHEST;BACK
LOCATION: CHEST;BACK

## 2020-03-03 ASSESSMENT — PAIN DESCRIPTION - ONSET
ONSET: GRADUAL
ONSET: GRADUAL

## 2020-03-03 ASSESSMENT — PAIN DESCRIPTION - PAIN TYPE
TYPE: ACUTE PAIN
TYPE: ACUTE PAIN

## 2020-03-03 ASSESSMENT — PAIN DESCRIPTION - PROGRESSION
CLINICAL_PROGRESSION: GRADUALLY WORSENING
CLINICAL_PROGRESSION: NOT CHANGED

## 2020-03-03 ASSESSMENT — PAIN SCALES - GENERAL
PAINLEVEL_OUTOF10: 7
PAINLEVEL_OUTOF10: 0
PAINLEVEL_OUTOF10: 5
PAINLEVEL_OUTOF10: 0
PAINLEVEL_OUTOF10: 6

## 2020-03-03 ASSESSMENT — PAIN DESCRIPTION - ORIENTATION
ORIENTATION: LEFT;MID
ORIENTATION: LEFT;MID

## 2020-03-03 ASSESSMENT — PAIN DESCRIPTION - FREQUENCY
FREQUENCY: CONTINUOUS
FREQUENCY: CONTINUOUS

## 2020-03-03 ASSESSMENT — PAIN DESCRIPTION - DESCRIPTORS
DESCRIPTORS: PRESSURE;DISCOMFORT
DESCRIPTORS: PRESSURE;DISCOMFORT

## 2020-03-03 ASSESSMENT — PAIN - FUNCTIONAL ASSESSMENT
PAIN_FUNCTIONAL_ASSESSMENT: ACTIVITIES ARE NOT PREVENTED
PAIN_FUNCTIONAL_ASSESSMENT: ACTIVITIES ARE NOT PREVENTED

## 2020-03-03 NOTE — ED NOTES
Pt refused Flu swab. Pt stated Janneth Jaime does not have the flu and you are not sticking that up my nose! \"     Waqar Rivera, RN  03/03/20 8739

## 2020-03-03 NOTE — CONSULTS
structure. There is trace tricuspid regurgitation, RVSP 22mmHg. Normal aortic root size. No evidence of pericardial effusion. No intracardiac mass. 18. 4/24/2019 Lexiscan MPS: EF 55%. Non ischemic.  Small fixed anteroapical defect.        Past Medical History:   Diagnosis Date    Breast cancer ([facility] 2000, 2005    right    Breast cancer ([facility] 2020    left    CAD (coronary artery disease)     follows with Dr. Candelario Hale CVA (cerebral vascular accident) [facility]) 2015    no deficits    DM (diabetes mellitus) ([facility]     H/O: GI bleed     Hyperlipidemia     Hypertension        Patient Active Problem List   Diagnosis    Chest pain    Hypertension    Hx of breast cancer    Hypertension    Breast cancer ([facility]    Left-sided weakness    Controlled type 2 diabetes mellitus without complication, without long-term current use of insulin (HCC)    History of CVA (cerebrovascular accident)    Hypertensive left ventricular hypertrophy, without heart failure    Abnormal EKG    Chronic midline low back pain with left-sided sciatica    Non morbid obesity    Cervical cancer (HCC)    [facility]ervix ([facility]    Acute GI bleeding    Gastrointestinal hemorrhage    Anemia due to acute blood loss    Controlled type 2 diabetes mellitus without complication (HCC)    Elevated troponin    Shortness of breath    Wide-complex tachycardia (HCC)    Intraductal carcinoma of left breast    Postoperative anemia due to acute blood loss       Allergies   Allergen Reactions    Pcn [Penicillins] Hives and Rash       Current Facility-Administered Medications   Medication Dose Route Frequency Provider Last Rate Last Dose    sodium chloride flush 0.9 % injection 10 mL  10 mL Intravenous PRN Raymond Boykin DO   10 mL at 03/03/20 1404    sodium chloride flush 0.9 % injection 10 mL  10 mL Intravenous BID Theron Boykin DO        iopamidol (ISOVUE-370) 76 % injection 80 mL  80 mL Intravenous ONCE PRN tenderness. Abdominal: Soft. Bowel sounds are normal. No distension and no mass. No tenderness. No rebound and no guarding. Musculoskeletal: Normal range of motion. No edema and no tenderness. Lymphadenopathy:   No cervical adenopathy. No groin adenopathy. Neurological: Alert and oriented to person, place, and time. Skin: Skin is warm and dry. No rash noted. Not diaphoretic. No erythema. Psychiatric: Normal mood and affect. Behavior is normal.       CBC:   Lab Results   Component Value Date    WBC 6.6 03/03/2020    RBC 3.56 03/03/2020    HGB 10.2 03/03/2020    HCT 31.7 03/03/2020    MCV 89.0 03/03/2020    MCH 28.7 03/03/2020    MCHC 32.2 03/03/2020    RDW 15.5 03/03/2020     03/03/2020    MPV 11.3 03/03/2020     BMP:   Lab Results   Component Value Date     03/03/2020    K 4.2 03/03/2020     03/03/2020    CO2 21 03/03/2020    BUN 15 03/03/2020    LABALBU 3.1 03/03/2020    LABALBU 4.2 02/21/2012    CREATININE 1.1 03/03/2020    CALCIUM 9.1 03/03/2020    GFRAA >60 03/03/2020    LABGLOM >60 03/03/2020     Magnesium:    Lab Results   Component Value Date    MG 2.2 02/27/2020     Cardiac Enzymes:   Lab Results   Component Value Date    CKTOTAL 158 02/05/2020    CKTOTAL 169 02/05/2020    CKTOTAL 197 (H) 02/05/2020    CKMB 1.6 02/25/2020    CKMB 6.3 (H) 02/05/2020    CKMB 8.0 (H) 02/05/2020    TROPONINI 1.77 (H) 03/03/2020    TROPONINI <0.01 02/25/2020    TROPONINI 0.61 (H) 02/05/2020      PT/INR:    Lab Results   Component Value Date    PROTIME 13.4 03/03/2020    PROTIME 10.9 02/23/2012    INR 1.2 03/03/2020     TSH:    Lab Results   Component Value Date    TSH 1.500 02/04/2020       Rhythm Strip: normal sinus rhythm.     EKG:  normal sinus rhythm, nonspecific ST and T waves changes, Q waves in anteroseptal.    ASSESSMENT AND PLAN:  Patient Active Problem List   Diagnosis    Chest pain    Hypertension    Hx of breast cancer    Hypertension    [facility]    Left-sided weakness    Controlled type 2 diabetes mellitus without complication, without long-term current use of insulin (HCC)    History of CVA (cerebrovascular accident)    Hypertensive left ventricular hypertrophy, without heart failure    Abnormal EKG    Chronic midline low back pain with left-sided sciatica    Non morbid obesity    [facility] ([facility]    Malignant neoplasm of endocervix ([facility]    Acute GI bleeding    Gastrointestinal hemorrhage    Anemia due to acute blood loss    Controlled type 2 diabetes mellitus without complication (HCC)    Elevated troponin    Shortness of breath    Wide-complex tachycardia (HCC)    Intraductal carcinoma of left breast    Postoperative anemia due to acute blood loss     1. Chest pain/NSTEMI:     Some ongoing CP. EKG with anteroseptal pattern of injury and Q waves     Echo. IV heparin/IV nitro/ASA/BB. Likely needs cath, will try to cool of medically and arrange for AM.    2. Recent and recurrent Breast surgery/Cardiac Arrest    3. Hx of CVA    4. HTN: Observe. 5. Lipids    6. DM    7. Hx of Bell's palsy    Calvin Paredes D.O.   Cardiologist  Cardiology, [facility]

## 2020-03-03 NOTE — ED PROVIDER NOTES
Jorge Morales is a 60 y.o. female presenting to the ED for short of breath, chest pain, beginning 2 days ago. The complaint has been intermittent, moderate in severity, and worsened by nothing. 59 yo f  Who is s/p left mastectomy last Tuesday w dr Frandy Hawley,. Pt notes ss chest pressure for past 2 days. Pt is having exertional chest pressure and sob. Pt notes this is new the past few days. Dr Sloane Nolasco did her surgical preclearance which was alledgedly ok. Pt denies ocugh congestion, fever, n,v,d./  Lft leg > swollen than r but did have cva on left before which it was swollen for in the past. Pt also notes recent gi bleed and had a cardiac arrest 1 week ago. Pt also c/o orthopnea  Review of Systems:   Pertinent positives and negatives are stated within HPI, all other systems reviewed and are negative. Review of Systems   Constitutional: Positive for fatigue. Negative for activity change and fever. HENT: Negative for congestion, sinus pain and trouble swallowing. Eyes: Negative for photophobia, redness and visual disturbance. Respiratory: Positive for chest tightness and shortness of breath. Negative for cough and wheezing. Cardiovascular: Positive for chest pain and leg swelling. Negative for palpitations. Gastrointestinal: Negative for abdominal distention and abdominal pain. Genitourinary: Negative for difficulty urinating, dyspareunia, dysuria and pelvic pain. Musculoskeletal: Negative for back pain, gait problem and joint swelling. Skin: Negative for color change, pallor and rash. Neurological: Negative for dizziness and light-headedness. Hematological: Negative for adenopathy. Bruises/bleeds easily. Psychiatric/Behavioral: Negative for behavioral problems.              --------------------------------------------- PAST HISTORY ---------------------------------------------  Past Medical History:  has a past medical history of Breast cancer ([facility] [address] [facility] CAD BPM    P-R Interval 144 ms    QRS Duration 72 ms    Q-T Interval 422 ms    QTc Calculation (Bazett) 493 ms    P Axis 25 degrees    R Axis -32 degrees    T Axis 175 degrees       RADIOLOGY:  Interpreted by Radiologist.  CTA PULMONARY W CONTRAST   Final Result   No central pulmonary embolism or aortic dissection. Cardiomegaly with coronary artery calcification with   atelectasis/pleural effusions and groundglass opacities concerning for   CHF. Bilateral mastectomy. US DUP LOWER EXTREMITY LEFT DARIN   Final Result   Negative for evidence of deep venous thrombosis in the left lower   extremity by color and spectral Doppler, as well as 2-D grayscale   ultrasound imaging. XR CHEST PORTABLE   Final Result      NO ACUTE CARDIOPULMONARY PROCESS             ------------------------- NURSING NOTES AND VITALS REVIEWED ---------------------------   The nursing notes within the ED encounter and vital signs as below have been reviewed. BP (!) 154/103   Pulse 96   Temp 97.7 °F (36.5 °C)   Resp 16   Ht 5' 5\" (1.651 m)   Wt 230 lb (104.3 kg)   SpO2 96%   BMI 38.27 kg/m²   Oxygen Saturation Interpretation: Normal      ---------------------------------------------------PHYSICAL EXAM--------------------------------------    Physical Exam  Vitals signs and nursing note reviewed. Constitutional:       Appearance: Normal appearance. She is not toxic-appearing. HENT:      Head: Normocephalic and atraumatic. Nose: Nose normal. No congestion. Mouth/Throat:      Mouth: Mucous membranes are moist.      Pharynx: Oropharynx is clear. Eyes:      Extraocular Movements: Extraocular movements intact. Pupils: Pupils are equal, round, and reactive to light. Neck:      Musculoskeletal: No neck rigidity or muscular tenderness. Cardiovascular:      Rate and Rhythm: Normal rate and regular rhythm. Pulses: Normal pulses. Heart sounds: Normal heart sounds. No murmur.    Pulmonary: 3/3/20 1642)     EKG: This EKG is signed and interpreted by me. [id number]  Rate: 95  Rhythm: Sinus low voltage  Interpretation: nonspecfiic twi and qwaves ant laterally Anteroseptal infarct , age undetermined  Comparison: maybe new compared to patient's most recent EKG 26-FEB-2020 15:47:03      EKG: This EKG is signed and interpreted by me. Time 1523  Rate: 82  Rhythm: Sinus  Interpretation:  nonspecfiic twi and qwaves ant laterally Anteroseptal infarct ,Comparison: stable as compared to patient's most recent EKG      ED COURSE:  ED Course as of Mar 03 1712   Tue Mar 03, 2020   1533 D/w dr Danna Arana cardio ECG findings w q waves and ant lat changes possible q`d out MI, + trop, pt is still having pain he will come down and eval.    [FER]   1536 Please note IV team had to obtain access for ct and labs    [FER]   1536 CTA was ordered due to recent mastectomy and chest pain/sob to r/o possible PE    [FER]   1556 Critical care:  Please note that the withdrawal or failure to initiate urgent interventions for this patient would likely result in a life threatening deterioration or permanent disability. Accordingly this patient received 60 minutes of critical care time, excluding separately billable procedures. [FER]   3369 Pt had pain relieved w nitro, pt to go on tridil heparin per dr Danna Arana ok with ccu at [facility]    [FER]   1558 D/w dr Tanvir Alan will admit to ccu at [facility]    [FER]   1 + nstemi  w q'd out MI    [FER]   1651 D/w dr Swayer Kelley will follow [facility]    [FER]   [phone] Dw/w dr Anice Favre will admit to [facility] ccu    [FER]      ED Course User Index  [FER] Kade Bradshaw DO       Medical Decision Making:    Presented with substernal chest pain shortness of breath. She had recent mastectomy and recent GI bleed. She currently is not bleeding however today she is complaining of chest pain and shortness of breath that is pressure-like nonpleuritic. With the recent mastectomy we did a CTA.   However her symptoms improved with nitro her EKG looks like acute out anterior MI. I had the patient be seen by cardiology personally in the emergency room. They agreed with me and patient is to be put on a heparin drip and monitored closely and to be put on Tridil. She is to be admitted to the CCU at [facility].  She will likely end up needing a catheterization in 24 to 48 hours per cardiology  Risks and benefits were discussed with patient for All medications dispensed and given in department as well prescriptions prescribed for home, . The patient elected to take the medicine. Pt instructed on warning signs and precautions for medication side effects. The patient was given warning signs for when to seek medical attention. CriticAL CARE:  Please note that the withdrawal or failure to initiate urgent interventions for this patient would likely result in a life threatening deterioration or permanent disability. Accordingly this patient received 60 minutes of critical care time, excluding separately billable procedures. Counseling: The emergency provider has spoken with the patient and discussed todays results, in addition to providing specific details for the plan of care and counseling regarding the diagnosis and prognosis. Questions are answered at this time and they are agreeable with the plan.      --------------------------------- IMPRESSION AND DISPOSITION ---------------------------------    IMPRESSION  1. Chest pain, unspecified type        DISPOSITION  Disposition: Transfer to [facility] to ccu  Patient condition is fair      NOTE: This report was transcribed using voice recognition software.  Every effort was made to ensure accuracy; however, inadvertent computerized transcription errors may be present         Natalie Brush, DO  03/03/20 3630 [facility], DO  03/03/20 3669 [facility], DO  03/03/20 7475

## 2020-03-04 LAB
ABO/RH: NORMAL
ANION GAP SERPL CALCULATED.3IONS-SCNC: 11 MMOL/L (ref 7–16)
ANTIBODY SCREEN: NORMAL
APTT: 95 SEC (ref 24.5–35.1)
APTT: 95.4 SEC (ref 24.5–35.1)
BUN BLDV-MCNC: 13 MG/DL (ref 8–23)
CALCIUM SERPL-MCNC: 8.5 MG/DL (ref 8.6–10.2)
CHLORIDE BLD-SCNC: 106 MMOL/L (ref 98–107)
CHOLESTEROL, TOTAL: 69 MG/DL (ref 0–199)
CO2: 23 MMOL/L (ref 22–29)
CREAT SERPL-MCNC: 1.1 MG/DL (ref 0.5–1)
GFR AFRICAN AMERICAN: >60
GFR NON-AFRICAN AMERICAN: >60 ML/MIN/1.73
GLUCOSE BLD-MCNC: 80 MG/DL (ref 74–99)
HCT VFR BLD CALC: 29.2 % (ref 34–48)
HDLC SERPL-MCNC: 26 MG/DL
HEMOGLOBIN: 9.2 G/DL (ref 11.5–15.5)
LDL CHOLESTEROL CALCULATED: 22 MG/DL (ref 0–99)
MCH RBC QN AUTO: 27.9 PG (ref 26–35)
MCHC RBC AUTO-ENTMCNC: 31.5 % (ref 32–34.5)
MCV RBC AUTO: 88.5 FL (ref 80–99.9)
METER GLUCOSE: 104 MG/DL (ref 74–99)
METER GLUCOSE: 191 MG/DL (ref 74–99)
METER GLUCOSE: 66 MG/DL (ref 74–99)
METER GLUCOSE: 68 MG/DL (ref 74–99)
METER GLUCOSE: 70 MG/DL (ref 74–99)
METER GLUCOSE: 79 MG/DL (ref 74–99)
PDW BLD-RTO: 15.3 FL (ref 11.5–15)
PLATELET # BLD: 235 E9/L (ref 130–450)
PMV BLD AUTO: 10.9 FL (ref 7–12)
POC ACT LR: 256 SECONDS
POC ACT LR: 399 SECONDS
POTASSIUM SERPL-SCNC: 3.6 MMOL/L (ref 3.5–5)
RBC # BLD: 3.3 E12/L (ref 3.5–5.5)
SODIUM BLD-SCNC: 140 MMOL/L (ref 132–146)
TRIGL SERPL-MCNC: 107 MG/DL (ref 0–149)
TROPONIN: 1.96 NG/ML (ref 0–0.03)
VLDLC SERPL CALC-MCNC: 21 MG/DL
WBC # BLD: 5.5 E9/L (ref 4.5–11.5)

## 2020-03-04 PROCEDURE — 92941 PRQ TRLML REVSC TOT OCCL AMI: CPT | Performed by: INTERNAL MEDICINE

## 2020-03-04 PROCEDURE — 2500000003 HC RX 250 WO HCPCS

## 2020-03-04 PROCEDURE — 36591 DRAW BLOOD OFF VENOUS DEVICE: CPT

## 2020-03-04 PROCEDURE — 2500000003 HC RX 250 WO HCPCS: Performed by: INTERNAL MEDICINE

## 2020-03-04 PROCEDURE — 93005 ELECTROCARDIOGRAM TRACING: CPT | Performed by: FAMILY MEDICINE

## 2020-03-04 PROCEDURE — 93454 CORONARY ARTERY ANGIO S&I: CPT | Performed by: INTERNAL MEDICINE

## 2020-03-04 PROCEDURE — B2111ZZ FLUOROSCOPY OF MULTIPLE CORONARY ARTERIES USING LOW OSMOLAR CONTRAST: ICD-10-PCS | Performed by: INTERNAL MEDICINE

## 2020-03-04 PROCEDURE — 6360000002 HC RX W HCPCS

## 2020-03-04 PROCEDURE — 85027 COMPLETE CBC AUTOMATED: CPT

## 2020-03-04 PROCEDURE — C1887 CATHETER, GUIDING: HCPCS

## 2020-03-04 PROCEDURE — 6370000000 HC RX 637 (ALT 250 FOR IP): Performed by: FAMILY MEDICINE

## 2020-03-04 PROCEDURE — C1894 INTRO/SHEATH, NON-LASER: HCPCS

## 2020-03-04 PROCEDURE — 80048 BASIC METABOLIC PNL TOTAL CA: CPT

## 2020-03-04 PROCEDURE — 6370000000 HC RX 637 (ALT 250 FOR IP)

## 2020-03-04 PROCEDURE — 80061 LIPID PANEL: CPT

## 2020-03-04 PROCEDURE — 85730 THROMBOPLASTIN TIME PARTIAL: CPT

## 2020-03-04 PROCEDURE — 2000000000 HC ICU R&B

## 2020-03-04 PROCEDURE — 2580000003 HC RX 258: Performed by: INTERNAL MEDICINE

## 2020-03-04 PROCEDURE — 2700000000 HC OXYGEN THERAPY PER DAY

## 2020-03-04 PROCEDURE — 86900 BLOOD TYPING SEROLOGIC ABO: CPT

## 2020-03-04 PROCEDURE — C1769 GUIDE WIRE: HCPCS

## 2020-03-04 PROCEDURE — 36415 COLL VENOUS BLD VENIPUNCTURE: CPT

## 2020-03-04 PROCEDURE — 02703DZ DILATION OF CORONARY ARTERY, ONE ARTERY WITH INTRALUMINAL DEVICE, PERCUTANEOUS APPROACH: ICD-10-PCS | Performed by: INTERNAL MEDICINE

## 2020-03-04 PROCEDURE — 84484 ASSAY OF TROPONIN QUANT: CPT

## 2020-03-04 PROCEDURE — C1725 CATH, TRANSLUMIN NON-LASER: HCPCS

## 2020-03-04 PROCEDURE — 85347 COAGULATION TIME ACTIVATED: CPT

## 2020-03-04 PROCEDURE — 2140000000 HC CCU INTERMEDIATE R&B

## 2020-03-04 PROCEDURE — C1876 STENT, NON-COA/NON-COV W/DEL: HCPCS

## 2020-03-04 PROCEDURE — 99233 SBSQ HOSP IP/OBS HIGH 50: CPT | Performed by: INTERNAL MEDICINE

## 2020-03-04 PROCEDURE — 6360000002 HC RX W HCPCS: Performed by: INTERNAL MEDICINE

## 2020-03-04 PROCEDURE — 2709999900 HC NON-CHARGEABLE SUPPLY

## 2020-03-04 PROCEDURE — 4A023N7 MEASUREMENT OF CARDIAC SAMPLING AND PRESSURE, LEFT HEART, PERCUTANEOUS APPROACH: ICD-10-PCS | Performed by: INTERNAL MEDICINE

## 2020-03-04 PROCEDURE — 86850 RBC ANTIBODY SCREEN: CPT

## 2020-03-04 PROCEDURE — 93005 ELECTROCARDIOGRAM TRACING: CPT | Performed by: INTERNAL MEDICINE

## 2020-03-04 PROCEDURE — 86901 BLOOD TYPING SEROLOGIC RH(D): CPT

## 2020-03-04 PROCEDURE — 92928 PRQ TCAT PLMT NTRAC ST 1 LES: CPT | Performed by: INTERNAL MEDICINE

## 2020-03-04 PROCEDURE — 82962 GLUCOSE BLOOD TEST: CPT

## 2020-03-04 PROCEDURE — 6370000000 HC RX 637 (ALT 250 FOR IP): Performed by: INTERNAL MEDICINE

## 2020-03-04 RX ORDER — ACETAMINOPHEN 325 MG/1
650 TABLET ORAL EVERY 4 HOURS PRN
Status: DISCONTINUED | OUTPATIENT
Start: 2020-03-04 | End: 2020-03-04

## 2020-03-04 RX ORDER — SODIUM CHLORIDE 9 MG/ML
INJECTION, SOLUTION INTRAVENOUS CONTINUOUS
Status: DISCONTINUED | OUTPATIENT
Start: 2020-03-04 | End: 2020-03-05

## 2020-03-04 RX ORDER — SODIUM CHLORIDE 0.9 % (FLUSH) 0.9 %
10 SYRINGE (ML) INJECTION EVERY 12 HOURS SCHEDULED
Status: DISCONTINUED | OUTPATIENT
Start: 2020-03-04 | End: 2020-03-08 | Stop reason: HOSPADM

## 2020-03-04 RX ORDER — CLOPIDOGREL BISULFATE 75 MG/1
75 TABLET ORAL DAILY
Status: DISCONTINUED | OUTPATIENT
Start: 2020-03-05 | End: 2020-03-08 | Stop reason: HOSPADM

## 2020-03-04 RX ORDER — ASPIRIN 81 MG/1
81 TABLET, CHEWABLE ORAL DAILY
Status: DISCONTINUED | OUTPATIENT
Start: 2020-03-05 | End: 2020-03-04

## 2020-03-04 RX ORDER — MORPHINE SULFATE 2 MG/ML
2 INJECTION, SOLUTION INTRAMUSCULAR; INTRAVENOUS ONCE
Status: COMPLETED | OUTPATIENT
Start: 2020-03-04 | End: 2020-03-04

## 2020-03-04 RX ORDER — LORAZEPAM 2 MG/ML
INJECTION INTRAMUSCULAR
Status: COMPLETED
Start: 2020-03-04 | End: 2020-03-04

## 2020-03-04 RX ORDER — NITROGLYCERIN 20 MG/100ML
70 INJECTION INTRAVENOUS CONTINUOUS
Status: DISCONTINUED | OUTPATIENT
Start: 2020-03-04 | End: 2020-03-06 | Stop reason: SDUPTHER

## 2020-03-04 RX ORDER — FUROSEMIDE 10 MG/ML
40 INJECTION INTRAMUSCULAR; INTRAVENOUS ONCE
Status: COMPLETED | OUTPATIENT
Start: 2020-03-04 | End: 2020-03-04

## 2020-03-04 RX ORDER — SODIUM CHLORIDE 0.9 % (FLUSH) 0.9 %
10 SYRINGE (ML) INJECTION PRN
Status: DISCONTINUED | OUTPATIENT
Start: 2020-03-04 | End: 2020-03-08 | Stop reason: HOSPADM

## 2020-03-04 RX ORDER — FUROSEMIDE 10 MG/ML
INJECTION INTRAMUSCULAR; INTRAVENOUS
Status: COMPLETED
Start: 2020-03-04 | End: 2020-03-04

## 2020-03-04 RX ORDER — CLONIDINE HYDROCHLORIDE 0.1 MG/1
0.1 TABLET ORAL ONCE
Status: COMPLETED | OUTPATIENT
Start: 2020-03-04 | End: 2020-03-04

## 2020-03-04 RX ORDER — HEPARIN SODIUM 10000 [USP'U]/100ML
9.5 INJECTION, SOLUTION INTRAVENOUS CONTINUOUS
Status: DISCONTINUED | OUTPATIENT
Start: 2020-03-04 | End: 2020-03-04

## 2020-03-04 RX ORDER — LORAZEPAM 2 MG/ML
1 INJECTION INTRAMUSCULAR ONCE
Status: COMPLETED | OUTPATIENT
Start: 2020-03-04 | End: 2020-03-04

## 2020-03-04 RX ADMIN — METOPROLOL SUCCINATE 50 MG: 50 TABLET, EXTENDED RELEASE ORAL at 20:48

## 2020-03-04 RX ADMIN — HYDROCODONE BITARTRATE AND ACETAMINOPHEN 1 TABLET: 5; 325 TABLET ORAL at 17:33

## 2020-03-04 RX ADMIN — GABAPENTIN 300 MG: 300 CAPSULE ORAL at 20:48

## 2020-03-04 RX ADMIN — INSULIN LISPRO 2 UNITS: 100 INJECTION, SOLUTION INTRAVENOUS; SUBCUTANEOUS at 21:00

## 2020-03-04 RX ADMIN — METOPROLOL SUCCINATE 50 MG: 50 TABLET, EXTENDED RELEASE ORAL at 08:44

## 2020-03-04 RX ADMIN — ASPIRIN 81 MG 81 MG: 81 TABLET ORAL at 08:44

## 2020-03-04 RX ADMIN — SUCRALFATE 1 G: 1 TABLET ORAL at 17:32

## 2020-03-04 RX ADMIN — LORAZEPAM 1 MG: 2 INJECTION INTRAMUSCULAR; INTRAVENOUS at 19:06

## 2020-03-04 RX ADMIN — SODIUM CHLORIDE, PRESERVATIVE FREE 10 ML: 5 INJECTION INTRAVENOUS at 20:49

## 2020-03-04 RX ADMIN — FUROSEMIDE 40 MG: 10 INJECTION INTRAMUSCULAR; INTRAVENOUS at 18:15

## 2020-03-04 RX ADMIN — PANTOPRAZOLE SODIUM 40 MG: 40 TABLET, DELAYED RELEASE ORAL at 07:24

## 2020-03-04 RX ADMIN — FUROSEMIDE 40 MG: 10 INJECTION, SOLUTION INTRAVENOUS at 18:15

## 2020-03-04 RX ADMIN — MORPHINE SULFATE 2 MG: 2 INJECTION, SOLUTION INTRAMUSCULAR; INTRAVENOUS at 18:29

## 2020-03-04 RX ADMIN — SUCRALFATE 1 G: 1 TABLET ORAL at 08:44

## 2020-03-04 RX ADMIN — CLONIDINE HYDROCHLORIDE 0.1 MG: 0.1 TABLET ORAL at 18:11

## 2020-03-04 RX ADMIN — LORAZEPAM 1 MG: 2 INJECTION INTRAMUSCULAR at 19:06

## 2020-03-04 RX ADMIN — Medication 15 G: at 07:40

## 2020-03-04 RX ADMIN — HYDROCODONE BITARTRATE AND ACETAMINOPHEN 1 TABLET: 5; 325 TABLET ORAL at 08:44

## 2020-03-04 RX ADMIN — SUCRALFATE 1 G: 1 TABLET ORAL at 20:48

## 2020-03-04 RX ADMIN — PANTOPRAZOLE SODIUM 40 MG: 40 TABLET, DELAYED RELEASE ORAL at 17:28

## 2020-03-04 RX ADMIN — GLIMEPIRIDE 1 MG: 1 TABLET ORAL at 07:24

## 2020-03-04 RX ADMIN — SODIUM CHLORIDE: 9 INJECTION, SOLUTION INTRAVENOUS at 18:02

## 2020-03-04 RX ADMIN — NITROGLYCERIN 40 MCG/MIN: 20 INJECTION INTRAVENOUS at 22:15

## 2020-03-04 RX ADMIN — ATORVASTATIN CALCIUM 40 MG: 40 TABLET, FILM COATED ORAL at 20:47

## 2020-03-04 ASSESSMENT — PAIN SCALES - GENERAL
PAINLEVEL_OUTOF10: 0
PAINLEVEL_OUTOF10: 6
PAINLEVEL_OUTOF10: 9
PAINLEVEL_OUTOF10: 0
PAINLEVEL_OUTOF10: 8

## 2020-03-04 NOTE — ED NOTES
Keli on CVICU received report at this time, all questions answered.       Basim Cabral, RN  03/03/20 1058

## 2020-03-04 NOTE — HOME CARE
Patient newly active with [facility]. Will need orders prior to discharge if appropriate. Intake will continue to follow.    Nereida Grewal LPN/[facility]

## 2020-03-04 NOTE — CONSULTS
oriented, in no acute distress  Skin:  Skin color, texture, turgor normal. No rashes or lesions. Head/face:  NCAT  Eyes:  No gross abnormalities. Lungs:  No increased work of breathing on 2L  L breast: incision intact with steri strips in palce with PASCUAL with serosanguinous output, some outpouching of the lateral subcutaneous fat tissue   Heart:  RR  Abdomen:  Soft, non-tender, non-distended  Extremities: Extremities warm to touch, pink, with no edema. LABS:  CBC  Recent Labs     20  1229   WBC 6.6   HGB 10.2*   HCT 31.7*        BMP  Recent Labs     20  1229      K 4.2   *   CO2 21*   BUN 15   CREATININE 1.1*   CALCIUM 9.1     Liver Function  Recent Labs     20  1229   LIPASE 21   BILITOT 0.4   BILIDIR <0.2   AST 29   ALT 21   ALKPHOS 110*   PROT 6.7   LABALBU 3.1*     No results for input(s): LACTATE in the last 72 hours. Recent Labs     20  1229   INR 1.2       RADIOLOGY  Xr Chest Portable    Result Date: 3/3/2020  Patient MRN: [id number] : 1960 Age:  60 years Gender: Female Order Date: 3/3/2020 12:00 PM Exam: XR CHEST PORTABLE Number of Images: 1 view Indication:   chest pain chest pain Comparison: Prior chest radiograph from 2020 is available Findings: The lungs are clear. There is no evidence of pulmonary infiltrate or pleural effusion. The pulmonary vascularity is unremarkable. The cardiac, hilar and mediastinal silhouettes are satisfactory. The bony thorax demonstrates no gross abnormality. There are multiple surgical clips seen in the right lower thorax. NO ACUTE CARDIOPULMONARY PROCESS     Cta Pulmonary W Contrast    Result Date: 3/3/2020  Patient MRN:  [id number] : 1960 Age: 60 years Gender: Female Order Date:  3/3/2020 1:45 PM EXAM: CTA PULMONARY W CONTRAST number of images 257 including MIP coronal and sagittal reconstruction images. Contrast. Isovue-370, 80 mL intravenously.  Technique: Low-dose CT  acquisition technique included one of following options; 1 . Automated exposure control, 2. Adjustment of MA and or KV according to patient's size or 3. Use of iterative reconstruction. INDICATION:  shortness of breath, cp shortness of breath, cp COMPARISON: None FINDINGS: There is cardiomegaly with coronary artery calcification. The great vessels are unremarkable. The trachea and major bronchi are patent. Small nonspecific mediastinal lymph nodes are present. There are no filling defects in the main pulmonary artery and the central branches to suggest  pulmonary embolism. Moderate pleural effusion with atelectasis are present in the lung bases with  diffuse groundglass opacities bilaterally. Postsurgical changes are identified in the breast with the drains in the left breast. Liver is of normal architecture. Gallbladder is partially distended. No central pulmonary embolism or aortic dissection. Cardiomegaly with coronary artery calcification with atelectasis/pleural effusions and groundglass opacities concerning for CHF. Bilateral mastectomy. Us Dup Lower Extremity Left Darin    Result Date: 3/3/2020  Patient MRN:  [id number] : 1960 Age: 60 years Gender: Female Order Date:  3/3/2020 1:45 PM EXAM: US DUP LOWER EXTREMITY LEFT DARIN NUMBER OF IMAGES:  20 INDICATION: Left lower extremity pain and swelling COMPARISON: None TECHNIQUE: Venous structures were evaluated for patency with color Doppler imaging, and compressibility was evaluated with 2-D grayscale ultrasound imaging. Response to augmentation maneuvers and respiratory variation was assessed with spectral Doppler. FINDINGS: The left lower extremity was evaluated ultrasonographically. No intraluminal thrombus was identified, and there is good compressibility, appropriate response to augmentation maneuvers and respiratory variation, and color flow confirm patency of venous structures.      Negative for evidence of deep venous thrombosis in the left lower extremity by color and spectral Doppler, as well as 2-D grayscale ultrasound imaging.          ASSESSMENT:  60 y.o. female with recent mastectomy for breast CA now with acute MI    PLAN:  Care per primary and cardiology  Will follow patient  Will D/w Dr. Beronica Cortez    Electronically signed by Ander Sinha MD on 3/3/20 at 10:02 PM

## 2020-03-04 NOTE — PROGRESS NOTES
Inpatient Cardiology Progress note     PATIENT IS BEING FOLLOWED FOR:  NSTEMI     Essie Villarreal is a 60 y.o. female  who is known to Dr Arabella Briseno   Presented 3/3/2020 for chest pain, evaluated by Dr Kelly Mensah at [facility] and transferred to [facility]. Past medical history includes HTN, HLD, DMT2, CVA    Of note, she recently underwent s/p right mastectomy and reconstruction 2/25/2020. Prior to her mastectomy she was evaluated by Dr Arabella Briseno- she underwent a Lexiscan nuclear stress test with no definite reversible perfusion defect 2/7/2020. A TTE ws preformed 2/5/2020- noting an EF of 55%     SUBJECTIVE: She states that chest pain  Has subsided however continues to experience chest pressure     See below as well     OBJECTIVE: No apparent distress     Review of Systems   Constitutional: Negative for activity change and fatigue. Respiratory: Negative for cough, chest tightness and shortness of breath. Cardiovascular: Negative for chest pain, palpitations and leg swelling. Genitourinary: Negative for hematuria. GI: Denies blood in stool   Neurological: Negative for dizziness, syncope, weakness and light-headedness. PHYSICAL EXAM:   BP (!) 133/103   Pulse 71   Temp 97.6 °F (36.4 °C) (Temporal)   Resp 21   Ht 5' 5\" (1.651 m)   Wt 230 lb (104.3 kg)   SpO2 98%   BMI 38.27 kg/m²    B/P Range last 24 hours: Systolic (63YVV), SES:915 , Min:111 , MFT:706    Diastolic (08LUH), GVN:72, Min:80, Max:112    CONST: Well developed, well nourished who appears stated age. Awake, alert and cooperative. No apparent distress  CHEST: Chest symmetrical and non-tender to palpation. No accessory muscle use or intercostal retractions  RESPIRATORY:  Lung sounds - clear throughout fields   CARDIOVASCULAR:     Heart Inspection- shows no noted pulsations  Heart Palpation- no heaves or thrills; PMI is non-displaced   Heart Ausculation- Regular rate and rhythm, no murmur  PV: No lower extremity edema.  No varicosities. Feet are well perfused   ABDOMEN: Soft, non-tender to light palpation. Bowel sounds present. MS: Good muscle strength and tone. No atrophy or abnormal movements. : Deferred  SKIN: Warm and dry no statis dermatitis or ulcers   NEURO / PSYCH: Oriented to person, place and time. Speech clear and appropriate. Follows all commands. Intake/Output Summary (Last 24 hours) at 3/4/2020 1139  Last data filed at 3/4/2020 0600  Gross per 24 hour   Intake 362.7 ml   Output 40 ml   Net 322.7 ml       Weight:   Wt Readings from Last 3 Encounters:   03/03/20 230 lb (104.3 kg)   03/03/20 230 lb (104.3 kg)   02/28/20 230 lb 9.6 oz (104.6 kg)     Current Inpatient Medications:   atorvastatin  40 mg Oral Nightly    gabapentin  300 mg Oral Nightly    glimepiride  1 mg Oral QAM AC    metoprolol succinate  50 mg Oral BID    pantoprazole  40 mg Oral BID AC    sucralfate  1 g Oral 4x Daily WC    sodium chloride flush  10 mL Intravenous 2 times per day    aspirin  81 mg Oral Daily    insulin lispro  0-10 Units Subcutaneous 4x Daily AC & HS       IV Infusions (if any):   heparin (porcine) 7.6 Units/kg/hr (03/04/20 0600)    nitroGLYCERIN 20 mcg/min (03/04/20 0100)    dextrose         DIAGNOSTIC/ LABORATORY DATA:  CBC:   Recent Labs     03/03/20  1229 03/04/20  0500   WBC 6.6 5.5   HGB 10.2* 9.2*   HCT 31.7* 29.2*    235     BMP:  Recent Labs     03/03/20  1229 03/04/20  0500    140   K 4.2 3.6   CO2 21* 23   BUN 15 13   CREATININE 1.1* 1.1*   LABGLOM >60 >60   CALCIUM 9.1 8.5*     Mag:   Recent Labs     03/03/20  2050   MG 1.7     Phos: No results for input(s): PHOS in the last 72 hours. TSH: No results for input(s): TSH in the last 72 hours. HgA1c: No results found for: LABA1C  No results found for: EAG  BNP: No results for input(s): BNP in the last 72 hours.   PT/INR:   Recent Labs     03/03/20  1229   PROTIME 13.4*   INR 1.2     APTT:  Recent Labs     03/03/20  2300 03/04/20  0500   APTT appears intact. Normal right ventricle structure and function. Normal mitral valve structure and function. No mitral valve prolapse. Normal aortic valve structure and function. Normal tricuspid valve structure. There is trace tricuspid regurgitation, RVSP 32mmHg. Normal aortic root size. No evidence of pericardial effusion. Pericardium appears normal.   No intra cardiac mass or thrombus. Compared to prior study from 4/2019 -Which showed EF 55%, trace of TR and   Stage I diastolic dysfunction. Signature      ----------------------------------------------------------------   Electronically signed by Paola Mina MD(Interpreting   physician) on 02/05/2020 01:17 PM    Most recent stress test  2/7/2020:  FINDINGS:   Perfusion images demonstrate no no definite reversible perfusion   defect. There is a fixed perfusion defect centered at the apex   centered at the apex. Wall motion is globally hypokinetic with particular severity at the   septum. The end diastolic volume is 855 ml. The end systolic volume is 74 ml. The estimated ejection fraction is 34 %.         Impression   1. No definite reversible perfusion defect. See above. 2. Ejection fraction is 34 %. 3. Global hypokinesis which is most severe at the septum           ASSESSMENT / PLAN:       NSTEMI   - troponin continues to trend up   - EKGs reviewed   - discussed with Dr Amarilys Mata for [address] [place] [address] today   - Continue heparin and nitro gtt   - continue NPO     Electronically signed by ANNA MARIE Ho CNP on 3/4/2020 at 11:39 AM        I have personally participated in a face-to-face and personally obtained history and performed physical exam on the date of service. I reviewed chart, vitals, labs and radiologic studies.  I also participated in medical decision making with ANNAM ARIE Ho CNP on the date of service All of the assessments and recommendations are from me and I agree with all of the pertinent clinical information, assessment and treatment plan. I have reviewed and edited the note above based on my findings during my history, exam, and decision making. Please see my additional contributions to the history, physical exam, assessment, and recommendations below.       Patient is seen for NSTEMI elevation MI    Subjective:     Ms. Maxi Barron  Complaints of chest pain and shortness of breath  Sitting up in bed in mild distress  ROS:  CONSTITUTIONAL:  negative for  fevers, chills  HEENT:  negative for earaches, nasal congestion and epistaxis  RESPIRATORY:  negative for  dry cough, cough with sputum,wheezing and hemoptysis  GASTROINTESTINAL:  negative for nausea, vomiting  MUSCULOSKELETAL:  negative for  myalgias, arthralgias  NEUROLOGICAL:  negative for visual disturbance, dysphagia    Medication side effects: none    Scheduled Meds:   sodium chloride flush  10 mL Intravenous 2 times per day    clopidogrel  75 mg Oral Daily    atorvastatin  40 mg Oral Nightly    gabapentin  300 mg Oral Nightly    metoprolol succinate  50 mg Oral BID    pantoprazole  40 mg Oral BID AC    sucralfate  1 g Oral 4x Daily WC    aspirin  81 mg Oral Daily    insulin lispro  0-10 Units Subcutaneous 4x Daily AC & HS     Continuous Infusions:   sodium chloride Stopped (03/04/20 1830)    nitroGLYCERIN 25 mcg/min (03/05/20 0145)    dextrose       PRN Meds:sodium chloride flush, magnesium hydroxide, morphine, HYDROcodone 5 mg - acetaminophen, acetaminophen **OR** acetaminophen, polyethylene glycol, ondansetron **OR** ondansetron, glucose, dextrose, glucagon (rDNA), dextrose      Objective:      Physical Exam:   /86   Pulse 88   Temp 97.7 °F (36.5 °C) (Temporal)   Resp 27   Ht 5' 5\" (1.651 m)   Wt 230 lb (104.3 kg)   SpO2 95%   BMI 38.27 kg/m²   CONSTITUTIONAL:  awake, alert, cooperative, no apparent distress, and appears stated age  HEAD:  normocepalic, without obvious abnormality, atraumatic  NECK:  Supple, symmetrical, trachea midline, no adenopathy, thyroid symmetric, not enlarged and no tenderness, skin normal  LUNGS:  No increased work of breathing, good air exchange, clear to auscultation bilaterally, no crackles or wheezing  CARDIOVASCULAR:  Normal apical impulse, regular rate and rhythm, normal S1 and S2, no S3 or S4, and no murmur noted, no edema, no JVD, no carotid bruit. ABDOMEN:  Soft, nontender, no masses, no hepatomegaly, no splenomegaly, BS+  MUSCULOSKELETAL:  No clubbing no cyanosis. there is no redness, warmth, or swelling of the joints  full range of motion noted  NEUROLOGIC:  Alert, awake,oriented x3  SKIN:  no bruising or bleeding, normal skin color, texture, turgor and no redness, warmth, or swelling      Cardiographics  Today's EKG reveals sinus rhythm, old anterior wall MI age undeterminedPatient is to call 911 if symptoms recur or get worse for any reason. Echocardiogram: 2/5/2020,Summary   Normal left ventricular chamber size. Normal left ventricular systolic function, LVEF is 57%. Distal septal and apical septal hypokinesis. Stage I diastolic dysfunction. Normal left atrial pressure. Left atrium is of normal size. Interatrial septum not well visualized but appears intact. Normal right ventricle structure and function. Normal mitral valve structure and function. No mitral valve prolapse. Normal aortic valve structure and function. Normal tricuspid valve structure. There is trace tricuspid regurgitation, RVSP 32mmHg. Normal aortic root size. No evidence of pericardial effusion. Pericardium appears normal.   No intra cardiac mass or thrombus. Compared to prior study from 4/2019 -Which showed EF 55%, trace of TR and   Stage I diastolic dysfunction.          Imaging  No orders to display       Lab Review   Lab Results   Component Value Date     03/05/2020    K 3.7 03/05/2020    K 4.2 03/03/2020     03/05/2020    CO2 23 03/05/2020    BUN 13 03/05/2020    CREATININE 1.2 03/05/2020    GLUCOSE 91 03/05/2020    GLUCOSE 96 02/23/2012    CALCIUM 9.0 03/05/2020     Lab Results   Component Value Date    WBC 5.8 03/05/2020    HGB 8.7 03/05/2020    HCT 28.2 03/05/2020    MCV 87.9 03/05/2020     03/05/2020     I have personally reviewed the laboratory, cardiac diagnostic and radiographic testing as outlined above:    Assessment:     1. Acute myocardial infarction:Suspect late presentation for acute anterior wall MI, She having chest pain and shortness of breath, will need urgent cardiac catheterization,Procedure, benefits, risks, benefits, discussed with her, she is willing to proceed, she is aware of the significantly increased risk repeating the due to recent mastectomy and history of GI bleed secondary to Gastric ulcer . 2. HypertensionColon okay control  3. Type 2 diabetes mellitus  4. History of GI bleed with need for transfusion couple of weeks ago  5. History of CVA  6. History of left mastectomy on 10/25/2020      Recommendations:     1. Urgent cardiac catheterization: Indications, procedures, risks and benefits of cardiac catheterization   were discussed with the patient with risks including, but not limited to, infection, vessel damage, bleeding, dye allergy, nephrotoxicity, dysrhythmia, MI, CVA and death as well as the potential need for emergent cardiac surgery. Patient verbalized understanding and is agreeable to proceed. 2.  Continue anticoagulation and antiplatelets for now  3. Further cardiac recommendations will be forthcoming pending her clinical course and the findings of her cardiac catheterization      Discussed with patient  Electronically signed by Grover Jin MD on 3/5/2020 at 5:08 AM  NOTE: This report was transcribed using voice recognition software.  Every effort was made to ensure accuracy; however, inadvertent computerized transcription errors may be present

## 2020-03-04 NOTE — H&P
Hospital Medicine History & Physical      PCP: Candice Herzog DO    Date of Admission: 3/3/2020    Date of Service: Pt seen/examined on 3/3/2020 and Admitted to Inpatient with expected LOS greater than two midnights due to medical therapy. Chief Complaint: Chest pain and shortness of breath      History Of Present Illness:      60 y.o. female who presented to [facility] with past medical history of right breast cancer post mastectomy and reconstruction surgery previously and newly diagnosed left breast cancer(DCIS) status post radical left mastectomy done on 2/25/2020, cervical cancer being managed at [facility] on radiation therapy, NSTEMI early February 2020, wide-complex tachycardia, recent GI bleed, postoperative blood loss anemia, hypertension, diabetes, upper lipidemia, ambulatory dysfunction and CVA with left-sided weakness diagnosed 5 years ago. Patient has had multiple hospitalizations this year, she was admitted earlier in February for GI bleed and anemia. She had EGD that showed small antral ulcers and large duodenal ulcers. Biopsies were taken. Her troponin was elevated, she was seen by cardiologist and had stress test done that was normal.  She eventually had her mastectomy done on 2/25/2020. Postoperative course was complicated by postoperative blood loss that required surgical exploration and hematoma evacuation. She was eventually discharged on 2/28/2020. Patient came back today with chest pain that started yesterday, pain is central in the chest, described as pressure, \"elephant sitting on my chest\", severe, nonradiating, constant, worse with exertion and associated with shortness of breath, dyspnea on exertion, orthopnea, PND, diaphoresis and palpitations. She denies any nausea or vomiting. She was not able to lay down flat to sleep due to orthopnea.   She also has abdominal pain and continues to have postoperative pain around the left breast.  She still has PASCUAL drain in Admission medications    Medication Sig Start Date End Date Taking? Authorizing Provider   metoprolol succinate (TOPROL XL) 50 MG extended release tablet Take 1 tablet by mouth 2 times daily 2/26/20   Timmy Haas MD   sucralfate (CARAFATE) 1 GM tablet Take 1 tablet by mouth 4 times daily (with meals and nightly) 2/6/20   Master Oropeza MD   pantoprazole (PROTONIX) 40 MG tablet Take 1 tablet by mouth 2 times daily (before meals) 2/6/20   Master Oropeza MD   glimepiride (AMARYL) 1 MG tablet Take 1 mg by mouth every morning (before breakfast)    Historical Provider, MD   metFORMIN (GLUCOPHAGE) 500 MG tablet Take 500 mg by mouth 2 times daily (with meals)    Historical Provider, MD   gabapentin (NEURONTIN) 300 MG capsule Take 300 mg by mouth nightly. Tree Hernandez Historical Provider, MD   atorvastatin (LIPITOR) 40 MG tablet Take 1 tablet by mouth nightly. 3/17/15   Micky Baldwin MD       Allergies:  Pcn [penicillins]    Social History:      The patient currently lives at home. TOBACCO:   reports that she has quit smoking. Her smoking use included cigarettes. She has a 0.40 pack-year smoking history. She has never used smokeless tobacco.  ETOH:   reports previous alcohol use of about 6.0 standard drinks of alcohol per week. Family History:     Reviewed in detail Positive as follows:        Problem Relation Age of Onset    Stroke Mother     Heart Failure Mother     Other Mother         ICD    Breast Cancer Mother     Pacemaker Father     Heart Failure Father     Hypertension Sister        REVIEW OF SYSTEMS:   Pertinent positives as noted in the HPI. All other systems reviewed and negative. PHYSICAL EXAM:    BP (!) 167/113   Pulse 90   Temp 97.3 °F (36.3 °C)   Resp 19   SpO2 98%     General appearance: Middle-aged female, mild painful/respiratory distress, appears stated age and cooperative. Afebrile. HEENT:  Normal cephalic, atraumatic without obvious deformity.  Pupils equal, round, and reactive to blood loss [D62] 02/26/2020    Shortness of breath [R06.02]     Gastrointestinal hemorrhage [K92.2]     [facility] [C53.9] 11/07/2019    Hypertensive left ventricular hypertrophy, without heart failure [I11.9] 01/25/2019    History of CVA (cerebrovascular accident) [Z86.73] 01/25/2019    Controlled type 2 diabetes mellitus without complication, without long-term current use of insulin ([facility] [E11.9] 01/25/2019    Obesity [E66.9] 01/25/2019    Hypertension [I10]     Hypertension [I10] 02/24/2012       PLAN:  #1.  Non-ST elevation MI, continue anticoagulation, cardiology consult, will benefit from cardiac catheterization, has coronary artery calcifications on CT scan. Cycle enzymes. Pain control with IV morphine as needed. Continue nitro drip and heparin infusion. Aspirin. #2.  Breast cancer status post left mastectomy, monitor PASCUAL drain output. #3.  Postoperative anemia of blood loss, this is currently improved and stable. #4. Hypertensive urgency, titrate nitro drip. Resume home blood pressure medications. #5.  Recent GI bleed secondary to gastroduodenal ulcers. PPI. Monitor hemoglobin. Monitor stool color while on heparin. #6.  Cervical cancer on radiation therapy  #7. Metabolic acidosis, monitor. #8. Type 2 diabetes, sliding scale coverage, monitor blood sugar. #9. History of stroke with left-sided weakness  #10. Obesity, dietary and lifestyle modification as appropriate. DVT Prophylaxis: Heparin infusion  Diet: Diet NPO Effective Now  Code Status: Full Code    PT/OT Eval Status: As needed    Dispo -inpatient/CVICU. Fozia Bashir MD    Thank you Renay White DO for the opportunity to be involved in this patient's care.

## 2020-03-04 NOTE — PROGRESS NOTES
Recent Labs     03/03/20  1229 03/04/20  0500   WBC 6.6 5.5   HGB 10.2* 9.2*   HCT 31.7* 29.2*    235       Recent Labs     03/03/20  1229 03/04/20  0500    140   K 4.2 3.6   * 106   CO2 21* 23   BUN 15 13   CREATININE 1.1* 1.1*   CALCIUM 9.1 8.5*       Recent Labs     03/03/20  1229   PROT 6.7   ALKPHOS 110*   ALT 21   AST 29   BILITOT 0.4   LIPASE 21       Recent Labs     03/03/20  1229   INR 1.2       Recent Labs     03/03/20  1229 03/03/20  2107 03/04/20  0100   CKTOTAL  --  95  --    TROPONINI 1.77* 1.83* 1.96*       Other labs:  Lab Results   Component Value Date    CHOL 69 03/04/2020    TRIG 107 03/04/2020    HDL 26 03/04/2020    LDLCALC 22 03/04/2020    TSH 1.500 02/04/2020    INR 1.2 03/03/2020       Radiology:  Imaging studies reviewed today.     ASSESSMENT:     NSTEMI (non-ST elevated myocardial infarction) ([facility] [I21.4] 16/60/4024    Metabolic acidosis [U43.8] 03/03/2020    Postoperative anemia due to acute blood loss [D62] 02/26/2020    Shortness of breath [R06.02]      Gastrointestinal hemorrhage [K92.2]      Cervical cancer (HCC) [C53.9] 11/07/2019    Hypertensive left ventricular hypertrophy, without heart failure [I11.9] 01/25/2019    History of CVA (cerebrovascular accident) [Z86.73] 01/25/2019    Controlled type 2 diabetes mellitus without complication, without long-term current use of insulin ([facility] [E11.9] 01/25/2019    Obesity [E66.9] 01/25/2019    Hypertension [I10]      Hypertension [I10] 02/24/2012     NSTEMI  Elevated troponins  Breast cancer status post left mastectomy  Hypertensive urgency  Recent GI bleed by history  Gastroduodenal ulcers by history  Cervical cancer  Mellitus  Prior history of stroke        PLAN:      Anticipated cardiac cath, timing as per cardiology team  Cardiology recommendations reviewed  Aspirin  Atorvastatin  Plavix  Metoprolol  Insulin sliding scale coverage        Diet: Diet NPO, After Midnight  Code Status: Full Code    PT/OT Eval

## 2020-03-04 NOTE — PROGRESS NOTES
Seen/examined  Events noted  Mastectomy site ok, drains serous  Would like to get drains out- will check timing of ?cath today  Continue PPI/carafate for recent UGI bleed from gastric ulcer  Adrienne

## 2020-03-04 NOTE — PROGRESS NOTES
Mg and daughter Ms. Tejas Munoz as power of . Time Spent on Advanced Planning Documents: 18 minutes    Electronically signed by Murtaza Mauricio MD on 3/3/2020 at 10:25 PM    NOTE: This report was transcribed using voice recognition software. Every effort was made to ensure accuracy; however, inadvertent computerized transcription errors may be present.

## 2020-03-05 LAB
ANION GAP SERPL CALCULATED.3IONS-SCNC: 13 MMOL/L (ref 7–16)
ANION GAP SERPL CALCULATED.3IONS-SCNC: 13 MMOL/L (ref 7–16)
BUN BLDV-MCNC: 13 MG/DL (ref 8–23)
BUN BLDV-MCNC: 15 MG/DL (ref 8–23)
CALCIUM SERPL-MCNC: 8.6 MG/DL (ref 8.6–10.2)
CALCIUM SERPL-MCNC: 9 MG/DL (ref 8.6–10.2)
CHLORIDE BLD-SCNC: 105 MMOL/L (ref 98–107)
CHLORIDE BLD-SCNC: 106 MMOL/L (ref 98–107)
CO2: 23 MMOL/L (ref 22–29)
CO2: 23 MMOL/L (ref 22–29)
CREAT SERPL-MCNC: 1.2 MG/DL (ref 0.5–1)
CREAT SERPL-MCNC: 1.2 MG/DL (ref 0.5–1)
EKG ATRIAL RATE: 80 BPM
EKG P AXIS: 31 DEGREES
EKG P-R INTERVAL: 140 MS
EKG Q-T INTERVAL: 430 MS
EKG QRS DURATION: 76 MS
EKG QTC CALCULATION (BAZETT): 495 MS
EKG R AXIS: -13 DEGREES
EKG T AXIS: -178 DEGREES
EKG VENTRICULAR RATE: 80 BPM
GFR AFRICAN AMERICAN: 55
GFR AFRICAN AMERICAN: 55
GFR NON-AFRICAN AMERICAN: 55 ML/MIN/1.73
GFR NON-AFRICAN AMERICAN: 55 ML/MIN/1.73
GLUCOSE BLD-MCNC: 162 MG/DL (ref 74–99)
GLUCOSE BLD-MCNC: 91 MG/DL (ref 74–99)
HCT VFR BLD CALC: 28.2 % (ref 34–48)
HEMOGLOBIN: 8.7 G/DL (ref 11.5–15.5)
MCH RBC QN AUTO: 27.1 PG (ref 26–35)
MCHC RBC AUTO-ENTMCNC: 30.9 % (ref 32–34.5)
MCV RBC AUTO: 87.9 FL (ref 80–99.9)
METER GLUCOSE: 101 MG/DL (ref 74–99)
METER GLUCOSE: 122 MG/DL (ref 74–99)
METER GLUCOSE: 162 MG/DL (ref 74–99)
PDW BLD-RTO: 15.3 FL (ref 11.5–15)
PLATELET # BLD: 242 E9/L (ref 130–450)
PMV BLD AUTO: 10.5 FL (ref 7–12)
POTASSIUM REFLEX MAGNESIUM: 3.8 MMOL/L (ref 3.5–5)
POTASSIUM SERPL-SCNC: 3.7 MMOL/L (ref 3.5–5)
RBC # BLD: 3.21 E12/L (ref 3.5–5.5)
SODIUM BLD-SCNC: 141 MMOL/L (ref 132–146)
SODIUM BLD-SCNC: 142 MMOL/L (ref 132–146)
WBC # BLD: 5.8 E9/L (ref 4.5–11.5)

## 2020-03-05 PROCEDURE — 80048 BASIC METABOLIC PNL TOTAL CA: CPT

## 2020-03-05 PROCEDURE — 36415 COLL VENOUS BLD VENIPUNCTURE: CPT

## 2020-03-05 PROCEDURE — 2700000000 HC OXYGEN THERAPY PER DAY

## 2020-03-05 PROCEDURE — 2580000003 HC RX 258: Performed by: INTERNAL MEDICINE

## 2020-03-05 PROCEDURE — 36591 DRAW BLOOD OFF VENOUS DEVICE: CPT

## 2020-03-05 PROCEDURE — 6370000000 HC RX 637 (ALT 250 FOR IP): Performed by: FAMILY MEDICINE

## 2020-03-05 PROCEDURE — 2140000000 HC CCU INTERMEDIATE R&B

## 2020-03-05 PROCEDURE — 85027 COMPLETE CBC AUTOMATED: CPT

## 2020-03-05 PROCEDURE — 82962 GLUCOSE BLOOD TEST: CPT

## 2020-03-05 PROCEDURE — 93010 ELECTROCARDIOGRAM REPORT: CPT | Performed by: INTERNAL MEDICINE

## 2020-03-05 PROCEDURE — 6370000000 HC RX 637 (ALT 250 FOR IP): Performed by: INTERNAL MEDICINE

## 2020-03-05 PROCEDURE — 99233 SBSQ HOSP IP/OBS HIGH 50: CPT | Performed by: INTERNAL MEDICINE

## 2020-03-05 RX ORDER — SODIUM CHLORIDE 9 MG/ML
INJECTION, SOLUTION INTRAVENOUS CONTINUOUS
Status: DISCONTINUED | OUTPATIENT
Start: 2020-03-05 | End: 2020-03-06

## 2020-03-05 RX ADMIN — SODIUM CHLORIDE, PRESERVATIVE FREE 10 ML: 5 INJECTION INTRAVENOUS at 20:39

## 2020-03-05 RX ADMIN — SODIUM CHLORIDE, PRESERVATIVE FREE 10 ML: 5 INJECTION INTRAVENOUS at 08:34

## 2020-03-05 RX ADMIN — PANTOPRAZOLE SODIUM 40 MG: 40 TABLET, DELAYED RELEASE ORAL at 16:26

## 2020-03-05 RX ADMIN — INSULIN LISPRO 2 UNITS: 100 INJECTION, SOLUTION INTRAVENOUS; SUBCUTANEOUS at 11:47

## 2020-03-05 RX ADMIN — ACETAMINOPHEN 650 MG: 325 TABLET ORAL at 14:17

## 2020-03-05 RX ADMIN — ASPIRIN 81 MG 81 MG: 81 TABLET ORAL at 08:34

## 2020-03-05 RX ADMIN — PANTOPRAZOLE SODIUM 40 MG: 40 TABLET, DELAYED RELEASE ORAL at 06:50

## 2020-03-05 RX ADMIN — SUCRALFATE 1 G: 1 TABLET ORAL at 20:39

## 2020-03-05 RX ADMIN — METOPROLOL SUCCINATE 50 MG: 50 TABLET, EXTENDED RELEASE ORAL at 20:39

## 2020-03-05 RX ADMIN — GABAPENTIN 300 MG: 300 CAPSULE ORAL at 20:39

## 2020-03-05 RX ADMIN — ATORVASTATIN CALCIUM 40 MG: 40 TABLET, FILM COATED ORAL at 20:39

## 2020-03-05 RX ADMIN — SODIUM CHLORIDE: 9 INJECTION, SOLUTION INTRAVENOUS at 22:11

## 2020-03-05 RX ADMIN — INSULIN LISPRO 2 UNITS: 100 INJECTION, SOLUTION INTRAVENOUS; SUBCUTANEOUS at 21:06

## 2020-03-05 RX ADMIN — HYDROCODONE BITARTRATE AND ACETAMINOPHEN 1 TABLET: 5; 325 TABLET ORAL at 19:54

## 2020-03-05 RX ADMIN — METOPROLOL SUCCINATE 50 MG: 50 TABLET, EXTENDED RELEASE ORAL at 08:34

## 2020-03-05 RX ADMIN — SUCRALFATE 1 G: 1 TABLET ORAL at 16:26

## 2020-03-05 RX ADMIN — CLOPIDOGREL 75 MG: 75 TABLET, FILM COATED ORAL at 08:34

## 2020-03-05 RX ADMIN — SUCRALFATE 1 G: 1 TABLET ORAL at 11:47

## 2020-03-05 RX ADMIN — SUCRALFATE 1 G: 1 TABLET ORAL at 08:34

## 2020-03-05 ASSESSMENT — PAIN SCALES - GENERAL
PAINLEVEL_OUTOF10: 0
PAINLEVEL_OUTOF10: 8
PAINLEVEL_OUTOF10: 0
PAINLEVEL_OUTOF10: 2
PAINLEVEL_OUTOF10: 0
PAINLEVEL_OUTOF10: 4
PAINLEVEL_OUTOF10: 0

## 2020-03-05 ASSESSMENT — PAIN DESCRIPTION - PAIN TYPE: TYPE: ACUTE PAIN

## 2020-03-05 ASSESSMENT — PAIN - FUNCTIONAL ASSESSMENT: PAIN_FUNCTIONAL_ASSESSMENT: ACTIVITIES ARE NOT PREVENTED

## 2020-03-05 ASSESSMENT — PAIN DESCRIPTION - DESCRIPTORS: DESCRIPTORS: HEADACHE

## 2020-03-05 ASSESSMENT — PAIN DESCRIPTION - LOCATION
LOCATION: HEAD
LOCATION: BREAST

## 2020-03-05 NOTE — FLOWSHEET NOTE
Left radial arterial line pulled at 0615. Pressure applied for 5 minutes, no bleeding/swelling/hematoma. Pressure dressing applied. Morris removed at 0600.  Pt due to void in 8 hrs

## 2020-03-05 NOTE — PROCEDURES
[address]                  [address] [id number],  [facility]                            CARDIAC CATHETERIZATION    PATIENT NAME: Marko Youssef                     :        1960  MED REC NO:   [id number]                            ROOM:       8089  ACCOUNT NO:   [de-identified]                           ADMIT DATE: 2020  PROVIDER:     Jaime Arteaga MD    DATE OF PROCEDURE:  2020    PROCEDURES:  1. Coronary angiography. 2.  Percutaneous coronary intervention with deployment of 2.75 x 15 mm  mini Vision bare metal stent in proximal LAD. 3.  Conscious sedation using Versed and fentanyl. AUC indication is #4, score of 8. PCI indication is #16, score of 9. INDICATION:  The patient is a 79-year-old female with a history of  diabetes mellitus, family history of CAD, presented to the hospital with  shortness of breath and chest pain, was found to have non-ST-elevation  MI. The patient was brought to the cath lab to evaluate the anatomy of  her coronary arteries with the intention to intervene as warranted. Of note, the patient had recent left mastectomy two days ago for breast  cancer and the patient had history of bleeding ulcer diagnosed three  weeks ago. DESCRIPTION OF PROCEDURE:  After the appropriate informed consent, both  groins were prepped and draped in the usual sterile fashion. A timeout  was completed. The right wrist area was locally anesthetized with 2 mL  of 2% lidocaine. Jennifer Ravenna test was normal.  A 21-gauge needle was used to  access the right radial artery. A 6-Israeli introducer sheath was used  to cannulate the right radial artery. A 6-Israeli JL3.5 and 6-Israeli JR4  catheters were used for coronary angiography in multiple projections. ANGIOGRAPHIC FINDINGS:  The left main coronary artery arises normally  from the left sinus of Valsalva. It is a good-sized vessel that has at  least 50% distal disease.   It used intraarterial heparin for anticoagulation  for the diagnostic procedure. We used intravenous heparin for  anticoagulation for the interventional procedure with ACT maintained  more than 250 seconds throughout the procedure. CONSCIOUS SEDATION:  We used Versed and fentanyl for conscious sedation. First dose was given at 1536. Procedure ended at 426 1660. There were 65  minutes of direct face-to-face supervision during conscious sedation  administration. IMPRESSION:  1. Totally occluded left anterior descending artery with successful PCI  using 2.5 x 18 mm mini Vision bare metal stent (bare metal stent was  used due to the patient's recent history of GI bleed, recent mastectomy  surgery two days ago, and the plan for possible CABG down the road when  she heals from her mastectomy surgery). 2.  At least 50% distal left main disease. 3.  Mild-to-moderate ostial left circumflex disease with moderate  disease involving the vessel distally. 4.  Moderate disease involving the mid segment of the RCA. 5.  Moderate disease involving second OM branch and second diagonal  branch. RECOMMENDATIONS:  1. Aspirin for life. 2.  P2Y12 inhibitors for at least a month, preferably longer. 3.  Aggressive coronary artery disease risk factor modifications. 4.  Evaluation by CT Surgery when stable from other comorbid condition  standpoint.         Lulu Pierre MD    D: 03/04/2020 17:25:57       T: 03/04/2020 18:34:45     [email or web address]  Job#: [id number]     Doc#: [id number]    CC:  Becca Coleman

## 2020-03-05 NOTE — PROGRESS NOTES
Seen/examined  Events noted- bare metal stent placed in LAD  Feels well  Left chest wall without hematoma  Remaining drain removed  To begin plavix today  Can discharge from my standpoint  Adrienne

## 2020-03-05 NOTE — CONSULTS
Met with patient and discussed that their physician has ordered a referral to our outpatient Phase II Cardiac Rehabilitation program. Reviewed the benefits of cardiac rehabilitation based on their diagnosis and personal risk factors. Patient demonstrates mild interest in Cardiac Rehabilitation at this time. Cardiac Rehabilitation brochure provided to patient/family. The Cardiac Rehabilitation Program has been provided the patient's referral information and pertinent patient details and history. The patient may call [facility] at [phone] for additional information or questions. Contact information for [facility] and other choices close to the patient's residence have been provided in the discharge instructions so that the patient may call and schedule an appointment when cleared by their physician.  Thank you for the referral.

## 2020-03-05 NOTE — PROGRESS NOTES
03/03/20  1229 03/04/20  0500 03/05/20  0408   WBC 6.6 5.5 5.8   HGB 10.2* 9.2* 8.7*   HCT 31.7* 29.2* 28.2*    235 242       Recent Labs     03/03/20  1229 03/04/20  0500 03/05/20  0408    140 142   K 4.2 3.6 3.7   * 106 106   CO2 21* 23 23   BUN 15 13 13   CREATININE 1.1* 1.1* 1.2*   CALCIUM 9.1 8.5* 9.0       Recent Labs     03/03/20  1229   PROT 6.7   ALKPHOS 110*   ALT 21   AST 29   BILITOT 0.4   LIPASE 21       Recent Labs     03/03/20  1229   INR 1.2       Recent Labs     03/03/20  1229 03/03/20  2107 03/04/20  0100   CKTOTAL  --  95  --    TROPONINI 1.77* 1.83* 1.96*       Other labs:  Lab Results   Component Value Date    CHOL 69 03/04/2020    TRIG 107 03/04/2020    HDL 26 03/04/2020    LDLCALC 22 03/04/2020    TSH 1.500 02/04/2020    INR 1.2 03/03/2020       Radiology:  Imaging studies reviewed today.     ASSESSMENT:     NSTEMI (non-ST elevated myocardial infarction) ([facility] [I21.4] 97/41/2781    Metabolic acidosis [Z05.6] 03/03/2020    Postoperative anemia due to acute blood loss [D62] 02/26/2020    Shortness of breath [R06.02]      Gastrointestinal hemorrhage [K92.2]      Cervical cancer (HCC) [C53.9] 11/07/2019    Hypertensive left ventricular hypertrophy, without heart failure [I11.9] 01/25/2019    History of CVA (cerebrovascular accident) [Z86.73] 01/25/2019    Controlled type 2 diabetes mellitus without complication, without long-term current use of insulin ([facility] [E11.9] 01/25/2019    Obesity [E66.9] 01/25/2019    Hypertension [I10]      Hypertension [I10] 02/24/2012     NSTEMI  Elevated troponins  Breast cancer status post left mastectomy  Hypertensive urgency  Recent GI bleed by history  Gastroduodenal ulcers by history  Cervical cancer  Mellitus  Prior history of stroke        PLAN:      Status post cardiac catheterization, status post bare metal stent 3/4/20  Cardiology recommendations reviewed  Aspirin  Atorvastatin  Plavix  Metoprolol  Insulin sliding scale

## 2020-03-06 LAB
METER GLUCOSE: 128 MG/DL (ref 74–99)
METER GLUCOSE: 147 MG/DL (ref 74–99)
METER GLUCOSE: 148 MG/DL (ref 74–99)
METER GLUCOSE: 162 MG/DL (ref 74–99)

## 2020-03-06 PROCEDURE — 2140000000 HC CCU INTERMEDIATE R&B

## 2020-03-06 PROCEDURE — 6370000000 HC RX 637 (ALT 250 FOR IP): Performed by: INTERNAL MEDICINE

## 2020-03-06 PROCEDURE — 2580000003 HC RX 258: Performed by: INTERNAL MEDICINE

## 2020-03-06 PROCEDURE — 6370000000 HC RX 637 (ALT 250 FOR IP): Performed by: FAMILY MEDICINE

## 2020-03-06 PROCEDURE — 82962 GLUCOSE BLOOD TEST: CPT

## 2020-03-06 PROCEDURE — 99291 CRITICAL CARE FIRST HOUR: CPT | Performed by: THORACIC SURGERY (CARDIOTHORACIC VASCULAR SURGERY)

## 2020-03-06 PROCEDURE — 99232 SBSQ HOSP IP/OBS MODERATE 35: CPT | Performed by: INTERNAL MEDICINE

## 2020-03-06 PROCEDURE — 99222 1ST HOSP IP/OBS MODERATE 55: CPT | Performed by: THORACIC SURGERY (CARDIOTHORACIC VASCULAR SURGERY)

## 2020-03-06 RX ORDER — AMLODIPINE BESYLATE 10 MG/1
10 TABLET ORAL DAILY
Status: DISCONTINUED | OUTPATIENT
Start: 2020-03-06 | End: 2020-03-08 | Stop reason: HOSPADM

## 2020-03-06 RX ORDER — NITROGLYCERIN 20 MG/100ML
10 INJECTION INTRAVENOUS CONTINUOUS
Status: DISCONTINUED | OUTPATIENT
Start: 2020-03-06 | End: 2020-03-06

## 2020-03-06 RX ADMIN — GABAPENTIN 300 MG: 300 CAPSULE ORAL at 20:30

## 2020-03-06 RX ADMIN — ATORVASTATIN CALCIUM 40 MG: 40 TABLET, FILM COATED ORAL at 20:30

## 2020-03-06 RX ADMIN — SUCRALFATE 1 G: 1 TABLET ORAL at 16:30

## 2020-03-06 RX ADMIN — ACETAMINOPHEN 650 MG: 325 TABLET ORAL at 17:48

## 2020-03-06 RX ADMIN — ACETAMINOPHEN 650 MG: 325 TABLET ORAL at 07:50

## 2020-03-06 RX ADMIN — INSULIN LISPRO 2 UNITS: 100 INJECTION, SOLUTION INTRAVENOUS; SUBCUTANEOUS at 12:39

## 2020-03-06 RX ADMIN — ASPIRIN 81 MG 81 MG: 81 TABLET ORAL at 09:06

## 2020-03-06 RX ADMIN — PANTOPRAZOLE SODIUM 40 MG: 40 TABLET, DELAYED RELEASE ORAL at 16:30

## 2020-03-06 RX ADMIN — SODIUM CHLORIDE, PRESERVATIVE FREE 10 ML: 5 INJECTION INTRAVENOUS at 20:31

## 2020-03-06 RX ADMIN — METOPROLOL SUCCINATE 50 MG: 50 TABLET, EXTENDED RELEASE ORAL at 20:31

## 2020-03-06 RX ADMIN — SUCRALFATE 1 G: 1 TABLET ORAL at 07:50

## 2020-03-06 RX ADMIN — CLOPIDOGREL 75 MG: 75 TABLET, FILM COATED ORAL at 09:07

## 2020-03-06 RX ADMIN — PANTOPRAZOLE SODIUM 40 MG: 40 TABLET, DELAYED RELEASE ORAL at 07:50

## 2020-03-06 RX ADMIN — AMLODIPINE BESYLATE 10 MG: 10 TABLET ORAL at 22:00

## 2020-03-06 RX ADMIN — SUCRALFATE 1 G: 1 TABLET ORAL at 20:31

## 2020-03-06 RX ADMIN — METOPROLOL SUCCINATE 50 MG: 50 TABLET, EXTENDED RELEASE ORAL at 09:06

## 2020-03-06 RX ADMIN — SODIUM CHLORIDE, PRESERVATIVE FREE 10 ML: 5 INJECTION INTRAVENOUS at 09:05

## 2020-03-06 RX ADMIN — SUCRALFATE 1 G: 1 TABLET ORAL at 12:39

## 2020-03-06 ASSESSMENT — PAIN SCALES - GENERAL
PAINLEVEL_OUTOF10: 3
PAINLEVEL_OUTOF10: 0
PAINLEVEL_OUTOF10: 0
PAINLEVEL_OUTOF10: 3
PAINLEVEL_OUTOF10: 1
PAINLEVEL_OUTOF10: 4

## 2020-03-06 ASSESSMENT — PAIN DESCRIPTION - PAIN TYPE: TYPE: ACUTE PAIN

## 2020-03-06 ASSESSMENT — PAIN DESCRIPTION - ORIENTATION: ORIENTATION: LEFT

## 2020-03-06 ASSESSMENT — PAIN DESCRIPTION - LOCATION: LOCATION: BREAST

## 2020-03-06 NOTE — PROGRESS NOTES
Nutrition Assessment    Type and Reason for Visit: Initial(LOS ICU)    Nutrition Recommendations: Continue current diet. Pt denied need for ONS reports eating very well    Nutrition Assessment: Pt appears nourished on admit w/ reported very good appetite/stable wt hx. However pt at risk d/t noted hx cervical/breast CA s/p recent mastectomy s/p chemoRT. Noted surgical wounds. Pt denied need for ONS at this time. Encouraged protein food choices. Malnutrition Assessment:  · Malnutrition Status: No malnutrition  · Findings of the 6 clinical characteristics of malnutrition (Minimum of 2 out of 6 clinical characteristics is required to make the diagnosis of moderate or severe Protein Calorie Malnutrition based on AND/ASPEN Guidelines):  1. Energy Intake-Greater than 75% of estimated energy requirement,  2. Weight Loss-No significant weight loss  3. Fat Loss-No significant subcutaneous fat loss  4. Muscle Loss-No significant muscle mass loss  5. Fluid Accumulation-No significant fluid accumulation  6.   Strength-Not measured    Nutrition Risk Level: Low    Nutrient Needs:  · Estimated Daily Total Kcal: 9209-6515 (MSJ REE 1620 x 1.1 SF)  · Estimated Daily Protein (g):  (1.5-1.8 g/kg)  · Estimated Daily Total Fluid (ml/day): per critical care     Nutrition Diagnosis:   · Problem: Increased nutrient needs  · Etiology: related to Increased demand for energy/nutrients     Signs and symptoms:  as evidenced by Presence of wounds    Objective Information:  · Nutrition-Focused Physical Findings: Pt alert up in chair, family at bedside, SOB PTA, +I/O's, +1 nonpitting edema, active BS, noted UGIB      · Wound Type: Surgical Wound(x2)     · Current Nutrition Therapies:  · Oral Diet Orders: Cardiac   · Oral Diet intake: %(per doc flow/ observed tray/ pt report)     · Anthropometric Measures:  · Ht: 5' 5\" (165.1 cm)   · Current Body Wt: 230 lb (104.3 kg)(stated- UTO new wt pt up in chair)  · Usual Body Wt: 232 lb (105.2 kg)(11/2019 EMR actual)  · % Weight Change: Pt reports wt gain PTA s/p CVA   · Ideal Body Wt: 125 lb (56.7 kg), % Ideal Body 184%  · BMI Classification: BMI 35.0 - 39.9 Obese Class II    Nutrition Interventions:   Continued Inpatient Monitoring, Coordination of Care, Education Initiated(ONS offered, pt declined )    Nutrition Evaluation:   · Evaluation: Goals set   · Goals: Pt to consume >75% meals & ONS    · Monitoring: Meal Intake, Supplement Intake, Diet Tolerance, Skin Integrity, Wound Healing, Weight, Pertinent Labs, Monitor Bowel Function, I&O      Electronically signed by Deanne You RD, LD on 3/6/20 at 10:35 AM    Contact Number: Ext [phone]

## 2020-03-06 NOTE — CONSULTS
2.5 x 18 stent deployed proximal LAD by DR Dempsey [facility] FOOT SURGERY      right    HYSTERECTOMY      fibroids    KNEE ARTHROSCOPY      left    MASTECTOMY Right 2000    MASTECTOMY, MODIFIED RADICAL Left 2/25/2020    LEFT BREAST SIMPLE MASTECTOMY performed by Artur Shah MD at [address], MODIFIED RADICAL Left 2/25/2020    LEFT BREAST MASTECTOMY POST OP BLEED CONTROL, EVACUATION OF LEFT CHEST WALL HEMATOMA performed by Artur Shah MD at [address] ENDOSCOPY      UPPER GASTROINTESTINAL ENDOSCOPY N/A 2/5/2020    EGD BIOPSY performed by Artur Shah MD at [facility] History:  Social History     Socioeconomic History    Marital status: Single     Spouse name: Not on file    Number of children: Not on file    Years of education: Not on file    Highest education level: Not on file   Occupational History    Not on file   Social Needs    Financial resource strain: Not on file    Food insecurity:     Worry: Not on file     Inability: Not on file    Transportation needs:     Medical: Not on file     Non-medical: Not on file   Tobacco Use    Smoking status: Former Smoker     Packs/day: 0.20     Years: 2.00     Pack years: 0.40     Types: Cigarettes    Smokeless tobacco: Never Used    Tobacco comment: as a teen   Substance and Sexual Activity    Alcohol use: Not Currently     Alcohol/week: 6.0 standard drinks     Types: 6 Standard drinks or equivalent per week    Drug use: Not Currently     Frequency: 2.0 times per week     Types: Marijuana    Sexual activity: Not Currently   Lifestyle    Physical activity:     Days per week: Not on file     Minutes per session: Not on file    Stress: Not on file   Relationships    Social connections:     Talks on phone: Not on file     Gets together: Not on file     Attends Rastafari service: Not on file     Active member of club or organization: Not on file     Attends meetings of clubs or organizations: Not on file     Relationship status: Not on file    Intimate partner violence:     Fear of current or ex partner: Not on file     Emotionally abused: Not on file     Physically abused: Not on file     Forced sexual activity: Not on file   Other Topics Concern    Not on file   Social History Narrative    Denies caffeine. Family History:  Family History   Problem Relation Age of Onset    Stroke Mother     Heart Failure Mother     Other Mother         ICD    Breast Cancer Mother     Pacemaker Father     Heart Failure Father     Hypertension Sister        Review of Systems:  Constitutional: Denies fevers, chills, or weight loss. HEENT: Denies visual changes or hearing loss. Heart: As per HPI. Lungs: Denies shortness of breath, cough, or wheezing. Gastrointestinal: Denies nausea, vomiting, constipation, or diarrhea. Genitourinary: dysuria or hematuria. Psychiatric: Patient denies anxiety or depression. Neurologic: Patient denies weakness of the extremities, dizziness, or headaches. All other ROS checked and found to be negative. Labs:  Recent Labs     03/04/20  0500 03/05/20  0408   WBC 5.5 5.8   HGB 9.2* 8.7*   HCT 29.2* 28.2*    242      Recent Labs     03/04/20  0500 03/05/20  0408 03/05/20 2000   BUN 13 13 15   CREATININE 1.1* 1.2* 1.2*       Objective:  Vitals BP (!) 153/103   Pulse 86   Temp 98.3 °F (36.8 °C) (Temporal)   Resp 14   Ht 5' 5\" (1.651 m)   Wt 230 lb (104.3 kg)   SpO2 93%   BMI 38.27 kg/m²   General Appearance: Pleasant 60y.o. year old female who appears stated age. Communicates well, no acute distress. HEENT: Head is normocephalic, atraumatic. EOMs intact, PERRL. Trachea midline. Lungs: Normal respiratory rate and normal effort. She is not in respiratory distress. Breath sounds clear to auscultation. No wheezes. Heart: Normal rate. Regular rhythm. S1 normal and S2 normal. Positive for murmur.    Chest: Symmetric chest wall

## 2020-03-07 ENCOUNTER — APPOINTMENT (OUTPATIENT)
Dept: GENERAL RADIOLOGY | Age: 60
DRG: 248 | End: 2020-03-07
Attending: INTERNAL MEDICINE
Payer: MEDICARE

## 2020-03-07 LAB
ANION GAP SERPL CALCULATED.3IONS-SCNC: 16 MMOL/L (ref 7–16)
BUN BLDV-MCNC: 12 MG/DL (ref 8–23)
CALCIUM SERPL-MCNC: 9.1 MG/DL (ref 8.6–10.2)
CHLORIDE BLD-SCNC: 105 MMOL/L (ref 98–107)
CO2: 21 MMOL/L (ref 22–29)
CREAT SERPL-MCNC: 0.9 MG/DL (ref 0.5–1)
EKG ATRIAL RATE: 84 BPM
EKG P AXIS: 29 DEGREES
EKG P-R INTERVAL: 138 MS
EKG Q-T INTERVAL: 382 MS
EKG QRS DURATION: 72 MS
EKG QTC CALCULATION (BAZETT): 451 MS
EKG R AXIS: -24 DEGREES
EKG T AXIS: 157 DEGREES
EKG VENTRICULAR RATE: 84 BPM
GFR AFRICAN AMERICAN: >60
GFR NON-AFRICAN AMERICAN: >60 ML/MIN/1.73
GLUCOSE BLD-MCNC: 151 MG/DL (ref 74–99)
MAGNESIUM: 1.9 MG/DL (ref 1.6–2.6)
METER GLUCOSE: 125 MG/DL (ref 74–99)
METER GLUCOSE: 129 MG/DL (ref 74–99)
METER GLUCOSE: 168 MG/DL (ref 74–99)
METER GLUCOSE: 91 MG/DL (ref 74–99)
POTASSIUM SERPL-SCNC: 3.9 MMOL/L (ref 3.5–5)
PRO-BNP: 5966 PG/ML (ref 0–125)
PROCALCITONIN: 0.06 NG/ML (ref 0–0.08)
SODIUM BLD-SCNC: 142 MMOL/L (ref 132–146)

## 2020-03-07 PROCEDURE — 36415 COLL VENOUS BLD VENIPUNCTURE: CPT

## 2020-03-07 PROCEDURE — 93010 ELECTROCARDIOGRAM REPORT: CPT | Performed by: INTERNAL MEDICINE

## 2020-03-07 PROCEDURE — 2700000000 HC OXYGEN THERAPY PER DAY

## 2020-03-07 PROCEDURE — 2580000003 HC RX 258: Performed by: INTERNAL MEDICINE

## 2020-03-07 PROCEDURE — 83880 ASSAY OF NATRIURETIC PEPTIDE: CPT

## 2020-03-07 PROCEDURE — 6370000000 HC RX 637 (ALT 250 FOR IP): Performed by: INTERNAL MEDICINE

## 2020-03-07 PROCEDURE — 80048 BASIC METABOLIC PNL TOTAL CA: CPT

## 2020-03-07 PROCEDURE — 83735 ASSAY OF MAGNESIUM: CPT

## 2020-03-07 PROCEDURE — 2140000000 HC CCU INTERMEDIATE R&B

## 2020-03-07 PROCEDURE — 71045 X-RAY EXAM CHEST 1 VIEW: CPT

## 2020-03-07 PROCEDURE — 82962 GLUCOSE BLOOD TEST: CPT

## 2020-03-07 PROCEDURE — 84145 PROCALCITONIN (PCT): CPT

## 2020-03-07 RX ADMIN — SUCRALFATE 1 G: 1 TABLET ORAL at 08:57

## 2020-03-07 RX ADMIN — INSULIN LISPRO 2 UNITS: 100 INJECTION, SOLUTION INTRAVENOUS; SUBCUTANEOUS at 11:58

## 2020-03-07 RX ADMIN — HYDROCODONE BITARTRATE AND ACETAMINOPHEN 1 TABLET: 5; 325 TABLET ORAL at 02:59

## 2020-03-07 RX ADMIN — GABAPENTIN 300 MG: 300 CAPSULE ORAL at 20:15

## 2020-03-07 RX ADMIN — SUCRALFATE 1 G: 1 TABLET ORAL at 13:01

## 2020-03-07 RX ADMIN — ACETAMINOPHEN 650 MG: 325 TABLET ORAL at 20:19

## 2020-03-07 RX ADMIN — SODIUM CHLORIDE, PRESERVATIVE FREE 10 ML: 5 INJECTION INTRAVENOUS at 20:20

## 2020-03-07 RX ADMIN — AMLODIPINE BESYLATE 10 MG: 10 TABLET ORAL at 08:58

## 2020-03-07 RX ADMIN — CLOPIDOGREL 75 MG: 75 TABLET, FILM COATED ORAL at 08:58

## 2020-03-07 RX ADMIN — METOPROLOL SUCCINATE 50 MG: 50 TABLET, EXTENDED RELEASE ORAL at 20:15

## 2020-03-07 RX ADMIN — ATORVASTATIN CALCIUM 40 MG: 40 TABLET, FILM COATED ORAL at 20:15

## 2020-03-07 RX ADMIN — PANTOPRAZOLE SODIUM 40 MG: 40 TABLET, DELAYED RELEASE ORAL at 06:25

## 2020-03-07 RX ADMIN — SODIUM CHLORIDE, PRESERVATIVE FREE 10 ML: 5 INJECTION INTRAVENOUS at 08:58

## 2020-03-07 RX ADMIN — SUCRALFATE 1 G: 1 TABLET ORAL at 20:15

## 2020-03-07 RX ADMIN — METOPROLOL SUCCINATE 50 MG: 50 TABLET, EXTENDED RELEASE ORAL at 08:57

## 2020-03-07 RX ADMIN — SUCRALFATE 1 G: 1 TABLET ORAL at 16:57

## 2020-03-07 RX ADMIN — PANTOPRAZOLE SODIUM 40 MG: 40 TABLET, DELAYED RELEASE ORAL at 16:57

## 2020-03-07 ASSESSMENT — PAIN SCALES - GENERAL
PAINLEVEL_OUTOF10: 0
PAINLEVEL_OUTOF10: 3
PAINLEVEL_OUTOF10: 7
PAINLEVEL_OUTOF10: 0

## 2020-03-07 NOTE — PROGRESS NOTES
Hospital Medicine Progress Note      Date of Admission: 3/3/2020  Hospital day # 4    Course: Follow-up on non-ST MI,  chest pain, shortness of breath    Subjective    Refers no chest pain  Patient refers some shortness of breath  Refers feels better but weak  Patient currently requiring oxygen, she refers does not use oxygen at home. Per nurse patient saturation was 88% on O2 by nasal cannula last night      Exam:    BP (!) 133/91   Pulse 88   Temp 97.5 °F (36.4 °C) (Temporal)   Resp 14   Ht 5' 5\" (1.651 m)   Wt 235 lb 6.4 oz (106.8 kg)   SpO2 98%   BMI 39.17 kg/m²     General appearance: No apparent distress, chronically ill, appears stated age and cooperative. HEENT: Pupils equal, round, and reactive to light. Conjunctivae/corneas clear. Neck: Supple. No jugular venous distention. Trachea midline. Respiratory:  Normal respiratory effort. Clear to auscultation, bilaterally without Rales/Wheezes/Rhonchi. Cardiovascular: S1/S2  Abdomen: Soft, non-tender, non-distended with normal bowel sounds. Musculoskeletal: No clubbing, cyanosis or edema bilaterally. Brisk capillary refill. 2+ lower extremity pulses (dorsalis pedis).    Skin:  No rashes    Neurologic: awake, alert and following commands     Medications:  Reviewed    Infusion Medications    dextrose       Scheduled Medications    amLODIPine  10 mg Oral Daily    sodium chloride flush  10 mL Intravenous 2 times per day    clopidogrel  75 mg Oral Daily    atorvastatin  40 mg Oral Nightly    gabapentin  300 mg Oral Nightly    metoprolol succinate  50 mg Oral BID    pantoprazole  40 mg Oral BID AC    sucralfate  1 g Oral 4x Daily WC    insulin lispro  0-10 Units Subcutaneous 4x Daily AC & HS     PRN Meds: sodium chloride flush, magnesium hydroxide, morphine, HYDROcodone 5 mg - acetaminophen, acetaminophen **OR** [DISCONTINUED] acetaminophen, polyethylene glycol, glucose, dextrose, glucagon (rDNA), dextrose    I/O    Intake/Output Summary

## 2020-03-07 NOTE — PROGRESS NOTES
Hospital Medicine Progress Note      Date of Admission: 3/3/2020  Hospital day # 3    Course: Follow-up on non-ST MI chest pain    Subjective    Refers no chest pain  Refers feels better but weak  Patient currently requiring oxygen, she refers does not use oxygen at home      Exam:    BP (!) 158/105   Pulse 82   Temp 96.6 °F (35.9 °C) (Temporal)   Resp 18   Ht 5' 5\" (1.651 m)   Wt 230 lb (104.3 kg)   SpO2 100%   BMI 38.27 kg/m²     General appearance: No apparent distress, chronically ill, appears stated age and cooperative. HEENT: Pupils equal, round, and reactive to light. Conjunctivae/corneas clear. Neck: Supple. No jugular venous distention. Trachea midline. Respiratory:  Normal respiratory effort. Clear to auscultation, bilaterally without Rales/Wheezes/Rhonchi. Cardiovascular: S1/S2  Abdomen: Soft, non-tender, non-distended with normal bowel sounds. Musculoskeletal: No clubbing, cyanosis or edema bilaterally. Brisk capillary refill. 2+ lower extremity pulses (dorsalis pedis).    Skin:  No rashes    Neurologic: awake, alert and following commands     Medications:  Reviewed    Infusion Medications    dextrose       Scheduled Medications    amLODIPine  10 mg Oral Daily    sodium chloride flush  10 mL Intravenous 2 times per day    clopidogrel  75 mg Oral Daily    atorvastatin  40 mg Oral Nightly    gabapentin  300 mg Oral Nightly    metoprolol succinate  50 mg Oral BID    pantoprazole  40 mg Oral BID AC    sucralfate  1 g Oral 4x Daily WC    insulin lispro  0-10 Units Subcutaneous 4x Daily AC & HS     PRN Meds: sodium chloride flush, magnesium hydroxide, morphine, HYDROcodone 5 mg - acetaminophen, acetaminophen **OR** [DISCONTINUED] acetaminophen, polyethylene glycol, glucose, dextrose, glucagon (rDNA), dextrose    I/O    Intake/Output Summary (Last 24 hours) at 3/6/2020 1942  Last data filed at 3/6/2020 1853  Gross per 24 hour   Intake 1851 ml   Output 300 ml   Net 1551 ml       Labs: Recent Labs     03/04/20  0500 03/05/20  0408   WBC 5.5 5.8   HGB 9.2* 8.7*   HCT 29.2* 28.2*    242       Recent Labs     03/04/20  0500 03/05/20  0408 03/05/20 2000    142 141   K 3.6 3.7 3.8    106 105   CO2 23 23 23   BUN 13 13 15   CREATININE 1.1* 1.2* 1.2*   CALCIUM 8.5* 9.0 8.6       No results for input(s): PROT, ALB, ALKPHOS, ALT, AST, BILITOT, AMYLASE, LIPASE in the last 72 hours. No results for input(s): INR in the last 72 hours. Recent Labs     03/03/20  2107 03/04/20  0100   CKTOTAL 95  --    TROPONINI 1.83* 1.96*       Other labs:  Lab Results   Component Value Date    CHOL 69 03/04/2020    TRIG 107 03/04/2020    HDL 26 03/04/2020    LDLCALC 22 03/04/2020    TSH 1.500 02/04/2020    INR 1.2 03/03/2020       Radiology:  Imaging studies reviewed today.     ASSESSMENT:     NSTEMI (non-ST elevated myocardial infarction) ([facility] [address] [I21.4] 57/38/2377    Metabolic acidosis [L77.7] 03/03/2020    Postoperative anemia due to acute blood loss [D62] 02/26/2020    Shortness of breath [R06.02]      Gastrointestinal hemorrhage [K92.2]      Cervical cancer (HCC) [C53.9] 11/07/2019    Hypertensive left ventricular hypertrophy, without heart failure [I11.9] 01/25/2019    History of CVA (cerebrovascular accident) [Z86.73] 01/25/2019    Controlled type 2 diabetes mellitus without complication, without long-term current use of insulin ([facility] [address] [E11.9] 01/25/2019    Obesity [E66.9] 01/25/2019    Hypertension [I10]      Hypertension [I10] 02/24/2012     NSTEMI  Elevated troponins  Breast cancer status post left mastectomy  Hypertensive urgency  Recent GI bleed by history  Gastroduodenal ulcers by history  Cervical cancer  Mellitus  Prior history of stroke        PLAN:      Status post cardiac catheterization, status post bare metal stent 3/4/20  Cardiology recommendations reviewed  Aspirin  Atorvastatin  Plavix  Metoprolol  Insulin sliding scale coverage  Continue to wean O2  Anticipated discharge home tomorrow          Diet: DIET CARDIAC;  Code Status: Full Code    PT/OT Eval Status: [] Ordered [] Evaluation noted [x] Not applicable    DVT Prophylaxis: [x]Lovenox []Heparin []PCD [] [address] []Encouraged ambulation []N/A    Likely disposition when able:  [x]Home [] Home with [facility] [] SNF/NEHEMIAS [] Acute Rehab [] LTAC []Other    +++++++++++++++++++++++++++++++++++++++++++++++++  Jadon Hess MD, Hospitalist  +++++++++++++++++++++++++++++++++++++++++++++++++  NOTE: This report was transcribed using voice recognition software.  Every effort was made to ensure accuracy; however, inadvertent computerized transcription errors may be present.

## 2020-03-07 NOTE — PROGRESS NOTES
Patient with 1 beats of V-tach. Patient asleep at time of occurrence. Respirations are even and unlabored on 4L NC. Dr. Meghna Lopez, on call cardiologist, notified.

## 2020-03-07 NOTE — PROGRESS NOTES
Patient SOB and anxious. O2 checked, and patient at 88% on 2L. Patient increased to 4L to maintain O2 saturations at 94%.

## 2020-03-07 NOTE — PLAN OF CARE
Problem: Increased nutrient needs (NI-5.1)  Goal: Food and/or Nutrient Delivery  Description-monitor   Individualized approach for food/nutrient provision.   Outcome: Met This Shift
Problem: Pain:  Goal: Control of acute pain  Description  Control of acute pain  Outcome: Met This Shift     Problem: Cardiac Output - Decreased:  Goal: Hemodynamic stability will improve  Description  Hemodynamic stability will improve  3/4/2020 2252 by Yana Martinez RN  Outcome: Met This Shift  3/4/2020 1054 by Romario Person RN  Outcome: Ongoing     Problem: Tissue Perfusion - Cardiopulmonary, Altered:  Goal: Absence of angina  Description  Absence of angina  Outcome: Met This Shift     Problem: Tissue Perfusion - Cardiopulmonary, Altered:  Goal: Circulatory function within specified parameters  Description  Circulatory function within specified parameters  Outcome: Met This Shift     Problem: Tissue Perfusion - Cardiopulmonary, Altered:  Goal: Hemodynamic stability will improve  Description  Hemodynamic stability will improve  Outcome: Met This Shift     Problem: Falls - Risk of:  Goal: Will remain free from falls  Description  Will remain free from falls  3/4/2020 2252 by Yana Martinez RN  Outcome: Met This Shift  3/4/2020 1054 by Romario Person RN  Outcome: Met This Shift     Problem: Falls - Risk of:  Goal: Absence of physical injury  Description  Absence of physical injury  Outcome: Met This Shift
Problem: Pain:  Goal: Pain level will decrease  Description  Pain level will decrease  3/4/2020 1054 by Jena Sanchez RN  Outcome: Met This Shift     Problem: Falls - Risk of:  Goal: Will remain free from falls  Description  Will remain free from falls  Outcome: Met This Shift     Problem: Cardiac Output - Decreased:  Goal: Hemodynamic stability will improve  Description  Hemodynamic stability will improve  3/4/2020 1054 by Jena Sanchez RN  Outcome: Ongoing     Problem: Serum Glucose Level - Abnormal:  Goal: Ability to maintain appropriate glucose levels will improve  Description  Ability to maintain appropriate glucose levels will improve  Outcome: Ongoing
Problem: Pain:  Goal: Pain level will decrease  Description  Pain level will decrease  3/5/2020 1640 by Efrain Carrasco RN  Outcome: Met This Shift     Problem: Pain:  Goal: Control of acute pain  Description  Control of acute pain  3/5/2020 1640 by Efrain Carrasco RN  Outcome: Met This Shift     Problem: Cardiac Output - Decreased:  Goal: Hemodynamic stability will improve  Description  Hemodynamic stability will improve  3/5/2020 1640 by Efrain Carrasco RN  Outcome: Met This Shift     Problem: Serum Glucose Level - Abnormal:  Goal: Ability to maintain appropriate glucose levels will improve  Description  Ability to maintain appropriate glucose levels will improve  3/5/2020 1640 by Efrain Carrasco RN  Outcome: Met This Shift     Problem: Falls - Risk of:  Goal: Will remain free from falls  Description  Will remain free from falls  3/5/2020 1640 by Efrain Carrasco RN  Outcome: Met This Shift     Problem: Falls - Risk of:  Goal: Absence of physical injury  Description  Absence of physical injury  3/5/2020 1640 by Efrain Carrasco RN  Outcome: Met This Shift
Problem: Pain:  Goal: Pain level will decrease  Description  Pain level will decrease  Outcome: Met This Shift  Goal: Control of acute pain  Description  Control of acute pain  Outcome: Met This Shift     Problem: Cardiac Output - Decreased:  Goal: Hemodynamic stability will improve  Description  Hemodynamic stability will improve  Outcome: Met This Shift     Problem: Tissue Perfusion - Cardiopulmonary, Altered:  Goal: Absence of angina  Description  Absence of angina  Outcome: Met This Shift
Problem: Pain:  Goal: Pain level will decrease  Description: Pain level will decrease  Outcome: Met This Shift  Goal: Control of acute pain  Description: Control of acute pain  Outcome: Met This Shift  Goal: Control of chronic pain  Description: Control of chronic pain  Outcome: Met This Shift     Problem: Cardiac Output - Decreased:  Goal: Hemodynamic stability will improve  Description: Hemodynamic stability will improve  Outcome: Met This Shift     Problem: Falls - Risk of:  Goal: Will remain free from falls  Description: Will remain free from falls  Outcome: Met This Shift  Goal: Absence of physical injury  Description: Absence of physical injury  Outcome: Met This Shift     Problem: Risk for Impaired Skin Integrity  Goal: Tissue integrity - skin and mucous membranes  Description: Structural intactness and normal physiological function of skin and  mucous membranes.   Outcome: Met This Shift
skin and  mucous membranes.   Outcome: Met This Shift

## 2020-03-08 VITALS
HEIGHT: 65 IN | RESPIRATION RATE: 22 BRPM | OXYGEN SATURATION: 97 % | SYSTOLIC BLOOD PRESSURE: 144 MMHG | HEART RATE: 89 BPM | DIASTOLIC BLOOD PRESSURE: 91 MMHG | WEIGHT: 235.5 LBS | BODY MASS INDEX: 39.24 KG/M2 | TEMPERATURE: 97.4 F

## 2020-03-08 LAB
BASOPHILS ABSOLUTE: 0 E9/L (ref 0–0.2)
BASOPHILS RELATIVE PERCENT: 0 % (ref 0–2)
EOSINOPHILS ABSOLUTE: 0 E9/L (ref 0.05–0.5)
EOSINOPHILS RELATIVE PERCENT: 0.8 % (ref 0–6)
HCT VFR BLD CALC: 32.7 % (ref 34–48)
HEMOGLOBIN: 9.9 G/DL (ref 11.5–15.5)
LYMPHOCYTES ABSOLUTE: 0.36 E9/L (ref 1.5–4)
LYMPHOCYTES RELATIVE PERCENT: 7.1 % (ref 20–42)
MCH RBC QN AUTO: 26.9 PG (ref 26–35)
MCHC RBC AUTO-ENTMCNC: 30.3 % (ref 32–34.5)
MCV RBC AUTO: 88.9 FL (ref 80–99.9)
METER GLUCOSE: 126 MG/DL (ref 74–99)
METER GLUCOSE: 204 MG/DL (ref 74–99)
MONOCYTES ABSOLUTE: 0.52 E9/L (ref 0.1–0.95)
MONOCYTES RELATIVE PERCENT: 9.7 % (ref 2–12)
NEUTROPHILS ABSOLUTE: 4.32 E9/L (ref 1.8–7.3)
NEUTROPHILS RELATIVE PERCENT: 83.2 % (ref 43–80)
OVALOCYTES: ABNORMAL
PDW BLD-RTO: 15.3 FL (ref 11.5–15)
PLATELET # BLD: 302 E9/L (ref 130–450)
PMV BLD AUTO: 10.5 FL (ref 7–12)
POIKILOCYTES: ABNORMAL
POLYCHROMASIA: ABNORMAL
RBC # BLD: 3.68 E12/L (ref 3.5–5.5)
WBC # BLD: 5.2 E9/L (ref 4.5–11.5)

## 2020-03-08 PROCEDURE — 2700000000 HC OXYGEN THERAPY PER DAY

## 2020-03-08 PROCEDURE — 6370000000 HC RX 637 (ALT 250 FOR IP): Performed by: NURSE PRACTITIONER

## 2020-03-08 PROCEDURE — 2580000003 HC RX 258: Performed by: INTERNAL MEDICINE

## 2020-03-08 PROCEDURE — 6370000000 HC RX 637 (ALT 250 FOR IP): Performed by: INTERNAL MEDICINE

## 2020-03-08 PROCEDURE — 82962 GLUCOSE BLOOD TEST: CPT

## 2020-03-08 PROCEDURE — 85025 COMPLETE CBC W/AUTO DIFF WBC: CPT

## 2020-03-08 PROCEDURE — 99232 SBSQ HOSP IP/OBS MODERATE 35: CPT | Performed by: INTERNAL MEDICINE

## 2020-03-08 PROCEDURE — 36415 COLL VENOUS BLD VENIPUNCTURE: CPT

## 2020-03-08 RX ORDER — ASPIRIN 81 MG/1
81 TABLET, CHEWABLE ORAL DAILY
Status: DISCONTINUED | OUTPATIENT
Start: 2020-03-08 | End: 2020-03-08 | Stop reason: HOSPADM

## 2020-03-08 RX ORDER — ISOSORBIDE MONONITRATE 30 MG/1
30 TABLET, EXTENDED RELEASE ORAL DAILY
Status: DISCONTINUED | OUTPATIENT
Start: 2020-03-08 | End: 2020-03-08 | Stop reason: HOSPADM

## 2020-03-08 RX ORDER — CLOPIDOGREL BISULFATE 75 MG/1
75 TABLET ORAL DAILY
Qty: 30 TABLET | Refills: 0 | Status: ON HOLD | OUTPATIENT
Start: 2020-03-09 | End: 2020-06-21 | Stop reason: HOSPADM

## 2020-03-08 RX ORDER — MAGNESIUM SULFATE HEPTAHYDRATE 500 MG/ML
1 INJECTION, SOLUTION INTRAMUSCULAR; INTRAVENOUS ONCE
Status: CANCELLED | OUTPATIENT
Start: 2020-03-08 | End: 2020-03-08

## 2020-03-08 RX ORDER — ASPIRIN 81 MG/1
81 TABLET, CHEWABLE ORAL DAILY
Qty: 30 TABLET | Refills: 0 | Status: ON HOLD | OUTPATIENT
Start: 2020-03-08 | End: 2020-06-21 | Stop reason: HOSPADM

## 2020-03-08 RX ORDER — ISOSORBIDE MONONITRATE 30 MG/1
30 TABLET, EXTENDED RELEASE ORAL DAILY
Qty: 30 TABLET | Refills: 0 | Status: ON HOLD | OUTPATIENT
Start: 2020-03-08 | End: 2020-06-21 | Stop reason: HOSPADM

## 2020-03-08 RX ORDER — AMLODIPINE BESYLATE 10 MG/1
10 TABLET ORAL DAILY
Qty: 30 TABLET | Refills: 0 | Status: ON HOLD | OUTPATIENT
Start: 2020-03-09 | End: 2020-06-21 | Stop reason: HOSPADM

## 2020-03-08 RX ORDER — POTASSIUM CHLORIDE 20 MEQ/1
20 TABLET, EXTENDED RELEASE ORAL ONCE
Status: COMPLETED | OUTPATIENT
Start: 2020-03-08 | End: 2020-03-08

## 2020-03-08 RX ORDER — METOPROLOL SUCCINATE 25 MG/1
75 TABLET, EXTENDED RELEASE ORAL 2 TIMES DAILY
Qty: 180 TABLET | Refills: 0 | Status: ON HOLD | OUTPATIENT
Start: 2020-03-08 | End: 2020-06-21 | Stop reason: HOSPADM

## 2020-03-08 RX ADMIN — INSULIN LISPRO 4 UNITS: 100 INJECTION, SOLUTION INTRAVENOUS; SUBCUTANEOUS at 11:20

## 2020-03-08 RX ADMIN — CLOPIDOGREL 75 MG: 75 TABLET, FILM COATED ORAL at 10:00

## 2020-03-08 RX ADMIN — ISOSORBIDE MONONITRATE 30 MG: 30 TABLET, EXTENDED RELEASE ORAL at 12:30

## 2020-03-08 RX ADMIN — SODIUM CHLORIDE, PRESERVATIVE FREE 10 ML: 5 INJECTION INTRAVENOUS at 10:00

## 2020-03-08 RX ADMIN — METOPROLOL SUCCINATE 75 MG: 50 TABLET, EXTENDED RELEASE ORAL at 10:01

## 2020-03-08 RX ADMIN — SUCRALFATE 1 G: 1 TABLET ORAL at 10:01

## 2020-03-08 RX ADMIN — PANTOPRAZOLE SODIUM 40 MG: 40 TABLET, DELAYED RELEASE ORAL at 06:16

## 2020-03-08 RX ADMIN — AMLODIPINE BESYLATE 10 MG: 10 TABLET ORAL at 10:00

## 2020-03-08 RX ADMIN — ASPIRIN 81 MG 81 MG: 81 TABLET ORAL at 12:30

## 2020-03-08 RX ADMIN — POTASSIUM CHLORIDE 20 MEQ: 1500 TABLET, EXTENDED RELEASE ORAL at 10:01

## 2020-03-08 RX ADMIN — SUCRALFATE 1 G: 1 TABLET ORAL at 12:30

## 2020-03-08 ASSESSMENT — PAIN SCALES - GENERAL: PAINLEVEL_OUTOF10: 0

## 2020-03-08 NOTE — DISCHARGE SUMMARY
hospitalizations this year, she was admitted earlier in February for GI bleed and anemia. She had EGD that showed small antral ulcers and large duodenal ulcers. Her troponin was elevated, she was seen by cardiologist and had stress test done that was normal.  She eventually had her mastectomy done on 2/25/2020. Postoperative course was complicated by postoperative blood loss that required surgical exploration and hematoma evacuation. She was eventually discharged on 2/28/2020. Patient came back this admission with chest pain that started day before admission, pain is central in the chest, described as pressure, severe, nonradiating, constant, worse with exertion and associated with shortness of breath. Vital signs notable for blood pressure 169/112, labs shows CO2 of 21, creatinine 1.1, proBNP 13, 470, troponin 1.77, this was recently negative, hemoglobin 10.2, INR 1.2 and LDL of 35. EKG shows sinus rhythm rate of 95 with Q waves in V1 through V4, lead III, aVF and T wave inversion laterally. Chest x-ray is negative. Doppler ultrasound of left leg is negative. CTA is negative for PE, did show coronary artery calcifications and findings concerning for CHF. Echo on 2/03/2020 showed EF of 57% with grade 1 diastolic dysfunction. No significant valvular heart disease. Acute myocardial infarction suspected late presentation for acute anterior wall MI, status post PCI to LAD, with a bare-metal stent due to the patient's recent history of GI bleed, recent mastectomy surgery two days ago, and the plan for possible CABG at some point when she heals from her mastectomy surgery     Please see consultations for details. PHYSICAL EXAM:  BP (!) 144/91   Pulse 89   Temp 97.4 °F (36.3 °C) (Temporal)   Resp 22   Ht 5' 5\" (1.651 m)   Wt 235 lb 8 oz (106.8 kg)   SpO2 97%   BMI 39.19 kg/m²     General appearance: No apparent distress appears stated age and cooperative.   HEENT Normal cephalic, atraumatic without obvious deformity. Pupils equal, round, and reactive to light. Extra ocular muscles intact. Conjunctivae/corneas clear. Neck: Supple, No jugular venous distention/bruits. Trachea midline without thyromegaly or adenopathy with full range of motion. Lungs: Clear to auscultation, bilaterally without Rales/Wheezes/Rhonchi with good respiratory effort. Heart:  S1/S2   Abdomen: Soft, non-tender or non-distended without rigidity or guarding and positive bowel sounds all four quadrants. Extremities: No clubbing, cyanosis, or edema bilaterally. Skin: Skin color, texture, turgor normal.  No rashes or lesions. Neurologic: Alert and oriented X 3  Mental status: Alert, oriented, thought content appropriate. Consults:   IP CONSULT TO CARDIOLOGY  IP CONSULT TO CARDIAC REHAB  IP CONSULT TO [address] SURGERY    Discharge Instructions / Follow up: Follow up Cardiology  Follow up PCP    Activity: activity as tolerated    Significant labs:    Labs: For convenience and continuity at follow-up the following most recent labs are provided:  CBC:   Recent Labs     03/08/20  0614   WBC 5.2   RBC 3.68   HGB 9.9*   HCT 32.7*   MCV 88.9   RDW 15.3*        BMP:   Recent Labs     03/05/20 2000 03/07/20  1302    142   K 3.8 3.9    105   CO2 23 21*   BUN 15 12   CREATININE 1.2* 0.9   MG  --  1.9     LFT:  No results for input(s): PROT, ALB, ALKPHOS, ALT, AST, BILITOT, AMYLASE, LIPASE in the last 72 hours. PT/INR: No results for input(s): INR, APTT in the last 72 hours. BNP: No results for input(s): BNP in the last 72 hours.   Hgb A1C: No results found for: LABA1C  Folate and B12:   Lab Results   Component Value Date    ABMRRRWH51 372 03/12/2015   ,   Lab Results   Component Value Date    FOLATE 14.5 03/12/2015     Thyroid Studies:   Lab Results   Component Value Date    TSH 1.500 02/04/2020       Urinalysis:    Lab Results   Component Value Date    NITRU Negative 03/11/2015    [address] [place] NONE 03/04/2015 BACTERIA FEW 2015    RBCUA NONE 2015    BLOODU Negative 2015    SPECGRAV 1.020 2015    GLUCOSEU Negative 2015       Imaging:  Xr Chest Portable    Result Date: 3/7/2020  Patient MRN: [id number] : 1960 Age:  60 years Gender: Female Order Date: 3/7/2020 12:45 PM Exam: XR CHEST PORTABLE Number of Images: 1 view Indication:  Shortness of breath Comparison: CT chest March 3, 2020 and anterior upright chest March 3, 2020 Findings: The lungs are symmetrically expanded, and shows some patchy density along the medial right upper lobe from the anterior first rib to the upper right hilar region. This appearance is new, and could represent very subtle interstitial disease without consolidation or effusion. Cardiovascular shadows show borderline cardiomegaly without decompensation. Skeletal structures show no evidence of acute pathology. Surgical clips overlie the right lower chest. Overlying oxygen tubing and EKG leads present. Very subtle peribronchial interstitial density in the medial aspect of the right upper lobe could be a manifestation of early interstitial pneumonitis or airway inflammatory change, but there is no consolidation or effusion. There is cardiomegaly without evidence of decompensation. Xr Chest Portable    Result Date: 3/3/2020  Patient MRN: [id number] : 1960 Age:  60 years Gender: Female Order Date: 3/3/2020 12:00 PM Exam: XR CHEST PORTABLE Number of Images: 1 view Indication:   chest pain chest pain Comparison: Prior chest radiograph from 2020 is available Findings: The lungs are clear. There is no evidence of pulmonary infiltrate or pleural effusion. The pulmonary vascularity is unremarkable. The cardiac, hilar and mediastinal silhouettes are satisfactory. The bony thorax demonstrates no gross abnormality. There are multiple surgical clips seen in the right lower thorax.      NO ACUTE CARDIOPULMONARY PROCESS     Xr Chest Portable    Result Date:

## 2020-03-08 NOTE — PROGRESS NOTES
Inpatient Cardiology Progress note     PATIENT IS BEING FOLLOWED FOR:NSTEMI    Cristina Wilks is a 60 y.o. female followed by Dr. Michelle Christensen. Who had a PCI on March 4 and was then evaluated by CTS who recommended possible CABG and she was to follow-up as an outpatient with Dr. Mohan Thomas . yesterday she had a run of nonsustained ventricular tachycardia. And I was called. Her EF is normal and electrolytes were ordered. She has had no further nonsustained ventricular tachycardia. SUBJECTIVE: Denies chest pain dyspnea or palpitation. OBJECTIVE: No apparent distress     ROS:  Consist: Denies fevers, chills or night sweats  Heart: Denies chest pain, palpitations, lightheadedness, dizziness or syncope  Lungs: Denies SOB, cough, wheezing, orthopnea or PND  GI: Denies abdominal pain, vomiting or diarrhea    PHYSICAL EXAM:   /89   Pulse 84   Temp 96.9 °F (36.1 °C) (Temporal)   Resp 18   Ht 5' 5\" (1.651 m)   Wt 235 lb 8 oz (106.8 kg)   SpO2 96%   BMI 39.19 kg/m²    B/P Range last 24 hours: Systolic (46EAZ), XTO:111 , Min:131 , LUIS:555    Diastolic (69LCQ), LANDON:34, Min:70, Max:89    CONST: Well developed, well nourished who appears stated age. Awake, alert and cooperative. No apparent distress  HEENT:   Head- Normocephalic, atraumatic   Eyes- Conjunctivae pink, anicteric  Throat- Oral mucosa pink and moist  Neck-  No stridor, trachea midline, no jugular venous distention. No carotid bruit  CHEST: Chest symmetrical and non-tender to palpation. No accessory muscle use or intercostal retractions  RESPIRATORY:  Lung sounds - clear throughout fields but coarse  CARDIOVASCULAR:     Heart Inspection- shows no noted pulsations  Heart Palpation- no heaves or thrills; PMI is non-displaced   Heart Ausculation- Regular rate and rhythm, no murmur. No s3, s4 or rub   PV: 1+ lower extremity edema on the right lower extremity. No varicosities.  Pedal pulses palpable, no clubbing or cyanosis   ABDOMEN: Soft, non-tender to light palpation. Bowel sounds present. No palpable masses no organomegaly; no abdominal bruit  MS: Good muscle strength and tone. No atrophy or abnormal movements. : Deferred  SKIN: Warm and dry no statis dermatitis or ulcers   NEURO / PSYCH: Oriented to person, place and time. Speech clear and appropriate. Follows all commands. Pleasant affect       Intake/Output Summary (Last 24 hours) at 3/8/2020 0937  Last data filed at 3/8/2020 9498  Gross per 24 hour   Intake 1170 ml   Output 1600 ml   Net -430 ml       Weight:   Wt Readings from Last 3 Encounters:   03/08/20 235 lb 8 oz (106.8 kg)   03/03/20 230 lb (104.3 kg)   02/28/20 230 lb 9.6 oz (104.6 kg)     Current Inpatient Medications:   potassium chloride  20 mEq Oral Once    metoprolol succinate  75 mg Oral BID    amLODIPine  10 mg Oral Daily    sodium chloride flush  10 mL Intravenous 2 times per day    clopidogrel  75 mg Oral Daily    atorvastatin  40 mg Oral Nightly    gabapentin  300 mg Oral Nightly    pantoprazole  40 mg Oral BID AC    sucralfate  1 g Oral 4x Daily WC    insulin lispro  0-10 Units Subcutaneous 4x Daily AC & HS       IV Infusions (if any):   dextrose         DIAGNOSTIC/ LABORATORY DATA:  Labs:   CBC:   Recent Labs     03/08/20  0614   WBC 5.2   HGB 9.9*   HCT 32.7*        BMP:   Recent Labs     03/05/20 2000 03/07/20  1302    142   K 3.8 3.9   CO2 23 21*   BUN 15 12   CREATININE 1.2* 0.9   LABGLOM 55 >60   CALCIUM 8.6 9.1     Mag:   Recent Labs     03/07/20  1302   MG 1.9     Phos: No results for input(s): PHOS in the last 72 hours. TSH: No results for input(s): TSH in the last 72 hours. HgA1c: No results found for: LABA1C  No results found for: EAG    BNP: No results for input(s): BNP in the last 72 hours. PT/INR: No results for input(s): PROTIME, INR in the last 72 hours. APTT:No results for input(s): APTT in the last 72 hours.   CARDIAC ENZYMES:No results for input(s): CKTOTAL, CKMB, CKMBINDEX, TROPONINI in the last 72 hours. FASTING LIPID PANEL:  Lab Results   Component Value Date    CHOL 69 03/04/2020    HDL 26 03/04/2020    LDLCALC 22 03/04/2020    TRIG 107 03/04/2020     LIVER PROFILE:No results for input(s): AST, ALT, LABALBU in the last 72 hours. CXR: Questionable pneumonitis or inflammation    Telemetry: Sinus rhythm with one episode of nonsustained ventricular tachycardia yesterday and no recurrence  12 lead EKG:    March 4 2020 left heart cath      IMPRESSION:  1. Totally occluded left anterior descending artery with successful PCI  using 2.5 x 18 mm mini Vision bare metal stent (bare metal stent was  used due to the patient's recent history of GI bleed, recent mastectomy  surgery two days ago, and the plan for possible CABG down the road when able with a 50% left main  she heals from her mastectomy surgery). 2.  At least 50% distal left main disease. 3.  Mild-to-moderate ostial left circumflex disease with moderate  disease involving the vessel distally. 4.  Moderate disease involving the mid segment of the RCA. 5.  Moderate disease involving second OM branch and second diagonal  branch.     RECOMMENDATIONS:  1. Aspirin for life. 2.  P2Y12 inhibitors for at least a month, preferably longer. 3.  Aggressive coronary artery disease risk factor modifications. 4.  Evaluation by CT Surgery when stable from other comorbid condition  standpoint.         Echo: February 3, 2020    Normal  left ventricular chamber size. Normal left ventricular systolic function, LVEF is 57%. Distal septal and apical septal hypokinesis. Stage I diastolic dysfunction. Normal left atrial pressure. Left atrium is of normal size. Interatrial septum not well visualized but appears intact. Normal right ventricle structure and function. Normal mitral valve structure and function. No mitral valve prolapse. Normal aortic valve structure and function. Normal tricuspid valve structure.    There is trace tricuspid

## 2020-03-08 NOTE — PROGRESS NOTES
Patient is seen for NSTEMI elevation MI    Subjective:     Ms. Figueroa Other better today, shortness of breath and chest pain are Better  Sitting up in bed in mild distress    ROS:  CONSTITUTIONAL:  negative for  fevers, chills  HEENT:  negative for earaches, nasal congestion and epistaxis  RESPIRATORY:  negative for  dry cough, cough with sputum,wheezing and hemoptysis  GASTROINTESTINAL:  negative for nausea, vomiting  MUSCULOSKELETAL:  negative for  myalgias, arthralgias  NEUROLOGICAL:  negative for visual disturbance, dysphagia    Medication side effects: none    Scheduled Meds:   amLODIPine  10 mg Oral Daily    sodium chloride flush  10 mL Intravenous 2 times per day    clopidogrel  75 mg Oral Daily    atorvastatin  40 mg Oral Nightly    gabapentin  300 mg Oral Nightly    metoprolol succinate  50 mg Oral BID    pantoprazole  40 mg Oral BID AC    sucralfate  1 g Oral 4x Daily WC    insulin lispro  0-10 Units Subcutaneous 4x Daily AC & HS     Continuous Infusions:   dextrose       PRN Meds:sodium chloride flush, magnesium hydroxide, morphine, HYDROcodone 5 mg - acetaminophen, acetaminophen **OR** [DISCONTINUED] acetaminophen, polyethylene glycol, glucose, dextrose, glucagon (rDNA), dextrose      Objective:      Physical Exam:   /89   Pulse 84   Temp 96.9 °F (36.1 °C) (Temporal)   Resp 18   Ht 5' 5\" (1.651 m)   Wt 235 lb 6.4 oz (106.8 kg)   SpO2 96%   BMI 39.17 kg/m²   CONSTITUTIONAL:  awake, alert, cooperative, no apparent distress, and appears stated age  HEAD:  normocepalic, without obvious abnormality, atraumatic  NECK:  Supple, symmetrical, trachea midline, no adenopathy, thyroid symmetric, not enlarged and no tenderness, skin normal  LUNGS:  No increased work of breathing, good air exchange, clear to auscultation bilaterally, no crackles or wheezing  CARDIOVASCULAR:  Normal apical impulse, regular rate and rhythm, normal S1 and S2, no S3 or S4, and no murmur noted, no edema, no JVD, no 03/05/2020    HGB 8.7 03/05/2020    HCT 28.2 03/05/2020    MCV 87.9 03/05/2020     03/05/2020     I have personally reviewed the laboratory, cardiac diagnostic and radiographic testing as outlined above:    Assessment:     1. Acute myocardial infarction:Suspect late presentation for acute anterior wall MI,Status post PCI to LAD, doing fine, we'll continue current treatment  2. CAD: Following distal left main disease, will consult CV surgery  3. Hypertension: controlled  4. Type 2 diabetes mellitus  5. History of GI bleed with need for transfusion couple of weeks ago  6. History of CVA  7. History of left mastectomy on 10/25/2020      Recommendations:     1. Will continue current treatment  2. Will increase ambulation as tolerated  3. CT surgery evaluation  4. Basic metabolic panel and CBC in a.m.  5.  Further cardiac recommendations will be forthcoming pending her clinical course and the findings of her cardiac catheterization      Discussed with patient, no family at bedside  Electronically signed by Adenike Deluca MD on 3/8/2020 at 12:01 AM  NOTE: This report was transcribed using voice recognition software.  Every effort was made to ensure accuracy; however, inadvertent computerized transcription errors may be present

## 2020-03-19 NOTE — PROGRESS NOTES
Patient notified that appointment for 3/26/2020 has been cancelled and rescheduled for after 5/18/2020

## 2020-03-26 ENCOUNTER — HOSPITAL ENCOUNTER (OUTPATIENT)
Dept: INFUSION THERAPY | Age: 60
Discharge: HOME | End: 2020-03-26

## 2020-04-02 ENCOUNTER — TELEPHONE (OUTPATIENT)
Dept: CARDIOLOGY CLINIC | Age: 60
End: 2020-04-02

## 2020-04-06 VITALS
HEIGHT: 65 IN | BODY MASS INDEX: 35.65 KG/M2 | WEIGHT: 214 LBS | SYSTOLIC BLOOD PRESSURE: 132 MMHG | DIASTOLIC BLOOD PRESSURE: 84 MMHG

## 2020-04-07 ENCOUNTER — VIRTUAL VISIT (OUTPATIENT)
Dept: CARDIOTHORACIC SURGERY | Age: 60
End: 2020-04-07
Payer: MEDICARE

## 2020-04-07 PROCEDURE — 99443 PR PHYS/QHP TELEPHONE EVALUATION 21-30 MIN: CPT | Performed by: THORACIC SURGERY (CARDIOTHORACIC VASCULAR SURGERY)

## 2020-04-07 ASSESSMENT — ENCOUNTER SYMPTOMS
CONSTIPATION: 0
ABDOMINAL PAIN: 0
SHORTNESS OF BREATH: 0
COUGH: 0

## 2020-04-07 NOTE — PROGRESS NOTES
Subjective:      Patient ID: Karol Olszewski is a 60 y.o. female. CC: NSTEMI s/p PCI 3/2020    Karol Olszewski is a 60 y.o. female evaluated via telephone on 4/7/2020. Consent:  She and/or health care decision maker is aware that that she may receive a bill for this telephone service, depending on her insurance coverage, and has provided verbal consent to proceed: Yes      Documentation:  I communicated with the patient and/or health care decision maker about possible surgery. Details of this discussion including any medical advice provided: yes. I affirm this is a Patient Initiated Episode with an Established Patient who has not had a related appointment within my department in the past 7 days or scheduled within the next 24 hours. Total Time: minutes: 21-30 minutes    Note: not billable if this call serves to triage the patient into an appointment for the relevant concern      Stephany Ashley      HPI: 60year old S/P left mastectomy 2/2020 with return to OR for hematoma who was found to have a NSTEMI. She had a cath performed with PCI to her LAD which was occluded. We are asked to see her for her MV CAD. She has been on DAPT for a month s/p stent. Currently she denies CP, SOB, ROMERO, LE edema, orthopnea, and PND. HPI    Review of Systems   Constitutional: Negative for chills and fever. Respiratory: Negative for cough and shortness of breath. Cardiovascular: Negative for chest pain and palpitations. Gastrointestinal: Negative for abdominal pain and constipation. Neurological: Negative for syncope and light-headedness. Objective:   Physical Exam  Vitals reviewed: PE deferred. Assessment:      MV CAD, S/P PCI      Plan:      She is doing well without symptoms. I advised her to continue her plavix and we will make a face to face appt for her to see me in May to be scheduled due to the Ke virus.

## 2020-04-08 ENCOUNTER — VIRTUAL VISIT (OUTPATIENT)
Dept: CARDIOLOGY CLINIC | Age: 60
End: 2020-04-08
Payer: MEDICARE

## 2020-04-08 VITALS
BODY MASS INDEX: 35.65 KG/M2 | WEIGHT: 214 LBS | SYSTOLIC BLOOD PRESSURE: 144 MMHG | DIASTOLIC BLOOD PRESSURE: 91 MMHG | HEIGHT: 65 IN | HEART RATE: 107 BPM

## 2020-04-08 PROCEDURE — G2012 BRIEF CHECK IN BY MD/QHP: HCPCS | Performed by: INTERNAL MEDICINE

## 2020-04-08 RX ORDER — ANASTROZOLE 1 MG/1
TABLET ORAL
COMMUNITY
Start: 2020-03-16 | End: 2020-06-01 | Stop reason: SDUPTHER

## 2020-04-08 NOTE — PROGRESS NOTES
[facility] Cardiology Progress Note  Dr. Senthil Joe      Referring Physician: KRISTEN Loyd  CHIEF COMPLAINT: No chief complaint on file. Mary Kate Brunner is a 60 y.o. female evaluated via telephone on 4/8/2020. Consent:  She and/or health care decision maker is aware that that she may receive a bill for this telephone service, depending on her insurance coverage, and has provided verbal consent to proceed: Yes      Documentation:  I communicated with the patient and/or health care decision maker about possible PC  Details of this discussion including any medical advice provided:      I affirm this is a Patient Initiated Episode with an Established Patient who has not had a related appointment within my department in the past 7 days or scheduled within the next 24 hours.     Total Time: minutes: 5-10 minutes    Note: not billable if this call serves to triage the patient into an appointment for the relevant concern      Senthil Joe       Past Medical History:   Diagnosis Date    Breast [facility] [address] 2000, 2005    right    Breast cancer ([facility] [address] 2020    left    CAD (coronary artery disease)     follows with Dr. Paul Coffman [facility]A [facility]rebral vascular accident) [facility]) 2015    no deficits    DM (diabetes mellitus) ([facility] [address]     H/O: GI bleed     Hyperlipidemia     Hypertension          Past Surgical History:   Procedure Laterality Date    APPENDECTOMY      BREAST RECONSTRUCTION Right     BREAST SURGERY  2000    right masectomy    COLONOSCOPY      CORONARY ANGIOPLASTY WITH STENT PLACEMENT  03/04/2020    Mini Vision 2.5 x 18 stent deployed proximal LAD by DR France Johnson    FOOT SURGERY      right    HYSTERECTOMY      fibroids    KNEE ARTHROSCOPY      left    MASTECTOMY Right 2000    MASTECTOMY, MODIFIED RADICAL Left 2/25/2020    LEFT BREAST SIMPLE MASTECTOMY performed by Gilford Cara, MD at [place] [address] Left 2/25/2020    LEFT BREAST MASTECTOMY POST OP BLEED CONTROL, EVACUATION OF heals from her mastectomy surgery). 2.  At least 50% distal left main disease. 3.  Mild-to-moderate ostial left circumflex disease with moderate  disease involving the vessel distally. 4.  Moderate disease involving the mid segment of the RCA. 5.  Moderate disease involving second OM branch and second diagonal  branch.     Cardiology Labs: BMP:    Lab Results   Component Value Date     03/07/2020    K 3.9 03/07/2020    K 3.8 03/05/2020     03/07/2020    CO2 21 03/07/2020    BUN 12 03/07/2020    CREATININE 0.9 03/07/2020     CMP:    Lab Results   Component Value Date     03/07/2020    K 3.9 03/07/2020    K 3.8 03/05/2020     03/07/2020    CO2 21 03/07/2020    BUN 12 03/07/2020    CREATININE 0.9 03/07/2020    PROT 6.7 03/03/2020     CBC:    Lab Results   Component Value Date    WBC 5.2 03/08/2020    RBC 3.68 03/08/2020    HGB 9.9 03/08/2020    HCT 32.7 03/08/2020    MCV 88.9 03/08/2020    RDW 15.3 03/08/2020     03/08/2020     PT/INR:  No results found for: PTINR  PT/INR Warfarin:  No components found for: PTPATWAR, PTINRWAR  PTT:    Lab Results   Component Value Date    APTT 95.4 03/04/2020     PTT Heparin:  No components found for: APTTHEP  Magnesium:    Lab Results   Component Value Date    MG 1.9 03/07/2020     TSH:    Lab Results   Component Value Date    TSH 1.500 02/04/2020     TROPONIN:  No components found for: TROP  BNP:  No results found for: BNP  FASTING LIPID PANEL:    Lab Results   Component Value Date    CHOL 69 03/04/2020    HDL 26 03/04/2020    TRIG 107 03/04/2020     No orders to display     I have personally reviewed the laboratory, cardiac diagnostic and radiographic testing as outlined above:      IMPRESSION:  1. Acute myocardial infarction:Suspect late presentation for acute anterior wall MI,Status post PCI to LAD, doing fine, we'll continue current treatment  2. CAD: Following distal left main disease, Follow-up with the CT surgeon as an outpatient  3.

## 2020-05-19 ENCOUNTER — INITIAL CONSULT (OUTPATIENT)
Dept: CARDIOTHORACIC SURGERY | Age: 60
End: 2020-05-19
Payer: MEDICARE

## 2020-05-19 ENCOUNTER — PREP FOR PROCEDURE (OUTPATIENT)
Dept: CARDIOTHORACIC SURGERY | Age: 60
End: 2020-05-19

## 2020-05-19 VITALS
WEIGHT: 224 LBS | BODY MASS INDEX: 37.32 KG/M2 | DIASTOLIC BLOOD PRESSURE: 90 MMHG | HEIGHT: 65 IN | SYSTOLIC BLOOD PRESSURE: 140 MMHG

## 2020-05-19 DIAGNOSIS — Z86.73 HISTORY OF STROKE: ICD-10-CM

## 2020-05-19 DIAGNOSIS — Z01.818 PRE-OPERATIVE EXAMINATION: Primary | ICD-10-CM

## 2020-05-19 DIAGNOSIS — I25.10 CAD IN NATIVE ARTERY: ICD-10-CM

## 2020-05-19 DIAGNOSIS — E11.69 TYPE 2 DIABETES MELLITUS WITH OTHER SPECIFIED COMPLICATION, UNSPECIFIED WHETHER LONG TERM INSULIN USE (HCC): ICD-10-CM

## 2020-05-19 PROCEDURE — G8417 CALC BMI ABV UP PARAM F/U: HCPCS | Performed by: THORACIC SURGERY (CARDIOTHORACIC VASCULAR SURGERY)

## 2020-05-19 PROCEDURE — 1036F TOBACCO NON-USER: CPT | Performed by: THORACIC SURGERY (CARDIOTHORACIC VASCULAR SURGERY)

## 2020-05-19 PROCEDURE — 3017F COLORECTAL CA SCREEN DOC REV: CPT | Performed by: THORACIC SURGERY (CARDIOTHORACIC VASCULAR SURGERY)

## 2020-05-19 PROCEDURE — G8427 DOCREV CUR MEDS BY ELIG CLIN: HCPCS | Performed by: THORACIC SURGERY (CARDIOTHORACIC VASCULAR SURGERY)

## 2020-05-19 PROCEDURE — 99214 OFFICE O/P EST MOD 30 MIN: CPT | Performed by: THORACIC SURGERY (CARDIOTHORACIC VASCULAR SURGERY)

## 2020-05-19 RX ORDER — CHLORHEXIDINE GLUCONATE ORAL RINSE 1.2 MG/ML
15 SOLUTION DENTAL ONCE
Status: CANCELLED | OUTPATIENT
Start: 2020-05-19 | End: 2020-05-19

## 2020-05-19 RX ORDER — SODIUM CHLORIDE 9 MG/ML
INJECTION, SOLUTION INTRAVENOUS CONTINUOUS
Status: CANCELLED | OUTPATIENT
Start: 2020-05-19

## 2020-05-19 RX ORDER — SODIUM CHLORIDE 0.9 % (FLUSH) 0.9 %
10 SYRINGE (ML) INJECTION EVERY 12 HOURS SCHEDULED
Status: CANCELLED | OUTPATIENT
Start: 2020-05-19

## 2020-05-19 RX ORDER — CHLORHEXIDINE GLUCONATE 4 G/100ML
SOLUTION TOPICAL ONCE
Status: CANCELLED | OUTPATIENT
Start: 2020-05-19 | End: 2020-05-19

## 2020-05-19 RX ORDER — SODIUM CHLORIDE 0.9 % (FLUSH) 0.9 %
10 SYRINGE (ML) INJECTION PRN
Status: CANCELLED | OUTPATIENT
Start: 2020-05-19

## 2020-05-19 ASSESSMENT — ENCOUNTER SYMPTOMS
COUGH: 0
SHORTNESS OF BREATH: 0

## 2020-05-19 NOTE — H&P
Alec Black is a 63 y.o. female who was previous called via telephone consult on 4/7/2020 due to the Cvodi-19 pandemic. Patient is  A 60year old S/P left mastectomy 2/2020 with return to OR for hematoma who was found to have a NSTEMI.  She had a cath performed with PCI to her LAD which was occluded.  We are asked to see her for her MV CAD.  She has been on DAPT for 2 months s/p stent.  She presented back to office last week to cont to discuss surgery. Currently she denies CP, SOB, ROMERO, LE edema, orthopnea, and PND.     Past Medical History:   Diagnosis Date    Breast cancer ([facility] [address] 2000, 2005    right    Breast cancer ([facility] [address] 2020    left    CAD (coronary artery disease)     follows with Dr. Patricio Herrera [facility]A [facility]rebral vascular [facility]) [facility]) 2015    no deficits    DM (diabetes mellitus) ([facility] [address]     H/O: GI bleed     Hyperlipidemia     Hypertension        Past Surgical History:   Procedure Laterality Date    APPENDECTOMY      BREAST RECONSTRUCTION Right     BREAST SURGERY  2000    right masectomy    COLONOSCOPY      CORONARY ANGIOPLASTY WITH STENT PLACEMENT  03/04/2020    Mini Vision 2.5 x 18 stent deployed proximal LAD by DR Tavo Hughes    FOOT SURGERY      right    HYSTERECTOMY      fibroids    KNEE ARTHROSCOPY      left    MASTECTOMY Right 2000    MASTECTOMY, MODIFIED RADICAL Left 2/25/2020    LEFT BREAST SIMPLE MASTECTOMY performed by Lisha Wren MD at [address], MODIFIED RADICAL Left 2/25/2020    LEFT BREAST MASTECTOMY POST OP BLEED CONTROL, EVACUATION OF LEFT CHEST WALL HEMATOMA performed by Lisha Wren MD at [address] GASTROINTESTINAL ENDOSCOPY N/A 2/5/2020    EGD BIOPSY performed by Lisha Wren MD at [place] [address] History     Tobacco Use    Smoking status: Former Smoker     Packs/day: 0.20     Years: 2.00     Pack years: 0.40     Types: Cigarettes    Smokeless tobacco: Never Used    Tobacco comment: as a teen   Substance Use Topics    Alcohol use: Not Currently     Alcohol/week: 6.0 standard drinks     Types: 6 Standard drinks or equivalent per week     Comment: No coffee. green tea    Drug use: Not Currently     Frequency: 2.0 times per week     Types: Marijuana       Family History   Problem Relation Age of Onset    Stroke Mother     Heart Failure Mother     Other Mother         ICD    Breast Cancer Mother     Pacemaker Father     Heart Failure Father     Hypertension Sister        No current facility-administered medications for this encounter. Current Outpatient Medications:     anastrozole (ARIMIDEX) 1 MG tablet, take 1 tablet by mouth once daily, Disp: , Rfl:     aspirin 81 MG chewable tablet, Take 1 tablet by mouth daily, Disp: 30 tablet, Rfl: 0    isosorbide mononitrate (IMDUR) 30 MG extended release tablet, Take 1 tablet by mouth daily, Disp: 30 tablet, Rfl: 0    metoprolol succinate (TOPROL XL) 25 MG extended release tablet, Take 3 tablets by mouth 2 times daily (Patient taking differently: Take 75 mg by mouth daily 2 tablets in the morning and 1 tablet in the evening.), Disp: 180 tablet, Rfl: 0    amLODIPine (NORVASC) 10 MG tablet, Take 1 tablet by mouth daily, Disp: 30 tablet, Rfl: 0    clopidogrel (PLAVIX) 75 MG tablet, Take 1 tablet by mouth daily, Disp: 30 tablet, Rfl: 0    sucralfate (CARAFATE) 1 GM tablet, Take 1 tablet by mouth 4 times daily (with meals and nightly), Disp: 120 tablet, Rfl: 3    pantoprazole (PROTONIX) 40 MG tablet, Take 1 tablet by mouth 2 times daily (before meals), Disp: 180 tablet, Rfl: 1    glimepiride (AMARYL) 1 MG tablet, Take 1 mg by mouth every morning (before breakfast), Disp: , Rfl:     metFORMIN (GLUCOPHAGE) 500 MG tablet, Take 500 mg by mouth 2 times daily (with meals), Disp: , Rfl:     gabapentin (NEURONTIN) 300 MG capsule, Take 300 mg by mouth nightly. ., Disp: , Rfl:     atorvastatin (LIPITOR) 40 MG tablet, Take 1 tablet by

## 2020-05-21 ENCOUNTER — TELEPHONE (OUTPATIENT)
Dept: CARDIOTHORACIC SURGERY | Age: 60
End: 2020-05-21

## 2020-05-22 ENCOUNTER — HOSPITAL ENCOUNTER (OUTPATIENT)
Dept: PULMONOLOGY | Age: 60
Discharge: HOME | End: 2020-05-22
Payer: MEDICARE

## 2020-05-22 PROCEDURE — 94729 DIFFUSING CAPACITY: CPT

## 2020-05-22 PROCEDURE — 94010 BREATHING CAPACITY TEST: CPT | Performed by: INTERNAL MEDICINE

## 2020-05-22 PROCEDURE — 94375 RESPIRATORY FLOW VOLUME LOOP: CPT

## 2020-05-22 PROCEDURE — 94726 PLETHYSMOGRAPHY LUNG VOLUMES: CPT | Performed by: INTERNAL MEDICINE

## 2020-05-22 PROCEDURE — 94729 DIFFUSING CAPACITY: CPT | Performed by: INTERNAL MEDICINE

## 2020-06-01 ENCOUNTER — OFFICE VISIT (OUTPATIENT)
Dept: ONCOLOGY | Age: 60
End: 2020-06-01
Payer: MEDICARE

## 2020-06-01 ENCOUNTER — HOSPITAL ENCOUNTER (OUTPATIENT)
Dept: GENERAL RADIOLOGY | Age: 60
Discharge: HOME | End: 2020-06-03
Payer: MEDICARE

## 2020-06-01 ENCOUNTER — HOSPITAL ENCOUNTER (OUTPATIENT)
Dept: INFUSION THERAPY | Age: 60
Discharge: HOME | End: 2020-06-01
Payer: MEDICARE

## 2020-06-01 VITALS
HEIGHT: 65 IN | HEART RATE: 81 BPM | SYSTOLIC BLOOD PRESSURE: 158 MMHG | TEMPERATURE: 98.3 F | OXYGEN SATURATION: 99 % | BODY MASS INDEX: 37.3 KG/M2 | WEIGHT: 223.9 LBS | DIASTOLIC BLOOD PRESSURE: 83 MMHG

## 2020-06-01 DIAGNOSIS — C53.0 CANCER OF ENDOCERVIX (HCC): ICD-10-CM

## 2020-06-01 LAB
ALBUMIN SERPL-MCNC: 4.1 G/DL (ref 3.5–5.2)
ALP BLD-CCNC: 96 U/L (ref 35–104)
ALT SERPL-CCNC: 7 U/L (ref 0–32)
ANION GAP SERPL CALCULATED.3IONS-SCNC: 14 MMOL/L (ref 7–16)
ANISOCYTOSIS: ABNORMAL
AST SERPL-CCNC: 12 U/L (ref 0–31)
BASOPHILS ABSOLUTE: 0.01 E9/L (ref 0–0.2)
BASOPHILS RELATIVE PERCENT: 0.3 % (ref 0–2)
BILIRUB SERPL-MCNC: <0.2 MG/DL (ref 0–1.2)
BUN BLDV-MCNC: 13 MG/DL (ref 8–23)
BURR CELLS: ABNORMAL
CA 125: 13.2 U/ML (ref 0–35)
CALCIUM SERPL-MCNC: 9.3 MG/DL (ref 8.6–10.2)
CHLORIDE BLD-SCNC: 105 MMOL/L (ref 98–107)
CO2: 22 MMOL/L (ref 22–29)
CREAT SERPL-MCNC: 1 MG/DL (ref 0.5–1)
EOSINOPHILS ABSOLUTE: 0.06 E9/L (ref 0.05–0.5)
EOSINOPHILS RELATIVE PERCENT: 1.8 % (ref 0–6)
GFR AFRICAN AMERICAN: >60
GFR NON-AFRICAN AMERICAN: >60 ML/MIN/1.73
GLUCOSE BLD-MCNC: 151 MG/DL (ref 74–99)
HCT VFR BLD CALC: 31.8 % (ref 34–48)
HEMOGLOBIN: 9.3 G/DL (ref 11.5–15.5)
IMMATURE GRANULOCYTES #: 0.02 E9/L
IMMATURE GRANULOCYTES %: 0.6 % (ref 0–5)
LYMPHOCYTES ABSOLUTE: 0.44 E9/L (ref 1.5–4)
LYMPHOCYTES RELATIVE PERCENT: 12.9 % (ref 20–42)
MCH RBC QN AUTO: 24.2 PG (ref 26–35)
MCHC RBC AUTO-ENTMCNC: 29.2 % (ref 32–34.5)
MCV RBC AUTO: 82.8 FL (ref 80–99.9)
MONOCYTES ABSOLUTE: 0.45 E9/L (ref 0.1–0.95)
MONOCYTES RELATIVE PERCENT: 13.2 % (ref 2–12)
NEUTROPHILS ABSOLUTE: 2.42 E9/L (ref 1.8–7.3)
NEUTROPHILS RELATIVE PERCENT: 71.2 % (ref 43–80)
OVALOCYTES: ABNORMAL
PDW BLD-RTO: 16.7 FL (ref 11.5–15)
PLATELET # BLD: 311 E9/L (ref 130–450)
PMV BLD AUTO: 10.7 FL (ref 7–12)
POIKILOCYTES: ABNORMAL
POLYCHROMASIA: ABNORMAL
POTASSIUM SERPL-SCNC: 3.8 MMOL/L (ref 3.5–5)
RBC # BLD: 3.84 E12/L (ref 3.5–5.5)
SCHISTOCYTES: ABNORMAL
SODIUM BLD-SCNC: 141 MMOL/L (ref 132–146)
TOTAL PROTEIN: 7.4 G/DL (ref 6.4–8.3)
WBC # BLD: 3.4 E9/L (ref 4.5–11.5)

## 2020-06-01 PROCEDURE — 99212 OFFICE O/P EST SF 10 MIN: CPT

## 2020-06-01 PROCEDURE — 80053 COMPREHEN METABOLIC PANEL: CPT

## 2020-06-01 PROCEDURE — 76882 US LMTD JT/FCL EVL NVASC XTR: CPT

## 2020-06-01 PROCEDURE — G8417 CALC BMI ABV UP PARAM F/U: HCPCS | Performed by: INTERNAL MEDICINE

## 2020-06-01 PROCEDURE — G8428 CUR MEDS NOT DOCUMENT: HCPCS | Performed by: INTERNAL MEDICINE

## 2020-06-01 PROCEDURE — 36415 COLL VENOUS BLD VENIPUNCTURE: CPT

## 2020-06-01 PROCEDURE — 85025 COMPLETE CBC W/AUTO DIFF WBC: CPT

## 2020-06-01 PROCEDURE — 86304 IMMUNOASSAY TUMOR CA 125: CPT

## 2020-06-01 PROCEDURE — 77080 DXA BONE DENSITY AXIAL: CPT

## 2020-06-01 PROCEDURE — 3017F COLORECTAL CA SCREEN DOC REV: CPT | Performed by: INTERNAL MEDICINE

## 2020-06-01 PROCEDURE — 99215 OFFICE O/P EST HI 40 MIN: CPT | Performed by: INTERNAL MEDICINE

## 2020-06-01 PROCEDURE — 1036F TOBACCO NON-USER: CPT | Performed by: INTERNAL MEDICINE

## 2020-06-01 RX ORDER — ANASTROZOLE 1 MG/1
TABLET ORAL
Qty: 30 TABLET | Refills: 2 | Status: SHIPPED
Start: 2020-06-01 | End: 2020-07-14

## 2020-06-01 NOTE — PROGRESS NOTES
completed 12/27/2019. HDR brachytherapy (1/14/2020-1/16/2020)  PET/CT scan 05/18/2020 noted no evidence of disease. Left Breast Cancer Feb 2020  Left simple mastectomy on 02/25/2020:  A. Left breast, simple mastectomy:   - Invasive carcinoma of no special type (ductal), grade 2; tumor size 0.8 x 0.7 x 0.5 cm; LVI not identified.   - Ductal carcinoma in situ, intermediate to high nuclear grade,cribriform and solid types, with necrosis;   - Sclerosing fibroadenomatoid nodules and gynecomastia-like changes;   - Microcalcifications in DCIS;   - Scar and foreign body-type granuloma of prior biopsy site;  - Additional skin/breast tissue showing no pathologic alteration. B. Left breast, new inferior-medial margin, excision: Negative for malignancy. pT1b pNx  Breast Cancer Marker Studies:  Estrogen Receptors (ER): Positive >90%  Progesterone Receptors (OR): Positive:40%  Her-2/heidi (c-erb B-2) protein expression: Positive (score 3+)    She's on Arimidex 1 mg po daily with fair tolerance. Not on Herceptin due to CHF and CAD. EF 34% Feb 2020  No SLNB done. Left axillary U/S today 06/01/2020. Patient will see Breast Team at [address] [place] [address] scan today 06/01/2020. Review of Systems;  CONSTITUTIONAL: No fever, chills. Fair appetite and energy level. ENMT: Eyes: No diplopia; Nose: No epistaxis. Mouth: No sore throat. RESPIRATORY: No hemoptysis, shortness of breath, cough. CARDIOVASCULAR: No chest pain, palpitations. GASTROINTESTINAL: No nausea/vomiting, abdominal pain, diarrhea/constipation. GENITOURINARY: No dysuria, urinary frequency, hematuria. NEURO: No syncope, presyncope, headache.   Remainder:  ROS NEGATIVE    Past Medical History:      Diagnosis Date    Breast cancer ([facility] [address] 2000, 2005    right    Breast cancer ([facility] [address] 2020    left    CAD (coronary artery disease)     follows with Dr. Geneva Rocha CVA (cerebral vascular accident) [facility]) 2015    no deficits    DM (diabetes mellitus) ([facility] [address]     H/O: GI bleed

## 2020-06-05 ENCOUNTER — HOSPITAL ENCOUNTER (OUTPATIENT)
Age: 60
Discharge: HOME | End: 2020-06-07
Payer: MEDICARE

## 2020-06-05 PROCEDURE — U0003 INFECTIOUS AGENT DETECTION BY NUCLEIC ACID (DNA OR RNA); SEVERE ACUTE RESPIRATORY SYNDROME CORONAVIRUS 2 (SARS-COV-2) (CORONAVIRUS DISEASE [COVID-19]), AMPLIFIED PROBE TECHNIQUE, MAKING USE OF HIGH THROUGHPUT TECHNOLOGIES AS DESCRIBED BY CMS-2020-01-R: HCPCS

## 2020-06-07 LAB
SARS-COV-2: NOT DETECTED
SOURCE: NORMAL

## 2020-06-09 ENCOUNTER — HOSPITAL ENCOUNTER (OUTPATIENT)
Dept: ULTRASOUND IMAGING | Age: 60
Discharge: HOME | DRG: 236 | End: 2020-06-11
Payer: MEDICARE

## 2020-06-09 ENCOUNTER — HOSPITAL ENCOUNTER (OUTPATIENT)
Dept: INTERVENTIONAL RADIOLOGY/VASCULAR | Age: 60
Discharge: HOME | DRG: 236 | End: 2020-06-11
Payer: MEDICARE

## 2020-06-09 ENCOUNTER — HOSPITAL ENCOUNTER (OUTPATIENT)
Dept: PREADMISSION TESTING | Age: 60
Discharge: HOME | DRG: 236 | End: 2020-06-09
Payer: MEDICARE

## 2020-06-09 ENCOUNTER — HOSPITAL ENCOUNTER (OUTPATIENT)
Dept: GENERAL RADIOLOGY | Age: 60
Discharge: HOME | DRG: 236 | End: 2020-06-11
Attending: THORACIC SURGERY (CARDIOTHORACIC VASCULAR SURGERY)
Payer: MEDICARE

## 2020-06-09 VITALS
RESPIRATION RATE: 12 BRPM | TEMPERATURE: 97 F | BODY MASS INDEX: 37.15 KG/M2 | WEIGHT: 223 LBS | DIASTOLIC BLOOD PRESSURE: 78 MMHG | HEIGHT: 65 IN | OXYGEN SATURATION: 98 % | HEART RATE: 93 BPM | SYSTOLIC BLOOD PRESSURE: 115 MMHG

## 2020-06-09 LAB
ABO/RH: NORMAL
ALBUMIN SERPL-MCNC: 3.9 G/DL (ref 3.5–5.2)
ALP BLD-CCNC: 94 U/L (ref 35–104)
ALT SERPL-CCNC: 7 U/L (ref 0–32)
ANION GAP SERPL CALCULATED.3IONS-SCNC: 11 MMOL/L (ref 7–16)
ANTIBODY SCREEN: NORMAL
APTT: 35.2 SEC (ref 24.5–35.1)
AST SERPL-CCNC: 12 U/L (ref 0–31)
BACTERIA: ABNORMAL /HPF
BILIRUB SERPL-MCNC: <0.2 MG/DL (ref 0–1.2)
BILIRUBIN URINE: NEGATIVE
BLOOD, URINE: NEGATIVE
BUN BLDV-MCNC: 13 MG/DL (ref 8–23)
CALCIUM SERPL-MCNC: 8.9 MG/DL (ref 8.6–10.2)
CHLORIDE BLD-SCNC: 111 MMOL/L (ref 98–107)
CLARITY: ABNORMAL
CO2: 22 MMOL/L (ref 22–29)
COLOR: YELLOW
CREAT SERPL-MCNC: 1 MG/DL (ref 0.5–1)
EKG ATRIAL RATE: 74 BPM
EKG P AXIS: 33 DEGREES
EKG P-R INTERVAL: 122 MS
EKG Q-T INTERVAL: 376 MS
EKG QRS DURATION: 92 MS
EKG QTC CALCULATION (BAZETT): 417 MS
EKG R AXIS: -8 DEGREES
EKG T AXIS: 175 DEGREES
EKG VENTRICULAR RATE: 74 BPM
EPITHELIAL CELLS, UA: ABNORMAL /HPF
GFR AFRICAN AMERICAN: >60
GFR NON-AFRICAN AMERICAN: >60 ML/MIN/1.73
GLUCOSE BLD-MCNC: 139 MG/DL (ref 74–99)
GLUCOSE URINE: NEGATIVE MG/DL
HBA1C MFR BLD: 5.6 % (ref 4–5.6)
HCT VFR BLD CALC: 28.6 % (ref 34–48)
HEMOGLOBIN: 8.4 G/DL (ref 11.5–15.5)
INR BLD: 1
KETONES, URINE: ABNORMAL MG/DL
LEUKOCYTE ESTERASE, URINE: ABNORMAL
MCH RBC QN AUTO: 24 PG (ref 26–35)
MCHC RBC AUTO-ENTMCNC: 29.4 % (ref 32–34.5)
MCV RBC AUTO: 81.7 FL (ref 80–99.9)
NITRITE, URINE: NEGATIVE
PDW BLD-RTO: 17.2 FL (ref 11.5–15)
PH UA: 6 (ref 5–9)
PLATELET # BLD: 305 E9/L (ref 130–450)
PMV BLD AUTO: 10.8 FL (ref 7–12)
POTASSIUM SERPL-SCNC: 4.3 MMOL/L (ref 3.5–5)
PROTEIN UA: ABNORMAL MG/DL
PROTHROMBIN TIME: 11.8 SEC (ref 9.3–12.4)
RBC # BLD: 3.5 E12/L (ref 3.5–5.5)
RBC UA: ABNORMAL /HPF (ref 0–2)
SODIUM BLD-SCNC: 144 MMOL/L (ref 132–146)
SPECIFIC GRAVITY UA: 1.02 (ref 1–1.03)
TOTAL PROTEIN: 7.1 G/DL (ref 6.4–8.3)
UROBILINOGEN, URINE: 0.2 E.U./DL
WBC # BLD: 3.7 E9/L (ref 4.5–11.5)
WBC UA: >20 /HPF (ref 0–5)

## 2020-06-09 PROCEDURE — 81001 URINALYSIS AUTO W/SCOPE: CPT

## 2020-06-09 PROCEDURE — 36415 COLL VENOUS BLD VENIPUNCTURE: CPT

## 2020-06-09 PROCEDURE — 93922 UPR/L XTREMITY ART 2 LEVELS: CPT

## 2020-06-09 PROCEDURE — 86850 RBC ANTIBODY SCREEN: CPT

## 2020-06-09 PROCEDURE — 93880 EXTRACRANIAL BILAT STUDY: CPT

## 2020-06-09 PROCEDURE — 86923 COMPATIBILITY TEST ELECTRIC: CPT

## 2020-06-09 PROCEDURE — 71046 X-RAY EXAM CHEST 2 VIEWS: CPT

## 2020-06-09 PROCEDURE — 85027 COMPLETE CBC AUTOMATED: CPT

## 2020-06-09 PROCEDURE — 93970 EXTREMITY STUDY: CPT

## 2020-06-09 PROCEDURE — 83036 HEMOGLOBIN GLYCOSYLATED A1C: CPT

## 2020-06-09 PROCEDURE — 87088 URINE BACTERIA CULTURE: CPT

## 2020-06-09 PROCEDURE — 85730 THROMBOPLASTIN TIME PARTIAL: CPT

## 2020-06-09 PROCEDURE — 93005 ELECTROCARDIOGRAM TRACING: CPT | Performed by: THORACIC SURGERY (CARDIOTHORACIC VASCULAR SURGERY)

## 2020-06-09 PROCEDURE — 86900 BLOOD TYPING SEROLOGIC ABO: CPT

## 2020-06-09 PROCEDURE — 87081 CULTURE SCREEN ONLY: CPT

## 2020-06-09 PROCEDURE — 87186 SC STD MICRODIL/AGAR DIL: CPT

## 2020-06-09 PROCEDURE — 85610 PROTHROMBIN TIME: CPT

## 2020-06-09 PROCEDURE — 86901 BLOOD TYPING SEROLOGIC RH(D): CPT

## 2020-06-09 PROCEDURE — 80053 COMPREHEN METABOLIC PANEL: CPT

## 2020-06-09 PROCEDURE — P9016 RBC LEUKOCYTES REDUCED: HCPCS

## 2020-06-10 ENCOUNTER — ANESTHESIA EVENT (OUTPATIENT)
Dept: INPATIENT UNIT | Age: 60
DRG: 236 | End: 2020-06-10
Payer: MEDICARE

## 2020-06-10 ENCOUNTER — TELEPHONE (OUTPATIENT)
Dept: CARDIOTHORACIC SURGERY | Age: 60
End: 2020-06-10

## 2020-06-10 ENCOUNTER — HOSPITAL ENCOUNTER (INPATIENT)
Age: 60
LOS: 11 days | Discharge: HOME | DRG: 236 | End: 2020-06-21
Attending: THORACIC SURGERY (CARDIOTHORACIC VASCULAR SURGERY) | Admitting: THORACIC SURGERY (CARDIOTHORACIC VASCULAR SURGERY)
Payer: MEDICARE

## 2020-06-10 PROBLEM — I25.10 CAD IN NATIVE ARTERY: Status: ACTIVE | Noted: 2020-06-10

## 2020-06-10 LAB
METER GLUCOSE: 140 MG/DL (ref 74–99)
MRSA CULTURE ONLY: NORMAL

## 2020-06-10 PROCEDURE — 2580000003 HC RX 258: Performed by: PHYSICIAN ASSISTANT

## 2020-06-10 PROCEDURE — 99223 1ST HOSP IP/OBS HIGH 75: CPT | Performed by: THORACIC SURGERY (CARDIOTHORACIC VASCULAR SURGERY)

## 2020-06-10 PROCEDURE — 82962 GLUCOSE BLOOD TEST: CPT

## 2020-06-10 PROCEDURE — 2580000003 HC RX 258: Performed by: THORACIC SURGERY (CARDIOTHORACIC VASCULAR SURGERY)

## 2020-06-10 PROCEDURE — 6370000000 HC RX 637 (ALT 250 FOR IP): Performed by: THORACIC SURGERY (CARDIOTHORACIC VASCULAR SURGERY)

## 2020-06-10 PROCEDURE — 6370000000 HC RX 637 (ALT 250 FOR IP): Performed by: PHYSICIAN ASSISTANT

## 2020-06-10 PROCEDURE — 2140000000 HC CCU INTERMEDIATE R&B

## 2020-06-10 RX ORDER — SODIUM CHLORIDE 0.9 % (FLUSH) 0.9 %
10 SYRINGE (ML) INJECTION PRN
Status: DISCONTINUED | OUTPATIENT
Start: 2020-06-10 | End: 2020-06-15 | Stop reason: HOSPADM

## 2020-06-10 RX ORDER — SUCRALFATE 1 G/1
1 TABLET ORAL
Status: DISCONTINUED | OUTPATIENT
Start: 2020-06-10 | End: 2020-06-12

## 2020-06-10 RX ORDER — CHLORHEXIDINE GLUCONATE 0.12 MG/ML
15 RINSE ORAL ONCE
Status: COMPLETED | OUTPATIENT
Start: 2020-06-10 | End: 2020-06-10

## 2020-06-10 RX ORDER — SODIUM CHLORIDE 0.9 % (FLUSH) 0.9 %
10 SYRINGE (ML) INJECTION EVERY 12 HOURS SCHEDULED
Status: DISCONTINUED | OUTPATIENT
Start: 2020-06-10 | End: 2020-06-12 | Stop reason: HOSPADM

## 2020-06-10 RX ORDER — 0.9 % SODIUM CHLORIDE 0.9 %
20 INTRAVENOUS SOLUTION INTRAVENOUS ONCE
Status: COMPLETED | OUTPATIENT
Start: 2020-06-10 | End: 2020-06-11

## 2020-06-10 RX ORDER — ACETAMINOPHEN 325 MG/1
650 TABLET ORAL EVERY 6 HOURS PRN
Status: DISCONTINUED | OUTPATIENT
Start: 2020-06-10 | End: 2020-06-16

## 2020-06-10 RX ORDER — CHLORHEXIDINE GLUCONATE 4 G/100ML
SOLUTION TOPICAL ONCE
Status: COMPLETED | OUTPATIENT
Start: 2020-06-10 | End: 2020-06-10

## 2020-06-10 RX ORDER — PANTOPRAZOLE SODIUM 40 MG/1
40 TABLET, DELAYED RELEASE ORAL
Status: DISCONTINUED | OUTPATIENT
Start: 2020-06-10 | End: 2020-06-11

## 2020-06-10 RX ORDER — CEFAZOLIN SODIUM 2 G/50ML
2 SOLUTION INTRAVENOUS SEE ADMIN INSTRUCTIONS
Status: DISCONTINUED | OUTPATIENT
Start: 2020-06-10 | End: 2020-06-15 | Stop reason: SDUPTHER

## 2020-06-10 RX ORDER — SODIUM CHLORIDE 0.9 % (FLUSH) 0.9 %
10 SYRINGE (ML) INJECTION EVERY 12 HOURS SCHEDULED
Status: DISCONTINUED | OUTPATIENT
Start: 2020-06-10 | End: 2020-06-15 | Stop reason: HOSPADM

## 2020-06-10 RX ORDER — SODIUM CHLORIDE 9 MG/ML
INJECTION, SOLUTION INTRAVENOUS CONTINUOUS
Status: DISCONTINUED | OUTPATIENT
Start: 2020-06-10 | End: 2020-06-16

## 2020-06-10 RX ORDER — SODIUM CHLORIDE 0.9 % (FLUSH) 0.9 %
10 SYRINGE (ML) INJECTION PRN
Status: DISCONTINUED | OUTPATIENT
Start: 2020-06-10 | End: 2020-06-12 | Stop reason: HOSPADM

## 2020-06-10 RX ORDER — GABAPENTIN 300 MG/1
300 CAPSULE ORAL NIGHTLY
Status: DISCONTINUED | OUTPATIENT
Start: 2020-06-10 | End: 2020-06-21 | Stop reason: HOSPADM

## 2020-06-10 RX ORDER — ACETAMINOPHEN 650 MG/1
650 SUPPOSITORY RECTAL EVERY 6 HOURS PRN
Status: DISCONTINUED | OUTPATIENT
Start: 2020-06-10 | End: 2020-06-16

## 2020-06-10 RX ADMIN — PANTOPRAZOLE SODIUM 40 MG: 40 TABLET, DELAYED RELEASE ORAL at 21:41

## 2020-06-10 RX ADMIN — METOPROLOL TARTRATE 12.5 MG: 25 TABLET, FILM COATED ORAL at 21:41

## 2020-06-10 RX ADMIN — Medication 10 ML: at 21:42

## 2020-06-10 RX ADMIN — SUCRALFATE 1 G: 1 TABLET ORAL at 21:41

## 2020-06-10 RX ADMIN — CHLORHEXIDINE GLUCONATE 15 ML: 1.2 RINSE ORAL at 21:41

## 2020-06-10 RX ADMIN — SODIUM CHLORIDE, PRESERVATIVE FREE 10 ML: 5 INJECTION INTRAVENOUS at 21:42

## 2020-06-10 RX ADMIN — GABAPENTIN 300 MG: 300 CAPSULE ORAL at 21:41

## 2020-06-10 RX ADMIN — CHLORHEXIDINE GLUCONATE: 213 SOLUTION TOPICAL at 21:39

## 2020-06-10 ASSESSMENT — ENCOUNTER SYMPTOMS: SHORTNESS OF BREATH: 1

## 2020-06-10 NOTE — TELEPHONE ENCOUNTER
Informed pt she will need to be admitted today due to her blood level being low. She will need blood prior to surgery. Informed her to expect a call from admission office with details.

## 2020-06-11 ENCOUNTER — ANESTHESIA (OUTPATIENT)
Dept: INPATIENT UNIT | Age: 60
DRG: 236 | End: 2020-06-11
Payer: MEDICARE

## 2020-06-11 LAB
ALBUMIN SERPL-MCNC: 3.7 G/DL (ref 3.5–5.2)
ALP BLD-CCNC: 96 U/L (ref 35–104)
ALT SERPL-CCNC: 7 U/L (ref 0–32)
ANION GAP SERPL CALCULATED.3IONS-SCNC: 16 MMOL/L (ref 7–16)
AST SERPL-CCNC: 14 U/L (ref 0–31)
BILIRUB SERPL-MCNC: 0.2 MG/DL (ref 0–1.2)
BUN BLDV-MCNC: 16 MG/DL (ref 8–23)
CALCIUM SERPL-MCNC: 9.5 MG/DL (ref 8.6–10.2)
CHLORIDE BLD-SCNC: 109 MMOL/L (ref 98–107)
CO2: 20 MMOL/L (ref 22–29)
CREAT SERPL-MCNC: 1 MG/DL (ref 0.5–1)
GFR AFRICAN AMERICAN: >60
GFR NON-AFRICAN AMERICAN: >60 ML/MIN/1.73
GLUCOSE BLD-MCNC: 75 MG/DL (ref 74–99)
HCT VFR BLD CALC: 33.7 % (ref 34–48)
HEMOGLOBIN: 10.3 G/DL (ref 11.5–15.5)
MAGNESIUM: 1.9 MG/DL (ref 1.6–2.6)
METER GLUCOSE: 101 MG/DL (ref 74–99)
ORGANISM: ABNORMAL
POTASSIUM SERPL-SCNC: 4 MMOL/L (ref 3.5–5)
PRO-BNP: 654 PG/ML (ref 0–125)
SODIUM BLD-SCNC: 145 MMOL/L (ref 132–146)
TOTAL PROTEIN: 7.1 G/DL (ref 6.4–8.3)
URINE CULTURE, ROUTINE: ABNORMAL

## 2020-06-11 PROCEDURE — 99223 1ST HOSP IP/OBS HIGH 75: CPT | Performed by: INTERNAL MEDICINE

## 2020-06-11 PROCEDURE — 99222 1ST HOSP IP/OBS MODERATE 55: CPT | Performed by: INTERNAL MEDICINE

## 2020-06-11 PROCEDURE — 2580000003 HC RX 258: Performed by: PHYSICIAN ASSISTANT

## 2020-06-11 PROCEDURE — 6360000002 HC RX W HCPCS: Performed by: NURSE PRACTITIONER

## 2020-06-11 PROCEDURE — 36430 TRANSFUSION BLD/BLD COMPNT: CPT

## 2020-06-11 PROCEDURE — 83735 ASSAY OF MAGNESIUM: CPT

## 2020-06-11 PROCEDURE — 82962 GLUCOSE BLOOD TEST: CPT

## 2020-06-11 PROCEDURE — 80053 COMPREHEN METABOLIC PANEL: CPT

## 2020-06-11 PROCEDURE — 2580000003 HC RX 258: Performed by: THORACIC SURGERY (CARDIOTHORACIC VASCULAR SURGERY)

## 2020-06-11 PROCEDURE — 85014 HEMATOCRIT: CPT

## 2020-06-11 PROCEDURE — 99232 SBSQ HOSP IP/OBS MODERATE 35: CPT | Performed by: THORACIC SURGERY (CARDIOTHORACIC VASCULAR SURGERY)

## 2020-06-11 PROCEDURE — 36415 COLL VENOUS BLD VENIPUNCTURE: CPT

## 2020-06-11 PROCEDURE — 85018 HEMOGLOBIN: CPT

## 2020-06-11 PROCEDURE — 83880 ASSAY OF NATRIURETIC PEPTIDE: CPT

## 2020-06-11 PROCEDURE — 6360000002 HC RX W HCPCS: Performed by: SURGERY

## 2020-06-11 PROCEDURE — APPSS180 APP SPLIT SHARED TIME > 60 MINUTES: Performed by: NURSE PRACTITIONER

## 2020-06-11 PROCEDURE — 6370000000 HC RX 637 (ALT 250 FOR IP): Performed by: PHYSICIAN ASSISTANT

## 2020-06-11 PROCEDURE — C9113 INJ PANTOPRAZOLE SODIUM, VIA: HCPCS | Performed by: SURGERY

## 2020-06-11 PROCEDURE — 2140000000 HC CCU INTERMEDIATE R&B

## 2020-06-11 PROCEDURE — 2580000003 HC RX 258: Performed by: SURGERY

## 2020-06-11 PROCEDURE — 99222 1ST HOSP IP/OBS MODERATE 55: CPT | Performed by: NURSE PRACTITIONER

## 2020-06-11 RX ORDER — FUROSEMIDE 10 MG/ML
20 INJECTION INTRAMUSCULAR; INTRAVENOUS ONCE
Status: COMPLETED | OUTPATIENT
Start: 2020-06-11 | End: 2020-06-11

## 2020-06-11 RX ORDER — SODIUM CHLORIDE 9 MG/ML
10 INJECTION INTRAVENOUS 2 TIMES DAILY
Status: DISCONTINUED | OUTPATIENT
Start: 2020-06-11 | End: 2020-06-16

## 2020-06-11 RX ORDER — PANTOPRAZOLE SODIUM 40 MG/10ML
40 INJECTION, POWDER, LYOPHILIZED, FOR SOLUTION INTRAVENOUS 2 TIMES DAILY
Status: DISCONTINUED | OUTPATIENT
Start: 2020-06-11 | End: 2020-06-16

## 2020-06-11 RX ADMIN — Medication 10 ML: at 21:15

## 2020-06-11 RX ADMIN — SUCRALFATE 1 G: 1 TABLET ORAL at 21:14

## 2020-06-11 RX ADMIN — SUCRALFATE 1 G: 1 TABLET ORAL at 08:38

## 2020-06-11 RX ADMIN — METOPROLOL TARTRATE 25 MG: 25 TABLET, FILM COATED ORAL at 21:14

## 2020-06-11 RX ADMIN — SUCRALFATE 1 G: 1 TABLET ORAL at 17:29

## 2020-06-11 RX ADMIN — FUROSEMIDE 20 MG: 10 INJECTION, SOLUTION INTRAMUSCULAR; INTRAVENOUS at 11:19

## 2020-06-11 RX ADMIN — PANTOPRAZOLE SODIUM 40 MG: 40 TABLET, DELAYED RELEASE ORAL at 06:08

## 2020-06-11 RX ADMIN — GABAPENTIN 300 MG: 300 CAPSULE ORAL at 21:14

## 2020-06-11 RX ADMIN — SODIUM CHLORIDE, PRESERVATIVE FREE 10 ML: 5 INJECTION INTRAVENOUS at 21:15

## 2020-06-11 RX ADMIN — SODIUM CHLORIDE, PRESERVATIVE FREE 10 ML: 5 INJECTION INTRAVENOUS at 21:14

## 2020-06-11 RX ADMIN — PANTOPRAZOLE SODIUM 40 MG: 40 INJECTION, POWDER, FOR SOLUTION INTRAVENOUS at 21:15

## 2020-06-11 RX ADMIN — SODIUM CHLORIDE 20 ML: 9 INJECTION, SOLUTION INTRAVENOUS at 00:21

## 2020-06-11 RX ADMIN — ACETAMINOPHEN 650 MG: 325 TABLET, FILM COATED ORAL at 11:19

## 2020-06-11 RX ADMIN — SODIUM CHLORIDE: 9 INJECTION, SOLUTION INTRAVENOUS at 06:09

## 2020-06-11 RX ADMIN — METOPROLOL TARTRATE 25 MG: 25 TABLET, FILM COATED ORAL at 11:19

## 2020-06-11 RX ADMIN — SUCRALFATE 1 G: 1 TABLET ORAL at 11:20

## 2020-06-11 ASSESSMENT — PAIN SCALES - GENERAL
PAINLEVEL_OUTOF10: 0
PAINLEVEL_OUTOF10: 0
PAINLEVEL_OUTOF10: 4
PAINLEVEL_OUTOF10: 0

## 2020-06-11 NOTE — PROGRESS NOTES
CC:CAD    Brief HPI:  Patient seen with Dr. Tremaine Rea. Awake, alert. No complaints.       Past Medical History:   Diagnosis Date    Breast cancer ([facility] [address] 2000, 2005    right    Breast cancer ([facility] [address] 2020    left    CAD (coronary artery disease)     follows with Dr. Tima Anguiano [facility]A (cerebral vascular accident) [facility]) 2015    no deficits    DM (diabetes mellitus) ([facility] [address]     H/O: GI bleed     History of blood transfusion 02/2020    Hyperlipidemia     Hypertension      Past Surgical History:   Procedure Laterality Date    APPENDECTOMY      BREAST RECONSTRUCTION Right     BREAST SURGERY  2000    right masectomy    COLONOSCOPY      CORONARY ANGIOPLASTY WITH STENT PLACEMENT  03/04/2020    Mini Vision 2.5 x 18 stent deployed proximal LAD by DR Dillon Smith    FOOT SURGERY      right    HYSTERECTOMY      fibroids    KNEE ARTHROSCOPY      left    MASTECTOMY Right 2000    MASTECTOMY, MODIFIED RADICAL Left 2/25/2020    LEFT BREAST SIMPLE MASTECTOMY performed by Amanda Flood MD at [address] [facility], MODIFIED RADICAL Left 2/25/2020    LEFT BREAST MASTECTOMY POST OP BLEED CONTROL, EVACUATION OF LEFT CHEST WALL HEMATOMA performed by Amanda Flood MD at [address] UPPER GASTROINTESTINAL ENDOSCOPY N/A 2/5/2020    EGD BIOPSY performed by Amanda Flood MD at [address] [facility] Marital status: Single     Spouse name: Not on file    Number of children: Not on file    Years of education: Not on file    Highest education level: Not on file   Occupational History    Not on file   Social Needs    Financial resource strain: Not on file    Food insecurity     Worry: Not on file     Inability: Not on file    Transportation needs     Medical: Not on file     Non-medical: Not on file   Tobacco Use    Smoking status: Former Smoker     Packs/day: 0.20     Years: 2.00     Pack years: 0.40     Types: Cigarettes    Smokeless 06/09/20  1100   BUN 13   CREATININE 1.0         ROS:   Negative for CP, palpitations, SOB at rest, dizziness/lightheadedness. Physical Exam   Constitutional: Oriented to person, place, and time. Appears well-developed. No distress. Cardiovascular: Normal rate, regular rhythm and normal heart sounds. Pulmonary/Chest: Effort normal. No respiratory distress. Abdominal: Soft. Bowel sounds are normal.   Musculoskeletal: Normal range of motion. Neurological: alert and oriented to person, place, and time. Skin: Skin is warm and dry. Psychiatric: normal mood and affect. A/P:  1)  CAD  - CABG cancelled today per Dr Faith Arana due to anemia. She was transfused 2 units yesterday. Repeat hgb up to 10. Will have hematology see patient (known to Dr Lorie Esparza.)  She had a mastectomy with return to OR for bleeding in February so certainly she needs cleared from a heme standpoint  - will also consult gen surg for anemia  -cardiology to follow (known to Dr Ayana Prasad)- had some VT last ernesto- additional labs ordered; beta blocker ordered    As above. OR when ok with all. Kristen Woods    Note: 25 minutes was spent providing face-to-face patient care, including:  and coordinating care, reviewing the chart, labs, and diagnostics, as well as medical decision making. Greater than 50% of this time was spent instructing and counseling the patient face to face regarding findings and recommendations.

## 2020-06-11 NOTE — CONSULTS
2016  9. 8/22/2019 LEEP-->Invasive poorly-differentiated adenocarcinoma. The invasive adenocarcinoma involves the deep (radial) excision margin. 10. FIGO stage IB invasive squamous cell carcinoma of the cervical stump (pt is s/p supracervical hysterectomy in the 80s), now s/p chemo-XRT to 45 Gy in 25 fx with concurrent weekly cisplatin, completed 12/27/2019 in [place], [place] with Dr. Hector Rincon  11. Supracervical ANGELLA 0750'R  12. 1/2020 Gout  13. EGD 02/05/2020: Gastric and Duodenal ulcers (Dr. Amari Walker)  14. 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 Chest wall hematoma 02/25/2020 s/p Exploration of left mastectomy wound, control of perforating vessel hemorrhage, hematoma evacuation (Dr. Amari Walker) with EBL 500cc and blood clot   16. Neutropenia placed on Filgastrim  17. Appendectomy, tonsillectomy, R foot surgery, L knee arthroscopy  18. 4/11/2019 TTE Dr Khalif Varghese ventricular size is normal.  19. Normal LV segmental wall motion, EF 55%. Mild left ventricular concentric hypertrophy noted. There is doppler evidence of stage I diastolic dysfunction. Left atrium is of normal size. Interatrial septum not well visualized but appears intact. Normal right ventricle structure and function.  Normal aortic valve structure and function. Normal tricuspid valve structure. There is trace tricuspid regurgitation, RVSP 22mmHg. Normal aortic root size. No evidence of pericardial effusion. No intracardiac mass. 20. 4/24/2019 Lexiscan MPS: EF 55%. Non ischemic. Small fixed anteroapical defect.   21. NSVT 2/4/2020 at 0930 (asymptomatic)  22. Echocardiogram 02/05/2020 (Dr. Marivel Courtney):  Normal left ventricular chamber size. Normal left ventricular systolic function, LVEF is 57%. Distal septal and apical septal hypokinesis. Stage I diastolic dysfunction. Normal left atrial pressure. Left atrium is of normal size. Interatrial septum not well visualized but appears intact. SYSTEMS:     · Constitutional: Denies fevers, chills, night sweats, and fatigue  · HEENT: Denies headaches, nose bleeds, and blurred vision,oral pain, abscess or lesion. · Musculoskeletal: Denies falls, pain to BLE with ambulation and edema to BLE. · Neurological: Denies dizziness and lightheadedness, numbness and tingling  · Cardiovascular: Denies chest pain, palpitations, and feelings of heart racing. · Respiratory: Denies orthopnea and PND. Complains of ROMERO   · Gastrointestinal: Denies heartburn, nausea/vomiting, diarrhea and constipation, black/bloody, and tarry stools. · Genitourinary: Denies dysuria and hematuria  · Hematologic: Denies excessive bruising or bleeding  · Lymphatic: Denies lumps and bumps to neck, axilla, breast, and groin  · Endocrine: Denies excessive thirst. Denies intolerance to hot and cold  · GYN: Postmenopausal state; Denies vaginal bleeding. · Psychiatric: Denies anxiety and depression. PHYSICAL EXAM:   /72   Pulse 89   Temp 97.3 °F (36.3 °C) (Temporal)   Resp 18   Ht 5' 5\" (1.651 m)   Wt 229 lb 2 oz (103.9 kg)   SpO2 94%   BMI 38.13 kg/m²   CONST:  Well developed, obese middle aged female who appears stated age. Awake, alert, cooperative, no apparent distress  HEENT:   Head- Normocephalic, atraumatic   Eyes- Conjunctivae pink, anicteric  Throat- Oral mucosa pink and moist  Neck-  No stridor, trachea midline, + jugular venous distention. No adenopathy   CHEST: Chest symmetrical and non-tender to palpation. No accessory muscle use or intercostal retractions  RESPIRATORY: Lung sounds - coarse rhonchi throughout anterior lung fields. Bilateral bases diminished  CARDIOVASCULAR:     No carotid bruit  Heart Inspection- shows no noted pulsations  Heart Palpation- no heaves or thrills; PMI is non-displaced   Heart Ausculation- Regular rate and rhythm, no murmur. No s3, s4 or rub   PV: 1-2+ pitting bilateral lower extremity edema. No varicosities.  Pedal pulses palpable, no clubbing or cyanosis   ABDOMEN: Soft, obese, non-tender to light palpation. Bowel sounds present. No palpable masses no organomegaly; no abdominal bruit  MS: Good muscle strength and tone. No atrophy or abnormal movements. : Deferred  SKIN: Warm and dry no statis dermatitis or ulcers   NEURO / PSYCH: Oriented to person, place and time. Speech clear and appropriate. Follows all commands. Pleasant affect     DATA:    ECG: SR   Tele strips: SR with 11 beats NSVT   Diagnostic:      Intake/Output Summary (Last 24 hours) at 6/11/2020 0847  Last data filed at 6/11/2020 0501  Gross per 24 hour   Intake 1226.66 ml   Output 200 ml   Net 1026.66 ml       Labs:   CBC:   Recent Labs     06/09/20  1100   WBC 3.7*   HGB 8.4*   HCT 28.6*        BMP:   Recent Labs     06/09/20  1100      K 4.3   CO2 22   BUN 13   CREATININE 1.0   LABGLOM >60   CALCIUM 8.9   HgA1c:   Lab Results   Component Value Date    LABA1C 5.6 06/09/2020   PT/INR:   Recent Labs     06/09/20  1100   PROTIME 11.8   INR 1.0     APTT:  Recent Labs     06/09/20  1100   APTT 35.2*   FASTING LIPID PANEL:  Lab Results   Component Value Date    CHOL 69 03/04/2020    HDL 26 03/04/2020    LDLCALC 22 03/04/2020    TRIG 107 03/04/2020     LIVER PROFILE:  Recent Labs     06/09/20  1100   AST 12   ALT 7   LABALBU 3.9     06/09/2020 CXR:  1. Clear lungs. 2. Cardiomegaly without evidence of overt failure    06/09/2020 Bilateral Carotid Ultrasound: Atherosclerotic disease. No hemodynamically significant stenosis is identified Estimated stenosis by NASCET criteria in the proximal right carotid artery is between 0% and 49%. Estimated stenosis by NASCET criteria in the proximal left carotid artery is between 0% and 49%.     IMPRESSION and PLAN to follow as per Dr. Mary Tate     Electronically signed by ANNA MARIE Carver CNP on 6/11/2020 at 8:47 1425 [facility] Consult       Sameera Rosas I have personally participated in a face-to-face and personally obtained history and performed physical exam on the date of service. I reviewed chart, vitals, labs and radiologic studies. I also participated in medical decision making with ANNA MARIE Thornton CNP on the date of service All of the assessments and recommendations are from me and I agree with all of the pertinent clinical information, assessment and treatment plan. I have reviewed and edited the note above based on my findings during my history, exam, and decision making. Please see my additional contributions to the history, physical exam, assessment, and recommendations below. Reason for Consult: Nonsustained ventricular tachycardia  Requesting Physician: Windell Mcburney, PA   Chief Complaint: nonsustained ventricular tachycardia  History Obtained From: Patient    HISTORY OF PRESENT ILLNESS:   Patient is 63years old female with history of CAD status post PCI to LAD, CVA, type 2 diabetes mellitus, hypertension, hyperlipidemia, anemia, was admitted to the hospital for CABG, patient was found to be anemic, and patient was noted to have nonsustained ventricular tachycardia on monitor, cardiology was consulted. Shortness of breath is at baseline, denies any chest pain, no palpitations, no lightheadedness or dizziness, occasional pedal edema, no PND, no orthopnea, no syncope, no presyncopal episodes.       Past Medical History:   Diagnosis Date    Breast cancer ([facility] [address] 2000, 2005    right    Breast cancer ([facility] [address] 2020    left    CAD (coronary artery disease)     follows with Dr. Savanna Hyde CVA (cerebral vascular accident) [facility]) 2015    no deficits    DM (diabetes mellitus) ([facility] [address]     H/O: GI bleed     History of blood transfusion 02/2020    Hyperlipidemia     Hypertension        Past Surgical History:   Procedure Laterality Date    APPENDECTOMY      BREAST RECONSTRUCTION Right     BREAST SURGERY  2000    right masectomy    COLONOSCOPY      CORONARY ANGIOPLASTY WITH STENT PLACEMENT auscultation bilaterally, no crackles or wheezing  CARDIOVASCULAR:  Normal apical impulse, regular rate and rhythm, normal S1 and S2, no S3 or S4, and no murmur noted and no JVD, no carotid bruit, no pedal edema, good carotid upstroke bilaterally. ABDOMEN:  Soft, nontender, no masses, no hepatomegaly or splenomegaly, BS+  CHEST: nontender to palpation, expands symmetrically  MUSCULOSKELETAL:  No clubbing no cyanosis. there is no redness, warmth, or swelling of the joints  full range of motion noted  NEUROLOGIC:  Alert, awake,oriented x3  SKIN:  no bruising or bleeding, normal skin color, texture, turgor and no redness, warmth, or swelling    /73   Pulse 89   Temp 97 °F (36.1 °C) (Temporal)   Resp 18   Ht 5' 5\" (1.651 m)   Wt 230 lb 3.2 oz (104.4 kg)   SpO2 93%   BMI 38.31 kg/m²     DATA:   I personally reviewed the admission EKG with the following interpretation: Sinus rhythm, blocked PACs, nonspecific T wave changes  ECHO: 2/5/2020,  Summary   Normal left ventricular chamber size. Normal left ventricular systolic function, LVEF is 57%. Distal septal and apical septal hypokinesis. Stage I diastolic dysfunction. Normal left atrial pressure. Left atrium is of normal size. Interatrial septum not well visualized but appears intact. Normal right ventricle structure and function. Normal mitral valve structure and function. No mitral valve prolapse. Normal aortic valve structure and function. Normal tricuspid valve structure. There is trace tricuspid regurgitation, RVSP 32mmHg. Normal aortic root size. No evidence of pericardial effusion. Pericardium appears normal.   No intra cardiac mass or thrombus. Compared to prior study from 4/2019 -Which showed EF 55%, trace of TR and   Stage I diastolic dysfunction. Stress Test:   Angiography: 3/4/2020,IMPRESSION:  1.   Totally occluded left anterior descending artery with successful PCI  using 2.5 x 18 mm mini Vision bare metal magnesium, ischemic?,  Continue beta-blocker for now  3. GI bleed:  4. Hypertension: Controlled  5. Type 2 diabetes mellitus  6. History of CVA  7. History of left mastectomy      Recommendations:     1. Continue current treatment  2. Will increase beta-blocker dose if nonsustained ventricular tachycardia recurs  3. Gentle diuresis  4. Will maintain normal potassium and normal magnesium  5. Basic metabolic panel and CBC in a.m.  6. Further cardiac recommendations will be forthcoming pending her clinical course and diagnostic test findings      I have reviewed my findings and recommendations with patient    Thank you for the consult  Electronically signed by Varsha Hinkle MD on 6/11/2020 at 10:08 PM  NOTE: This report was transcribed using voice recognition software.  Every effort was made to ensure accuracy; however, inadvertent computerized transcription errors may be present

## 2020-06-11 NOTE — PROGRESS NOTES
Pt observed for first 15 min of blood administration, No adverse reactions noted.   Temp: 98.9 Bp-143/81 HR-95 Map-105  respirs-20

## 2020-06-11 NOTE — PROGRESS NOTES
Dr Geetha Escobedo via perfect serve to confirm that surgery is cancelled.  Stated that it is cancelled for today

## 2020-06-11 NOTE — ANESTHESIA PRE PROCEDURE
Frequency Provider Last Rate Last Dose    gabapentin (NEURONTIN) capsule 300 mg  300 mg Oral Nightly Aleena Mendoza        pantoprazole (PROTONIX) tablet 40 mg  40 mg Oral BID AC Aleena Mendoza        sucralfate (CARAFATE) tablet 1 g  1 g Oral 4x Daily WC Aleena Mendoza        metoprolol tartrate (LOPRESSOR) tablet 12.5 mg  12.5 mg Oral Once Radha GeneNabilbama        0.9 % sodium chloride bolus  20 mL Intravenous Once Radha Gene [place]        sodium chloride flush 0.9 % injection 10 mL  10 mL Intravenous 2 times per day Radha Gene, [place]        sodium chloride flush 0.9 % injection 10 mL  10 mL Intravenous PRN KRISTEN Mendoza        acetaminophen (TYLENOL) tablet 650 mg  650 mg Oral Q6H PRN Radha Gene PA        Or    acetaminophen (TYLENOL) suppository 650 mg  650 mg Rectal Q6H PRN Radha Gene, PA        0.9 % sodium chloride infusion   Intravenous Continuous Dana Pelaez MD        ceFAZolin (ANCEF) 2 g in dextrose 3 % 50 mL IVPB (duplex)  2 g Intravenous See Admin Instructions Dana Pelaez MD        chlorhexidine (HIBICLENS) 4 % liquid   Topical Once Dana Pelaez MD        chlorhexidine (PERIDEX) 0.12 % solution 15 mL  15 mL Mouth/Throat Once Dana Pelaez MD        sodium chloride flush 0.9 % injection 10 mL  10 mL Intravenous 2 times per day Dana Pelaez MD        sodium chloride flush 0.9 % injection 10 mL  10 mL Intravenous PRN Dana Pelaez MD           Allergies:     Allergies   Allergen Reactions    Penicillins Hives and Rash       Problem List:    Patient Active Problem List   Diagnosis Code    Chest pain R07.9    Hx of breast cancer Z85.3    Hypertension I10    Breast cancer ([facility] [address] C50.919    Left-sided weakness R53.1    Controlled type 2 diabetes mellitus without complication, without long-term current use of insulin (HCC) E11.9    History of CVA (cerebrovascular accident) Z80.78    Hypertensive left ventricular hypertrophy, without heart failure I11.9  Abnormal EKG R94.31    Chronic midline low back pain with left-sided sciatica M54.42, G89.29    Obesity E66.9    Cervical cancer (HCC) C53.9    Malignant neoplasm of endocervix (HCC) C53.0    Acute GI bleeding K92.2    Gastrointestinal hemorrhage K92.2    Anemia due to acute blood loss D62    Controlled type 2 diabetes mellitus without complication ([facility]) A59.3    Shortness of breath R06.02    Wide-complex tachycardia ([facility]) I47.2    Intraductal carcinoma of left breast D05.12    Postoperative anemia due to acute blood loss D62    NSTEMI (non-ST elevated myocardial infarction) ([facility]) C53.8    Metabolic acidosis Q08.6    Hyperlipidemia E78.5    Coronary artery disease involving native coronary artery of native heart with unstable angina pectoris ([facility]) I25.110    CAD in native artery I25.10       Past Medical History:        Diagnosis Date    Breast cancer ([facility] [address] 2000, 2005    right    Breast cancer ([facility] [address] 2020    left    CAD (coronary artery disease)     follows with Dr. Jamar Doherty CVA (cerebral vascular accident) [facility]) 2015    no deficits    DM (diabetes mellitus) ([facility] [address]     H/O: GI bleed     History of blood transfusion 02/2020    Hyperlipidemia     Hypertension        Past Surgical History:        Procedure Laterality Date    APPENDECTOMY      BREAST RECONSTRUCTION Right     BREAST SURGERY  2000    right masectomy    COLONOSCOPY      CORONARY ANGIOPLASTY WITH STENT PLACEMENT  03/04/2020    Mini Vision 2.5 x 18 stent deployed proximal LAD by DR Elida Merlin    FOOT SURGERY      right    HYSTERECTOMY      fibroids    KNEE ARTHROSCOPY      left    MASTECTOMY Right 2000    MASTECTOMY, MODIFIED RADICAL Left 2/25/2020    LEFT BREAST SIMPLE MASTECTOMY performed by Jensen Perla MD at [facility], MODIFIED RADICAL Left 2/25/2020    LEFT BREAST MASTECTOMY POST OP BLEED CONTROL, EVACUATION OF LEFT CHEST WALL HEMATOMA performed by Jensen Perla MD at [place] [address] (BL breast, cervical). Pt had no PAT visit       Abdominal:   (+) obese,         Vascular: negative vascular ROS. Anesthesia Plan      general     ASA 4     (#22 RFA)  Induction: intravenous. arterial line, BIS, central line, CVP and MARY JO    Anesthetic plan and risks discussed with patient. Use of blood products discussed with patient whom consented to blood products. Plan discussed with attending and CRNA.                 Jensen Allen RN   6/10/2020

## 2020-06-11 NOTE — CARE COORDINATION
SOCIAL WORK/CASEMANAGEMENT TRANSITION OF CARE [id number] [place]  [facility], [address], [place], [phone]): I met with pt and her daughter, Elizabeth Ferrari, who is in the room. Pt came in for open heart surgery. She has a history of breast CA and underwent last treatments of chemo and radiation in nov. 2019. Pt lives alone in a multi level home with 4 steps from the front to enter then another 10 steps to the 2nd floor bed and bath. No hhc pta. Pt has a fww, cane, shower chair and is diabetic not on insulin with a working glucometer. Elizabeth Ferrari is not working nor is pt who is on disability. Went over need for 24/7 care from family if able to return home with hhc vs fam. I provided a fam and hhc list. Sw/cm to follow.  Marcos Mckeon  6/11/2020

## 2020-06-11 NOTE — CONSULTS
education: Not on file    Highest education level: Not on file   Occupational History    Not on file   Social Needs    Financial resource strain: Not on file    Food insecurity     Worry: Not on file     Inability: Not on file    Transportation needs     Medical: Not on file     Non-medical: Not on file   Tobacco Use    Smoking status: Former Smoker     Packs/day: 0.20     Years: 2.00     Pack years: 0.40     Types: Cigarettes    Smokeless tobacco: Never Used    Tobacco comment: as a teen   Substance and Sexual Activity    Alcohol use: Not Currently     Alcohol/week: 6.0 standard drinks     Types: 6 Standard drinks or equivalent per week     Comment: No coffee. green tea    Drug use: Not Currently     Types: Marijuana     Comment: used briefly when she turned \"59\"    Sexual activity: Not Currently   Lifestyle    Physical activity     Days per week: Not on file     Minutes per session: Not on file    Stress: Not on file   Relationships    Social connections     Talks on phone: Not on file     Gets together: Not on file     Attends Spiritism service: Not on file     Active member of club or organization: Not on file     Attends meetings of clubs or organizations: Not on file     Relationship status: Not on file    Intimate partner violence     Fear of current or ex partner: Not on file     Emotionally abused: Not on file     Physically abused: Not on file     Forced sexual activity: Not on file   Other Topics Concern    Not on file   Social History Narrative    Denies caffeine. Allergies: Allergies   Allergen Reactions    Penicillins Hives and Rash       Physical Exam:  /72   Pulse 89   Temp 97.3 °F (36.3 °C) (Temporal)   Resp 18   Ht 5' 5\" (1.651 m)   Wt 229 lb 2 oz (103.9 kg)   SpO2 94%   BMI 38.13 kg/m²   GENERAL: Alert, oriented x 3, not in acute distress. HEENT: PERRLA; EOMI. Oropharynx clear. NECK: Supple. Without lymphadenopathy. LUNGS: Rhonchi noted bilaterally.  No s/p NSTEMI.   2 units of pRBCs given for Hbg of 8.4 from 06/09/2020. She had an EGD done with Dr. Desi Henderson on 02/05/2020. Gastric and duodenal ulcers were noted. She had a colonoscopy done at [facility] in 2016 (records requested). She currently denies any signs of bleeding.     -Will monitor CBCD  -Hb 10.3; ok to proceed with surgery.   -GS team on board  -Cardiology team on board  -Will continue to follow     Thank you for allowing us to participate in the care of ANNA MARIE Ramos CNP  [facility]  [address] [place] [address]  June 11, 2020     Agree with H&P and A/P by DARIAN Lam   06/11/2020  Kaitlynn Serrano MD

## 2020-06-12 ENCOUNTER — ANESTHESIA EVENT (OUTPATIENT)
Dept: ENDOSCOPY | Age: 60
DRG: 236 | End: 2020-06-12
Payer: MEDICARE

## 2020-06-12 ENCOUNTER — ANESTHESIA (OUTPATIENT)
Dept: ENDOSCOPY | Age: 60
DRG: 236 | End: 2020-06-12
Payer: MEDICARE

## 2020-06-12 VITALS — SYSTOLIC BLOOD PRESSURE: 144 MMHG | OXYGEN SATURATION: 93 % | DIASTOLIC BLOOD PRESSURE: 95 MMHG

## 2020-06-12 LAB
ANION GAP SERPL CALCULATED.3IONS-SCNC: 13 MMOL/L (ref 7–16)
BUN BLDV-MCNC: 14 MG/DL (ref 8–23)
CALCIUM SERPL-MCNC: 9.3 MG/DL (ref 8.6–10.2)
CHLORIDE BLD-SCNC: 105 MMOL/L (ref 98–107)
CO2: 23 MMOL/L (ref 22–29)
CREAT SERPL-MCNC: 1 MG/DL (ref 0.5–1)
GFR AFRICAN AMERICAN: >60
GFR NON-AFRICAN AMERICAN: >60 ML/MIN/1.73
GLUCOSE BLD-MCNC: 99 MG/DL (ref 74–99)
HCT VFR BLD CALC: 35.4 % (ref 34–48)
HEMOGLOBIN: 10.8 G/DL (ref 11.5–15.5)
MCH RBC QN AUTO: 24.9 PG (ref 26–35)
MCHC RBC AUTO-ENTMCNC: 30.5 % (ref 32–34.5)
MCV RBC AUTO: 81.6 FL (ref 80–99.9)
PDW BLD-RTO: 17.2 FL (ref 11.5–15)
PLATELET # BLD: 253 E9/L (ref 130–450)
PMV BLD AUTO: 10.2 FL (ref 7–12)
POTASSIUM SERPL-SCNC: 3.9 MMOL/L (ref 3.5–5)
RBC # BLD: 4.34 E12/L (ref 3.5–5.5)
SODIUM BLD-SCNC: 141 MMOL/L (ref 132–146)
WBC # BLD: 6.5 E9/L (ref 4.5–11.5)

## 2020-06-12 PROCEDURE — 7100000000 HC PACU RECOVERY - FIRST 15 MIN: Performed by: SURGERY

## 2020-06-12 PROCEDURE — 2140000000 HC CCU INTERMEDIATE R&B

## 2020-06-12 PROCEDURE — 2580000003 HC RX 258

## 2020-06-12 PROCEDURE — 3700000001 HC ADD 15 MINUTES (ANESTHESIA): Performed by: SURGERY

## 2020-06-12 PROCEDURE — 0DB78ZX EXCISION OF STOMACH, PYLORUS, VIA NATURAL OR ARTIFICIAL OPENING ENDOSCOPIC, DIAGNOSTIC: ICD-10-PCS | Performed by: SURGERY

## 2020-06-12 PROCEDURE — 88342 IMHCHEM/IMCYTCHM 1ST ANTB: CPT

## 2020-06-12 PROCEDURE — 2580000003 HC RX 258: Performed by: PHYSICIAN ASSISTANT

## 2020-06-12 PROCEDURE — 2709999900 HC NON-CHARGEABLE SUPPLY: Performed by: SURGERY

## 2020-06-12 PROCEDURE — 6360000002 HC RX W HCPCS: Performed by: SURGERY

## 2020-06-12 PROCEDURE — 99232 SBSQ HOSP IP/OBS MODERATE 35: CPT | Performed by: THORACIC SURGERY (CARDIOTHORACIC VASCULAR SURGERY)

## 2020-06-12 PROCEDURE — 88305 TISSUE EXAM BY PATHOLOGIST: CPT

## 2020-06-12 PROCEDURE — 36415 COLL VENOUS BLD VENIPUNCTURE: CPT

## 2020-06-12 PROCEDURE — 2580000003 HC RX 258: Performed by: SURGERY

## 2020-06-12 PROCEDURE — C9113 INJ PANTOPRAZOLE SODIUM, VIA: HCPCS | Performed by: SURGERY

## 2020-06-12 PROCEDURE — 80048 BASIC METABOLIC PNL TOTAL CA: CPT

## 2020-06-12 PROCEDURE — 3700000000 HC ANESTHESIA ATTENDED CARE: Performed by: SURGERY

## 2020-06-12 PROCEDURE — 6360000002 HC RX W HCPCS

## 2020-06-12 PROCEDURE — 3609012400 HC EGD TRANSORAL BIOPSY SINGLE/MULTIPLE: Performed by: SURGERY

## 2020-06-12 PROCEDURE — 6370000000 HC RX 637 (ALT 250 FOR IP): Performed by: PHYSICIAN ASSISTANT

## 2020-06-12 PROCEDURE — 7100000001 HC PACU RECOVERY - ADDTL 15 MIN: Performed by: SURGERY

## 2020-06-12 PROCEDURE — 85027 COMPLETE CBC AUTOMATED: CPT

## 2020-06-12 RX ORDER — SODIUM CHLORIDE 9 MG/ML
INJECTION, SOLUTION INTRAVENOUS CONTINUOUS PRN
Status: DISCONTINUED | OUTPATIENT
Start: 2020-06-12 | End: 2020-06-12 | Stop reason: SDUPTHER

## 2020-06-12 RX ORDER — PROPOFOL 10 MG/ML
INJECTION, EMULSION INTRAVENOUS PRN
Status: DISCONTINUED | OUTPATIENT
Start: 2020-06-12 | End: 2020-06-12 | Stop reason: SDUPTHER

## 2020-06-12 RX ADMIN — SODIUM CHLORIDE: 9 INJECTION, SOLUTION INTRAVENOUS at 14:35

## 2020-06-12 RX ADMIN — PANTOPRAZOLE SODIUM 40 MG: 40 INJECTION, POWDER, FOR SOLUTION INTRAVENOUS at 21:15

## 2020-06-12 RX ADMIN — GABAPENTIN 300 MG: 300 CAPSULE ORAL at 21:15

## 2020-06-12 RX ADMIN — PROPOFOL 100 MG: 10 INJECTION, EMULSION INTRAVENOUS at 14:36

## 2020-06-12 RX ADMIN — Medication 10 ML: at 21:16

## 2020-06-12 RX ADMIN — SODIUM CHLORIDE, PRESERVATIVE FREE 10 ML: 5 INJECTION INTRAVENOUS at 08:50

## 2020-06-12 RX ADMIN — PANTOPRAZOLE SODIUM 40 MG: 40 INJECTION, POWDER, FOR SOLUTION INTRAVENOUS at 08:49

## 2020-06-12 RX ADMIN — SODIUM CHLORIDE, PRESERVATIVE FREE 10 ML: 5 INJECTION INTRAVENOUS at 21:15

## 2020-06-12 RX ADMIN — METOPROLOL TARTRATE 25 MG: 25 TABLET, FILM COATED ORAL at 21:15

## 2020-06-12 RX ADMIN — METOPROLOL TARTRATE 25 MG: 25 TABLET, FILM COATED ORAL at 08:49

## 2020-06-12 ASSESSMENT — PAIN SCALES - GENERAL
PAINLEVEL_OUTOF10: 0

## 2020-06-12 ASSESSMENT — ENCOUNTER SYMPTOMS: SHORTNESS OF BREATH: 1

## 2020-06-12 NOTE — ANESTHESIA PRE PROCEDURE
stent deployed proximal LAD by DR Fournier Magnet FOOT SURGERY      right    HYSTERECTOMY      fibroids    KNEE ARTHROSCOPY      left    MASTECTOMY Right 2000    MASTECTOMY, MODIFIED RADICAL Left 2/25/2020    LEFT BREAST SIMPLE MASTECTOMY performed by Oumou Munguia MD at [address], MODIFIED RADICAL Left 2/25/2020    LEFT BREAST MASTECTOMY POST OP BLEED CONTROL, EVACUATION OF LEFT CHEST WALL HEMATOMA performed by Oumou Munguia MD at [address] ENDOSCOPY      UPPER GASTROINTESTINAL ENDOSCOPY N/A 2/5/2020    EGD BIOPSY performed by Oumou Munguia MD at [address] History:    Social History     Tobacco Use    Smoking status: Former Smoker     Packs/day: 0.20     Years: 2.00     Pack years: 0.40     Types: Cigarettes    Smokeless tobacco: Never Used    Tobacco comment: as a teen   Substance Use Topics    Alcohol use: Not Currently     Alcohol/week: 6.0 standard drinks     Types: 6 Standard drinks or equivalent per week     Comment: No coffee. green tea                                Counseling given: Not Answered  Comment: as a teen      Vital Signs (Current): There were no vitals filed for this visit.                                            BP Readings from Last 3 Encounters:   06/12/20 134/78   06/12/20 (!) 156/103   06/09/20 115/78       NPO Status:  > 10 hours                                                                                BMI:   Wt Readings from Last 3 Encounters:   06/12/20 229 lb 9.6 oz (104.1 kg)   06/09/20 223 lb (101.2 kg)   06/01/20 223 lb 14.4 oz (101.6 kg)     There is no height or weight on file to calculate BMI.    CBC:   Lab Results   Component Value Date    WBC 6.5 06/12/2020    RBC 4.34 06/12/2020    HGB 10.8 06/12/2020    HCT 35.4 06/12/2020    MCV 81.6 06/12/2020    RDW 17.2 06/12/2020     06/12/2020       CMP:   Lab Results   Component Value Date     06/12/2020    K 3.9 06/12/2020    K 3.8

## 2020-06-13 LAB
HCT VFR BLD CALC: 34.6 % (ref 34–48)
HEMOGLOBIN: 10.7 G/DL (ref 11.5–15.5)
MAGNESIUM: 2 MG/DL (ref 1.6–2.6)
MCH RBC QN AUTO: 25.1 PG (ref 26–35)
MCHC RBC AUTO-ENTMCNC: 30.9 % (ref 32–34.5)
MCV RBC AUTO: 81.2 FL (ref 80–99.9)
PDW BLD-RTO: 17.2 FL (ref 11.5–15)
PLATELET # BLD: 285 E9/L (ref 130–450)
PMV BLD AUTO: 11.3 FL (ref 7–12)
RBC # BLD: 4.26 E12/L (ref 3.5–5.5)
WBC # BLD: 5.1 E9/L (ref 4.5–11.5)

## 2020-06-13 PROCEDURE — 6370000000 HC RX 637 (ALT 250 FOR IP): Performed by: INTERNAL MEDICINE

## 2020-06-13 PROCEDURE — 6360000002 HC RX W HCPCS: Performed by: SURGERY

## 2020-06-13 PROCEDURE — 2580000003 HC RX 258: Performed by: PHYSICIAN ASSISTANT

## 2020-06-13 PROCEDURE — C9113 INJ PANTOPRAZOLE SODIUM, VIA: HCPCS | Performed by: SURGERY

## 2020-06-13 PROCEDURE — 99232 SBSQ HOSP IP/OBS MODERATE 35: CPT | Performed by: INTERNAL MEDICINE

## 2020-06-13 PROCEDURE — 36415 COLL VENOUS BLD VENIPUNCTURE: CPT

## 2020-06-13 PROCEDURE — 6370000000 HC RX 637 (ALT 250 FOR IP): Performed by: PHYSICIAN ASSISTANT

## 2020-06-13 PROCEDURE — 2140000000 HC CCU INTERMEDIATE R&B

## 2020-06-13 PROCEDURE — 2580000003 HC RX 258: Performed by: SURGERY

## 2020-06-13 PROCEDURE — 99232 SBSQ HOSP IP/OBS MODERATE 35: CPT | Performed by: THORACIC SURGERY (CARDIOTHORACIC VASCULAR SURGERY)

## 2020-06-13 PROCEDURE — 85027 COMPLETE CBC AUTOMATED: CPT

## 2020-06-13 PROCEDURE — 83735 ASSAY OF MAGNESIUM: CPT

## 2020-06-13 RX ORDER — PANTOPRAZOLE SODIUM 40 MG/1
40 TABLET, DELAYED RELEASE ORAL
Qty: 180 TABLET | Refills: 1 | Status: SHIPPED | OUTPATIENT
Start: 2020-06-13 | End: 2021-11-29

## 2020-06-13 RX ADMIN — ACETAMINOPHEN 650 MG: 325 TABLET, FILM COATED ORAL at 21:06

## 2020-06-13 RX ADMIN — GABAPENTIN 300 MG: 300 CAPSULE ORAL at 21:01

## 2020-06-13 RX ADMIN — METOPROLOL TARTRATE 25 MG: 25 TABLET, FILM COATED ORAL at 14:12

## 2020-06-13 RX ADMIN — PANTOPRAZOLE SODIUM 40 MG: 40 INJECTION, POWDER, FOR SOLUTION INTRAVENOUS at 08:45

## 2020-06-13 RX ADMIN — PANTOPRAZOLE SODIUM 40 MG: 40 INJECTION, POWDER, FOR SOLUTION INTRAVENOUS at 21:02

## 2020-06-13 RX ADMIN — Medication 10 ML: at 21:09

## 2020-06-13 RX ADMIN — METOPROLOL TARTRATE 25 MG: 25 TABLET, FILM COATED ORAL at 21:02

## 2020-06-13 RX ADMIN — METOPROLOL TARTRATE 25 MG: 25 TABLET, FILM COATED ORAL at 08:45

## 2020-06-13 RX ADMIN — Medication 10 ML: at 08:45

## 2020-06-13 RX ADMIN — SODIUM CHLORIDE, PRESERVATIVE FREE 10 ML: 5 INJECTION INTRAVENOUS at 21:09

## 2020-06-13 ASSESSMENT — PAIN SCALES - GENERAL
PAINLEVEL_OUTOF10: 0
PAINLEVEL_OUTOF10: 5
PAINLEVEL_OUTOF10: 0

## 2020-06-13 NOTE — PROGRESS NOTES
GENERAL SURGERY  DAILY PROGRESS NOTE    Date:2020       [id number]  Patient Karina Husbands     YOB: 1960     Age:60 y.o. Subjective:  No bleeding overnight. Energetic this morning. EGD showed mild gastritis. Objective:  BP (!) 138/90   Pulse 80   Temp 97.2 °F (36.2 °C) (Temporal)   Resp 16   Ht 5' 5\" (1.651 m)   Wt 224 lb 12.8 oz (102 kg)   SpO2 97%   BMI 37.41 kg/m²   Temp (24hrs), Av.6 °F (37 °C), Min:97.2 °F (36.2 °C), Max:99.9 °F (37.7 °C)      I/O (24Hr): Intake/Output Summary (Last 24 hours) at 2020 0835  Last data filed at 2020 0643  Gross per 24 hour   Intake 770 ml   Output 2700 ml   Net -1930 ml       GENERAL:  No acute distress. Alert and interactive. LUNGS:  No cough. Nonlabored breathing on room air. CARDIOVASC:  Normal rate, no cyanosis. ABDOMEN:  Soft, non-distended, non-tender. No guarding / rigidity / rebound.    EXTREMITIES:  No deformities, no edema but left side thicker leg than right    Assessment:  63 y.o. female with anemia in setting of mild gastritis, prior ulcers have healed    Plan:  - continue PPI, don't need carafate  - ok for CABG from our standpoint  - will f/u in clinic in 2 weeks    Please call with further questions, thank you for the consult    Discussed with Dr Thania Syed    Electronically signed by Flor Oliver MD on 2020 at 8:35 AM

## 2020-06-13 NOTE — PROGRESS NOTES
Zina Shane covering for Dr. Lois Hatch. Notified of 2 runs of VTach. One 6 beats, and one 7 beats, VS and labs.    Pt asymptomatic

## 2020-06-13 NOTE — PROGRESS NOTES
14   CREATININE 1.0 1.0         ROS:   Negative for CP, palpitations, SOB at rest, dizziness/lightheadedness. Physical Exam   Constitutional: Oriented to person, place, and time. Appears well-developed. No distress. Cardiovascular: Normal rate, regular rhythm and normal heart sounds. Pulmonary/Chest: Effort normal. No respiratory distress. Abdominal: Soft. Bowel sounds are normal.   Musculoskeletal: Normal range of motion. Neurological: alert and oriented to person, place, and time. Skin: Skin is warm and dry. Psychiatric: normal mood and affect. A/P:  1) CAD  --for CABG this admission once anemia workup complete--plan for Tue afternoon      2) Anemia  --hematology following--ok with surgery from their POV  --general surgery following--EGD yesterday without any significant finding         3) NSVT  --cardiology following  --on BB     As above. Seen and examined. OR likely Tuesday. Appreciate consultants input. Raleigh Goddard    Note: 25 minutes was spent providing face-to-face patient care, including:  and coordinating care, reviewing the chart, labs, and diagnostics, as well as medical decision making.  Greater than 50% of this time was spent instructing and counseling the patient face to face regarding findings and recommendations

## 2020-06-13 NOTE — PROGRESS NOTES
mg, Q6H PRN  [Held by provider] 0.9 % sodium chloride infusion, Continuous  ceFAZolin (ANCEF) 2 g in dextrose 3 % 50 mL IVPB (duplex), See Admin Instructions        Review of systems:     Heart: as above   Lungs: as above   Eyes: denies changes in vision or discharge. Ears: denies changes in hearing or pain. Nose: denies epistaxis or masses   Throat: denies sore throat or trouble swallowing. Neuro: denies numbness, tingling, tremors. Skin: denies rashes or itching. : denies hematuria, dysuria   GI: denies vomiting, diarrhea   Psych: denies mood changed, anxiety, depression. Physical exam:    Constitutional: A&O x3, communicates well, no acute distress. Eyes: extraocular muscles intact, PERRL. Normal lids & conjunctiva. No icterus. ENT: clear, no bleeding. No external masses. Lips normal formation. Neck: supple, full ROM, no JVD, no bruits, no lymphadenopathy. No masses. trachea midline. Heart: regular rate & rhythm, normal S1 & S2, I/VI (normal physiologic) systolic murmur, S4 gallop. No heave. Lungs: CTA. No accessory muscles. Abd: soft, non-tender. Normal bowel sounds. Obese. Neuro: Full ROM X 4, EOMI, no tremors. EXT: Mild left lower extremity edema. She states that this is chronic in her baseline. Skin: warm, dry, intact. Good turgor. Psych: A&O x 3, normal behavior, not anxious. Assessment/Recommendations  1. Multivessel CAD. Planning for bypass surgery with CT surgery. 2. Nonsustained ventricular tachycardia. I will increase the metoprolol today. I will also check a BMP and mag today. 3. GI bleed. Hematology and general surgery on the case. 4. Hypertension. I will increase the metoprolol today. 5. Diabetes. 6. Prior CVA. 7. Mastectomy.

## 2020-06-14 LAB
BLOOD BANK DISPENSE STATUS: NORMAL
BLOOD BANK PRODUCT CODE: NORMAL
BPU ID: NORMAL
DESCRIPTION BLOOD BANK: NORMAL
HCT VFR BLD CALC: 35 % (ref 34–48)
HEMOGLOBIN: 10.6 G/DL (ref 11.5–15.5)
MCH RBC QN AUTO: 24.8 PG (ref 26–35)
MCHC RBC AUTO-ENTMCNC: 30.3 % (ref 32–34.5)
MCV RBC AUTO: 81.8 FL (ref 80–99.9)
PDW BLD-RTO: 16.8 FL (ref 11.5–15)
PLATELET # BLD: 255 E9/L (ref 130–450)
PMV BLD AUTO: 10.6 FL (ref 7–12)
RBC # BLD: 4.28 E12/L (ref 3.5–5.5)
WBC # BLD: 5.2 E9/L (ref 4.5–11.5)

## 2020-06-14 PROCEDURE — 6360000002 HC RX W HCPCS: Performed by: SURGERY

## 2020-06-14 PROCEDURE — 85027 COMPLETE CBC AUTOMATED: CPT

## 2020-06-14 PROCEDURE — 6370000000 HC RX 637 (ALT 250 FOR IP): Performed by: INTERNAL MEDICINE

## 2020-06-14 PROCEDURE — 2580000003 HC RX 258: Performed by: PHYSICIAN ASSISTANT

## 2020-06-14 PROCEDURE — 2580000003 HC RX 258: Performed by: SURGERY

## 2020-06-14 PROCEDURE — C9113 INJ PANTOPRAZOLE SODIUM, VIA: HCPCS | Performed by: SURGERY

## 2020-06-14 PROCEDURE — 2140000000 HC CCU INTERMEDIATE R&B

## 2020-06-14 PROCEDURE — 99232 SBSQ HOSP IP/OBS MODERATE 35: CPT | Performed by: THORACIC SURGERY (CARDIOTHORACIC VASCULAR SURGERY)

## 2020-06-14 PROCEDURE — 6370000000 HC RX 637 (ALT 250 FOR IP): Performed by: PHYSICIAN ASSISTANT

## 2020-06-14 PROCEDURE — 36415 COLL VENOUS BLD VENIPUNCTURE: CPT

## 2020-06-14 RX ADMIN — Medication 10 ML: at 22:15

## 2020-06-14 RX ADMIN — GABAPENTIN 300 MG: 300 CAPSULE ORAL at 22:16

## 2020-06-14 RX ADMIN — METOPROLOL TARTRATE 25 MG: 25 TABLET, FILM COATED ORAL at 13:42

## 2020-06-14 RX ADMIN — METOPROLOL TARTRATE 25 MG: 25 TABLET, FILM COATED ORAL at 08:52

## 2020-06-14 RX ADMIN — METOPROLOL TARTRATE 25 MG: 25 TABLET, FILM COATED ORAL at 22:22

## 2020-06-14 RX ADMIN — PANTOPRAZOLE SODIUM 40 MG: 40 INJECTION, POWDER, FOR SOLUTION INTRAVENOUS at 22:15

## 2020-06-14 RX ADMIN — Medication 10 ML: at 08:52

## 2020-06-14 RX ADMIN — PANTOPRAZOLE SODIUM 40 MG: 40 INJECTION, POWDER, FOR SOLUTION INTRAVENOUS at 08:52

## 2020-06-14 RX ADMIN — SODIUM CHLORIDE, PRESERVATIVE FREE 10 ML: 5 INJECTION INTRAVENOUS at 22:15

## 2020-06-14 ASSESSMENT — PAIN SCALES - GENERAL
PAINLEVEL_OUTOF10: 0

## 2020-06-14 NOTE — PROGRESS NOTES
CC:CAD     Brief HPI:  Patient seen . Awake, alert. No complaints.   Sitting up in bed, pleasant, denies any CP or SOB       Past Medical History:   Diagnosis Date    Breast cancer ([facility] [address] 2000, 2005    right    Breast cancer ([facility] [address] 2020    left    CAD (coronary artery disease)     follows with Dr. Ave Bernal CVA (cerebral vascular accident) [facility]) 2015    no deficits    DM (diabetes mellitus) ([facility] [address]     H/O: GI bleed     History of blood transfusion 02/2020    Hyperlipidemia     Hypertension      Past Surgical History:   Procedure Laterality Date    APPENDECTOMY      BREAST RECONSTRUCTION Right     BREAST SURGERY  2000    right masectomy    COLONOSCOPY      CORONARY ANGIOPLASTY WITH STENT PLACEMENT  03/04/2020    Mini Vision 2.5 x 18 stent deployed proximal LAD by DR Julieta Nunez    FOOT SURGERY      right    HYSTERECTOMY      fibroids    KNEE ARTHROSCOPY      left    MASTECTOMY Right 2000    MASTECTOMY, MODIFIED RADICAL Left 2/25/2020    LEFT BREAST SIMPLE MASTECTOMY performed by Aleja Naranjo MD at [facility], MODIFIED RADICAL Left 2/25/2020    LEFT BREAST MASTECTOMY POST OP BLEED CONTROL, EVACUATION OF LEFT CHEST WALL HEMATOMA performed by Aleja Naranjo MD at [address] UPPER GASTROINTESTINAL ENDOSCOPY N/A 2/5/2020    EGD BIOPSY performed by Aleja Naranjo MD at [address] [place] [address] Marital status: Single     Spouse name: Not on file    Number of children: Not on file    Years of education: Not on file    Highest education level: Not on file   Occupational History    Not on file   Social Needs    Financial resource strain: Not on file    Food insecurity     Worry: Not on file     Inability: Not on file    Transportation needs     Medical: Not on file     Non-medical: Not on file   Tobacco Use    Smoking status: Former Smoker     Packs/day: 0.20     Years: 2.00     Pack years: 0.40 Types: Cigarettes    Smokeless tobacco: Never Used    Tobacco comment: as a teen   Substance and Sexual Activity    Alcohol use: Not Currently     Alcohol/week: 6.0 standard drinks     Types: 6 Standard drinks or equivalent per week     Comment: No coffee. green tea    Drug use: Not Currently     Types: Marijuana     Comment: used briefly when she turned \"59\"    Sexual activity: Not Currently   Lifestyle    Physical activity     Days per week: Not on file     Minutes per session: Not on file    Stress: Not on file   Relationships    Social connections     Talks on phone: Not on file     Gets together: Not on file     Attends Tenriism service: Not on file     Active member of club or organization: Not on file     Attends meetings of clubs or organizations: Not on file     Relationship status: Not on file    Intimate partner violence     Fear of current or ex partner: Not on file     Emotionally abused: Not on file     Physically abused: Not on file     Forced sexual activity: Not on file   Other Topics Concern    Not on file   Social History Narrative    Denies caffeine.       Family History   Problem Relation Age of Onset    Stroke Mother     Heart Failure Mother     Other Mother         ICD    Breast Cancer Mother     Pacemaker Father     Heart Failure Father     Hypertension Sister        Vitals:    06/13/20 2100 06/14/20 0024 06/14/20 0628 06/14/20 0730   BP: 139/76 (!) 112/59  126/80   Pulse: 80 76  78   Resp: 16 15  12   Temp: 96.6 °F (35.9 °C) 97.8 °F (36.6 °C)  97.8 °F (36.6 °C)   TempSrc: Temporal Oral  Temporal   SpO2: 99% 95%  98%   Weight:   225 lb 9.6 oz (102.3 kg)    Height:               Intake/Output Summary (Last 24 hours) at 6/14/2020 0918  Last data filed at 6/14/2020 1506  Gross per 24 hour   Intake 1080 ml   Output 1300 ml   Net -220 ml         Recent Labs     06/12/20  1155 06/13/20  0624 06/14/20  0623   WBC 6.5 5.1 5.2   HGB 10.8* 10.7* 10.6*   HCT 35.4 34.6 35.0

## 2020-06-15 ENCOUNTER — ANESTHESIA EVENT (OUTPATIENT)
Dept: OPERATING ROOM | Age: 60
DRG: 236 | End: 2020-06-15
Payer: MEDICARE

## 2020-06-15 LAB
ABO/RH: NORMAL
ANTIBODY SCREEN: NORMAL
HCT VFR BLD CALC: 33.7 % (ref 34–48)
HEMOGLOBIN: 10.2 G/DL (ref 11.5–15.5)
MCH RBC QN AUTO: 24.4 PG (ref 26–35)
MCHC RBC AUTO-ENTMCNC: 30.3 % (ref 32–34.5)
MCV RBC AUTO: 80.6 FL (ref 80–99.9)
PDW BLD-RTO: 16.9 FL (ref 11.5–15)
PLATELET # BLD: 263 E9/L (ref 130–450)
PMV BLD AUTO: 10.8 FL (ref 7–12)
RBC # BLD: 4.18 E12/L (ref 3.5–5.5)
WBC # BLD: 5.1 E9/L (ref 4.5–11.5)

## 2020-06-15 PROCEDURE — 86901 BLOOD TYPING SEROLOGIC RH(D): CPT

## 2020-06-15 PROCEDURE — 85027 COMPLETE CBC AUTOMATED: CPT

## 2020-06-15 PROCEDURE — 2580000003 HC RX 258: Performed by: PHYSICIAN ASSISTANT

## 2020-06-15 PROCEDURE — 86850 RBC ANTIBODY SCREEN: CPT

## 2020-06-15 PROCEDURE — 6360000002 HC RX W HCPCS: Performed by: SURGERY

## 2020-06-15 PROCEDURE — 2580000003 HC RX 258: Performed by: SURGERY

## 2020-06-15 PROCEDURE — 2140000000 HC CCU INTERMEDIATE R&B

## 2020-06-15 PROCEDURE — 99233 SBSQ HOSP IP/OBS HIGH 50: CPT | Performed by: INTERNAL MEDICINE

## 2020-06-15 PROCEDURE — 86923 COMPATIBILITY TEST ELECTRIC: CPT

## 2020-06-15 PROCEDURE — P9016 RBC LEUKOCYTES REDUCED: HCPCS

## 2020-06-15 PROCEDURE — 36415 COLL VENOUS BLD VENIPUNCTURE: CPT

## 2020-06-15 PROCEDURE — 6370000000 HC RX 637 (ALT 250 FOR IP): Performed by: PHYSICIAN ASSISTANT

## 2020-06-15 PROCEDURE — 86900 BLOOD TYPING SEROLOGIC ABO: CPT

## 2020-06-15 PROCEDURE — C9113 INJ PANTOPRAZOLE SODIUM, VIA: HCPCS | Performed by: SURGERY

## 2020-06-15 PROCEDURE — 6370000000 HC RX 637 (ALT 250 FOR IP): Performed by: INTERNAL MEDICINE

## 2020-06-15 RX ORDER — SODIUM CHLORIDE 0.9 % (FLUSH) 0.9 %
10 SYRINGE (ML) INJECTION EVERY 12 HOURS SCHEDULED
Status: DISCONTINUED | OUTPATIENT
Start: 2020-06-15 | End: 2020-06-16

## 2020-06-15 RX ORDER — CHLORHEXIDINE GLUCONATE 0.12 MG/ML
15 RINSE ORAL ONCE
Status: COMPLETED | OUTPATIENT
Start: 2020-06-16 | End: 2020-06-16

## 2020-06-15 RX ORDER — CHLORHEXIDINE GLUCONATE 4 G/100ML
SOLUTION TOPICAL SEE ADMIN INSTRUCTIONS
Status: DISCONTINUED | OUTPATIENT
Start: 2020-06-15 | End: 2020-06-16

## 2020-06-15 RX ORDER — CEFAZOLIN SODIUM 2 G/50ML
2 SOLUTION INTRAVENOUS SEE ADMIN INSTRUCTIONS
Status: COMPLETED | OUTPATIENT
Start: 2020-06-15 | End: 2020-06-16

## 2020-06-15 RX ORDER — ASPIRIN 81 MG/1
81 TABLET, CHEWABLE ORAL DAILY
Status: DISCONTINUED | OUTPATIENT
Start: 2020-06-15 | End: 2020-06-16

## 2020-06-15 RX ORDER — SODIUM CHLORIDE 0.9 % (FLUSH) 0.9 %
10 SYRINGE (ML) INJECTION PRN
Status: DISCONTINUED | OUTPATIENT
Start: 2020-06-15 | End: 2020-06-16

## 2020-06-15 RX ADMIN — Medication 10 ML: at 08:37

## 2020-06-15 RX ADMIN — PANTOPRAZOLE SODIUM 40 MG: 40 INJECTION, POWDER, FOR SOLUTION INTRAVENOUS at 08:37

## 2020-06-15 RX ADMIN — METOPROLOL TARTRATE 25 MG: 25 TABLET, FILM COATED ORAL at 15:13

## 2020-06-15 RX ADMIN — SODIUM CHLORIDE, PRESERVATIVE FREE 10 ML: 5 INJECTION INTRAVENOUS at 09:00

## 2020-06-15 RX ADMIN — SODIUM CHLORIDE, PRESERVATIVE FREE 10 ML: 5 INJECTION INTRAVENOUS at 20:41

## 2020-06-15 RX ADMIN — METOPROLOL TARTRATE 25 MG: 25 TABLET, FILM COATED ORAL at 08:37

## 2020-06-15 RX ADMIN — SODIUM CHLORIDE, PRESERVATIVE FREE 10 ML: 5 INJECTION INTRAVENOUS at 08:37

## 2020-06-15 RX ADMIN — PANTOPRAZOLE SODIUM 40 MG: 40 INJECTION, POWDER, FOR SOLUTION INTRAVENOUS at 20:41

## 2020-06-15 RX ADMIN — CHLORHEXIDINE GLUCONATE: 213 SOLUTION TOPICAL at 23:00

## 2020-06-15 RX ADMIN — GABAPENTIN 300 MG: 300 CAPSULE ORAL at 20:42

## 2020-06-15 RX ADMIN — METOPROLOL TARTRATE 25 MG: 25 TABLET, FILM COATED ORAL at 20:42

## 2020-06-15 RX ADMIN — SODIUM CHLORIDE, PRESERVATIVE FREE 10 ML: 5 INJECTION INTRAVENOUS at 20:42

## 2020-06-15 RX ADMIN — ASPIRIN 81 MG 81 MG: 81 TABLET ORAL at 15:13

## 2020-06-15 ASSESSMENT — PAIN SCALES - GENERAL
PAINLEVEL_OUTOF10: 0

## 2020-06-15 ASSESSMENT — ENCOUNTER SYMPTOMS: SHORTNESS OF BREATH: 1

## 2020-06-15 NOTE — PROGRESS NOTES
06/15/20  0629   WBC 5.1 5.2 5.1   HGB 10.7* 10.6* 10.2*   HCT 34.6 35.0 33.7*    255 263      Recent Labs     06/12/20  1155   BUN 14   CREATININE 1.0         ROS:   Negative for CP, palpitations, SOB at rest, dizziness/lightheadedness. Physical Exam   Constitutional: Oriented to person, place, and time. Appears well-developed. No distress. Cardiovascular: Normal rate, regular rhythm and normal heart sounds.    Pulmonary/Chest: Effort normal. No respiratory distress. Abdominal: Soft. Bowel sounds are normal.   Musculoskeletal: Normal range of motion. Neurological: alert and oriented to person, place, and time. Skin: Skin is warm and dry. Psychiatric: normal mood and affect.            A/P:  1) CAD  --for CABG this admission --plan for tomm afternoon   -- Remains CP free   -- Preop testing complete      2) Anemia  --hematology following--ok with surgery from their POV  --general surgery following--EGD 6/12 without any significant finding         3) NSVT  --cardiology following  --on BB- ( lopressor 25 mg)      Note: 25 minutes was spent providing face-to-face patient care, including:  and coordinating care, reviewing the chart, labs, and diagnostics, as well as medical decision making.  Greater than 50% of this time was spent instructing and counseling the patient face to face regarding findings and recommendations.

## 2020-06-15 NOTE — CARE COORDINATION
SOCIAL WORK/CASEMANAGEMENT TRANSITION OF CARE [id number] [facility], [address], [place], [phone]): pt for open heart surgery tomorrow. Met with pt in the room this a.m. no needs addressed. Sw/cm to continue to follow pt for discharge needs.  Pt hopes to return home with hhc. Provided fam and hhc list. Cleveland Garcias  6/15/2020

## 2020-06-15 NOTE — PROGRESS NOTES
LEEP specimen ([id number]) - Focus of cauterized adenocarcinoma invovling excision magin.       COMMENT   In the cervix LEEP specimen ([id number]) the adenocarcinoma invades the cervical stroma to a thickness of at least 2.5 mm. However, since the invasive adenocarcinoma involves the deep (radial) excision margin, the full extent of invasion cannot be determined. The invasive adenocarcinoma involves five tissue sections. Therefore, the width (circumferential measurement) is estimated at approximately 13 mm. Vascular invasion is not identified.       PET[facility] 10/12/2019 at [facility]:   There is no hypermetabolic abdominal or pelvic lymphadenopathy.    There is mild low-level activity in the cervical region (max SUV 2.9).        Evaluated by GYN ONC (Dr. Leticia Gutiérrez at [facility]) who recommended ChemoRT with weekly Cisplatin  followed by HDR brachytherapy for Stage 1B1 Cervical Cancer. Patient would like to receive treatments locally. EBRT was started on 11/20/2019. Cycle # 1 weekly Cisplatin was on 11/20/2019. Cycle # 1 weekly Cisplatin was on 11/20/2019. Cycle # 5 weekly Cisplatin was on 12/19/2019. EBRT was completed 12/27/2019. HDR brachytherapy (1/14/2020-1/16/2020)  PET/CT scan 05/18/2020 noted no evidence of disease.     Left Breast Cancer Feb 2020  Left simple mastectomy on 02/25/2020:  A. Left breast, simple mastectomy:   - Invasive carcinoma of no special type (ductal), grade 2; tumor size 0.8 x 0.7 x 0.5 cm; LVI not identified.   - Ductal carcinoma in situ, intermediate to high nuclear grade,cribriform and solid types, with necrosis;   - Sclerosing fibroadenomatoid nodules and gynecomastia-like changes;   - Microcalcifications in DCIS;   - Scar and foreign body-type granuloma of prior biopsy site;  - Additional skin/breast tissue showing no pathologic alteration.     B. Left breast, new inferior-medial margin, excision: Negative for malignancy.     pT1b pNx  Breast Cancer Marker Studies:  Estrogen Receptors

## 2020-06-16 ENCOUNTER — APPOINTMENT (OUTPATIENT)
Dept: GENERAL RADIOLOGY | Age: 60
DRG: 236 | End: 2020-06-16
Attending: THORACIC SURGERY (CARDIOTHORACIC VASCULAR SURGERY)
Payer: MEDICARE

## 2020-06-16 ENCOUNTER — ANESTHESIA (OUTPATIENT)
Dept: OPERATING ROOM | Age: 60
DRG: 236 | End: 2020-06-16
Payer: MEDICARE

## 2020-06-16 VITALS — OXYGEN SATURATION: 100 % | RESPIRATION RATE: 14 BRPM

## 2020-06-16 LAB
AADO2: 207.8 MMHG
AADO2: 208 MMHG
AADO2: 293.5 MMHG
ACTIVATED CLOTTING TIME: 113 SECONDS (ref 99–130)
ACTIVATED CLOTTING TIME: 447 SECONDS (ref 99–130)
ACTIVATED CLOTTING TIME: 465 SECONDS (ref 99–130)
ACTIVATED CLOTTING TIME: 499 SECONDS (ref 99–130)
ACTIVATED CLOTTING TIME: 90 SECONDS (ref 99–130)
ANION GAP SERPL CALCULATED.3IONS-SCNC: 8 MMOL/L (ref 7–16)
APTT: 30 SEC (ref 24.5–35.1)
B.E.: -1.7 MMOL/L (ref -3–3)
B.E.: -4.8 MMOL/L (ref -3–3)
B.E.: -6.3 MMOL/L (ref -3–3)
B.E.: -6.3 MMOL/L (ref -3–3)
B.E.: 0.6 MMOL/L (ref -3–0)
BUN BLDV-MCNC: 13 MG/DL (ref 8–23)
CALCIUM SERPL-MCNC: 7.8 MG/DL (ref 8.6–10.2)
CARDIOPULMONARY BYPASS: YES
CHLORIDE BLD-SCNC: 105 MMOL/L (ref 98–107)
CO2: 24 MMOL/L (ref 22–29)
COHB: 0.2 % (ref 0–1.5)
COHB: 0.3 % (ref 0–1.5)
CREAT SERPL-MCNC: 0.9 MG/DL (ref 0.5–1)
CRITICAL: ABNORMAL
DATE ANALYZED: ABNORMAL
DATE OF COLLECTION: ABNORMAL
DEVICE: ABNORMAL
FIO2: 50 %
FIO2: 60 %
FIO2: 60 %
GFR AFRICAN AMERICAN: >60
GFR NON-AFRICAN AMERICAN: >60 ML/MIN/1.73
GLUCOSE BLD-MCNC: 204 MG/DL (ref 74–99)
HCO3 ARTERIAL: 24.2 MMOL/L (ref 22–26)
HCO3: 18.4 MMOL/L (ref 22–26)
HCO3: 18.6 MMOL/L (ref 22–26)
HCO3: 20.2 MMOL/L (ref 22–26)
HCO3: 23.3 MMOL/L (ref 22–26)
HCT (EST): 22 % (ref 34–48)
HCT VFR BLD CALC: 30.9 % (ref 34–48)
HCT VFR BLD CALC: 33.5 % (ref 34–48)
HEMOGLOBIN: 10.2 G/DL (ref 11.5–15.5)
HEMOGLOBIN: 9.5 G/DL (ref 11.5–15.5)
HGB, (EST): 7.5 G/DL (ref 11.5–15.5)
HHB: 1.7 % (ref 0–5)
HHB: 3.8 % (ref 0–5)
HHB: 5 % (ref 0–5)
HHB: 6.4 % (ref 0–5)
INR BLD: 1.2
LAB: ABNORMAL
MAGNESIUM: 2.5 MG/DL (ref 1.6–2.6)
MCH RBC QN AUTO: 24.9 PG (ref 26–35)
MCH RBC QN AUTO: 25.6 PG (ref 26–35)
MCHC RBC AUTO-ENTMCNC: 30.4 % (ref 32–34.5)
MCHC RBC AUTO-ENTMCNC: 30.7 % (ref 32–34.5)
MCV RBC AUTO: 81.7 FL (ref 80–99.9)
MCV RBC AUTO: 83.3 FL (ref 80–99.9)
METER GLUCOSE: 160 MG/DL (ref 74–99)
METER GLUCOSE: 173 MG/DL (ref 74–99)
METER GLUCOSE: 176 MG/DL (ref 74–99)
METER GLUCOSE: 188 MG/DL (ref 74–99)
METHB: 0.2 % (ref 0–1.5)
METHB: 0.3 % (ref 0–1.5)
METHB: 0.3 % (ref 0–1.5)
METHB: 0.5 % (ref 0–1.5)
MODE: ABNORMAL
MODE: ABNORMAL
MODE: AC
MODE: AC
O2 CONTENT: 14.2 ML/DL
O2 CONTENT: 14.9 ML/DL
O2 SATURATION: 100 % (ref 92–98.5)
O2 SATURATION: 94.8 % (ref 92–98.5)
O2 SATURATION: 95 % (ref 92–98.5)
O2 SATURATION: 96.2 % (ref 92–98.5)
O2 SATURATION: 99 % (ref 92–98.5)
O2HB: 93.1 % (ref 94–97)
O2HB: 94.4 % (ref 94–97)
O2HB: 95.7 % (ref 94–97)
O2HB: 97.5 % (ref 94–97)
OPERATOR ID: 2860
OPERATOR ID: ABNORMAL
PATIENT TEMP: 37 C
PCO2 ARTERIAL: 33.8 MMHG (ref 35–45)
PCO2: 33.9 MMHG (ref 35–45)
PCO2: 34.5 MMHG (ref 35–45)
PCO2: 36.8 MMHG (ref 35–45)
PCO2: 40.5 MMHG (ref 35–45)
PDW BLD-RTO: 16.5 FL (ref 11.5–15)
PDW BLD-RTO: 16.9 FL (ref 11.5–15)
PEEP/CPAP: 5 CMH2O
PEEP/CPAP: 5 CMH2O
PEEP/CPAP: 8 CMH2O
PFO2: 1.25 MMHG/%
PFO2: 1.94 MMHG/%
PFO2: 2.74 MMHG/%
PH BLOOD GAS: 7.35 (ref 7.35–7.45)
PH BLOOD GAS: 7.35 (ref 7.35–7.45)
PH BLOOD GAS: 7.36 (ref 7.35–7.45)
PH BLOOD GAS: 7.38 (ref 7.35–7.45)
PH BLOOD GAS: 7.46 (ref 7.35–7.45)
PLATELET # BLD: 201 E9/L (ref 130–450)
PLATELET # BLD: 264 E9/L (ref 130–450)
PMV BLD AUTO: 10.6 FL (ref 7–12)
PMV BLD AUTO: 10.9 FL (ref 7–12)
PO2 ARTERIAL: 556.5 MMHG (ref 60–80)
PO2: 164.5 MMHG (ref 75–100)
PO2: 74.8 MMHG (ref 75–100)
PO2: 85.3 MMHG (ref 75–100)
PO2: 97.2 MMHG (ref 75–100)
POTASSIUM SERPL-SCNC: 4.32 MMOL/L (ref 3.5–5)
POTASSIUM SERPL-SCNC: 4.4 MMOL/L (ref 3.5–5.5)
POTASSIUM SERPL-SCNC: 4.7 MMOL/L (ref 3.5–5)
PROTHROMBIN TIME: 13.5 SEC (ref 9.3–12.4)
PS: 10 CMH20
RBC # BLD: 3.71 E12/L (ref 3.5–5.5)
RBC # BLD: 4.1 E12/L (ref 3.5–5.5)
RI(T): 1.26
RI(T): 214 %
RI(T): 3.92
RR MECHANICAL: 14 B/MIN
RR MECHANICAL: 14 B/MIN
SODIUM BLD-SCNC: 137 MMOL/L (ref 132–146)
SOURCE, BLOOD GAS: ABNORMAL
THB: 10.6 G/DL (ref 11.5–16.5)
THB: 10.9 G/DL (ref 11.5–16.5)
THB: 11 G/DL (ref 11.5–16.5)
THB: 11.3 G/DL (ref 11.5–16.5)
TIME ANALYZED: 1738
TIME ANALYZED: 1901
TIME ANALYZED: 2143
TIME ANALYZED: 2340
VT MECHANICAL: 550 ML
VT MECHANICAL: 550 ML
WBC # BLD: 13 E9/L (ref 4.5–11.5)
WBC # BLD: 6.1 E9/L (ref 4.5–11.5)

## 2020-06-16 PROCEDURE — 6370000000 HC RX 637 (ALT 250 FOR IP): Performed by: THORACIC SURGERY (CARDIOTHORACIC VASCULAR SURGERY)

## 2020-06-16 PROCEDURE — 02L70CK OCCLUSION OF LEFT ATRIAL APPENDAGE WITH EXTRALUMINAL DEVICE, OPEN APPROACH: ICD-10-PCS | Performed by: THORACIC SURGERY (CARDIOTHORACIC VASCULAR SURGERY)

## 2020-06-16 PROCEDURE — 94640 AIRWAY INHALATION TREATMENT: CPT

## 2020-06-16 PROCEDURE — 6360000002 HC RX W HCPCS: Performed by: THORACIC SURGERY (CARDIOTHORACIC VASCULAR SURGERY)

## 2020-06-16 PROCEDURE — 2580000003 HC RX 258: Performed by: THORACIC SURGERY (CARDIOTHORACIC VASCULAR SURGERY)

## 2020-06-16 PROCEDURE — C9113 INJ PANTOPRAZOLE SODIUM, VIA: HCPCS | Performed by: SURGERY

## 2020-06-16 PROCEDURE — 6360000002 HC RX W HCPCS: Performed by: NURSE PRACTITIONER

## 2020-06-16 PROCEDURE — 84132 ASSAY OF SERUM POTASSIUM: CPT

## 2020-06-16 PROCEDURE — 80048 BASIC METABOLIC PNL TOTAL CA: CPT

## 2020-06-16 PROCEDURE — 2720000010 HC SURG SUPPLY STERILE: Performed by: THORACIC SURGERY (CARDIOTHORACIC VASCULAR SURGERY)

## 2020-06-16 PROCEDURE — 6360000002 HC RX W HCPCS: Performed by: PHYSICIAN ASSISTANT

## 2020-06-16 PROCEDURE — 5A1221Z PERFORMANCE OF CARDIAC OUTPUT, CONTINUOUS: ICD-10-PCS | Performed by: THORACIC SURGERY (CARDIOTHORACIC VASCULAR SURGERY)

## 2020-06-16 PROCEDURE — 2580000003 HC RX 258: Performed by: SURGERY

## 2020-06-16 PROCEDURE — 33517 CABG ARTERY-VEIN SINGLE: CPT | Performed by: NURSE PRACTITIONER

## 2020-06-16 PROCEDURE — 2580000003 HC RX 258: Performed by: NURSE PRACTITIONER

## 2020-06-16 PROCEDURE — 2580000003 HC RX 258

## 2020-06-16 PROCEDURE — 85610 PROTHROMBIN TIME: CPT

## 2020-06-16 PROCEDURE — 33517 CABG ARTERY-VEIN SINGLE: CPT | Performed by: THORACIC SURGERY (CARDIOTHORACIC VASCULAR SURGERY)

## 2020-06-16 PROCEDURE — P9045 ALBUMIN (HUMAN), 5%, 250 ML: HCPCS | Performed by: THORACIC SURGERY (CARDIOTHORACIC VASCULAR SURGERY)

## 2020-06-16 PROCEDURE — 94002 VENT MGMT INPAT INIT DAY: CPT

## 2020-06-16 PROCEDURE — 83735 ASSAY OF MAGNESIUM: CPT

## 2020-06-16 PROCEDURE — 3700000001 HC ADD 15 MINUTES (ANESTHESIA): Performed by: THORACIC SURGERY (CARDIOTHORACIC VASCULAR SURGERY)

## 2020-06-16 PROCEDURE — 33533 CABG ARTERIAL SINGLE: CPT | Performed by: NURSE PRACTITIONER

## 2020-06-16 PROCEDURE — 94664 DEMO&/EVAL PT USE INHALER: CPT

## 2020-06-16 PROCEDURE — 6370000000 HC RX 637 (ALT 250 FOR IP): Performed by: INTERNAL MEDICINE

## 2020-06-16 PROCEDURE — 36415 COLL VENOUS BLD VENIPUNCTURE: CPT

## 2020-06-16 PROCEDURE — 85730 THROMBOPLASTIN TIME PARTIAL: CPT

## 2020-06-16 PROCEDURE — 6360000002 HC RX W HCPCS

## 2020-06-16 PROCEDURE — 82962 GLUCOSE BLOOD TEST: CPT

## 2020-06-16 PROCEDURE — C1713 ANCHOR/SCREW BN/BN,TIS/BN: HCPCS | Performed by: THORACIC SURGERY (CARDIOTHORACIC VASCULAR SURGERY)

## 2020-06-16 PROCEDURE — 2709999900 HC NON-CHARGEABLE SUPPLY: Performed by: THORACIC SURGERY (CARDIOTHORACIC VASCULAR SURGERY)

## 2020-06-16 PROCEDURE — 2500000003 HC RX 250 WO HCPCS

## 2020-06-16 PROCEDURE — 85027 COMPLETE CBC AUTOMATED: CPT

## 2020-06-16 PROCEDURE — 021009W BYPASS CORONARY ARTERY, ONE ARTERY FROM AORTA WITH AUTOLOGOUS VENOUS TISSUE, OPEN APPROACH: ICD-10-PCS | Performed by: THORACIC SURGERY (CARDIOTHORACIC VASCULAR SURGERY)

## 2020-06-16 PROCEDURE — 3600000008 HC SURGERY OHS BASE: Performed by: THORACIC SURGERY (CARDIOTHORACIC VASCULAR SURGERY)

## 2020-06-16 PROCEDURE — 6370000000 HC RX 637 (ALT 250 FOR IP): Performed by: PHYSICIAN ASSISTANT

## 2020-06-16 PROCEDURE — 3700000000 HC ANESTHESIA ATTENDED CARE: Performed by: THORACIC SURGERY (CARDIOTHORACIC VASCULAR SURGERY)

## 2020-06-16 PROCEDURE — 3600000018 HC SURGERY OHS ADDTL 15MIN: Performed by: THORACIC SURGERY (CARDIOTHORACIC VASCULAR SURGERY)

## 2020-06-16 PROCEDURE — 6360000002 HC RX W HCPCS: Performed by: SURGERY

## 2020-06-16 PROCEDURE — C9113 INJ PANTOPRAZOLE SODIUM, VIA: HCPCS | Performed by: THORACIC SURGERY (CARDIOTHORACIC VASCULAR SURGERY)

## 2020-06-16 PROCEDURE — 82803 BLOOD GASES ANY COMBINATION: CPT

## 2020-06-16 PROCEDURE — B24BZZ4 ULTRASONOGRAPHY OF HEART WITH AORTA, TRANSESOPHAGEAL: ICD-10-PCS | Performed by: THORACIC SURGERY (CARDIOTHORACIC VASCULAR SURGERY)

## 2020-06-16 PROCEDURE — 33533 CABG ARTERIAL SINGLE: CPT | Performed by: THORACIC SURGERY (CARDIOTHORACIC VASCULAR SURGERY)

## 2020-06-16 PROCEDURE — A4648 IMPLANTABLE TISSUE MARKER: HCPCS | Performed by: THORACIC SURGERY (CARDIOTHORACIC VASCULAR SURGERY)

## 2020-06-16 PROCEDURE — 82805 BLOOD GASES W/O2 SATURATION: CPT

## 2020-06-16 PROCEDURE — 71045 X-RAY EXAM CHEST 1 VIEW: CPT

## 2020-06-16 PROCEDURE — 02100Z9 BYPASS CORONARY ARTERY, ONE ARTERY FROM LEFT INTERNAL MAMMARY, OPEN APPROACH: ICD-10-PCS | Performed by: THORACIC SURGERY (CARDIOTHORACIC VASCULAR SURGERY)

## 2020-06-16 PROCEDURE — 85347 COAGULATION TIME ACTIVATED: CPT

## 2020-06-16 PROCEDURE — 2000000000 HC ICU R&B

## 2020-06-16 PROCEDURE — 2780000010 HC IMPLANT OTHER: Performed by: THORACIC SURGERY (CARDIOTHORACIC VASCULAR SURGERY)

## 2020-06-16 PROCEDURE — 06BP0ZZ EXCISION OF RIGHT SAPHENOUS VEIN, OPEN APPROACH: ICD-10-PCS | Performed by: THORACIC SURGERY (CARDIOTHORACIC VASCULAR SURGERY)

## 2020-06-16 PROCEDURE — 6370000000 HC RX 637 (ALT 250 FOR IP)

## 2020-06-16 PROCEDURE — 2580000003 HC RX 258: Performed by: PHYSICIAN ASSISTANT

## 2020-06-16 PROCEDURE — 99233 SBSQ HOSP IP/OBS HIGH 50: CPT | Performed by: NURSE PRACTITIONER

## 2020-06-16 DEVICE — SCREW BNE L13MM DIA2.3MM THOR STRNL TI LOK DRL FREE LEV 1: Type: IMPLANTABLE DEVICE | Site: STERNUM | Status: FUNCTIONAL

## 2020-06-16 DEVICE — PLATE BONE 1.8MM THICKNESS STRNL LCK 6 H CP TI: Type: IMPLANTABLE DEVICE | Site: STERNUM | Status: FUNCTIONAL

## 2020-06-16 DEVICE — SCREW BNE L11MM DIA2.3MM THOR STRNL TI LOK DRL FREE LEV 1: Type: IMPLANTABLE DEVICE | Site: STERNUM | Status: FUNCTIONAL

## 2020-06-16 DEVICE — ZINACTIVE USE 2540329 DEVICE OCCL CLP L40MM PLUNG GRP FLX SHFT FOR GILLINOV: Type: IMPLANTABLE DEVICE | Site: HEART | Status: FUNCTIONAL

## 2020-06-16 DEVICE — PLATE LCK STRNL 8-H LAD T 1.8MM: Type: IMPLANTABLE DEVICE | Site: STERNUM | Status: FUNCTIONAL

## 2020-06-16 RX ORDER — DEXTROSE MONOHYDRATE 50 MG/ML
100 INJECTION, SOLUTION INTRAVENOUS PRN
Status: DISCONTINUED | OUTPATIENT
Start: 2020-06-16 | End: 2020-06-21 | Stop reason: HOSPADM

## 2020-06-16 RX ORDER — SODIUM CHLORIDE 9 MG/ML
30 INJECTION, SOLUTION INTRAVENOUS CONTINUOUS
Status: DISCONTINUED | OUTPATIENT
Start: 2020-06-16 | End: 2020-06-17

## 2020-06-16 RX ORDER — ONDANSETRON 2 MG/ML
4 INJECTION INTRAMUSCULAR; INTRAVENOUS EVERY 8 HOURS PRN
Status: DISCONTINUED | OUTPATIENT
Start: 2020-06-16 | End: 2020-06-17

## 2020-06-16 RX ORDER — ALBUMIN, HUMAN INJ 5% 5 %
SOLUTION INTRAVENOUS
Status: DISCONTINUED
Start: 2020-06-16 | End: 2020-06-17

## 2020-06-16 RX ORDER — SODIUM CHLORIDE 9 MG/ML
INJECTION, SOLUTION INTRAVENOUS CONTINUOUS PRN
Status: DISCONTINUED | OUTPATIENT
Start: 2020-06-16 | End: 2020-06-16 | Stop reason: SDUPTHER

## 2020-06-16 RX ORDER — SENNA AND DOCUSATE SODIUM 50; 8.6 MG/1; MG/1
1 TABLET, FILM COATED ORAL 2 TIMES DAILY
Status: DISCONTINUED | OUTPATIENT
Start: 2020-06-16 | End: 2020-06-17

## 2020-06-16 RX ORDER — CHLORHEXIDINE GLUCONATE 0.12 MG/ML
15 RINSE ORAL 2 TIMES DAILY
Status: DISCONTINUED | OUTPATIENT
Start: 2020-06-16 | End: 2020-06-17

## 2020-06-16 RX ORDER — SODIUM CHLORIDE 0.9 % (FLUSH) 0.9 %
10 SYRINGE (ML) INJECTION PRN
Status: DISCONTINUED | OUTPATIENT
Start: 2020-06-16 | End: 2020-06-21 | Stop reason: HOSPADM

## 2020-06-16 RX ORDER — SODIUM CHLORIDE, SODIUM LACTATE, POTASSIUM CHLORIDE, CALCIUM CHLORIDE 600; 310; 30; 20 MG/100ML; MG/100ML; MG/100ML; MG/100ML
INJECTION, SOLUTION INTRAVENOUS CONTINUOUS PRN
Status: DISCONTINUED | OUTPATIENT
Start: 2020-06-16 | End: 2020-06-16 | Stop reason: SDUPTHER

## 2020-06-16 RX ORDER — BETHANECHOL CHLORIDE 25 MG/1
25 TABLET ORAL 3 TIMES DAILY
Status: DISCONTINUED | OUTPATIENT
Start: 2020-06-16 | End: 2020-06-21 | Stop reason: HOSPADM

## 2020-06-16 RX ORDER — ACETAMINOPHEN 325 MG/1
650 TABLET ORAL EVERY 4 HOURS PRN
Status: DISCONTINUED | OUTPATIENT
Start: 2020-06-16 | End: 2020-06-17

## 2020-06-16 RX ORDER — MEPERIDINE HYDROCHLORIDE 25 MG/ML
25 INJECTION INTRAMUSCULAR; INTRAVENOUS; SUBCUTANEOUS
Status: ACTIVE | OUTPATIENT
Start: 2020-06-16 | End: 2020-06-16

## 2020-06-16 RX ORDER — PHENYLEPHRINE HYDROCHLORIDE 10 MG/ML
INJECTION INTRAVENOUS PRN
Status: DISCONTINUED | OUTPATIENT
Start: 2020-06-16 | End: 2020-06-16 | Stop reason: SDUPTHER

## 2020-06-16 RX ORDER — PROPOFOL 10 MG/ML
INJECTION, EMULSION INTRAVENOUS
Status: DISCONTINUED
Start: 2020-06-16 | End: 2020-06-17

## 2020-06-16 RX ORDER — PROPOFOL 10 MG/ML
INJECTION, EMULSION INTRAVENOUS PRN
Status: DISCONTINUED | OUTPATIENT
Start: 2020-06-16 | End: 2020-06-16 | Stop reason: SDUPTHER

## 2020-06-16 RX ORDER — FENTANYL CITRATE 50 UG/ML
50 INJECTION, SOLUTION INTRAMUSCULAR; INTRAVENOUS
Status: DISCONTINUED | OUTPATIENT
Start: 2020-06-16 | End: 2020-06-17

## 2020-06-16 RX ORDER — VECURONIUM BROMIDE 1 MG/ML
INJECTION, POWDER, LYOPHILIZED, FOR SOLUTION INTRAVENOUS PRN
Status: DISCONTINUED | OUTPATIENT
Start: 2020-06-16 | End: 2020-06-16 | Stop reason: SDUPTHER

## 2020-06-16 RX ORDER — FENTANYL CITRATE 0.05 MG/ML
INJECTION, SOLUTION INTRAMUSCULAR; INTRAVENOUS PRN
Status: DISCONTINUED | OUTPATIENT
Start: 2020-06-16 | End: 2020-06-16 | Stop reason: SDUPTHER

## 2020-06-16 RX ORDER — NICOTINE POLACRILEX 4 MG
15 LOZENGE BUCCAL PRN
Status: DISCONTINUED | OUTPATIENT
Start: 2020-06-16 | End: 2020-06-21 | Stop reason: HOSPADM

## 2020-06-16 RX ORDER — IPRATROPIUM BROMIDE AND ALBUTEROL SULFATE 2.5; .5 MG/3ML; MG/3ML
1 SOLUTION RESPIRATORY (INHALATION)
Status: DISCONTINUED | OUTPATIENT
Start: 2020-06-16 | End: 2020-06-21 | Stop reason: HOSPADM

## 2020-06-16 RX ORDER — FENTANYL CITRATE 50 UG/ML
25 INJECTION, SOLUTION INTRAMUSCULAR; INTRAVENOUS
Status: DISCONTINUED | OUTPATIENT
Start: 2020-06-16 | End: 2020-06-17

## 2020-06-16 RX ORDER — OXYCODONE HYDROCHLORIDE 5 MG/1
10 TABLET ORAL EVERY 4 HOURS PRN
Status: DISCONTINUED | OUTPATIENT
Start: 2020-06-16 | End: 2020-06-17

## 2020-06-16 RX ORDER — SODIUM CHLORIDE 9 MG/ML
10 INJECTION INTRAVENOUS DAILY
Status: DISCONTINUED | OUTPATIENT
Start: 2020-06-16 | End: 2020-06-17

## 2020-06-16 RX ORDER — PROPOFOL 10 MG/ML
10 INJECTION, EMULSION INTRAVENOUS CONTINUOUS PRN
Status: DISCONTINUED | OUTPATIENT
Start: 2020-06-16 | End: 2020-06-17

## 2020-06-16 RX ORDER — ASPIRIN 81 MG/1
81 TABLET ORAL DAILY
Status: DISCONTINUED | OUTPATIENT
Start: 2020-06-17 | End: 2020-06-17

## 2020-06-16 RX ORDER — MAGNESIUM SULFATE IN WATER 40 MG/ML
2 INJECTION, SOLUTION INTRAVENOUS PRN
Status: DISCONTINUED | OUTPATIENT
Start: 2020-06-16 | End: 2020-06-17

## 2020-06-16 RX ORDER — PANTOPRAZOLE SODIUM 40 MG/1
40 TABLET, DELAYED RELEASE ORAL DAILY
Status: DISCONTINUED | OUTPATIENT
Start: 2020-06-17 | End: 2020-06-17

## 2020-06-16 RX ORDER — HEPARIN SODIUM 10000 [USP'U]/ML
INJECTION, SOLUTION INTRAVENOUS; SUBCUTANEOUS PRN
Status: DISCONTINUED | OUTPATIENT
Start: 2020-06-16 | End: 2020-06-16 | Stop reason: SDUPTHER

## 2020-06-16 RX ORDER — ALBUMIN, HUMAN INJ 5% 5 %
25 SOLUTION INTRAVENOUS PRN
Status: DISCONTINUED | OUTPATIENT
Start: 2020-06-16 | End: 2020-06-17

## 2020-06-16 RX ORDER — DOBUTAMINE HYDROCHLORIDE 400 MG/100ML
INJECTION INTRAVENOUS CONTINUOUS PRN
Status: DISCONTINUED | OUTPATIENT
Start: 2020-06-16 | End: 2020-06-16 | Stop reason: SDUPTHER

## 2020-06-16 RX ORDER — PANTOPRAZOLE SODIUM 40 MG/10ML
40 INJECTION, POWDER, LYOPHILIZED, FOR SOLUTION INTRAVENOUS DAILY
Status: COMPLETED | OUTPATIENT
Start: 2020-06-16 | End: 2020-06-16

## 2020-06-16 RX ORDER — OXYCODONE HYDROCHLORIDE 5 MG/1
5 TABLET ORAL EVERY 4 HOURS PRN
Status: DISCONTINUED | OUTPATIENT
Start: 2020-06-16 | End: 2020-06-17

## 2020-06-16 RX ORDER — DOBUTAMINE HYDROCHLORIDE 400 MG/100ML
3 INJECTION INTRAVENOUS CONTINUOUS
Status: DISCONTINUED | OUTPATIENT
Start: 2020-06-16 | End: 2020-06-17

## 2020-06-16 RX ORDER — SODIUM CHLORIDE 0.9 % (FLUSH) 0.9 %
10 SYRINGE (ML) INJECTION EVERY 12 HOURS SCHEDULED
Status: DISCONTINUED | OUTPATIENT
Start: 2020-06-16 | End: 2020-06-21 | Stop reason: HOSPADM

## 2020-06-16 RX ORDER — DEXTROSE MONOHYDRATE 25 G/50ML
12.5 INJECTION, SOLUTION INTRAVENOUS PRN
Status: DISCONTINUED | OUTPATIENT
Start: 2020-06-16 | End: 2020-06-21 | Stop reason: HOSPADM

## 2020-06-16 RX ORDER — CLOPIDOGREL BISULFATE 75 MG/1
75 TABLET ORAL DAILY
Status: DISCONTINUED | OUTPATIENT
Start: 2020-06-17 | End: 2020-06-21 | Stop reason: HOSPADM

## 2020-06-16 RX ORDER — ASPIRIN 300 MG/1
300 SUPPOSITORY RECTAL ONCE
Status: COMPLETED | OUTPATIENT
Start: 2020-06-16 | End: 2020-06-17

## 2020-06-16 RX ORDER — INSULIN GLARGINE 100 [IU]/ML
0.15 INJECTION, SOLUTION SUBCUTANEOUS NIGHTLY
Status: DISCONTINUED | OUTPATIENT
Start: 2020-06-17 | End: 2020-06-17

## 2020-06-16 RX ORDER — ACETAMINOPHEN 650 MG/1
650 SUPPOSITORY RECTAL EVERY 4 HOURS PRN
Status: DISCONTINUED | OUTPATIENT
Start: 2020-06-16 | End: 2020-06-17

## 2020-06-16 RX ORDER — 0.9 % SODIUM CHLORIDE 0.9 %
250 INTRAVENOUS SOLUTION INTRAVENOUS CONTINUOUS PRN
Status: DISCONTINUED | OUTPATIENT
Start: 2020-06-16 | End: 2020-06-17

## 2020-06-16 RX ORDER — SENNA PLUS 8.6 MG/1
1 TABLET ORAL NIGHTLY
Status: DISCONTINUED | OUTPATIENT
Start: 2020-06-17 | End: 2020-06-17

## 2020-06-16 RX ORDER — AMINOCAPROIC ACID 250 MG/ML
INJECTION, SOLUTION INTRAVENOUS PRN
Status: DISCONTINUED | OUTPATIENT
Start: 2020-06-16 | End: 2020-06-16 | Stop reason: SDUPTHER

## 2020-06-16 RX ORDER — CEFAZOLIN SODIUM 2 G/50ML
2 SOLUTION INTRAVENOUS EVERY 8 HOURS
Status: COMPLETED | OUTPATIENT
Start: 2020-06-17 | End: 2020-06-18

## 2020-06-16 RX ORDER — PROTAMINE SULFATE 10 MG/ML
INJECTION, SOLUTION INTRAVENOUS PRN
Status: DISCONTINUED | OUTPATIENT
Start: 2020-06-16 | End: 2020-06-16 | Stop reason: SDUPTHER

## 2020-06-16 RX ORDER — MIDAZOLAM HYDROCHLORIDE 1 MG/ML
INJECTION INTRAMUSCULAR; INTRAVENOUS PRN
Status: DISCONTINUED | OUTPATIENT
Start: 2020-06-16 | End: 2020-06-16 | Stop reason: SDUPTHER

## 2020-06-16 RX ORDER — POTASSIUM CHLORIDE 29.8 MG/ML
20 INJECTION INTRAVENOUS PRN
Status: DISCONTINUED | OUTPATIENT
Start: 2020-06-16 | End: 2020-06-17

## 2020-06-16 RX ADMIN — FENTANYL CITRATE 50 MCG: 50 INJECTION INTRAMUSCULAR; INTRAVENOUS at 17:49

## 2020-06-16 RX ADMIN — FENTANYL CITRATE 100 MCG: 50 INJECTION, SOLUTION INTRAMUSCULAR; INTRAVENOUS at 13:32

## 2020-06-16 RX ADMIN — SODIUM CHLORIDE, POTASSIUM CHLORIDE, SODIUM LACTATE AND CALCIUM CHLORIDE: 600; 310; 30; 20 INJECTION, SOLUTION INTRAVENOUS at 13:21

## 2020-06-16 RX ADMIN — CEFAZOLIN SODIUM 2 G: 2 SOLUTION INTRAVENOUS at 13:32

## 2020-06-16 RX ADMIN — IPRATROPIUM BROMIDE AND ALBUTEROL SULFATE 1 AMPULE: 2.5; .5 SOLUTION RESPIRATORY (INHALATION) at 20:57

## 2020-06-16 RX ADMIN — IPRATROPIUM BROMIDE AND ALBUTEROL SULFATE 1 AMPULE: 2.5; .5 SOLUTION RESPIRATORY (INHALATION) at 17:46

## 2020-06-16 RX ADMIN — METOPROLOL TARTRATE 25 MG: 25 TABLET, FILM COATED ORAL at 09:08

## 2020-06-16 RX ADMIN — ALBUMIN (HUMAN) 25 G: 12.5 INJECTION, SOLUTION INTRAVENOUS at 19:14

## 2020-06-16 RX ADMIN — SODIUM CHLORIDE: 9 INJECTION, SOLUTION INTRAVENOUS at 13:21

## 2020-06-16 RX ADMIN — SODIUM CHLORIDE, POTASSIUM CHLORIDE, SODIUM LACTATE AND CALCIUM CHLORIDE: 600; 310; 30; 20 INJECTION, SOLUTION INTRAVENOUS at 14:50

## 2020-06-16 RX ADMIN — CHLORHEXIDINE GLUCONATE 0.12% ORAL RINSE 15 ML: 1.2 LIQUID ORAL at 09:08

## 2020-06-16 RX ADMIN — HEPARIN SODIUM 40000 UNITS: 10000 INJECTION, SOLUTION INTRAVENOUS; SUBCUTANEOUS at 14:35

## 2020-06-16 RX ADMIN — SODIUM CHLORIDE, PRESERVATIVE FREE 10 ML: 5 INJECTION INTRAVENOUS at 09:10

## 2020-06-16 RX ADMIN — CHLORHEXIDINE GLUCONATE 15 ML: 1.2 RINSE ORAL at 20:50

## 2020-06-16 RX ADMIN — FENTANYL CITRATE 100 MCG: 50 INJECTION, SOLUTION INTRAMUSCULAR; INTRAVENOUS at 14:02

## 2020-06-16 RX ADMIN — SODIUM CHLORIDE 30 ML/HR: 9 INJECTION, SOLUTION INTRAVENOUS at 18:01

## 2020-06-16 RX ADMIN — INSULIN LISPRO 3 UNITS: 100 INJECTION, SOLUTION INTRAVENOUS; SUBCUTANEOUS at 21:08

## 2020-06-16 RX ADMIN — EPINEPHRINE 0.02 MCG/KG/MIN: 1 INJECTION PARENTERAL at 15:45

## 2020-06-16 RX ADMIN — PROTAMINE SULFATE 300 MG: 10 INJECTION, SOLUTION INTRAVENOUS at 15:55

## 2020-06-16 RX ADMIN — DOBUTAMINE HYDROCHLORIDE 5 MCG/KG/MIN: 400 INJECTION INTRAVENOUS at 15:58

## 2020-06-16 RX ADMIN — SODIUM CHLORIDE 2 UNITS/HR: 9 INJECTION, SOLUTION INTRAVENOUS at 15:21

## 2020-06-16 RX ADMIN — PANTOPRAZOLE SODIUM 40 MG: 40 INJECTION, POWDER, FOR SOLUTION INTRAVENOUS at 18:19

## 2020-06-16 RX ADMIN — FENTANYL CITRATE 25 MCG: 50 INJECTION INTRAMUSCULAR; INTRAVENOUS at 22:27

## 2020-06-16 RX ADMIN — PROPOFOL 150 MG: 10 INJECTION, EMULSION INTRAVENOUS at 13:32

## 2020-06-16 RX ADMIN — FENTANYL CITRATE 50 MCG: 50 INJECTION, SOLUTION INTRAMUSCULAR; INTRAVENOUS at 14:00

## 2020-06-16 RX ADMIN — CEFAZOLIN SODIUM 2 G: 2 SOLUTION INTRAVENOUS at 16:02

## 2020-06-16 RX ADMIN — INSULIN LISPRO 3 UNITS: 100 INJECTION, SOLUTION INTRAVENOUS; SUBCUTANEOUS at 18:14

## 2020-06-16 RX ADMIN — VECURONIUM BROMIDE 10 MG: 10 INJECTION, POWDER, LYOPHILIZED, FOR SOLUTION INTRAVENOUS at 13:32

## 2020-06-16 RX ADMIN — MIDAZOLAM 2 MG: 1 INJECTION INTRAMUSCULAR; INTRAVENOUS at 13:15

## 2020-06-16 RX ADMIN — AMINOCAPROIC ACID 5000 MG: 250 INJECTION, SOLUTION INTRAVENOUS at 13:32

## 2020-06-16 RX ADMIN — MUPIROCIN: 20 OINTMENT TOPICAL at 09:09

## 2020-06-16 RX ADMIN — INSULIN HUMAN 2 UNITS: 100 INJECTION, SOLUTION PARENTERAL at 15:18

## 2020-06-16 RX ADMIN — PHENYLEPHRINE HYDROCHLORIDE 100 MCG: 10 INJECTION INTRAVENOUS at 14:39

## 2020-06-16 RX ADMIN — MUPIROCIN: 20 OINTMENT TOPICAL at 20:46

## 2020-06-16 RX ADMIN — PROPOFOL 10 MCG/KG/MIN: 10 INJECTION, EMULSION INTRAVENOUS at 18:24

## 2020-06-16 RX ADMIN — PHENYLEPHRINE HYDROCHLORIDE 100 MCG: 10 INJECTION INTRAVENOUS at 13:36

## 2020-06-16 RX ADMIN — FENTANYL CITRATE 750 MCG: 50 INJECTION, SOLUTION INTRAMUSCULAR; INTRAVENOUS at 14:09

## 2020-06-16 RX ADMIN — SODIUM CHLORIDE, PRESERVATIVE FREE 10 ML: 5 INJECTION INTRAVENOUS at 09:09

## 2020-06-16 RX ADMIN — AMIODARONE HYDROCHLORIDE 0.5 MG/MIN: 50 INJECTION, SOLUTION INTRAVENOUS at 17:50

## 2020-06-16 RX ADMIN — SODIUM CHLORIDE, PRESERVATIVE FREE 10 ML: 5 INJECTION INTRAVENOUS at 20:46

## 2020-06-16 RX ADMIN — CALCIUM GLUCONATE 1 G: 98 INJECTION, SOLUTION INTRAVENOUS at 19:49

## 2020-06-16 RX ADMIN — AMINOCAPROIC ACID 1 G/HR: 250 INJECTION, SOLUTION INTRAVENOUS at 13:57

## 2020-06-16 RX ADMIN — MIDAZOLAM 2 MG: 1 INJECTION INTRAMUSCULAR; INTRAVENOUS at 13:06

## 2020-06-16 RX ADMIN — PANTOPRAZOLE SODIUM 40 MG: 40 INJECTION, POWDER, FOR SOLUTION INTRAVENOUS at 09:10

## 2020-06-16 ASSESSMENT — PULMONARY FUNCTION TESTS
PIF_VALUE: 1
PIF_VALUE: 1
PIF_VALUE: 27
PIF_VALUE: 31
PIF_VALUE: 31
PIF_VALUE: 25
PIF_VALUE: 1
PIF_VALUE: 29
PIF_VALUE: 27
PIF_VALUE: 31
PIF_VALUE: 28
PIF_VALUE: 32
PIF_VALUE: 30
PIF_VALUE: 29
PIF_VALUE: 29
PIF_VALUE: 27
PIF_VALUE: 27
PIF_VALUE: 29
PIF_VALUE: 26
PIF_VALUE: 27
PIF_VALUE: 30
PIF_VALUE: 3
PIF_VALUE: 30
PIF_VALUE: 28
PIF_VALUE: 4
PIF_VALUE: 2
PIF_VALUE: 28
PIF_VALUE: 1
PIF_VALUE: 1
PIF_VALUE: 26
PIF_VALUE: 2
PIF_VALUE: 1
PIF_VALUE: 27
PIF_VALUE: 1
PIF_VALUE: 26
PIF_VALUE: 30
PIF_VALUE: 26
PIF_VALUE: 26
PIF_VALUE: 2
PIF_VALUE: 27
PIF_VALUE: 29
PIF_VALUE: 27
PIF_VALUE: 31
PIF_VALUE: 1
PIF_VALUE: 26
PIF_VALUE: 28
PIF_VALUE: 30
PIF_VALUE: 1
PIF_VALUE: 38
PIF_VALUE: 2
PIF_VALUE: 1
PIF_VALUE: 29
PIF_VALUE: 27
PIF_VALUE: 31
PIF_VALUE: 1
PIF_VALUE: 2
PIF_VALUE: 29
PIF_VALUE: 29
PIF_VALUE: 5
PIF_VALUE: 24
PIF_VALUE: 30
PIF_VALUE: 29
PIF_VALUE: 30
PIF_VALUE: 1
PIF_VALUE: 27
PIF_VALUE: 31
PIF_VALUE: 32
PIF_VALUE: 27
PIF_VALUE: 2
PIF_VALUE: 29
PIF_VALUE: 29
PIF_VALUE: 25
PIF_VALUE: 18
PIF_VALUE: 1
PIF_VALUE: 1
PIF_VALUE: 27
PIF_VALUE: 29
PIF_VALUE: 31
PIF_VALUE: 29
PIF_VALUE: 2
PIF_VALUE: 1
PIF_VALUE: 1
PIF_VALUE: 2
PIF_VALUE: 1
PIF_VALUE: 2
PIF_VALUE: 1
PIF_VALUE: 1
PIF_VALUE: 28
PIF_VALUE: 27
PIF_VALUE: 32
PIF_VALUE: 28
PIF_VALUE: 28
PIF_VALUE: 31
PIF_VALUE: 29
PIF_VALUE: 28
PIF_VALUE: 27
PIF_VALUE: 28
PIF_VALUE: 1
PIF_VALUE: 29
PIF_VALUE: 29
PIF_VALUE: 27
PIF_VALUE: 24
PIF_VALUE: 27
PIF_VALUE: 1
PIF_VALUE: 30
PIF_VALUE: 29
PIF_VALUE: 1
PIF_VALUE: 31
PIF_VALUE: 19
PIF_VALUE: 31
PIF_VALUE: 26
PIF_VALUE: 27
PIF_VALUE: 26
PIF_VALUE: 27
PIF_VALUE: 30
PIF_VALUE: 1
PIF_VALUE: 27
PIF_VALUE: 30
PIF_VALUE: 28
PIF_VALUE: 31
PIF_VALUE: 29
PIF_VALUE: 28
PIF_VALUE: 26
PIF_VALUE: 0
PIF_VALUE: 31
PIF_VALUE: 31
PIF_VALUE: 27
PIF_VALUE: 2
PIF_VALUE: 28
PIF_VALUE: 27
PIF_VALUE: 28
PIF_VALUE: 1
PIF_VALUE: 1
PIF_VALUE: 31
PIF_VALUE: 28
PIF_VALUE: 33
PIF_VALUE: 1
PIF_VALUE: 29
PIF_VALUE: 28
PIF_VALUE: 30
PIF_VALUE: 25
PIF_VALUE: 26
PIF_VALUE: 27
PIF_VALUE: 1
PIF_VALUE: 38
PIF_VALUE: 29
PIF_VALUE: 30
PIF_VALUE: 26
PIF_VALUE: 31
PIF_VALUE: 1
PIF_VALUE: 1
PIF_VALUE: 4
PIF_VALUE: 23
PIF_VALUE: 31
PIF_VALUE: 1
PIF_VALUE: 28
PIF_VALUE: 31
PIF_VALUE: 1
PIF_VALUE: 31
PIF_VALUE: 1
PIF_VALUE: 28
PIF_VALUE: 29
PIF_VALUE: 31
PIF_VALUE: 4
PIF_VALUE: 31
PIF_VALUE: 30
PIF_VALUE: 1
PIF_VALUE: 2
PIF_VALUE: 31
PIF_VALUE: 28
PIF_VALUE: 31
PIF_VALUE: 30
PIF_VALUE: 33
PIF_VALUE: 1
PIF_VALUE: 29
PIF_VALUE: 25
PIF_VALUE: 1
PIF_VALUE: 27
PIF_VALUE: 31
PIF_VALUE: 29
PIF_VALUE: 29
PIF_VALUE: 1
PIF_VALUE: 31
PIF_VALUE: 27
PIF_VALUE: 28
PIF_VALUE: 31
PIF_VALUE: 30
PIF_VALUE: 1
PIF_VALUE: 30
PIF_VALUE: 26
PIF_VALUE: 29
PIF_VALUE: 26
PIF_VALUE: 30
PIF_VALUE: 1
PIF_VALUE: 29
PIF_VALUE: 1
PIF_VALUE: 27
PIF_VALUE: 29
PIF_VALUE: 28
PIF_VALUE: 27
PIF_VALUE: 26
PIF_VALUE: 32
PIF_VALUE: 1
PIF_VALUE: 29
PIF_VALUE: 29
PIF_VALUE: 26
PIF_VALUE: 31
PIF_VALUE: 25
PIF_VALUE: 24
PIF_VALUE: 29
PIF_VALUE: 31
PIF_VALUE: 27

## 2020-06-16 ASSESSMENT — PAIN SCALES - GENERAL
PAINLEVEL_OUTOF10: 0
PAINLEVEL_OUTOF10: 6
PAINLEVEL_OUTOF10: 8
PAINLEVEL_OUTOF10: 0

## 2020-06-16 ASSESSMENT — PAIN DESCRIPTION - ONSET: ONSET: PROGRESSIVE

## 2020-06-16 ASSESSMENT — PAIN DESCRIPTION - LOCATION: LOCATION: STERNUM

## 2020-06-16 ASSESSMENT — PAIN DESCRIPTION - ORIENTATION: ORIENTATION: MID

## 2020-06-16 ASSESSMENT — PAIN DESCRIPTION - DESCRIPTORS: DESCRIPTORS: ACHING;CONSTANT;DISCOMFORT

## 2020-06-16 ASSESSMENT — PAIN - FUNCTIONAL ASSESSMENT: PAIN_FUNCTIONAL_ASSESSMENT: PREVENTS OR INTERFERES SOME ACTIVE ACTIVITIES AND ADLS

## 2020-06-16 ASSESSMENT — PAIN DESCRIPTION - PROGRESSION: CLINICAL_PROGRESSION: GRADUALLY WORSENING

## 2020-06-16 ASSESSMENT — PAIN DESCRIPTION - PAIN TYPE: TYPE: SURGICAL PAIN

## 2020-06-16 ASSESSMENT — PAIN DESCRIPTION - FREQUENCY: FREQUENCY: CONTINUOUS

## 2020-06-16 NOTE — BRIEF OP NOTE
Brief Postoperative Note    Ezekiel Santiago  YOB: 1960  [id number]    Pre-operative Diagnosis: H/O STEMI, CAD    Post-operative Diagnosis: Same    Operation: Urgent sternotomy/Urgent CABG x 2 (LIMA-LAD, SVG-OM1)/Exploration of PDA/Open GSV harvest RLE/ARNULFO exclusion with 40mm AtriClip/Rigid internal fixation of the sternum with KLS plates x 2    Anesthesia: General    Surgeon: Jonny Salazar    Assistants: Roel/Steven    Estimated Blood Loss: 8453    Complications: None    Specimens:   * No specimens in log *    Implants:  Implant Name Type Inv.  Item Serial No.  Lot No. LRB No. Used Action   CLIP ATRICLIP FLEX HNDL 40MM Heart CLIP ATRICLIP FLEX HNDL 40MM  ATRICURE [id number] N/A 1 Implanted   PLATE STERNAL TI 6 HL 1.8MM Screw/Plate/Nail/Efe PLATE STERNAL TI 6 HL 1.8MM  KLS [employer]MENT[employer] LP  N/A 1 Implanted   PLATE STERNL LK 8 HL LADDER 1.8MM Screw/Plate/Nail/Efe PLATE STERNL LK 8 HL LADDER 1.8MM  K[employer] LP  N/A 1 Implanted   SCREW LK STNAL 2.3X13MM Screw/Plate/Nail/Efe SCREW LK STNAL 2.3X13MM  K[employer] LP  N/A 6 Implanted   SCREW LK STNAL 2.3X11MM Screw/Plate/Nail/Efe SCREW LK STNAL 2.3X11MM  K[employer] LP  N/A 8 Implanted         Drains:   Chest Tube 1 Anterior Mediastinal 32 Dutch (Active)       Chest Tube 2 Anterior Mediastinal 32 Dutch (Active)       Chest Tube 3 Anterior Pleural 28 Dutch (Active)       Chest Tube 4 Anterior Pleural 24 Dutch (Active)       Urethral Catheter Temperature probe 16 fr (Active)       Findings: PDA too small to graft, EF 35%    Electronically signed by Lavinia Ng MD on 6/16/2020 at 4:32 PM

## 2020-06-16 NOTE — ANESTHESIA PRE PROCEDURE
1513    sodium chloride flush 0.9 % injection 10 mL  10 mL Intravenous 2 times per day Karma Payne PA-C   10 mL at 06/16/20 1599    sodium chloride flush 0.9 % injection 10 mL  10 mL Intravenous PRN Karma Payne PA-C        ceFAZolin (ANCEF) 2 g in dextrose 3 % 50 mL IVPB (duplex)  2 g Intravenous See Admin Instructions KRISTEN Prince-NATALIE        mupirocin (BACTROBAN) 2 % ointment   Nasal BID KRISTEN Prince-NATALIE        chlorhexidine (HIBICLENS) 4 % liquid   Topical See Admin Instructions Karma Payne PA-C        metoprolol tartrate (LOPRESSOR) tablet 25 mg  25 mg Oral TID Scott Jain DO   25 mg at 06/16/20 0908    pantoprazole (PROTONIX) injection 40 mg  40 mg Intravenous BID Amanda Flood MD   40 mg at 06/16/20 0910    And    sodium chloride (PF) 0.9 % injection 10 mL  10 mL Intravenous BID Amanda Flood MD   10 mL at 06/16/20 0910    gabapentin (NEURONTIN) capsule 300 mg  300 mg Oral Nightly Amanda Flood MD   300 mg at 06/15/20 2042    acetaminophen (TYLENOL) tablet 650 mg  650 mg Oral Q6H PRN Amanda Flood MD   650 mg at 06/13/20 2106    Or    acetaminophen (TYLENOL) suppository 650 mg  650 mg Rectal Q6H PRN Amanda Flood MD           Allergies:     Allergies   Allergen Reactions    Penicillins Hives and Rash       Problem List:    Patient Active Problem List   Diagnosis Code    Chest pain R07.9    Hx of breast cancer Z85.3    Hypertension I10    Breast cancer ([facility] [address] C50.919    Left-sided weakness R53.1    Controlled type 2 diabetes mellitus without complication, without long-term current use of insulin (HCC) E11.9    History of CVA (cerebrovascular accident) Z80.78    Hypertensive left ventricular hypertrophy, without heart failure I11.9    Abnormal EKG R94.31    Chronic midline low back pain with left-sided sciatica M54.42, G89.29    Obesity E66.9    Cervical cancer (HCC) C53.9    Malignant neoplasm of endocervix (HCC) C53.0    Acute GI bleeding K92.2    Gastrointestinal hemorrhage K92.2    Anemia due to acute blood loss D62    Type 2 diabetes mellitus without complication, without long-term current use of insulin (HCC) E11.9    Shortness of breath R06.02    Wide-complex tachycardia (HCC) I47.2    Intraductal carcinoma of left breast D05.12    Postoperative anemia due to acute blood loss D62    NSTEMI (non-ST elevated myocardial infarction) (HCC) R18.8    Metabolic acidosis M17.6    Hyperlipidemia E78.5    Coronary artery disease involving native coronary artery of native heart with unstable angina pectoris (HCC) I25.110    CAD in native artery I25.10    COVID-19 U07.1    Nonsustained ventricular tachycardia (HCC) I47.2       Past Medical History:        Diagnosis Date    Breast cancer ([facility] [address] 2000, 2005    right    Breast cancer ([facility] [address] 2020    left    CAD (coronary artery disease)     follows with Dr. Marielle Medina CVA (cerebral vascular accident) [facility]) 2015    no deficits    DM (diabetes mellitus) ([facility] [address]     H/O: GI bleed     History of blood transfusion 02/2020    Hyperlipidemia     Hypertension        Past Surgical History:        Procedure Laterality Date    APPENDECTOMY      BREAST RECONSTRUCTION Right     BREAST SURGERY  2000    right masectomy    COLONOSCOPY      CORONARY ANGIOPLASTY WITH STENT PLACEMENT  03/04/2020    Mini Vision 2.5 x 18 stent deployed proximal LAD by DR Snow Ny    FOOT SURGERY      right    HYSTERECTOMY      fibroids    KNEE ARTHROSCOPY      left    MASTECTOMY Right 2000    MASTECTOMY, MODIFIED RADICAL Left 2/25/2020    LEFT BREAST SIMPLE MASTECTOMY performed by Ryley Powers MD at [facility], MODIFIED RADICAL Left 2/25/2020    LEFT BREAST MASTECTOMY POST OP BLEED CONTROL, EVACUATION OF LEFT CHEST WALL HEMATOMA [facility]      UPPER GASTROINTESTINAL ENDOSCOPY N/A 2/5/2020    EGD BIOPSY performed by Ryley Powers MD at [address] [facility] ENDOSCOPY N/A 6/12/2020    EGD BIOPSY performed by Justin Duran MD at [facility] History:    Social History     Tobacco Use    Smoking status: Former Smoker     Packs/day: 0.20     Years: 2.00     Pack years: 0.40     Types: Cigarettes    Smokeless tobacco: Never Used    Tobacco comment: as a teen   Substance Use Topics    Alcohol use: Not Currently     Alcohol/week: 6.0 standard drinks     Types: 6 Standard drinks or equivalent per week     Comment: No coffee. green tea                                Counseling given: Not Answered  Comment: as a teen      Vital Signs (Current):   Vitals:    06/16/20 0344 06/16/20 0702 06/16/20 0740 06/16/20 1137   BP: 122/80  136/80 (!) 146/84   Pulse: 80  88 88   Resp: 18  18 16   Temp: 97.7 °F (36.5 °C)  98.2 °F (36.8 °C) 97.7 °F (36.5 °C)   TempSrc: Temporal  Oral    SpO2: 98%  98%    Weight:  230 lb 12.8 oz (104.7 kg)     Height:                                                  BP Readings from Last 3 Encounters:   06/16/20 (!) 146/84   06/12/20 (!) 144/95   06/09/20 115/78       NPO Status:  Pt instructed to be NPO after midnight, verbalized understanding. BMI:   Wt Readings from Last 3 Encounters:   06/16/20 230 lb 12.8 oz (104.7 kg)   06/09/20 223 lb (101.2 kg)   06/01/20 223 lb 14.4 oz (101.6 kg)     Body mass index is 38.41 kg/m².     CBC:   Lab Results   Component Value Date    WBC 6.1 06/16/2020    RBC 4.10 06/16/2020    HGB 10.2 06/16/2020    HCT 33.5 06/16/2020    MCV 81.7 06/16/2020    RDW 16.5 06/16/2020     06/16/2020       CMP:   Lab Results   Component Value Date     06/12/2020    K 3.9 06/12/2020    K 3.8 03/05/2020     06/12/2020    CO2 23 06/12/2020    BUN 14 06/12/2020    CREATININE 1.0 06/12/2020    GFRAA >60 06/12/2020    LABGLOM >60 06/12/2020    GLUCOSE 99 06/12/2020    GLUCOSE 96 02/23/2012 PROT 7.1 06/11/2020    CALCIUM 9.3 06/12/2020    BILITOT 0.2 06/11/2020    ALKPHOS 96 06/11/2020    AST 14 06/11/2020    ALT 7 06/11/2020       POC Tests: No results for input(s): POCGLU, POCNA, POCK, POCCL, POCBUN, POCHEMO, POCHCT in the last 72 hours. Coags:   Lab Results   Component Value Date    PROTIME 11.8 06/09/2020    PROTIME 10.9 02/23/2012    INR 1.0 06/09/2020    APTT 35.2 06/09/2020       HCG (If Applicable): No results found for: PREGTESTUR, PREGSERUM, HCG, HCGQUANT     ABGs: No results found for: PHART, PO2ART, QNK9QTI, HMW9IIZ, BEART, B9LPAUIL     Type & Screen (If Applicable):  No results found for: LABABO, LABRH    Drug/Infectious Status (If Applicable):  No results found for: HIV, HEPCAB    COVID-19 Screening (If Applicable):   Lab Results   Component Value Date    COVID19 Not Detected 06/05/2020     CXR 6/9/20:    FINDINGS:       The heart is enlarged. The aorta is mildly tortuous. No pulmonary   vascular congestion. Lungs are clear. No pneumothorax or pleural   effusion. Surgical clips are seen in the right chest wall. Evaluation   of the bones reveals no fracture or destructive lesion. US CAROTIDS 6/9/20:  Atherosclerotic disease. No hemodynamically significant stenosis is   identified   Estimated stenosis by NASCET criteria in the proximal right carotid   artery is between 0% and 49%. Estimated stenosis by NASCET criteria in the proximal left carotid   artery is between 0% and 49%.      EKG 6/9/20:  6/9/2020  6:36 PM - Axel, Mhy Incoming Ekg Results From [facility]     Component Value Ref Range & Units Status Collected Lab   Ventricular Rate 74  BPM Final 06/09/2020 10:50 AM [facility]   Atrial Rate 74  BPM Final 06/09/2020 10:50 AM [facility]   P-R Interval 122  ms Final 06/09/2020 10:50 AM [facility]   QRS Duration 92  ms Final 06/09/2020 10:50 AM [facility]   Q-T Interval 376  ms Final 06/09/2020 10:50 AM [facility]   QTc Calculation (Bazett) 417  ms Final 06/09/2020 10:50 AM [facility]   P [place] 33 degrees Final 06/09/2020 10:50 AM [facility]   R [place] -8  degrees Final 06/09/2020 10:50 AM [facility]   T [place] 175  degrees Final 06/09/2020 10:50 AM [facility]   Testing Performed By     Lab - [address]. Name Director Address Valid Date Range   360-[facility] MUSE Unknown Unknown 04/18/16 0721-Present   Narrative & Impression     Sinus rhythm  with a  blocked PAC  Nonspecific T wave abnormality  Abnormal ECG  When compared with ECG of 04-MAR-2020 17:53,  Significant changes have occurred  Confirmed by Noreen Finch ([id number]) on 6/9/2020 6:36:46 PM         CARDIAC CATH 3/4/20: IMPRESSION:  1. Totally occluded left anterior descending artery with successful PCI  using 2.5 x 18 mm mini Vision bare metal stent (bare metal stent was  used due to the patient's recent history of GI bleed, recent mastectomy  surgery two days ago, and the plan for possible CABG down the road when  she heals from her mastectomy surgery). 2.  At least 50% distal left main disease. 3.  Mild-to-moderate ostial left circumflex disease with moderate  disease involving the vessel distally. 4.  Moderate disease involving the mid segment of the RCA. 5.  Moderate disease involving second OM branch and second diagonal  Branch. ECHO 2/5/20:   Summary   Normal left ventricular chamber size. Normal left ventricular systolic function, LVEF is 57%. Distal septal and apical septal hypokinesis. Stage I diastolic dysfunction. Normal left atrial pressure. Left atrium is of normal size. Interatrial septum not well visualized but appears intact. Normal right ventricle structure and function. Normal mitral valve structure and function. No mitral valve prolapse. Normal aortic valve structure and function. Normal tricuspid valve structure. There is trace tricuspid regurgitation, RVSP 32mmHg. Normal aortic root size. No evidence of pericardial effusion. Pericardium appears normal.   No intra cardiac mass or thrombus.    Compared to

## 2020-06-16 NOTE — ADT AUTH CERT
Utilization Reviews         Anemia, Iron Deficiency or Unspecified - Care Day 7 (6/16/2020) by Addie Croft RN         Review Status Review Entered   Completed 6/16/2020 14:40       Criteria Review      Care Day: 7 Care Date: 6/16/2020 Level of Care: Intermediate Care    Guideline Day 2    Level Of Care    ( ) Floor to discharge    6/16/2020 2:40 PM EDT by Jason Mejia      no dc  6-16/ on pcu level of care    Clinical Status    (X) * Hemodynamic stability    6/16/2020 2:40 PM EDT by Jason Mejia      146/84  97.7  16 Fatih.Agusda   98% ra    ( ) * Mental status at baseline    ( ) * Active blood loss absent    ( ) * Signs and symptoms of anemia absent or improved    ( ) * Hgb/Hct level stable and acceptable for next level of care    ( ) * Etiology of anemia requiring inpatient care absent    ( ) * Discharge plans and education understood    Activity    ( ) * Ambulatory [G]    Routes    ( ) * Oral hydration, medications, and diet    * Milestone   Additional Notes   6-16  I see a request for clinicals today.    At this time, pt is in the or for CABG

## 2020-06-16 NOTE — CARE COORDINATION
Pt for CABG today. Out of room when I attempted to meet with her. Pt has hhc and fam list from sw to make choices.  Sw/cm will continue to follow

## 2020-06-17 ENCOUNTER — APPOINTMENT (OUTPATIENT)
Dept: GENERAL RADIOLOGY | Age: 60
DRG: 236 | End: 2020-06-17
Attending: THORACIC SURGERY (CARDIOTHORACIC VASCULAR SURGERY)
Payer: MEDICARE

## 2020-06-17 LAB
ANION GAP SERPL CALCULATED.3IONS-SCNC: 13 MMOL/L (ref 7–16)
BUN BLDV-MCNC: 17 MG/DL (ref 8–23)
CALCIUM SERPL-MCNC: 8.6 MG/DL (ref 8.6–10.2)
CHLORIDE BLD-SCNC: 107 MMOL/L (ref 98–107)
CO2: 19 MMOL/L (ref 22–29)
CREAT SERPL-MCNC: 1.2 MG/DL (ref 0.5–1)
GFR AFRICAN AMERICAN: 55
GFR NON-AFRICAN AMERICAN: 55 ML/MIN/1.73
GLUCOSE BLD-MCNC: 167 MG/DL (ref 74–99)
HCT VFR BLD CALC: 31.8 % (ref 34–48)
HEMOGLOBIN: 9.7 G/DL (ref 11.5–15.5)
MAGNESIUM: 2.3 MG/DL (ref 1.6–2.6)
MCH RBC QN AUTO: 25.3 PG (ref 26–35)
MCHC RBC AUTO-ENTMCNC: 30.5 % (ref 32–34.5)
MCV RBC AUTO: 82.8 FL (ref 80–99.9)
METER GLUCOSE: 124 MG/DL (ref 74–99)
METER GLUCOSE: 132 MG/DL (ref 74–99)
METER GLUCOSE: 155 MG/DL (ref 74–99)
METER GLUCOSE: 187 MG/DL (ref 74–99)
METER GLUCOSE: 210 MG/DL (ref 74–99)
METER GLUCOSE: 221 MG/DL (ref 74–99)
METER GLUCOSE: 222 MG/DL (ref 74–99)
METER GLUCOSE: 311 MG/DL (ref 74–99)
PDW BLD-RTO: 17.2 FL (ref 11.5–15)
PLATELET # BLD: 212 E9/L (ref 130–450)
PMV BLD AUTO: 11 FL (ref 7–12)
POTASSIUM SERPL-SCNC: 4.4 MMOL/L (ref 3.5–5)
POTASSIUM SERPL-SCNC: 4.4 MMOL/L (ref 3.5–5)
RBC # BLD: 3.84 E12/L (ref 3.5–5.5)
SODIUM BLD-SCNC: 139 MMOL/L (ref 132–146)
WBC # BLD: 12 E9/L (ref 4.5–11.5)

## 2020-06-17 PROCEDURE — 80048 BASIC METABOLIC PNL TOTAL CA: CPT

## 2020-06-17 PROCEDURE — 94640 AIRWAY INHALATION TREATMENT: CPT

## 2020-06-17 PROCEDURE — 6370000000 HC RX 637 (ALT 250 FOR IP): Performed by: NURSE PRACTITIONER

## 2020-06-17 PROCEDURE — 6360000002 HC RX W HCPCS: Performed by: NURSE PRACTITIONER

## 2020-06-17 PROCEDURE — 83735 ASSAY OF MAGNESIUM: CPT

## 2020-06-17 PROCEDURE — 2580000003 HC RX 258: Performed by: THORACIC SURGERY (CARDIOTHORACIC VASCULAR SURGERY)

## 2020-06-17 PROCEDURE — 2140000000 HC CCU INTERMEDIATE R&B

## 2020-06-17 PROCEDURE — 99233 SBSQ HOSP IP/OBS HIGH 50: CPT | Performed by: INTERNAL MEDICINE

## 2020-06-17 PROCEDURE — 99233 SBSQ HOSP IP/OBS HIGH 50: CPT | Performed by: NURSE PRACTITIONER

## 2020-06-17 PROCEDURE — 6360000002 HC RX W HCPCS: Performed by: THORACIC SURGERY (CARDIOTHORACIC VASCULAR SURGERY)

## 2020-06-17 PROCEDURE — 97535 SELF CARE MNGMENT TRAINING: CPT

## 2020-06-17 PROCEDURE — 82962 GLUCOSE BLOOD TEST: CPT

## 2020-06-17 PROCEDURE — 85027 COMPLETE CBC AUTOMATED: CPT

## 2020-06-17 PROCEDURE — 6370000000 HC RX 637 (ALT 250 FOR IP): Performed by: THORACIC SURGERY (CARDIOTHORACIC VASCULAR SURGERY)

## 2020-06-17 PROCEDURE — 97530 THERAPEUTIC ACTIVITIES: CPT

## 2020-06-17 PROCEDURE — 2580000003 HC RX 258: Performed by: NURSE PRACTITIONER

## 2020-06-17 PROCEDURE — 97166 OT EVAL MOD COMPLEX 45 MIN: CPT

## 2020-06-17 PROCEDURE — 97162 PT EVAL MOD COMPLEX 30 MIN: CPT

## 2020-06-17 PROCEDURE — 36415 COLL VENOUS BLD VENIPUNCTURE: CPT

## 2020-06-17 PROCEDURE — 71045 X-RAY EXAM CHEST 1 VIEW: CPT

## 2020-06-17 RX ORDER — HYDROCODONE BITARTRATE AND ACETAMINOPHEN 5; 325 MG/1; MG/1
1 TABLET ORAL EVERY 4 HOURS PRN
Status: DISCONTINUED | OUTPATIENT
Start: 2020-06-17 | End: 2020-06-21 | Stop reason: HOSPADM

## 2020-06-17 RX ORDER — ACETAMINOPHEN 325 MG/1
650 TABLET ORAL EVERY 4 HOURS PRN
Status: DISCONTINUED | OUTPATIENT
Start: 2020-06-17 | End: 2020-06-21 | Stop reason: HOSPADM

## 2020-06-17 RX ORDER — METOPROLOL SUCCINATE 25 MG/1
25 TABLET, EXTENDED RELEASE ORAL DAILY
Status: DISCONTINUED | OUTPATIENT
Start: 2020-06-17 | End: 2020-06-21 | Stop reason: HOSPADM

## 2020-06-17 RX ORDER — FOLIC ACID 1 MG/1
1 TABLET ORAL DAILY
Status: DISCONTINUED | OUTPATIENT
Start: 2020-06-17 | End: 2020-06-21 | Stop reason: HOSPADM

## 2020-06-17 RX ORDER — ASPIRIN 81 MG/1
81 TABLET ORAL DAILY
Status: DISCONTINUED | OUTPATIENT
Start: 2020-06-18 | End: 2020-06-21 | Stop reason: HOSPADM

## 2020-06-17 RX ORDER — AMIODARONE HYDROCHLORIDE 200 MG/1
400 TABLET ORAL 2 TIMES DAILY
Status: DISCONTINUED | OUTPATIENT
Start: 2020-06-17 | End: 2020-06-21 | Stop reason: HOSPADM

## 2020-06-17 RX ORDER — FERROUS SULFATE 325(65) MG
325 TABLET ORAL 2 TIMES DAILY WITH MEALS
Status: DISCONTINUED | OUTPATIENT
Start: 2020-06-17 | End: 2020-06-21 | Stop reason: HOSPADM

## 2020-06-17 RX ORDER — HYDROCODONE BITARTRATE AND ACETAMINOPHEN 5; 325 MG/1; MG/1
2 TABLET ORAL EVERY 4 HOURS PRN
Status: DISCONTINUED | OUTPATIENT
Start: 2020-06-17 | End: 2020-06-21 | Stop reason: HOSPADM

## 2020-06-17 RX ORDER — ONDANSETRON 2 MG/ML
4 INJECTION INTRAMUSCULAR; INTRAVENOUS EVERY 8 HOURS PRN
Status: DISCONTINUED | OUTPATIENT
Start: 2020-06-17 | End: 2020-06-21 | Stop reason: HOSPADM

## 2020-06-17 RX ORDER — PANTOPRAZOLE SODIUM 40 MG/1
40 TABLET, DELAYED RELEASE ORAL DAILY
Status: DISCONTINUED | OUTPATIENT
Start: 2020-06-18 | End: 2020-06-21 | Stop reason: HOSPADM

## 2020-06-17 RX ORDER — POTASSIUM CHLORIDE 20 MEQ/1
20 TABLET, EXTENDED RELEASE ORAL PRN
Status: DISCONTINUED | OUTPATIENT
Start: 2020-06-17 | End: 2020-06-21 | Stop reason: HOSPADM

## 2020-06-17 RX ORDER — SENNA AND DOCUSATE SODIUM 50; 8.6 MG/1; MG/1
1 TABLET, FILM COATED ORAL 2 TIMES DAILY
Status: DISCONTINUED | OUTPATIENT
Start: 2020-06-17 | End: 2020-06-21 | Stop reason: HOSPADM

## 2020-06-17 RX ORDER — FUROSEMIDE 10 MG/ML
40 INJECTION INTRAMUSCULAR; INTRAVENOUS ONCE
Status: COMPLETED | OUTPATIENT
Start: 2020-06-17 | End: 2020-06-17

## 2020-06-17 RX ORDER — ASCORBIC ACID 500 MG
500 TABLET ORAL 2 TIMES DAILY
Status: DISCONTINUED | OUTPATIENT
Start: 2020-06-17 | End: 2020-06-21 | Stop reason: HOSPADM

## 2020-06-17 RX ADMIN — AMIODARONE HYDROCHLORIDE 400 MG: 200 TABLET ORAL at 20:45

## 2020-06-17 RX ADMIN — IPRATROPIUM BROMIDE AND ALBUTEROL SULFATE 1 AMPULE: 2.5; .5 SOLUTION RESPIRATORY (INHALATION) at 14:15

## 2020-06-17 RX ADMIN — FERROUS SULFATE TAB 325 MG (65 MG ELEMENTAL FE) 325 MG: 325 (65 FE) TAB at 20:46

## 2020-06-17 RX ADMIN — ASPIRIN 300 MG: 300 SUPPOSITORY RECTAL at 00:56

## 2020-06-17 RX ADMIN — FENTANYL CITRATE 25 MCG: 50 INJECTION INTRAMUSCULAR; INTRAVENOUS at 08:32

## 2020-06-17 RX ADMIN — SODIUM CHLORIDE 4.86 UNITS/HR: 900 INJECTION, SOLUTION INTRAVENOUS at 00:46

## 2020-06-17 RX ADMIN — MUPIROCIN: 20 OINTMENT TOPICAL at 08:08

## 2020-06-17 RX ADMIN — METOPROLOL SUCCINATE 25 MG: 25 TABLET, EXTENDED RELEASE ORAL at 08:04

## 2020-06-17 RX ADMIN — BETHANECHOL CHLORIDE 25 MG: 25 TABLET ORAL at 14:04

## 2020-06-17 RX ADMIN — AMIODARONE HYDROCHLORIDE 400 MG: 200 TABLET ORAL at 08:08

## 2020-06-17 RX ADMIN — DOCUSATE SODIUM 50MG AND SENNOSIDES 8.6MG 1 TABLET: 8.6; 5 TABLET, FILM COATED ORAL at 08:04

## 2020-06-17 RX ADMIN — GABAPENTIN 300 MG: 300 CAPSULE ORAL at 00:56

## 2020-06-17 RX ADMIN — FOLIC ACID 1 MG: 1 TABLET ORAL at 16:13

## 2020-06-17 RX ADMIN — CLOPIDOGREL 75 MG: 75 TABLET, FILM COATED ORAL at 08:04

## 2020-06-17 RX ADMIN — SODIUM CHLORIDE, PRESERVATIVE FREE 10 ML: 5 INJECTION INTRAVENOUS at 20:45

## 2020-06-17 RX ADMIN — IPRATROPIUM BROMIDE AND ALBUTEROL SULFATE 1 AMPULE: 2.5; .5 SOLUTION RESPIRATORY (INHALATION) at 09:39

## 2020-06-17 RX ADMIN — BETHANECHOL CHLORIDE 25 MG: 25 TABLET ORAL at 20:45

## 2020-06-17 RX ADMIN — BETHANECHOL CHLORIDE 25 MG: 25 TABLET ORAL at 08:04

## 2020-06-17 RX ADMIN — MUPIROCIN: 20 OINTMENT TOPICAL at 20:46

## 2020-06-17 RX ADMIN — HYDROCODONE BITARTRATE AND ACETAMINOPHEN 2 TABLET: 5; 325 TABLET ORAL at 16:13

## 2020-06-17 RX ADMIN — INSULIN LISPRO 1 UNITS: 100 INJECTION, SOLUTION INTRAVENOUS; SUBCUTANEOUS at 20:47

## 2020-06-17 RX ADMIN — MUPIROCIN: 20 OINTMENT TOPICAL at 09:34

## 2020-06-17 RX ADMIN — DOCUSATE SODIUM 50MG AND SENNOSIDES 8.6MG 1 TABLET: 8.6; 5 TABLET, FILM COATED ORAL at 00:56

## 2020-06-17 RX ADMIN — FUROSEMIDE 40 MG: 10 INJECTION, SOLUTION INTRAMUSCULAR; INTRAVENOUS at 09:51

## 2020-06-17 RX ADMIN — INSULIN LISPRO 4 UNITS: 100 INJECTION, SOLUTION INTRAVENOUS; SUBCUTANEOUS at 16:47

## 2020-06-17 RX ADMIN — OXYCODONE 10 MG: 5 TABLET ORAL at 12:47

## 2020-06-17 RX ADMIN — OXYCODONE 10 MG: 5 TABLET ORAL at 00:56

## 2020-06-17 RX ADMIN — Medication 500 MG: at 20:47

## 2020-06-17 RX ADMIN — GABAPENTIN 300 MG: 300 CAPSULE ORAL at 20:47

## 2020-06-17 RX ADMIN — SODIUM CHLORIDE, PRESERVATIVE FREE 10 ML: 5 INJECTION INTRAVENOUS at 16:13

## 2020-06-17 RX ADMIN — ASPIRIN 81 MG: 81 TABLET, COATED ORAL at 08:04

## 2020-06-17 RX ADMIN — MAGNESIUM GLUCONATE 500 MG ORAL TABLET 400 MG: 500 TABLET ORAL at 08:04

## 2020-06-17 RX ADMIN — FENTANYL CITRATE 25 MCG: 50 INJECTION INTRAMUSCULAR; INTRAVENOUS at 03:06

## 2020-06-17 RX ADMIN — OXYCODONE 10 MG: 5 TABLET ORAL at 05:56

## 2020-06-17 RX ADMIN — CEFAZOLIN SODIUM 2 G: 2 SOLUTION INTRAVENOUS at 00:01

## 2020-06-17 RX ADMIN — CEFAZOLIN SODIUM 2 G: 2 SOLUTION INTRAVENOUS at 16:13

## 2020-06-17 RX ADMIN — INSULIN LISPRO 6 UNITS: 100 INJECTION, SOLUTION INTRAVENOUS; SUBCUTANEOUS at 14:02

## 2020-06-17 RX ADMIN — PANTOPRAZOLE SODIUM 40 MG: 40 TABLET, DELAYED RELEASE ORAL at 08:04

## 2020-06-17 RX ADMIN — BETHANECHOL CHLORIDE 25 MG: 25 TABLET ORAL at 00:55

## 2020-06-17 RX ADMIN — CEFAZOLIN SODIUM 2 G: 2 SOLUTION INTRAVENOUS at 07:50

## 2020-06-17 RX ADMIN — SENNOSIDES AND DOCUSATE SODIUM 1 TABLET: 8.6; 5 TABLET ORAL at 20:45

## 2020-06-17 RX ADMIN — SODIUM CHLORIDE, PRESERVATIVE FREE 10 ML: 5 INJECTION INTRAVENOUS at 08:08

## 2020-06-17 ASSESSMENT — PAIN SCALES - GENERAL
PAINLEVEL_OUTOF10: 9
PAINLEVEL_OUTOF10: 9
PAINLEVEL_OUTOF10: 0
PAINLEVEL_OUTOF10: 4
PAINLEVEL_OUTOF10: 8
PAINLEVEL_OUTOF10: 8
PAINLEVEL_OUTOF10: 9
PAINLEVEL_OUTOF10: 8
PAINLEVEL_OUTOF10: 8
PAINLEVEL_OUTOF10: 0
PAINLEVEL_OUTOF10: 0

## 2020-06-17 ASSESSMENT — PAIN DESCRIPTION - PROGRESSION: CLINICAL_PROGRESSION: NOT CHANGED

## 2020-06-17 ASSESSMENT — PAIN DESCRIPTION - LOCATION: LOCATION: INCISION;STERNUM

## 2020-06-17 ASSESSMENT — PAIN DESCRIPTION - PAIN TYPE: TYPE: SURGICAL PAIN

## 2020-06-17 ASSESSMENT — PAIN DESCRIPTION - FREQUENCY: FREQUENCY: CONTINUOUS

## 2020-06-17 ASSESSMENT — PAIN DESCRIPTION - DESCRIPTORS: DESCRIPTORS: ACHING;DISCOMFORT;SHARP

## 2020-06-17 ASSESSMENT — PAIN DESCRIPTION - ORIENTATION: ORIENTATION: MID

## 2020-06-17 NOTE — PROGRESS NOTES
sternal precautions with self care tasks; education on recommended DME for fall prevention & bathroom safety. Pt provided with handouts on energy conservation and sternal precautions during functional activities for safe return home. Pt demonstrates F understanding. Comments: OK from RN to see patient. Upon arrival, patient semi-supine in bed and agreeable to OT session with PT collaboration. Pt demo F tolerance with F understanding of education/techniques. At end of session, patient seated in bedside chair. Call light within reach, all lines and tubes intact. Pt instructed on use of call light for assistance and fall prevention. Line management and environmental modifications made prior to and end of session to ensure patient safety and to increase efficiency of session. Skilled monitoring of HR, O2 saturation, blood pressure and patient's response to activity performed throughout session. Overall, pt presents with decreased activity tolerance, dynamic balance, functional mobility limiting completion of ADLs and safety. Pt can benefit from continued skilled OT to increase safety and functional independence. Evaluation Complexity: Moderate    · History: Expanded chart review of consults, imaging, and psychosocial history related to current functional performance. · Exam: 5+ performance deficits identified limiting functional independence and safe return home   · Assistance/Modification: Min/mod assistance or modifications required to perform tasks. May have comorbidities that affect occupational performance.     Assessment of current deficits   Functional mobility [x]  ADLs [x] Strength [x]  Cognition []  Functional transfers  [x] IADLs [x] Safety Awareness [x]  Endurance [x]  Fine Motor Coordination [] Balance [x] Vision/perception [] Sensation []   Gross Motor Coordination [] ROM [] Delirium []                  Communication []    Plan of Care: 1-5 tx/wk PRN   ADL retraining [x]   Equipment needs [x]   Neuromuscular re-education [] Energy Conservation Techniques [x]  Functional Transfer training [x] Patient and/or Family Education [x]  Functional Mobility training [x]  Environmental Modifications [x]  Cognitive re-training []   Compensatory techniques for ADLs [x]  Splinting Needs []   Positioning to improve overall function [x]   Therapeutic Activity [x]                       Therapeutic Exercise  [x]  Visual/Perceptual: []    Delirium prevention/treatment  [x]   Other:  []    Rehab Potential: good for established goals    Patient / Family Goal: moderate     Patient and/or family were instructed/educated on diagnosis, prognosis/goals and plan of care. Pt demonstrated F understanding. [] Malnutrition indicators have been identified and nursing has been notified to ensure a dietitian consult is ordered. Moderate Evaluation +   Treatment Time In:0900              Treatment Time Out: 0930                Treatment Charges: Mins Units   Ther Ex  54106     Manual Therapy 23545     Thera Activities 72779 15 1   ADL/Home Mgt 69247 15 1   Neuro Re-ed 21729     Orthotic manage/training  57591     Non-Billable Time     Total Timed Treatment 30 2     Evaluation time includes thorough review of current medical information, gathering information on past medical history/social history and prior level of function, completion of standardized testing/informal observation of tasks, assessment of data and development of POC/Goals.      Karthikeyan Vieira, S/OT  Josh Hugo

## 2020-06-17 NOTE — PROGRESS NOTES
([id number]) - Focus of cauterized adenocarcinoma invovling excision magin.       COMMENT   In the cervix LEEP specimen ([id number]) the adenocarcinoma invades the cervical stroma to a thickness of at least 2.5 mm. However, since the invasive adenocarcinoma involves the deep (radial) excision margin, the full extent of invasion cannot be determined. The invasive adenocarcinoma involves five tissue sections. Therefore, the width (circumferential measurement) is estimated at approximately 13 mm. Vascular invasion is not identified.       PET/CT 10/12/2019 at [facility]:   There is no hypermetabolic abdominal or pelvic lymphadenopathy.    There is mild low-level activity in the cervical region (max SUV 2.9).        Evaluated by GYN ONC (Dr. Cathryn Hartman at [facility]) who recommended ChemoRT with weekly Cisplatin  followed by HDR brachytherapy for Stage 1B1 Cervical Cancer. Patient would like to receive treatments locally. EBRT was started on 11/20/2019. Cycle # 1 weekly Cisplatin was on 11/20/2019. Cycle # 1 weekly Cisplatin was on 11/20/2019. Cycle # 5 weekly Cisplatin was on 12/19/2019. EBRT was completed 12/27/2019. HDR brachytherapy (1/14/2020-1/16/2020)  PET/CT scan 05/18/2020 noted no evidence of disease.     Left Breast Cancer Feb 2020  Left simple mastectomy on 02/25/2020:  A. Left breast, simple mastectomy:   - Invasive carcinoma of no special type (ductal), grade 2; tumor size 0.8 x 0.7 x 0.5 cm; LVI not identified.   - Ductal carcinoma in situ, intermediate to high nuclear grade,cribriform and solid types, with necrosis;   - Sclerosing fibroadenomatoid nodules and gynecomastia-like changes;   - Microcalcifications in DCIS;   - Scar and foreign body-type granuloma of prior biopsy site;  - Additional skin/breast tissue showing no pathologic alteration.     B. Left breast, new inferior-medial margin, excision: Negative for malignancy.     pT1b pNx  Breast Cancer Marker Studies:  Estrogen Receptors (ER): Positive

## 2020-06-17 NOTE — PROGRESS NOTES
cm)   · Current Body Wt: 227 lb (103 kg)(6/17 actual )  · Admission Body Wt: 229 lb (103.9 kg)(6/10 standing scale)  · Usual Body Wt: 232 lb (105.2 kg)(11/2019 EMR actual , confirmed by pt ~230's)  · % Weight Change:  Stable wt hx per EMR/ pt report  · Ideal Body Wt: 125 lb (56.7 kg), % Ideal Body 182%  · BMI Classification: BMI 35.0 - 39.9 Obese Class II    Nutrition Interventions:   Continued Inpatient Monitoring, Coordination of Care, Education Initiated(Protein importance for recovery/ ONS encouraged)    Nutrition Evaluation:   · Evaluation: Goals set   · Goals: Pt to consume >75% meals/ONS    · Monitoring: Meal Intake, Supplement Intake, Diet Tolerance, Skin Integrity, I&O, Weight, Monitor Bowel Function, Pertinent Labs, Wound Healing      Electronically signed by Jason Salazar RD, LD on 6/17/20 at 1:04 PM EDT    Contact Number: Ext [phone]

## 2020-06-17 NOTE — PROGRESS NOTES
CVICU Progress Note    Name: Alicja Mauro  MRN: [id number]    CC: Postoperative Critical Care Management     Indication for Surgery/Procedure: H/O STEMI, CAD  LVEF:  35%   RVF:  Normal      Important/Relevant PMH/PSH: HTN, HLD, DM, right breast CA with recurrence s/p surgery and radiation, CVA with minimal residual left sided weakness, cervical CA on current chemotherapy, Gout, left breast CA s/p mastectomy in 02/2020 with current chemotherapy, and CAD with BMS to LAD 3/4/2020, GI bleed (last EGD  6/12/20 showed mild gastritis), sciatica      Procedure/Surgeries:  6/16/2020  Urgent sternotomy/Urgent CABG x 2 (LIMA-LAD, SVG-OM1)/Exploration of PDA/Open GSV harvest RLE/Rigid internal fixation of the sternum with KLS plates x 2/ Left atrial appendage occlusion with 40-mm AtriClip      Pacing wires:  Ventricular       Intake/Output Summary (Last 24 hours) at 6/17/2020 0748  Last data filed at 6/17/2020 0700  Gross per 24 hour   Intake 2677.4 ml   Output 2330 ml   Net 347.4 ml       Recent Labs     06/16/20  0633 06/16/20  1715 06/17/20  0421   WBC 6.1 13.0* 12.0*   HGB 10.2* 9.5* 9.7*   HCT 33.5* 30.9* 31.8*    201 212      Lab Results   Component Value Date     06/17/2020    K 4.4 06/17/2020    K 3.8 03/05/2020     06/17/2020    CO2 19 06/17/2020    BUN 17 06/17/2020    CREATININE 1.2 06/17/2020    GLUCOSE 167 06/17/2020    GLUCOSE 96 02/23/2012    CALCIUM 8.6 06/17/2020      Recent Labs     06/16/20  1715 06/17/20  0421   BUN 13 17   CREATININE 0.9 1.2*       Physical Exam:    BP (!) 128/42   Pulse 87   Temp 99.5 °F (37.5 °C) (Bladder)   Resp (!) 33   Ht 5' 5\" (1.651 m)   Wt 227 lb 1.2 oz (103 kg)   SpO2 93%   BMI 37.79 kg/m²       CXR Findings: 6/17/2020        General: Awake, alert. Denies SOB, c/o of expected incisional pain    Eyes: PERRL, anicteric   Pulmonary: Diminished bibasilar.  No wheezes, no accessory muscle use noted on 4L NC  Cardiovascular:  RRR, no heaves or thrills on palpation  Tele: SR  Abdomen: Soft, nontender, +BS   Extremities: Palpable pulses all extremities, no edema   Neurologic/Psych: A&Ox3, HENDRICKSON to command   Skin: Warm and dry  Incisions: MSI with nestor dressing intact,       Assessment/Plan: POD #1  1. CAD S/p CABGx2/ LAAclip   - DAPT, statin  - Ancef   - Scr 0.9-->1.3; nonoliguric-monitor   - PT/OT   - MS chest tubes removed without difficulty, pleural drains x2 changed to bulb sxn     2. Acute Postoperative Respiratory Insufficiency  -2/2 surgery  -Adequate o/v on 4L NC spo2~95%. Encouraged IS, ezpap, ambulate with PT/OT      3. LV dysfunction   -Echo 2/3/20 EF 57%, Lexiscan stress 02/07/20: EF 34 %. -Post CPB EF 35%, arrived to ICU on low dose dobutamine, turned off d/t hypertension.   -start 25mg toprol XL daily      4. NSVT   -short run postop-started on Amio infusion; no further episodes overnight. Change Amio to po add BB       5. Acute Post Operative Pain   - PRN percocet      6.  DMII   -Hemoglobin A1C 5.6  -SSI for glycemic control        VTE Prophylaxis: Pharmacologic/Mechanical: Yes, SCDs   Line infection prevention: Can CVC or arterial line be removed: Yes  Continued need for urinary catheter: No-remove     Dispo: transfer to [facility] signed by ANNA MARIE French - CNP on 6/17/2020 at 7:48 AM

## 2020-06-17 NOTE — OP NOTE
[address]                  [address] [place],  [facility]                                OPERATIVE REPORT    PATIENT NAME: Bro Floyd                     :        1960  MED REC NO:   [id number]                            ROOM:       [facility]  ACCOUNT NO:   [de-identified]                           ADMIT DATE: 06/10/2020  PROVIDER:     Ute Barajas MD    DATE OF PROCEDURE:  2020    PREOPERATIVE DIAGNOSES:  History of ST-elevation MI; severe multivessel  coronary artery disease; left main coronary artery stenosis; chronic  anemia; bilateral breast cancer, status post bilateral mastectomies;  history of cerebrovascular accident; diabetes; history of GI bleed;  hypertension; hyperlipidemia; and morbid obesity. POSTOPERATIVE DIAGNOSES:  History of ST-elevation MI; severe multivessel  coronary artery disease; left main coronary artery stenosis; chronic  anemia; bilateral breast cancer, status post bilateral mastectomies;  history of cerebrovascular accident; diabetes; history of GI bleed;  hypertension; hyperlipidemia; and morbid obesity. INDICATIONS:  History of ST-elevation MI; severe multivessel coronary  artery disease; left main coronary artery stenosis; chronic anemia;  bilateral breast cancer, status post bilateral mastectomies; history of  cerebrovascular accident; diabetes; history of GI bleed; hypertension;  hyperlipidemia; and morbid obesity. SURGEON:  Ute Barajas MD    ASSISTANT:  Kiana Barth (no other resident was adequately trained to be  able to assist). Kiana Barth was present and assisted throughout the  entire operation. SECOND ASSISTANT:  KRISTEN Puckett harvested the vein). COMPLICATIONS:  None, tolerated well. ESTIMATED BLOOD LOSS:  Approximately 1000 mL. ANESTHESIA:  General endotracheal.    ANESTHESIA ATTENDING:  Kristine Painter MD    SPECIMENS:  None.     DRAINS:  Two in the mediastinum, one in intensive care unit in stable, guarded condition. OPERATIVE PROCEDURE:  After informed consent was obtained and adequate  preoperative antibiotics were given, the patient was brought to the  operating room in stable condition. She was laid in the supine position  and induced under general endotracheal anesthesia by anesthesia staff. Morris catheter and adequate monitoring lines were placed. The patient's  chest, abdomen, pelvis, and lower extremities were prepped and draped in  the usual sterile fashion. Right lower extremity greater saphenous vein  was harvested in open technique and prepared on the back table for  anastomosis. These wounds were closed in multiple layers of absorbable  stitch. A standard sternotomy was performed. The left internal mammary  artery was harvested in a pedicle fashion. The patient was systemically  heparinized and the mammary was clipped distally and transected. Excellent pulsatile flow was noted. It was injected with papaverine,  clipped distally, and placed in the left chest.  A standard retractor  was placed. The pericardium was entered and tacked to the chest wall. After ensuring adequate ACT, the patient was instituted on  cardiopulmonary bypass by cannulating the ascending aorta using an  18-Greenlandic aortic cannula and the right atrial appendage using a  two-stage venous cannula. Retrograde catheter and root vent were  placed. The aorta was cross-clamped and the heart was arrested with  cold blood antegrade and retrograde Buckberg cardioplegia. Excellent  diastolic arrest was noted. Topical ice was used. Cardioplegia was  given intermittently throughout the remainder of the operation. The  vein was brought into the field and was grafted to the obtuse marginal  #1 using 7-0 Prolene.     Because the patient had some runs of atrial fibrillation during  cannulation, I felt she is at high risk for postoperative atrial  fibrillation and due to her history of GI

## 2020-06-18 ENCOUNTER — APPOINTMENT (OUTPATIENT)
Dept: GENERAL RADIOLOGY | Age: 60
DRG: 236 | End: 2020-06-18
Attending: THORACIC SURGERY (CARDIOTHORACIC VASCULAR SURGERY)
Payer: MEDICARE

## 2020-06-18 LAB
ANION GAP SERPL CALCULATED.3IONS-SCNC: 14 MMOL/L (ref 7–16)
ANION GAP SERPL CALCULATED.3IONS-SCNC: 16 MMOL/L (ref 7–16)
BUN BLDV-MCNC: 29 MG/DL (ref 8–23)
BUN BLDV-MCNC: 34 MG/DL (ref 8–23)
CALCIUM SERPL-MCNC: 9.2 MG/DL (ref 8.6–10.2)
CALCIUM SERPL-MCNC: 9.7 MG/DL (ref 8.6–10.2)
CHLORIDE BLD-SCNC: 102 MMOL/L (ref 98–107)
CHLORIDE BLD-SCNC: 103 MMOL/L (ref 98–107)
CO2: 22 MMOL/L (ref 22–29)
CO2: 24 MMOL/L (ref 22–29)
CREAT SERPL-MCNC: 1.4 MG/DL (ref 0.5–1)
CREAT SERPL-MCNC: 1.5 MG/DL (ref 0.5–1)
GFR AFRICAN AMERICAN: 43
GFR AFRICAN AMERICAN: 46
GFR NON-AFRICAN AMERICAN: 43 ML/MIN/1.73
GFR NON-AFRICAN AMERICAN: 46 ML/MIN/1.73
GLUCOSE BLD-MCNC: 149 MG/DL (ref 74–99)
GLUCOSE BLD-MCNC: 190 MG/DL (ref 74–99)
HCT VFR BLD CALC: 31.4 % (ref 34–48)
HEMOGLOBIN: 9.6 G/DL (ref 11.5–15.5)
MCH RBC QN AUTO: 25.7 PG (ref 26–35)
MCHC RBC AUTO-ENTMCNC: 30.6 % (ref 32–34.5)
MCV RBC AUTO: 84 FL (ref 80–99.9)
METER GLUCOSE: 138 MG/DL (ref 74–99)
METER GLUCOSE: 138 MG/DL (ref 74–99)
METER GLUCOSE: 144 MG/DL (ref 74–99)
METER GLUCOSE: 301 MG/DL (ref 74–99)
PDW BLD-RTO: 17.8 FL (ref 11.5–15)
PLATELET # BLD: 221 E9/L (ref 130–450)
PMV BLD AUTO: 11.1 FL (ref 7–12)
POTASSIUM SERPL-SCNC: 4.7 MMOL/L (ref 3.5–5)
POTASSIUM SERPL-SCNC: 4.7 MMOL/L (ref 3.5–5)
RBC # BLD: 3.74 E12/L (ref 3.5–5.5)
SODIUM BLD-SCNC: 140 MMOL/L (ref 132–146)
SODIUM BLD-SCNC: 141 MMOL/L (ref 132–146)
WBC # BLD: 11.4 E9/L (ref 4.5–11.5)

## 2020-06-18 PROCEDURE — 94669 MECHANICAL CHEST WALL OSCILL: CPT

## 2020-06-18 PROCEDURE — 99233 SBSQ HOSP IP/OBS HIGH 50: CPT | Performed by: INTERNAL MEDICINE

## 2020-06-18 PROCEDURE — 82962 GLUCOSE BLOOD TEST: CPT

## 2020-06-18 PROCEDURE — 6360000002 HC RX W HCPCS: Performed by: NURSE PRACTITIONER

## 2020-06-18 PROCEDURE — 6370000000 HC RX 637 (ALT 250 FOR IP): Performed by: NURSE PRACTITIONER

## 2020-06-18 PROCEDURE — 97535 SELF CARE MNGMENT TRAINING: CPT

## 2020-06-18 PROCEDURE — 85027 COMPLETE CBC AUTOMATED: CPT

## 2020-06-18 PROCEDURE — 2700000000 HC OXYGEN THERAPY PER DAY

## 2020-06-18 PROCEDURE — 93798 PHYS/QHP OP CAR RHAB W/ECG: CPT

## 2020-06-18 PROCEDURE — 2140000000 HC CCU INTERMEDIATE R&B

## 2020-06-18 PROCEDURE — 94640 AIRWAY INHALATION TREATMENT: CPT

## 2020-06-18 PROCEDURE — 36415 COLL VENOUS BLD VENIPUNCTURE: CPT

## 2020-06-18 PROCEDURE — P9047 ALBUMIN (HUMAN), 25%, 50ML: HCPCS | Performed by: NURSE PRACTITIONER

## 2020-06-18 PROCEDURE — 80048 BASIC METABOLIC PNL TOTAL CA: CPT

## 2020-06-18 PROCEDURE — 2580000003 HC RX 258: Performed by: NURSE PRACTITIONER

## 2020-06-18 PROCEDURE — 71045 X-RAY EXAM CHEST 1 VIEW: CPT

## 2020-06-18 PROCEDURE — 93308 TTE F-UP OR LMTD: CPT

## 2020-06-18 RX ORDER — ALBUMIN (HUMAN) 12.5 G/50ML
50 SOLUTION INTRAVENOUS ONCE
Status: COMPLETED | OUTPATIENT
Start: 2020-06-18 | End: 2020-06-18

## 2020-06-18 RX ORDER — BISACODYL 10 MG
10 SUPPOSITORY, RECTAL RECTAL DAILY
Status: DISCONTINUED | OUTPATIENT
Start: 2020-06-19 | End: 2020-06-20

## 2020-06-18 RX ADMIN — CEFAZOLIN SODIUM 2 G: 2 SOLUTION INTRAVENOUS at 00:48

## 2020-06-18 RX ADMIN — Medication 500 MG: at 07:52

## 2020-06-18 RX ADMIN — GABAPENTIN 300 MG: 300 CAPSULE ORAL at 20:40

## 2020-06-18 RX ADMIN — HYDROCODONE BITARTRATE AND ACETAMINOPHEN 2 TABLET: 5; 325 TABLET ORAL at 07:50

## 2020-06-18 RX ADMIN — IPRATROPIUM BROMIDE AND ALBUTEROL SULFATE 1 AMPULE: 2.5; .5 SOLUTION RESPIRATORY (INHALATION) at 17:16

## 2020-06-18 RX ADMIN — MAGNESIUM HYDROXIDE 30 ML: 2400 SUSPENSION ORAL at 07:51

## 2020-06-18 RX ADMIN — HYDROCODONE BITARTRATE AND ACETAMINOPHEN 2 TABLET: 5; 325 TABLET ORAL at 14:39

## 2020-06-18 RX ADMIN — FERROUS SULFATE TAB 325 MG (65 MG ELEMENTAL FE) 325 MG: 325 (65 FE) TAB at 07:50

## 2020-06-18 RX ADMIN — CEFAZOLIN SODIUM 2 G: 2 SOLUTION INTRAVENOUS at 07:54

## 2020-06-18 RX ADMIN — AMIODARONE HYDROCHLORIDE 400 MG: 200 TABLET ORAL at 20:40

## 2020-06-18 RX ADMIN — INSULIN LISPRO 4 UNITS: 100 INJECTION, SOLUTION INTRAVENOUS; SUBCUTANEOUS at 11:49

## 2020-06-18 RX ADMIN — AMIODARONE HYDROCHLORIDE 400 MG: 200 TABLET ORAL at 07:50

## 2020-06-18 RX ADMIN — MAGNESIUM GLUCONATE 500 MG ORAL TABLET 400 MG: 500 TABLET ORAL at 07:53

## 2020-06-18 RX ADMIN — BETHANECHOL CHLORIDE 25 MG: 25 TABLET ORAL at 07:52

## 2020-06-18 RX ADMIN — Medication 500 MG: at 20:40

## 2020-06-18 RX ADMIN — FOLIC ACID 1 MG: 1 TABLET ORAL at 07:50

## 2020-06-18 RX ADMIN — SODIUM CHLORIDE, PRESERVATIVE FREE 10 ML: 5 INJECTION INTRAVENOUS at 21:30

## 2020-06-18 RX ADMIN — IPRATROPIUM BROMIDE AND ALBUTEROL SULFATE 1 AMPULE: 2.5; .5 SOLUTION RESPIRATORY (INHALATION) at 21:05

## 2020-06-18 RX ADMIN — ALBUMIN (HUMAN) 50 G: 0.25 INJECTION, SOLUTION INTRAVENOUS at 08:39

## 2020-06-18 RX ADMIN — BETHANECHOL CHLORIDE 25 MG: 25 TABLET ORAL at 20:40

## 2020-06-18 RX ADMIN — ENOXAPARIN SODIUM 30 MG: 30 INJECTION SUBCUTANEOUS at 11:48

## 2020-06-18 RX ADMIN — FERROUS SULFATE TAB 325 MG (65 MG ELEMENTAL FE) 325 MG: 325 (65 FE) TAB at 16:49

## 2020-06-18 RX ADMIN — MUPIROCIN: 20 OINTMENT TOPICAL at 07:51

## 2020-06-18 RX ADMIN — SENNOSIDES AND DOCUSATE SODIUM 1 TABLET: 8.6; 5 TABLET ORAL at 20:40

## 2020-06-18 RX ADMIN — SODIUM CHLORIDE, PRESERVATIVE FREE 10 ML: 5 INJECTION INTRAVENOUS at 07:54

## 2020-06-18 RX ADMIN — SENNOSIDES AND DOCUSATE SODIUM 1 TABLET: 8.6; 5 TABLET ORAL at 07:54

## 2020-06-18 RX ADMIN — METOPROLOL SUCCINATE 25 MG: 25 TABLET, EXTENDED RELEASE ORAL at 07:53

## 2020-06-18 RX ADMIN — MUPIROCIN: 20 OINTMENT TOPICAL at 20:40

## 2020-06-18 RX ADMIN — HYDROCODONE BITARTRATE AND ACETAMINOPHEN 2 TABLET: 5; 325 TABLET ORAL at 21:56

## 2020-06-18 RX ADMIN — ASPIRIN 81 MG: 81 TABLET, COATED ORAL at 14:38

## 2020-06-18 RX ADMIN — CLOPIDOGREL 75 MG: 75 TABLET, FILM COATED ORAL at 07:50

## 2020-06-18 RX ADMIN — PANTOPRAZOLE SODIUM 40 MG: 40 TABLET, DELAYED RELEASE ORAL at 07:50

## 2020-06-18 RX ADMIN — INSULIN LISPRO 1 UNITS: 100 INJECTION, SOLUTION INTRAVENOUS; SUBCUTANEOUS at 16:48

## 2020-06-18 RX ADMIN — IPRATROPIUM BROMIDE AND ALBUTEROL SULFATE 1 AMPULE: 2.5; .5 SOLUTION RESPIRATORY (INHALATION) at 09:34

## 2020-06-18 RX ADMIN — BETHANECHOL CHLORIDE 25 MG: 25 TABLET ORAL at 14:38

## 2020-06-18 ASSESSMENT — PAIN DESCRIPTION - ORIENTATION: ORIENTATION: MID

## 2020-06-18 ASSESSMENT — PAIN DESCRIPTION - PROGRESSION
CLINICAL_PROGRESSION: NOT CHANGED
CLINICAL_PROGRESSION: NOT CHANGED

## 2020-06-18 ASSESSMENT — PAIN SCALES - GENERAL
PAINLEVEL_OUTOF10: 10
PAINLEVEL_OUTOF10: 10
PAINLEVEL_OUTOF10: 0
PAINLEVEL_OUTOF10: 0
PAINLEVEL_OUTOF10: 9
PAINLEVEL_OUTOF10: 10
PAINLEVEL_OUTOF10: 0

## 2020-06-18 ASSESSMENT — PAIN - FUNCTIONAL ASSESSMENT: PAIN_FUNCTIONAL_ASSESSMENT: ACTIVITIES ARE NOT PREVENTED

## 2020-06-18 ASSESSMENT — PAIN DESCRIPTION - DESCRIPTORS: DESCRIPTORS: ACHING;DISCOMFORT;DULL

## 2020-06-18 ASSESSMENT — PAIN DESCRIPTION - ONSET: ONSET: GRADUAL

## 2020-06-18 ASSESSMENT — PAIN DESCRIPTION - PAIN TYPE: TYPE: SURGICAL PAIN;ACUTE PAIN

## 2020-06-18 ASSESSMENT — PAIN DESCRIPTION - LOCATION: LOCATION: STERNUM

## 2020-06-18 ASSESSMENT — PAIN DESCRIPTION - FREQUENCY: FREQUENCY: INTERMITTENT

## 2020-06-18 NOTE — PROGRESS NOTES
Receptors ([place]): Positive:40%  Her-2/heidi (c-erb B-2) protein expression: Positive (score 3+)     She's on Arimidex 1 mg po daily with fair tolerance. Not on Herceptin due to CHF and CAD. EF 34% Feb 2020  No SLNB done. Left axillary U/S 06/01/2020: There is no sonographic evidence of malignancy. DEXA scan 06/01/2020: Normal bone mineral density values in the lumbar spine and bilateral hips.     Seen by Dr. Justin Taylor at [facility]: Case at Breast Cancer Tumor Board on 06/09/2020. Will plan for left SLNBx after her planned CABG.     On 06/09/2020: Hb 8.4. Hct 28.6, MCV 81.7, WBC 3.7,   Admitted on 06/10/2020 for blood transfusions in preparation for CABG s/p NSTEMI.   2 units of pRBCs given for Hb of 8.4 from 06/09/2020. EGD on 06/12/2020 noted mild gastritis.   Gastric antrum, biopsy: Chronic antral gastritis.  Negative immunostain for H.pylori.     Urgent sternotomy/Urgent CABG x 2 (LIMA-LAD, SVG-OM1)/Exploration of PDA/Open GSV harvest RLE/ARNULFO exclusion with 40mm AtriClip/Rigid internal fixation of the sternum with KLS plates x 2  Hb 9.6 this am.   Will monitor her counts     06/18/2020  Florencia Lee MD

## 2020-06-18 NOTE — PROGRESS NOTES
OT BEDSIDE TREATMENT NOTE      Date:2020  Patient Name: Mag Deluca  MRN: [id number]  : 1960  Room: [address] [place] [address]     Per OT Eval:    Evaluating OT: Chiki Elliott, OTR/L [id number]     AM-PAC Daily Activity Raw Score:      Recommended Adaptive Equipment:  TBD        Diagnosis: CAD  Surgery:  EGD with biopsies,  CABG x 2      Pertinent Medical History: Cancer, CVA, DM, HLD, HTN     Precautions:  Falls, sternal precautions, O2, bed alarm, ART line; current chemotherapy      Home Living: Pt lives alone  in a multi level home with 4 step(s) to enter and 0 rail(s); bed/bath on 2nd level  Bathroom setup: walk in shower; raised commode   Equipment owned: shower chair, ww, SPC     Prior Level of Function: independent with ADLs;  independent with IADLs. SPC PRN for ambulation. Driving: yes  Occupation: not reported     Pain Level: Pt complained of chest pain this session        Cognition: A&O: /   Follows 1-2 step commands              Memory: F+              Comprehension F+              Problem solving: F+              Judgement/safety: F+                 Communication skills: G              Vision: WFL                     Glasses:No                                                        Hearing: WFL                RASS: 0  CAM-ICU: (NT) Delirium     UE Assessment:  Hand Dominance: Right [x]?   Left []?       ROM Strength STM goal: PRN   RUE  Grossly WFL within precautions Not formally tested; grossly WFL              WNL for ADLS      LUE Grossly WFL within precautions Not formally tested; grossly WFL              WNL for ADLS         Sensation: No c/o numbness or tingling in extremities   Tone: WNL   Edema: WNL     Functional Assessment    Initial Eval Status  Date:  Treatment Status  Date: 20 STG=LTG  5-7days   Feeding Set Up Set-Up  Pt able to grasp cup, bring to mouth                      Independent   while seated up in chair to increase activity tolerance         Grooming Min balance; Mod I dynamic standing balance  during ADL tasks & transfers   Endurance/Activity Tolerance    F tolerance with Moderate activity. Pt sat EOB >5  Fair G   tolerance with Moderate activity/self care routine   Visual/  Perceptual               WFL                                             Education:  Pt was educated through out treatment regarding precautions to follow, proper technique & safety with functional transfers & mobility, use of AE to assist with LB dressing tasks to improve safety & prevent falls and allow pt to return home safely. Comments: Upon arrival pt sitting EOB, completing of SPT with nursing staff, agreeable to therapy. Once pt seated upright in recliner chair, pt completed of sponge bath task and dressing task. At end of session, pt sitting upright in chair, all lines and tubes intact, call light within reach. · Pt has made limited progress towards set goals.    · Continue with current plan of care      Treatment Time In: 2:38pm            Treatment Time Out: 3:02pm               Treatment Charges: Mins Units   Ther Ex  55767     Manual Therapy 91606     Thera Activities 13119     ADL/Home Mgt 10185 24 2   Neuro Re-ed 52562     Group Therapy      Orthotic manage/training  89732     Non-Billable Time     Total Timed Treatment 24 2        Elma PILLAI/L [id number]

## 2020-06-19 LAB
ANION GAP SERPL CALCULATED.3IONS-SCNC: 12 MMOL/L (ref 7–16)
ANION GAP SERPL CALCULATED.3IONS-SCNC: 15 MMOL/L (ref 7–16)
BLOOD BANK DISPENSE STATUS: NORMAL
BLOOD BANK PRODUCT CODE: NORMAL
BPU ID: NORMAL
BUN BLDV-MCNC: 42 MG/DL (ref 8–23)
BUN BLDV-MCNC: 43 MG/DL (ref 8–23)
CALCIUM SERPL-MCNC: 9.1 MG/DL (ref 8.6–10.2)
CALCIUM SERPL-MCNC: 9.2 MG/DL (ref 8.6–10.2)
CHLORIDE BLD-SCNC: 103 MMOL/L (ref 98–107)
CHLORIDE BLD-SCNC: 104 MMOL/L (ref 98–107)
CHLORIDE URINE RANDOM: <20 MMOL/L
CO2: 23 MMOL/L (ref 22–29)
CO2: 24 MMOL/L (ref 22–29)
CREAT SERPL-MCNC: 1.2 MG/DL (ref 0.5–1)
CREAT SERPL-MCNC: 1.4 MG/DL (ref 0.5–1)
CREATININE URINE: 122 MG/DL (ref 29–226)
DESCRIPTION BLOOD BANK: NORMAL
GFR AFRICAN AMERICAN: 46
GFR AFRICAN AMERICAN: 55
GFR NON-AFRICAN AMERICAN: 46 ML/MIN/1.73
GFR NON-AFRICAN AMERICAN: 55 ML/MIN/1.73
GLUCOSE BLD-MCNC: 142 MG/DL (ref 74–99)
GLUCOSE BLD-MCNC: 147 MG/DL (ref 74–99)
HCT VFR BLD CALC: 28.5 % (ref 34–48)
HEMOGLOBIN: 8.7 G/DL (ref 11.5–15.5)
MCH RBC QN AUTO: 25.4 PG (ref 26–35)
MCHC RBC AUTO-ENTMCNC: 30.5 % (ref 32–34.5)
MCV RBC AUTO: 83.3 FL (ref 80–99.9)
METER GLUCOSE: 152 MG/DL (ref 74–99)
METER GLUCOSE: 153 MG/DL (ref 74–99)
METER GLUCOSE: 158 MG/DL (ref 74–99)
METER GLUCOSE: 191 MG/DL (ref 74–99)
MICROALBUMIN UR-MCNC: <12 MG/L
MICROALBUMIN/CREAT UR-RTO: ABNORMAL (ref 0–30)
PDW BLD-RTO: 18.2 FL (ref 11.5–15)
PLATELET # BLD: 222 E9/L (ref 130–450)
PMV BLD AUTO: 11.3 FL (ref 7–12)
POTASSIUM SERPL-SCNC: 4.5 MMOL/L (ref 3.5–5)
POTASSIUM SERPL-SCNC: 4.8 MMOL/L (ref 3.5–5)
POTASSIUM, UR: 37.7 MMOL/L
PROTEIN PROTEIN: 47 MG/DL (ref 0–12)
PROTEIN/CREAT RATIO: 0.4
PROTEIN/CREAT RATIO: 0.4 (ref 0–0.2)
RBC # BLD: 3.42 E12/L (ref 3.5–5.5)
SODIUM BLD-SCNC: 139 MMOL/L (ref 132–146)
SODIUM BLD-SCNC: 142 MMOL/L (ref 132–146)
SODIUM URINE: <20 MMOL/L
UREA NITROGEN, UR: 1207 MG/DL (ref 800–1666)
WBC # BLD: 9.7 E9/L (ref 4.5–11.5)

## 2020-06-19 PROCEDURE — 82962 GLUCOSE BLOOD TEST: CPT

## 2020-06-19 PROCEDURE — 94669 MECHANICAL CHEST WALL OSCILL: CPT

## 2020-06-19 PROCEDURE — 84300 ASSAY OF URINE SODIUM: CPT

## 2020-06-19 PROCEDURE — 2580000003 HC RX 258: Performed by: NURSE PRACTITIONER

## 2020-06-19 PROCEDURE — 6370000000 HC RX 637 (ALT 250 FOR IP): Performed by: NURSE PRACTITIONER

## 2020-06-19 PROCEDURE — 82570 ASSAY OF URINE CREATININE: CPT

## 2020-06-19 PROCEDURE — 2700000000 HC OXYGEN THERAPY PER DAY

## 2020-06-19 PROCEDURE — 82436 ASSAY OF URINE CHLORIDE: CPT

## 2020-06-19 PROCEDURE — 84133 ASSAY OF URINE POTASSIUM: CPT

## 2020-06-19 PROCEDURE — 85027 COMPLETE CBC AUTOMATED: CPT

## 2020-06-19 PROCEDURE — 94640 AIRWAY INHALATION TREATMENT: CPT

## 2020-06-19 PROCEDURE — 36415 COLL VENOUS BLD VENIPUNCTURE: CPT

## 2020-06-19 PROCEDURE — 2140000000 HC CCU INTERMEDIATE R&B

## 2020-06-19 PROCEDURE — 82044 UR ALBUMIN SEMIQUANTITATIVE: CPT

## 2020-06-19 PROCEDURE — 80048 BASIC METABOLIC PNL TOTAL CA: CPT

## 2020-06-19 PROCEDURE — 6360000002 HC RX W HCPCS: Performed by: NURSE PRACTITIONER

## 2020-06-19 PROCEDURE — 84156 ASSAY OF PROTEIN URINE: CPT

## 2020-06-19 PROCEDURE — 84540 ASSAY OF URINE/UREA-N: CPT

## 2020-06-19 PROCEDURE — 51798 US URINE CAPACITY MEASURE: CPT

## 2020-06-19 PROCEDURE — 97530 THERAPEUTIC ACTIVITIES: CPT

## 2020-06-19 RX ADMIN — IPRATROPIUM BROMIDE AND ALBUTEROL SULFATE 1 AMPULE: 2.5; .5 SOLUTION RESPIRATORY (INHALATION) at 22:14

## 2020-06-19 RX ADMIN — INSULIN LISPRO 1 UNITS: 100 INJECTION, SOLUTION INTRAVENOUS; SUBCUTANEOUS at 08:32

## 2020-06-19 RX ADMIN — SENNOSIDES AND DOCUSATE SODIUM 1 TABLET: 8.6; 5 TABLET ORAL at 20:16

## 2020-06-19 RX ADMIN — METOPROLOL SUCCINATE 25 MG: 25 TABLET, EXTENDED RELEASE ORAL at 08:29

## 2020-06-19 RX ADMIN — MAGNESIUM HYDROXIDE 30 ML: 2400 SUSPENSION ORAL at 08:29

## 2020-06-19 RX ADMIN — ENOXAPARIN SODIUM 30 MG: 30 INJECTION SUBCUTANEOUS at 08:30

## 2020-06-19 RX ADMIN — GABAPENTIN 300 MG: 300 CAPSULE ORAL at 20:20

## 2020-06-19 RX ADMIN — BETHANECHOL CHLORIDE 25 MG: 25 TABLET ORAL at 20:16

## 2020-06-19 RX ADMIN — Medication 500 MG: at 20:16

## 2020-06-19 RX ADMIN — PANTOPRAZOLE SODIUM 40 MG: 40 TABLET, DELAYED RELEASE ORAL at 08:29

## 2020-06-19 RX ADMIN — BETHANECHOL CHLORIDE 25 MG: 25 TABLET ORAL at 14:30

## 2020-06-19 RX ADMIN — ASPIRIN 81 MG: 81 TABLET, COATED ORAL at 08:30

## 2020-06-19 RX ADMIN — IPRATROPIUM BROMIDE AND ALBUTEROL SULFATE 1 AMPULE: 2.5; .5 SOLUTION RESPIRATORY (INHALATION) at 17:31

## 2020-06-19 RX ADMIN — SENNOSIDES AND DOCUSATE SODIUM 1 TABLET: 8.6; 5 TABLET ORAL at 08:31

## 2020-06-19 RX ADMIN — SODIUM CHLORIDE, PRESERVATIVE FREE 10 ML: 5 INJECTION INTRAVENOUS at 20:16

## 2020-06-19 RX ADMIN — FOLIC ACID 1 MG: 1 TABLET ORAL at 08:29

## 2020-06-19 RX ADMIN — MUPIROCIN: 20 OINTMENT TOPICAL at 20:20

## 2020-06-19 RX ADMIN — AMIODARONE HYDROCHLORIDE 400 MG: 200 TABLET ORAL at 20:15

## 2020-06-19 RX ADMIN — SODIUM CHLORIDE, PRESERVATIVE FREE 10 ML: 5 INJECTION INTRAVENOUS at 08:30

## 2020-06-19 RX ADMIN — Medication 500 MG: at 08:30

## 2020-06-19 RX ADMIN — CLOPIDOGREL 75 MG: 75 TABLET, FILM COATED ORAL at 08:29

## 2020-06-19 RX ADMIN — MUPIROCIN: 20 OINTMENT TOPICAL at 08:29

## 2020-06-19 RX ADMIN — BISACODYL 10 MG: 10 SUPPOSITORY RECTAL at 08:37

## 2020-06-19 RX ADMIN — HYDROCODONE BITARTRATE AND ACETAMINOPHEN 2 TABLET: 5; 325 TABLET ORAL at 06:28

## 2020-06-19 RX ADMIN — INSULIN LISPRO 1 UNITS: 100 INJECTION, SOLUTION INTRAVENOUS; SUBCUTANEOUS at 16:48

## 2020-06-19 RX ADMIN — BETHANECHOL CHLORIDE 25 MG: 25 TABLET ORAL at 08:31

## 2020-06-19 RX ADMIN — INSULIN LISPRO 1 UNITS: 100 INJECTION, SOLUTION INTRAVENOUS; SUBCUTANEOUS at 11:47

## 2020-06-19 RX ADMIN — FERROUS SULFATE TAB 325 MG (65 MG ELEMENTAL FE) 325 MG: 325 (65 FE) TAB at 08:29

## 2020-06-19 RX ADMIN — FERROUS SULFATE TAB 325 MG (65 MG ELEMENTAL FE) 325 MG: 325 (65 FE) TAB at 16:47

## 2020-06-19 RX ADMIN — AMIODARONE HYDROCHLORIDE 400 MG: 200 TABLET ORAL at 08:29

## 2020-06-19 RX ADMIN — INSULIN LISPRO 1 UNITS: 100 INJECTION, SOLUTION INTRAVENOUS; SUBCUTANEOUS at 20:18

## 2020-06-19 RX ADMIN — HYDROCODONE BITARTRATE AND ACETAMINOPHEN 2 TABLET: 5; 325 TABLET ORAL at 11:48

## 2020-06-19 RX ADMIN — IPRATROPIUM BROMIDE AND ALBUTEROL SULFATE 1 AMPULE: 2.5; .5 SOLUTION RESPIRATORY (INHALATION) at 09:04

## 2020-06-19 RX ADMIN — HYDROCODONE BITARTRATE AND ACETAMINOPHEN 2 TABLET: 5; 325 TABLET ORAL at 20:15

## 2020-06-19 ASSESSMENT — PAIN DESCRIPTION - ORIENTATION
ORIENTATION: MID
ORIENTATION: MID

## 2020-06-19 ASSESSMENT — PAIN DESCRIPTION - ONSET
ONSET: GRADUAL
ONSET: AWAKENED FROM SLEEP

## 2020-06-19 ASSESSMENT — PAIN DESCRIPTION - DESCRIPTORS
DESCRIPTORS: ACHING;DISCOMFORT;DULL
DESCRIPTORS: SORE;ACHING;DISCOMFORT

## 2020-06-19 ASSESSMENT — PAIN DESCRIPTION - LOCATION
LOCATION: STERNUM;INCISION
LOCATION: STERNUM

## 2020-06-19 ASSESSMENT — PAIN DESCRIPTION - FREQUENCY
FREQUENCY: INTERMITTENT
FREQUENCY: INTERMITTENT

## 2020-06-19 ASSESSMENT — PAIN DESCRIPTION - PROGRESSION
CLINICAL_PROGRESSION: NOT CHANGED

## 2020-06-19 ASSESSMENT — PAIN SCALES - GENERAL
PAINLEVEL_OUTOF10: 0
PAINLEVEL_OUTOF10: 7
PAINLEVEL_OUTOF10: 0
PAINLEVEL_OUTOF10: 10
PAINLEVEL_OUTOF10: 10
PAINLEVEL_OUTOF10: 8
PAINLEVEL_OUTOF10: 0

## 2020-06-19 ASSESSMENT — PAIN DESCRIPTION - PAIN TYPE
TYPE: SURGICAL PAIN
TYPE: SURGICAL PAIN

## 2020-06-19 NOTE — CONSULTS
(HUMALOG) injection vial 0-3 Units, 0-3 Units, Subcutaneous, Nightly  pantoprazole (PROTONIX) tablet 40 mg, 40 mg, Oral, Daily  ondansetron (ZOFRAN) injection 4 mg, 4 mg, Intravenous, Q8H PRN  sodium chloride flush 0.9 % injection 10 mL, 10 mL, Intravenous, 2 times per day  sodium chloride flush 0.9 % injection 10 mL, 10 mL, Intravenous, PRN  magnesium oxide (MAG-OX) tablet 400 mg, 400 mg, Oral, Daily  mupirocin (BACTROBAN) 2 % ointment, , Nasal, BID  ipratropium-albuterol (DUONEB) nebulizer solution 1 ampule, 1 ampule, Inhalation, Q4H WA  glucose (GLUTOSE) 40 % oral gel 15 g, 15 g, Oral, PRN  dextrose 50 % IV solution, 12.5 g, Intravenous, PRN  glucagon (rDNA) injection 1 mg, 1 mg, Intramuscular, PRN  dextrose 5 % solution, 100 mL/hr, Intravenous, PRN  bethanechol (URECHOLINE) tablet 25 mg, 25 mg, Oral, TID  clopidogrel (PLAVIX) tablet 75 mg, 75 mg, Oral, Daily  gabapentin (NEURONTIN) capsule 300 mg, 300 mg, Oral, Nightly  Allergies:  Penicillins    Social History:    TOBACCO:   reports that she has quit smoking. Her smoking use included cigarettes. She has a 0.40 pack-year smoking history. She has never used smokeless tobacco.  ETOH:   reports previous alcohol use of about 6.0 standard drinks of alcohol per week.     Family History:       Problem Relation Age of Onset    Stroke Mother     Heart Failure Mother     Other Mother         ICD    Breast Cancer Mother     Pacemaker Father     Heart Failure Father     Hypertension Sister      REVIEW OF SYSTEMS:    CONSTITUTIONAL:  negative for  fevers and chills  EYES:  negative for  double vision and blurred vision  HEENT:  negative for  hearing loss and epistaxis  RESPIRATORY:  negative for  dyspnea  CARDIOVASCULAR:  negative for  chest pain  GASTROINTESTINAL:  negative for nausea, vomiting and diarrhea  GENITOURINARY:  negative for hematuria  INTEGUMENT/BREAST:  negative  HEMATOLOGIC/LYMPHATIC:  negative for easy bruising and bleeding  ALLERGIC/IMMUNOLOGIC:

## 2020-06-20 LAB
METER GLUCOSE: 113 MG/DL (ref 74–99)
METER GLUCOSE: 127 MG/DL (ref 74–99)
METER GLUCOSE: 129 MG/DL (ref 74–99)
METER GLUCOSE: 158 MG/DL (ref 74–99)

## 2020-06-20 PROCEDURE — 93798 PHYS/QHP OP CAR RHAB W/ECG: CPT

## 2020-06-20 PROCEDURE — 94669 MECHANICAL CHEST WALL OSCILL: CPT

## 2020-06-20 PROCEDURE — 6370000000 HC RX 637 (ALT 250 FOR IP): Performed by: NURSE PRACTITIONER

## 2020-06-20 PROCEDURE — 6360000002 HC RX W HCPCS: Performed by: NURSE PRACTITIONER

## 2020-06-20 PROCEDURE — 2580000003 HC RX 258: Performed by: NURSE PRACTITIONER

## 2020-06-20 PROCEDURE — 82962 GLUCOSE BLOOD TEST: CPT

## 2020-06-20 PROCEDURE — 2140000000 HC CCU INTERMEDIATE R&B

## 2020-06-20 PROCEDURE — 94640 AIRWAY INHALATION TREATMENT: CPT

## 2020-06-20 PROCEDURE — 6360000002 HC RX W HCPCS: Performed by: THORACIC SURGERY (CARDIOTHORACIC VASCULAR SURGERY)

## 2020-06-20 RX ORDER — BISACODYL 10 MG
10 SUPPOSITORY, RECTAL RECTAL DAILY PRN
Status: DISCONTINUED | OUTPATIENT
Start: 2020-06-20 | End: 2020-06-21 | Stop reason: HOSPADM

## 2020-06-20 RX ORDER — MAGNESIUM SULFATE IN WATER 40 MG/ML
2 INJECTION, SOLUTION INTRAVENOUS ONCE
Status: COMPLETED | OUTPATIENT
Start: 2020-06-20 | End: 2020-06-20

## 2020-06-20 RX ADMIN — ENOXAPARIN SODIUM 30 MG: 30 INJECTION SUBCUTANEOUS at 09:19

## 2020-06-20 RX ADMIN — BETHANECHOL CHLORIDE 25 MG: 25 TABLET ORAL at 13:42

## 2020-06-20 RX ADMIN — SENNOSIDES AND DOCUSATE SODIUM 1 TABLET: 8.6; 5 TABLET ORAL at 09:21

## 2020-06-20 RX ADMIN — CLOPIDOGREL 75 MG: 75 TABLET, FILM COATED ORAL at 11:36

## 2020-06-20 RX ADMIN — BETHANECHOL CHLORIDE 25 MG: 25 TABLET ORAL at 21:27

## 2020-06-20 RX ADMIN — SODIUM CHLORIDE, PRESERVATIVE FREE 10 ML: 5 INJECTION INTRAVENOUS at 21:27

## 2020-06-20 RX ADMIN — SODIUM CHLORIDE, PRESERVATIVE FREE 10 ML: 5 INJECTION INTRAVENOUS at 09:22

## 2020-06-20 RX ADMIN — ASPIRIN 81 MG: 81 TABLET, COATED ORAL at 09:21

## 2020-06-20 RX ADMIN — GABAPENTIN 300 MG: 300 CAPSULE ORAL at 21:27

## 2020-06-20 RX ADMIN — AMIODARONE HYDROCHLORIDE 400 MG: 200 TABLET ORAL at 21:27

## 2020-06-20 RX ADMIN — METOPROLOL SUCCINATE 25 MG: 25 TABLET, EXTENDED RELEASE ORAL at 09:19

## 2020-06-20 RX ADMIN — MUPIROCIN: 20 OINTMENT TOPICAL at 09:22

## 2020-06-20 RX ADMIN — FOLIC ACID 1 MG: 1 TABLET ORAL at 09:19

## 2020-06-20 RX ADMIN — BETHANECHOL CHLORIDE 25 MG: 25 TABLET ORAL at 09:21

## 2020-06-20 RX ADMIN — HYDROCODONE BITARTRATE AND ACETAMINOPHEN 2 TABLET: 5; 325 TABLET ORAL at 20:14

## 2020-06-20 RX ADMIN — SENNOSIDES AND DOCUSATE SODIUM 1 TABLET: 8.6; 5 TABLET ORAL at 21:27

## 2020-06-20 RX ADMIN — HYDROCODONE BITARTRATE AND ACETAMINOPHEN 2 TABLET: 5; 325 TABLET ORAL at 00:25

## 2020-06-20 RX ADMIN — Medication 500 MG: at 09:21

## 2020-06-20 RX ADMIN — IPRATROPIUM BROMIDE AND ALBUTEROL SULFATE 1 AMPULE: 2.5; .5 SOLUTION RESPIRATORY (INHALATION) at 16:39

## 2020-06-20 RX ADMIN — Medication 500 MG: at 21:27

## 2020-06-20 RX ADMIN — HYDROCODONE BITARTRATE AND ACETAMINOPHEN 2 TABLET: 5; 325 TABLET ORAL at 11:09

## 2020-06-20 RX ADMIN — FERROUS SULFATE TAB 325 MG (65 MG ELEMENTAL FE) 325 MG: 325 (65 FE) TAB at 09:20

## 2020-06-20 RX ADMIN — IPRATROPIUM BROMIDE AND ALBUTEROL SULFATE 1 AMPULE: 2.5; .5 SOLUTION RESPIRATORY (INHALATION) at 21:07

## 2020-06-20 RX ADMIN — IPRATROPIUM BROMIDE AND ALBUTEROL SULFATE 1 AMPULE: 2.5; .5 SOLUTION RESPIRATORY (INHALATION) at 11:18

## 2020-06-20 RX ADMIN — PANTOPRAZOLE SODIUM 40 MG: 40 TABLET, DELAYED RELEASE ORAL at 09:20

## 2020-06-20 RX ADMIN — HYDROCODONE BITARTRATE AND ACETAMINOPHEN 2 TABLET: 5; 325 TABLET ORAL at 16:05

## 2020-06-20 RX ADMIN — MAGNESIUM SULFATE HEPTAHYDRATE 2 G: 40 INJECTION, SOLUTION INTRAVENOUS at 13:42

## 2020-06-20 RX ADMIN — INSULIN LISPRO 1 UNITS: 100 INJECTION, SOLUTION INTRAVENOUS; SUBCUTANEOUS at 21:29

## 2020-06-20 RX ADMIN — FERROUS SULFATE TAB 325 MG (65 MG ELEMENTAL FE) 325 MG: 325 (65 FE) TAB at 16:05

## 2020-06-20 RX ADMIN — AMIODARONE HYDROCHLORIDE 400 MG: 200 TABLET ORAL at 09:20

## 2020-06-20 ASSESSMENT — PAIN DESCRIPTION - ONSET: ONSET: AWAKENED FROM SLEEP

## 2020-06-20 ASSESSMENT — PAIN DESCRIPTION - PROGRESSION
CLINICAL_PROGRESSION: NOT CHANGED

## 2020-06-20 ASSESSMENT — PAIN SCALES - WONG BAKER: WONGBAKER_NUMERICALRESPONSE: 0

## 2020-06-20 ASSESSMENT — PAIN DESCRIPTION - LOCATION: LOCATION: STERNUM;INCISION

## 2020-06-20 ASSESSMENT — PAIN SCALES - GENERAL
PAINLEVEL_OUTOF10: 0
PAINLEVEL_OUTOF10: 10
PAINLEVEL_OUTOF10: 10
PAINLEVEL_OUTOF10: 0
PAINLEVEL_OUTOF10: 0
PAINLEVEL_OUTOF10: 9
PAINLEVEL_OUTOF10: 5
PAINLEVEL_OUTOF10: 10
PAINLEVEL_OUTOF10: 0

## 2020-06-20 ASSESSMENT — PAIN DESCRIPTION - PAIN TYPE
TYPE: SURGICAL PAIN
TYPE: SURGICAL PAIN

## 2020-06-20 ASSESSMENT — PAIN - FUNCTIONAL ASSESSMENT: PAIN_FUNCTIONAL_ASSESSMENT: ACTIVITIES ARE NOT PREVENTED

## 2020-06-20 ASSESSMENT — PAIN DESCRIPTION - DESCRIPTORS: DESCRIPTORS: ACHING;DISCOMFORT;DULL

## 2020-06-20 ASSESSMENT — PAIN DESCRIPTION - FREQUENCY: FREQUENCY: INTERMITTENT

## 2020-06-20 ASSESSMENT — PAIN DESCRIPTION - ORIENTATION: ORIENTATION: MID

## 2020-06-20 NOTE — PROGRESS NOTES
[address] re: labs not drawn this AM. Spoke with Scar Stager was unable to achieve lab work this AM. Will sent someone else to draw labs. 1005 labs changed to STAT. Called STAT labs re: labs not drawn this AM. Spoke with Lynette supervisor from Lab and states Jorge [place] will be drawing labs. Haseeb 58 with Lynette from labs. Reordered again. Called STAT labs. Will sent lab to draw labs and RN staff will attempt. Spoke with Lab supervisor re: labs are schedule at 0600 AM and this unacceptable. 1115 Attempted lab draw by RN. Talked to lab states only two  can try once and RN can attempts. All attempts tried. Pt has limited access d/t only able to use R arm ( L mastectomy in 2/20) Notified Dr Oral Zaldivar and rosario.          Marina Fabian

## 2020-06-20 NOTE — PROGRESS NOTES
Dispatch called at [phone] and [phone] to have our lab personnel come to draw a.m. labs, awaiting return call or to come to floor.

## 2020-06-20 NOTE — PROGRESS NOTES
POD#4 Awake, alert. No complaints. Up in chair. Denies CP, palpitations, SOB at rest, dizziness/lightheadedness. Vitals:    06/20/20 0410 06/20/20 0646 06/20/20 0702 06/20/20 0720   BP: 107/69  108/70    Pulse: 87      Resp: 18  16    Temp: 97.8 °F (36.6 °C)  97.5 °F (36.4 °C)    TempSrc: Temporal  Temporal    SpO2: 95%  98% 95%   Weight:  232 lb (105.2 kg)     Height:         O2: none      Intake/Output Summary (Last 24 hours) at 6/20/2020 1019  Last data filed at 6/20/2020 0921  Gross per 24 hour   Intake 850 ml   Output 1800 ml   Net -950 ml       +BM 6/19  UO: 300mL/8hr       Recent Labs     06/18/20  0611 06/19/20  0619   WBC 11.4 9.7   HGB 9.6* 8.7*   HCT 31.4* 28.5*    222      Recent Labs     06/18/20  1342 06/19/20  0619 06/19/20  1455   BUN 34* 43* 42*   CREATININE 1.5* 1.4* 1.2*     Telemetry: NSR        PE  Cardiac: RRR  Lungs: decreased bases  Chest incision with intact PILAR DSD. Sternum stable. Prior chest tube site incisions C/D/I, no erythema with intact sutures. Abd: Soft, nontender, +BS  Ext: Incisions C/D/I, approximated, no erythema, + edema               A/P: POD#4     1. CAD  --Stable s/p urgent CABG x 2, ARNULFO exclusion on 6/16/2020  --DAPT/statin/BB  --reinforce sternal precautions       2. Acute blood loss anemia secondary to open heart surgery/anemia present on admission prior to surgery  --stable--hgb pending for today  --heme/onc following        3. NSR  --continue BB with hold parameters  --continue oral amiodarone for afib prophylaxis--with plans to taper on dc        4. LV dysfunction  --Echo 2/3/20 EF 57%, Lexiscan stress 02/07/20: EF 34 %. --Post CPB EF 35%, arrived to ICU on low dose dobutamine, turned off d/t hypertension  --optimize HF meds as able--currently on 25mg toprol xl  --check TTE to re-eval EF to determine need for WCD on discharge--EF 30-35% on repeat TTE--will need WCD on dc--order placed  --will need ACE/diuretic when ok with renal        5.  ARTURO  --Scr

## 2020-06-20 NOTE — CARE COORDINATION
SW received update from RN that patient will be in need of Life Vest at d/c. Reported possible plan for d/c tomorrow, 6/21/20. SW noted order placed in electronic chart. SW called Yasmin Morales ([phone]), reported plan to provide life vest later today. SW available for any additional needs.   Electronically signed by MAURICIO Kern on 6/20/2020 at 10:08 AM

## 2020-06-21 VITALS
RESPIRATION RATE: 16 BRPM | WEIGHT: 232.2 LBS | TEMPERATURE: 98.7 F | HEIGHT: 65 IN | BODY MASS INDEX: 38.69 KG/M2 | DIASTOLIC BLOOD PRESSURE: 93 MMHG | HEART RATE: 84 BPM | SYSTOLIC BLOOD PRESSURE: 154 MMHG | OXYGEN SATURATION: 98 %

## 2020-06-21 LAB
ANION GAP SERPL CALCULATED.3IONS-SCNC: 13 MMOL/L (ref 7–16)
BUN BLDV-MCNC: 29 MG/DL (ref 8–23)
CALCIUM SERPL-MCNC: 9.2 MG/DL (ref 8.6–10.2)
CHLORIDE BLD-SCNC: 104 MMOL/L (ref 98–107)
CO2: 23 MMOL/L (ref 22–29)
CREAT SERPL-MCNC: 0.9 MG/DL (ref 0.5–1)
FERRITIN: 129 NG/ML
GFR AFRICAN AMERICAN: >60
GFR NON-AFRICAN AMERICAN: >60 ML/MIN/1.73
GLUCOSE BLD-MCNC: 118 MG/DL (ref 74–99)
HCT VFR BLD CALC: 28.6 % (ref 34–48)
HEMOGLOBIN: 8.6 G/DL (ref 11.5–15.5)
IRON SATURATION: 7 % (ref 15–50)
IRON: 15 MCG/DL (ref 37–145)
MCH RBC QN AUTO: 25 PG (ref 26–35)
MCHC RBC AUTO-ENTMCNC: 30.1 % (ref 32–34.5)
MCV RBC AUTO: 83.1 FL (ref 80–99.9)
METER GLUCOSE: 105 MG/DL (ref 74–99)
METER GLUCOSE: 114 MG/DL (ref 74–99)
METER GLUCOSE: 118 MG/DL (ref 74–99)
PDW BLD-RTO: 18.7 FL (ref 11.5–15)
PLATELET # BLD: 280 E9/L (ref 130–450)
PMV BLD AUTO: 10.8 FL (ref 7–12)
POTASSIUM SERPL-SCNC: 4.7 MMOL/L (ref 3.5–5)
PRO-BNP: 2173 PG/ML (ref 0–125)
RBC # BLD: 3.44 E12/L (ref 3.5–5.5)
SODIUM BLD-SCNC: 140 MMOL/L (ref 132–146)
TOTAL IRON BINDING CAPACITY: 211 MCG/DL (ref 250–450)
WBC # BLD: 5.7 E9/L (ref 4.5–11.5)

## 2020-06-21 PROCEDURE — 82962 GLUCOSE BLOOD TEST: CPT

## 2020-06-21 PROCEDURE — 6370000000 HC RX 637 (ALT 250 FOR IP): Performed by: NURSE PRACTITIONER

## 2020-06-21 PROCEDURE — 83880 ASSAY OF NATRIURETIC PEPTIDE: CPT

## 2020-06-21 PROCEDURE — 83540 ASSAY OF IRON: CPT

## 2020-06-21 PROCEDURE — 93798 PHYS/QHP OP CAR RHAB W/ECG: CPT

## 2020-06-21 PROCEDURE — 83550 IRON BINDING TEST: CPT

## 2020-06-21 PROCEDURE — 80048 BASIC METABOLIC PNL TOTAL CA: CPT

## 2020-06-21 PROCEDURE — 6360000002 HC RX W HCPCS: Performed by: NURSE PRACTITIONER

## 2020-06-21 PROCEDURE — 82728 ASSAY OF FERRITIN: CPT

## 2020-06-21 PROCEDURE — 85027 COMPLETE CBC AUTOMATED: CPT

## 2020-06-21 PROCEDURE — 94640 AIRWAY INHALATION TREATMENT: CPT

## 2020-06-21 PROCEDURE — 94669 MECHANICAL CHEST WALL OSCILL: CPT

## 2020-06-21 PROCEDURE — 36415 COLL VENOUS BLD VENIPUNCTURE: CPT

## 2020-06-21 RX ORDER — LISINOPRIL 2.5 MG/1
2.5 TABLET ORAL DAILY
Qty: 30 TABLET | Refills: 2 | Status: SHIPPED | OUTPATIENT
Start: 2020-06-21 | End: 2020-07-27 | Stop reason: DRUGHIGH

## 2020-06-21 RX ORDER — FUROSEMIDE 20 MG/1
20 TABLET ORAL DAILY
Status: DISCONTINUED | OUTPATIENT
Start: 2020-06-22 | End: 2020-06-21 | Stop reason: HOSPADM

## 2020-06-21 RX ORDER — FERROUS SULFATE 325(65) MG
325 TABLET ORAL 2 TIMES DAILY WITH MEALS
Qty: 60 TABLET | Refills: 0 | Status: SHIPPED | OUTPATIENT
Start: 2020-06-21 | End: 2020-06-29 | Stop reason: SDUPTHER

## 2020-06-21 RX ORDER — METOPROLOL SUCCINATE 25 MG/1
25 TABLET, EXTENDED RELEASE ORAL DAILY
Qty: 30 TABLET | Refills: 2 | Status: SHIPPED | OUTPATIENT
Start: 2020-06-22

## 2020-06-21 RX ORDER — HYDROCODONE BITARTRATE AND ACETAMINOPHEN 5; 325 MG/1; MG/1
1 TABLET ORAL EVERY 6 HOURS PRN
Qty: 28 TABLET | Refills: 0 | Status: SHIPPED | OUTPATIENT
Start: 2020-06-21 | End: 2020-06-28

## 2020-06-21 RX ORDER — DOCUSATE SODIUM 100 MG/1
100 CAPSULE, LIQUID FILLED ORAL 2 TIMES DAILY PRN
Qty: 60 CAPSULE | Refills: 0 | Status: SHIPPED | OUTPATIENT
Start: 2020-06-21 | End: 2020-07-21

## 2020-06-21 RX ORDER — AMIODARONE HYDROCHLORIDE 200 MG/1
400 TABLET ORAL SEE ADMIN INSTRUCTIONS
Qty: 56 TABLET | Refills: 0 | Status: SHIPPED | OUTPATIENT
Start: 2020-06-21 | End: 2020-07-14

## 2020-06-21 RX ORDER — FUROSEMIDE 20 MG/1
10 TABLET ORAL DAILY
Status: DISCONTINUED | OUTPATIENT
Start: 2020-06-21 | End: 2020-06-21

## 2020-06-21 RX ORDER — FUROSEMIDE 20 MG/1
10 TABLET ORAL DAILY
Qty: 60 TABLET | Refills: 2 | Status: SHIPPED | OUTPATIENT
Start: 2020-06-22

## 2020-06-21 RX ORDER — LISINOPRIL 5 MG/1
2.5 TABLET ORAL DAILY
Status: DISCONTINUED | OUTPATIENT
Start: 2020-06-21 | End: 2020-06-21 | Stop reason: HOSPADM

## 2020-06-21 RX ORDER — ASPIRIN 81 MG/1
81 TABLET ORAL DAILY
Qty: 30 TABLET | Refills: 5 | Status: SHIPPED | OUTPATIENT
Start: 2020-06-22 | End: 2020-08-07 | Stop reason: ALTCHOICE

## 2020-06-21 RX ORDER — CLOPIDOGREL BISULFATE 75 MG/1
75 TABLET ORAL DAILY
Qty: 60 TABLET | Refills: 0 | Status: SHIPPED | OUTPATIENT
Start: 2020-06-22 | End: 2022-05-19

## 2020-06-21 RX ORDER — FOLIC ACID 1 MG/1
1 TABLET ORAL DAILY
Qty: 30 TABLET | Refills: 0 | Status: SHIPPED | OUTPATIENT
Start: 2020-06-22 | End: 2020-11-13

## 2020-06-21 RX ORDER — ASCORBIC ACID 500 MG
500 TABLET ORAL 2 TIMES DAILY
Qty: 60 TABLET | Refills: 0 | Status: SHIPPED | OUTPATIENT
Start: 2020-06-21 | End: 2020-09-03

## 2020-06-21 RX ADMIN — LISINOPRIL 2.5 MG: 5 TABLET ORAL at 09:43

## 2020-06-21 RX ADMIN — IPRATROPIUM BROMIDE AND ALBUTEROL SULFATE 1 AMPULE: 2.5; .5 SOLUTION RESPIRATORY (INHALATION) at 10:10

## 2020-06-21 RX ADMIN — CLOPIDOGREL 75 MG: 75 TABLET, FILM COATED ORAL at 08:09

## 2020-06-21 RX ADMIN — HYDROCODONE BITARTRATE AND ACETAMINOPHEN 2 TABLET: 5; 325 TABLET ORAL at 08:08

## 2020-06-21 RX ADMIN — ASPIRIN 81 MG: 81 TABLET, COATED ORAL at 08:09

## 2020-06-21 RX ADMIN — BETHANECHOL CHLORIDE 25 MG: 25 TABLET ORAL at 08:09

## 2020-06-21 RX ADMIN — METOPROLOL SUCCINATE 25 MG: 25 TABLET, EXTENDED RELEASE ORAL at 08:08

## 2020-06-21 RX ADMIN — ENOXAPARIN SODIUM 30 MG: 30 INJECTION SUBCUTANEOUS at 09:42

## 2020-06-21 RX ADMIN — FERROUS SULFATE TAB 325 MG (65 MG ELEMENTAL FE) 325 MG: 325 (65 FE) TAB at 08:09

## 2020-06-21 RX ADMIN — FUROSEMIDE 10 MG: 20 TABLET ORAL at 08:09

## 2020-06-21 RX ADMIN — MAGNESIUM GLUCONATE 500 MG ORAL TABLET 400 MG: 500 TABLET ORAL at 08:09

## 2020-06-21 RX ADMIN — PANTOPRAZOLE SODIUM 40 MG: 40 TABLET, DELAYED RELEASE ORAL at 08:09

## 2020-06-21 RX ADMIN — AMIODARONE HYDROCHLORIDE 400 MG: 200 TABLET ORAL at 08:09

## 2020-06-21 RX ADMIN — Medication 500 MG: at 08:08

## 2020-06-21 RX ADMIN — FOLIC ACID 1 MG: 1 TABLET ORAL at 08:09

## 2020-06-21 RX ADMIN — BETHANECHOL CHLORIDE 25 MG: 25 TABLET ORAL at 12:46

## 2020-06-21 RX ADMIN — FERROUS SULFATE TAB 325 MG (65 MG ELEMENTAL FE) 325 MG: 325 (65 FE) TAB at 16:42

## 2020-06-21 ASSESSMENT — PAIN DESCRIPTION - ONSET: ONSET: AWAKENED FROM SLEEP

## 2020-06-21 ASSESSMENT — PAIN SCALES - GENERAL
PAINLEVEL_OUTOF10: 0
PAINLEVEL_OUTOF10: 9

## 2020-06-21 ASSESSMENT — PAIN DESCRIPTION - DESCRIPTORS: DESCRIPTORS: ACHING;DISCOMFORT;DULL

## 2020-06-21 ASSESSMENT — PAIN DESCRIPTION - PAIN TYPE: TYPE: SURGICAL PAIN

## 2020-06-21 ASSESSMENT — PAIN SCALES - WONG BAKER: WONGBAKER_NUMERICALRESPONSE: 0

## 2020-06-21 ASSESSMENT — PAIN DESCRIPTION - ORIENTATION: ORIENTATION: MID

## 2020-06-21 ASSESSMENT — PAIN DESCRIPTION - PROGRESSION
CLINICAL_PROGRESSION: NOT CHANGED

## 2020-06-21 ASSESSMENT — PAIN DESCRIPTION - LOCATION: LOCATION: STERNUM;INCISION

## 2020-06-21 ASSESSMENT — PAIN DESCRIPTION - FREQUENCY: FREQUENCY: INTERMITTENT

## 2020-06-21 ASSESSMENT — PAIN - FUNCTIONAL ASSESSMENT: PAIN_FUNCTIONAL_ASSESSMENT: ACTIVITIES ARE NOT PREVENTED

## 2020-06-21 NOTE — PROGRESS NOTES
increase up to 1.5 mg/dL reason for this consultation. Review of her medical record showed no administration of IV contrast, nor administration of NSAIDs. Presently reports no shortness of breath, no urinary symptoms. Reports urinating every 4 hours. IMPRESSION/RECOMMENDATIONS:      1. ARTURO stage 1, CABG associated ARTURO, probably low grade ischemic ATN due to hypotension during surgery. Resolved. 2. CAD status post CABG x 2, June 16, 2020  3. HFrEF 30+/-5% with stage III DD, to start Lasix 20 mg daily  4. HTN, on metoprolol  5. Normocytic anemia, multifactorial including surgery, rule out iron deficiency.     Plan:    · Start Lasix 20 mg daily  · Okay to discharge from renal point of view  · Follow-up with us in 3 to 4 weeks  · BMP in 2 weeks

## 2020-06-21 NOTE — PROGRESS NOTES
0.9  --nephrology following  --ACE/lasix initiated today        6. Prolonged postoperative respiratory insufficiency  --wean oxygen to keep SpO2 greater than or equal to 92%  --continue duonebs with ezpap  --encourage C&DB, SMI  --currently on RA        7. DMII   -Hemoglobin A1C 5.6  -SSI for glycemic control         8.  Constipation  --resolved  --Continue MOM and senna-s.   --change MOM frequency to daily prn  --Encouraged continued increase in oral intake and activity.          9. Post operative deconditioning  --Increase activity as tolerated  --PT/OT  --ambulating 400ft           DVT prophylaxis:  --continue bilateral knee high LAURA hose  --continue PCDs  --continue progressive ambulation  --continue low dose lovenox for dvt prophylaxis and continue knee high LAURA hose/pcds/progressive ambulation      Dispo: home today        This patient's case and care plan was discussed with the attending surgeon

## 2020-06-29 ENCOUNTER — HOSPITAL ENCOUNTER (OUTPATIENT)
Dept: INFUSION THERAPY | Age: 60
Discharge: HOME | End: 2020-06-29
Payer: MEDICARE

## 2020-06-29 ENCOUNTER — OFFICE VISIT (OUTPATIENT)
Dept: ONCOLOGY | Age: 60
End: 2020-06-29
Payer: MEDICARE

## 2020-06-29 VITALS
DIASTOLIC BLOOD PRESSURE: 87 MMHG | SYSTOLIC BLOOD PRESSURE: 157 MMHG | TEMPERATURE: 99 F | HEART RATE: 88 BPM | OXYGEN SATURATION: 96 % | WEIGHT: 230.8 LBS | BODY MASS INDEX: 38.45 KG/M2 | HEIGHT: 65 IN

## 2020-06-29 DIAGNOSIS — C53.0 CANCER OF ENDOCERVIX (HCC): ICD-10-CM

## 2020-06-29 LAB — CA 125: 45.3 U/ML (ref 0–35)

## 2020-06-29 PROCEDURE — G8417 CALC BMI ABV UP PARAM F/U: HCPCS | Performed by: INTERNAL MEDICINE

## 2020-06-29 PROCEDURE — 99215 OFFICE O/P EST HI 40 MIN: CPT | Performed by: INTERNAL MEDICINE

## 2020-06-29 PROCEDURE — G8427 DOCREV CUR MEDS BY ELIG CLIN: HCPCS | Performed by: INTERNAL MEDICINE

## 2020-06-29 PROCEDURE — 3017F COLORECTAL CA SCREEN DOC REV: CPT | Performed by: INTERNAL MEDICINE

## 2020-06-29 PROCEDURE — 36415 COLL VENOUS BLD VENIPUNCTURE: CPT

## 2020-06-29 PROCEDURE — 1036F TOBACCO NON-USER: CPT | Performed by: INTERNAL MEDICINE

## 2020-06-29 PROCEDURE — 86304 IMMUNOASSAY TUMOR CA 125: CPT

## 2020-06-29 PROCEDURE — 1111F DSCHRG MED/CURRENT MED MERGE: CPT | Performed by: INTERNAL MEDICINE

## 2020-06-29 RX ORDER — FERROUS SULFATE 325(65) MG
325 TABLET ORAL 2 TIMES DAILY WITH MEALS
Qty: 60 TABLET | Refills: 0 | Status: SHIPPED | OUTPATIENT
Start: 2020-06-29 | End: 2022-05-19

## 2020-06-29 NOTE — PROGRESS NOTES
Department of [facility] Oncology  Attending Clinic Note    Reason for Visit: Follow-up on a patient with Cervical Cancer and Breast Cancer    PCP:  KRISTEN Givens    History of Present Illness  63 y.o. female with past medical hx of right breast cancer with recurrence (s/p total mastectomy with reconstruction/TRAM flap & XRT, HTN, chest pain, CVA, left sided weakness, low back pain with sciatica, type II diabetes, hypertensive left ventricular hypertrophy. PAP smear on 04/11/2019 revealing HSIL and endometrial cells. She then underwent a colposcopy on 07/10/2019 and a LEEP on 08/22/2019. PATH REVIEW [facility]:  FINAL DIAGNOSIS   1. Cervix, LEEP specimen ([id number]) - Invasive poorly-differentiated adenocarcinoma. - The invasive adenocarcinoma involves the deep (radial) excision margin. 2. Endocervix, LEEP specimen ([id number]) - Focus of cauterized adenocarcinoma invovling excision magin. COMMENT   In the cervix LEEP specimen ([id number]) the adenocarcinoma invades the cervical stroma to a thickness of at least 2.5 mm. However, since the invasive adenocarcinoma involves the deep (radial) excision margin, the full extent of invasion cannot be determined. The invasive adenocarcinoma involves five tissue sections. Therefore, the width (circumferential measurement) is estimated at approximately 13 mm. Vascular invasion is not identified. PET[facility]CT 10/12/2019 at [facility]:   There is no hypermetabolic abdominal or pelvic lymphadenopathy.    There is mild low-level activity in the cervical region (max SUV 2.9).       Evaluated by GYN ONC (Dr. Mani Bennett at [facility]) who recommended ChemoRT with weekly Cisplatin  followed by HDR brachytherapy for Stage 1B1 Cervical Cancer. Patient would like to receive treatments locally. EBRT was started on 11/20/2019. Cycle # 1 weekly Cisplatin was on 11/20/2019. Cycle # 1 weekly Cisplatin was on 11/20/2019. Cycle # 5 weekly Cisplatin was on 12/19/2019.  EBRT was activity in the cervical region (max SUV 2.9).       Evaluated by GYN ONC (Dr. Gage Tyson at [facility] [place]) who recommended concurrent chemoRT with weekly Cisplatin followed by HDR brachytherapy for her Stage 1B1 Cervical Cancer. Side effects of Cisplatin reviewed with patient. She agreed to proceed. Radiation therapy started on 11/20/2019. Cycle # 1 weekly Cisplatin was on 11/20/2019. Cycle # 5 weekly Cisplatin was on 12/19/2019. EBRT was completed 12/27/2019. HDR brachytherapy (1/14/2020-1/16/2020)  PET/CT scan 05/18/2020 noted no evidence of disease. Left Breast Cancer Feb 2020  Left simple mastectomy on 02/25/2020:  A. Left breast, simple mastectomy:   - Invasive carcinoma of no special type (ductal), grade 2; tumor size 0.8 x 0.7 x 0.5 cm; Negative margins. LVI not identified.   - Ductal carcinoma in situ, intermediate to high nuclear grade,cribriform and solid types, with necrosis;   - Sclerosing fibroadenomatoid nodules and gynecomastia-like changes;   - Microcalcifications in DCIS;   - Scar and foreign body-type granuloma of prior biopsy site;  - Additional skin/breast tissue showing no pathologic alteration. B. Left breast, new inferior-medial margin, excision: Negative for malignancy. pT1b pNx  Breast Cancer Marker Studies:  Estrogen Receptors (ER): Positive >90%  Progesterone Receptors (AZ): Positive:40%  Her-2/heidi (c-erb B-2) protein expression: Positive (score 3+)    Started Arimidex 1 mg po daily on 06/01/2020 with fair tolerance. Not on Herceptin due to CHF and CAD. EF 34% Feb 2020  No SLNB done. Left axillary U/S 06/01/2020:  No evidence of malignancy. DEXA scan 06/01/2020: normal bone density in LS and jasson hips. Tumor Board Discussion with Breast Team at [facility]: Defer LN evaluation and HER2 targeted therapies due to cardiac risks; Endocrine therapy only indicated. Admitted on 06/10/2020 for blood transfusions in preparation for CABG s/p NSTEMI. EGD on 06/12/2020 noted mild gastritis.

## 2020-06-29 NOTE — DISCHARGE SUMMARY
dobutamine for a post CPB EF of 35%, but it was stopped due to hypertension. She was also placed on amiodarone that was transitioned to oral for a short run of NSVT. She was transferred to the monitored stepdown unit on POD 1. Beta blockade was initiated. Nephrology was consulted for ARTURO, which resolved prior to discharge. TTE revealed an EF of 30-35% and thus she was ordered a WCD for discharge. With improvements in kidney function, she was able to be started on a low dose ACE and lasix as well as toprol xl. Her mediastinal and pleural chest tubes, and epicardial pacing wires were discontinued in the usual fashion. She was weaned off of supplemental oxygen, and met all ambulatory requirements. She was deemed ready for discharge and was discharged to home on POD 5 (6/21/2020) in good condition. Consults: cardiology, nephrology, hematology/oncology and general surgery    Discharge Exam:  Vitals          Vitals:     06/21/20 0400 06/21/20 0645 06/21/20 0721 06/21/20 0800   BP: 132/80     132/81   Pulse: 85     86   Resp: 16     16   Temp: 98 °F (36.7 °C)     97.9 °F (36.6 °C)   TempSrc: Temporal     Temporal   SpO2: 96% 96%   97%   Weight:     232 lb 3.2 oz (105.3 kg)     Height:                 O2: none        Intake/Output Summary (Last 24 hours) at 6/21/2020 1101  Last data filed at 6/21/2020 0721      Gross per 24 hour   Intake 1020 ml   Output 1500 ml   Net -480 ml         +BM 6/20  UO: 600mL/8hr              Recent Labs     06/19/20  0619 06/21/20  0542   WBC 9.7 5.7   HGB 8.7* 8.6*   HCT 28.5* 28.6*    280            Recent Labs     06/19/20  0619 06/19/20  1455 06/21/20  0542   BUN 43* 42* 29*   CREATININE 1.4* 1.2* 0.9      Telemetry: NSR        PE  Cardiac: RRR  Lungs: decreased bases  Chest incision C/D/I, approximated, no erythema. Sternum stable. Prior chest tube site incisions C/D/I, no erythema with intact sutures.    Abd: Soft, nontender, +BS  Ext: Incisions C/D/I, approximated, no erythema, +minimal edema            Disposition: home    Patient Instructions:    Davis July   Home Medication Instructions SSD:[id number]    Printed on:06/29/20 9193   Medication Information                      amiodarone (CORDARONE) 200 MG tablet  Take 2 tablets by mouth See Admin Instructions Take 400 mg orally twice daily for seven days, then take 200 mg orally twice daily for seven days, then take 200 mg orally daily for fourteen days, then discontinue             anastrozole (ARIMIDEX) 1 MG tablet  take 1 tablet by mouth once daily             aspirin 81 MG EC tablet  Take 1 tablet by mouth daily             atorvastatin (LIPITOR) 40 MG tablet  Take 1 tablet by mouth nightly. clopidogrel (PLAVIX) 75 MG tablet  Take 1 tablet by mouth daily             docusate sodium (COLACE) 100 MG capsule  Take 1 capsule by mouth 2 times daily as needed for Constipation             folic acid (FOLVITE) 1 MG tablet  Take 1 tablet by mouth daily             furosemide (LASIX) 20 MG tablet  Take 0.5 tablets by mouth daily             gabapentin (NEURONTIN) 300 MG capsule  Take 300 mg by mouth nightly. [place]              glimepiride (AMARYL) 1 MG tablet  Take 1 mg by mouth every morning (before breakfast)             lisinopril (PRINIVIL;ZESTRIL) 2.5 MG tablet  Take 1 tablet by mouth daily             magnesium oxide (MAG-OX) 400 (241.3 Mg) MG TABS tablet  Take 1 tablet by mouth daily for 14 days             metFORMIN (GLUCOPHAGE) 500 MG tablet  Take 500 mg by mouth 2 times daily (with meals)             metoprolol succinate (TOPROL XL) 25 MG extended release tablet  Take 1 tablet by mouth daily             pantoprazole (PROTONIX) 40 MG tablet  Take 1 tablet by mouth 2 times daily (before meals)             vitamin C (VITAMIN C) 500 MG tablet  Take 1 tablet by mouth 2 times daily               Activity: sternal precautions  Diet: cardiac diet  Wound Care: as directed    Follow-up with   Future Appointments   Date Time Provider Frantz Yaa   7/21/2020  9:00 AM Elayne Suresh MD CARDIO SURG [facility]   7/29/2020  9:45 AM Fallon Vásquez MD [place] CARDIO [facility]   9/28/2020 11:15 AM [place] MED ONC FAST TRACK 1 [facility] Med Onc [facility]   9/28/2020 11:30 AM Dejon Parsons MD MED ONC [facility]       Smoking cessation education provided prior to discharge    Discussed with patient the benefits of participation in cardiac rehab after discharge once approved safe by the surgeon and that a referral will be made at the follow up appointment. Pt verbalized comprehension.     Signed:  Alexus Rosenberg, MSN, APRN, FNP-BC, AGACNP-BC

## 2020-07-14 ENCOUNTER — HOSPITAL ENCOUNTER (EMERGENCY)
Age: 60
Discharge: HOME | End: 2020-07-14
Attending: EMERGENCY MEDICINE
Payer: MEDICARE

## 2020-07-14 ENCOUNTER — APPOINTMENT (OUTPATIENT)
Dept: CT IMAGING | Age: 60
End: 2020-07-14
Payer: MEDICARE

## 2020-07-14 ENCOUNTER — APPOINTMENT (OUTPATIENT)
Dept: GENERAL RADIOLOGY | Age: 60
End: 2020-07-14
Payer: MEDICARE

## 2020-07-14 VITALS
DIASTOLIC BLOOD PRESSURE: 108 MMHG | WEIGHT: 223 LBS | RESPIRATION RATE: 32 BRPM | TEMPERATURE: 97.7 F | BODY MASS INDEX: 37.15 KG/M2 | OXYGEN SATURATION: 92 % | HEIGHT: 65 IN | SYSTOLIC BLOOD PRESSURE: 167 MMHG | HEART RATE: 79 BPM

## 2020-07-14 LAB
ALBUMIN SERPL-MCNC: 4.1 G/DL (ref 3.5–5.2)
ALP BLD-CCNC: 92 U/L (ref 35–104)
ALT SERPL-CCNC: 8 U/L (ref 0–32)
ANION GAP SERPL CALCULATED.3IONS-SCNC: 12 MMOL/L (ref 7–16)
ANISOCYTOSIS: ABNORMAL
AST SERPL-CCNC: 12 U/L (ref 0–31)
BASOPHILS ABSOLUTE: 0 E9/L (ref 0–0.2)
BASOPHILS RELATIVE PERCENT: 0.3 % (ref 0–2)
BILIRUB SERPL-MCNC: 0.5 MG/DL (ref 0–1.2)
BUN BLDV-MCNC: 18 MG/DL (ref 8–23)
BURR CELLS: ABNORMAL
CALCIUM SERPL-MCNC: 9.5 MG/DL (ref 8.6–10.2)
CHLORIDE BLD-SCNC: 110 MMOL/L (ref 98–107)
CO2: 22 MMOL/L (ref 22–29)
CREAT SERPL-MCNC: 1.1 MG/DL (ref 0.5–1)
D DIMER: 574 NG/ML DDU
EOSINOPHILS ABSOLUTE: 0.07 E9/L (ref 0.05–0.5)
EOSINOPHILS RELATIVE PERCENT: 1.8 % (ref 0–6)
GFR AFRICAN AMERICAN: >60
GFR NON-AFRICAN AMERICAN: >60 ML/MIN/1.73
GLUCOSE BLD-MCNC: 76 MG/DL (ref 74–99)
HCT VFR BLD CALC: 33 % (ref 34–48)
HEMOGLOBIN: 9.7 G/DL (ref 11.5–15.5)
LYMPHOCYTES ABSOLUTE: 0.31 E9/L (ref 1.5–4)
LYMPHOCYTES RELATIVE PERCENT: 8 % (ref 20–42)
MCH RBC QN AUTO: 24.7 PG (ref 26–35)
MCHC RBC AUTO-ENTMCNC: 29.4 % (ref 32–34.5)
MCV RBC AUTO: 84 FL (ref 80–99.9)
MONOCYTES ABSOLUTE: 0.31 E9/L (ref 0.1–0.95)
MONOCYTES RELATIVE PERCENT: 8 % (ref 2–12)
NEUTROPHILS ABSOLUTE: 3.2 E9/L (ref 1.8–7.3)
NEUTROPHILS RELATIVE PERCENT: 82.3 % (ref 43–80)
OVALOCYTES: ABNORMAL
PDW BLD-RTO: 21.6 FL (ref 11.5–15)
PLATELET # BLD: 252 E9/L (ref 130–450)
PMV BLD AUTO: 10.1 FL (ref 7–12)
POIKILOCYTES: ABNORMAL
POLYCHROMASIA: ABNORMAL
POTASSIUM REFLEX MAGNESIUM: 3.7 MMOL/L (ref 3.5–5)
PRO-BNP: 3676 PG/ML (ref 0–125)
RBC # BLD: 3.93 E12/L (ref 3.5–5.5)
REASON FOR REJECTION: NORMAL
REJECTED TEST: NORMAL
SODIUM BLD-SCNC: 144 MMOL/L (ref 132–146)
TOTAL PROTEIN: 6.9 G/DL (ref 6.4–8.3)
TROPONIN: 0.02 NG/ML (ref 0–0.03)
TROPONIN: <0.01 NG/ML (ref 0–0.03)
WBC # BLD: 3.9 E9/L (ref 4.5–11.5)

## 2020-07-14 PROCEDURE — 93005 ELECTROCARDIOGRAM TRACING: CPT | Performed by: PHYSICIAN ASSISTANT

## 2020-07-14 PROCEDURE — APPSS45 APP SPLIT SHARED TIME 31-45 MINUTES: Performed by: NURSE PRACTITIONER

## 2020-07-14 PROCEDURE — 71046 X-RAY EXAM CHEST 2 VIEWS: CPT

## 2020-07-14 PROCEDURE — 99284 EMERGENCY DEPT VISIT MOD MDM: CPT | Performed by: INTERNAL MEDICINE

## 2020-07-14 PROCEDURE — 99285 EMERGENCY DEPT VISIT HI MDM: CPT

## 2020-07-14 PROCEDURE — 83880 ASSAY OF NATRIURETIC PEPTIDE: CPT

## 2020-07-14 PROCEDURE — G0378 HOSPITAL OBSERVATION PER HR: HCPCS

## 2020-07-14 PROCEDURE — 96374 THER/PROPH/DIAG INJ IV PUSH: CPT

## 2020-07-14 PROCEDURE — 85378 FIBRIN DEGRADE SEMIQUANT: CPT

## 2020-07-14 PROCEDURE — 6360000004 HC RX CONTRAST MEDICATION: Performed by: RADIOLOGY

## 2020-07-14 PROCEDURE — 6360000002 HC RX W HCPCS: Performed by: NURSE PRACTITIONER

## 2020-07-14 PROCEDURE — 71275 CT ANGIOGRAPHY CHEST: CPT

## 2020-07-14 PROCEDURE — 84484 ASSAY OF TROPONIN QUANT: CPT

## 2020-07-14 PROCEDURE — 2580000003 HC RX 258: Performed by: RADIOLOGY

## 2020-07-14 PROCEDURE — 80053 COMPREHEN METABOLIC PANEL: CPT

## 2020-07-14 PROCEDURE — 85025 COMPLETE CBC W/AUTO DIFF WBC: CPT

## 2020-07-14 RX ORDER — DOCUSATE SODIUM 100 MG/1
100 CAPSULE, LIQUID FILLED ORAL 2 TIMES DAILY PRN
Status: DISCONTINUED | OUTPATIENT
Start: 2020-07-14 | End: 2020-07-14 | Stop reason: HOSPADM

## 2020-07-14 RX ORDER — ACETAMINOPHEN 650 MG/1
650 SUPPOSITORY RECTAL EVERY 6 HOURS PRN
Status: DISCONTINUED | OUTPATIENT
Start: 2020-07-14 | End: 2020-07-14 | Stop reason: HOSPADM

## 2020-07-14 RX ORDER — NITROGLYCERIN 0.4 MG/1
0.4 TABLET SUBLINGUAL EVERY 5 MIN PRN
Status: DISCONTINUED | OUTPATIENT
Start: 2020-07-14 | End: 2020-07-14 | Stop reason: HOSPADM

## 2020-07-14 RX ORDER — ACETAMINOPHEN 325 MG/1
650 TABLET ORAL EVERY 6 HOURS PRN
Status: DISCONTINUED | OUTPATIENT
Start: 2020-07-14 | End: 2020-07-14 | Stop reason: HOSPADM

## 2020-07-14 RX ORDER — ALBUTEROL SULFATE 90 UG/1
2 AEROSOL, METERED RESPIRATORY (INHALATION) 4 TIMES DAILY PRN
Qty: 1 INHALER | Refills: 0 | Status: SHIPPED | OUTPATIENT
Start: 2020-07-14 | End: 2020-09-25

## 2020-07-14 RX ORDER — FOLIC ACID 1 MG/1
1 TABLET ORAL DAILY
Status: DISCONTINUED | OUTPATIENT
Start: 2020-07-14 | End: 2020-07-14 | Stop reason: HOSPADM

## 2020-07-14 RX ORDER — AMIODARONE HYDROCHLORIDE 200 MG/1
200 TABLET ORAL DAILY
COMMUNITY
Start: 2020-06-28 | End: 2020-07-19

## 2020-07-14 RX ORDER — PROMETHAZINE HYDROCHLORIDE 25 MG/1
12.5 TABLET ORAL EVERY 6 HOURS PRN
Status: DISCONTINUED | OUTPATIENT
Start: 2020-07-14 | End: 2020-07-14 | Stop reason: HOSPADM

## 2020-07-14 RX ORDER — SUCRALFATE 1 G/1
1 TABLET ORAL 4 TIMES DAILY
Status: DISCONTINUED | OUTPATIENT
Start: 2020-07-14 | End: 2020-07-14 | Stop reason: HOSPADM

## 2020-07-14 RX ORDER — AMLODIPINE BESYLATE 10 MG/1
10 TABLET ORAL DAILY
COMMUNITY
End: 2020-09-03

## 2020-07-14 RX ORDER — LISINOPRIL 5 MG/1
2.5 TABLET ORAL DAILY
Status: DISCONTINUED | OUTPATIENT
Start: 2020-07-14 | End: 2020-07-14 | Stop reason: HOSPADM

## 2020-07-14 RX ORDER — FUROSEMIDE 10 MG/ML
40 INJECTION INTRAMUSCULAR; INTRAVENOUS ONCE
Status: DISCONTINUED | OUTPATIENT
Start: 2020-07-14 | End: 2020-07-14 | Stop reason: HOSPADM

## 2020-07-14 RX ORDER — ONDANSETRON 2 MG/ML
4 INJECTION INTRAMUSCULAR; INTRAVENOUS EVERY 6 HOURS PRN
Status: DISCONTINUED | OUTPATIENT
Start: 2020-07-14 | End: 2020-07-14 | Stop reason: HOSPADM

## 2020-07-14 RX ORDER — SPIRONOLACTONE 25 MG/1
25 TABLET ORAL DAILY
Status: DISCONTINUED | OUTPATIENT
Start: 2020-07-14 | End: 2020-07-14 | Stop reason: HOSPADM

## 2020-07-14 RX ORDER — ASPIRIN 81 MG/1
81 TABLET, CHEWABLE ORAL DAILY
Status: DISCONTINUED | OUTPATIENT
Start: 2020-07-15 | End: 2020-07-14 | Stop reason: HOSPADM

## 2020-07-14 RX ORDER — SODIUM CHLORIDE 0.9 % (FLUSH) 0.9 %
10 SYRINGE (ML) INJECTION
Status: COMPLETED | OUTPATIENT
Start: 2020-07-14 | End: 2020-07-14

## 2020-07-14 RX ORDER — FUROSEMIDE 10 MG/ML
40 INJECTION INTRAMUSCULAR; INTRAVENOUS ONCE
Status: COMPLETED | OUTPATIENT
Start: 2020-07-14 | End: 2020-07-14

## 2020-07-14 RX ORDER — AMIODARONE HYDROCHLORIDE 200 MG/1
200 TABLET ORAL DAILY
Status: DISCONTINUED | OUTPATIENT
Start: 2020-07-14 | End: 2020-07-14 | Stop reason: HOSPADM

## 2020-07-14 RX ORDER — ATORVASTATIN CALCIUM 40 MG/1
40 TABLET, FILM COATED ORAL NIGHTLY
Status: DISCONTINUED | OUTPATIENT
Start: 2020-07-14 | End: 2020-07-14 | Stop reason: HOSPADM

## 2020-07-14 RX ORDER — FERROUS SULFATE 325(65) MG
325 TABLET ORAL 2 TIMES DAILY WITH MEALS
Status: DISCONTINUED | OUTPATIENT
Start: 2020-07-14 | End: 2020-07-14 | Stop reason: HOSPADM

## 2020-07-14 RX ORDER — ISOSORBIDE MONONITRATE 30 MG/1
30 TABLET, EXTENDED RELEASE ORAL DAILY
Status: DISCONTINUED | OUTPATIENT
Start: 2020-07-14 | End: 2020-07-14 | Stop reason: HOSPADM

## 2020-07-14 RX ORDER — AMLODIPINE BESYLATE 5 MG/1
10 TABLET ORAL DAILY
Status: DISCONTINUED | OUTPATIENT
Start: 2020-07-14 | End: 2020-07-14 | Stop reason: HOSPADM

## 2020-07-14 RX ORDER — GABAPENTIN 300 MG/1
300 CAPSULE ORAL NIGHTLY
Status: DISCONTINUED | OUTPATIENT
Start: 2020-07-14 | End: 2020-07-14 | Stop reason: HOSPADM

## 2020-07-14 RX ORDER — METOPROLOL SUCCINATE 25 MG/1
25 TABLET, EXTENDED RELEASE ORAL DAILY
Status: DISCONTINUED | OUTPATIENT
Start: 2020-07-14 | End: 2020-07-14 | Stop reason: HOSPADM

## 2020-07-14 RX ORDER — ASCORBIC ACID 500 MG
500 TABLET ORAL 2 TIMES DAILY
Status: DISCONTINUED | OUTPATIENT
Start: 2020-07-14 | End: 2020-07-14 | Stop reason: HOSPADM

## 2020-07-14 RX ORDER — POLYETHYLENE GLYCOL 3350 17 G/17G
17 POWDER, FOR SOLUTION ORAL DAILY PRN
Status: DISCONTINUED | OUTPATIENT
Start: 2020-07-14 | End: 2020-07-14 | Stop reason: HOSPADM

## 2020-07-14 RX ORDER — SODIUM CHLORIDE 0.9 % (FLUSH) 0.9 %
10 SYRINGE (ML) INJECTION PRN
Status: DISCONTINUED | OUTPATIENT
Start: 2020-07-14 | End: 2020-07-14 | Stop reason: HOSPADM

## 2020-07-14 RX ORDER — ASPIRIN 81 MG/1
81 TABLET ORAL DAILY
Status: DISCONTINUED | OUTPATIENT
Start: 2020-07-14 | End: 2020-07-14 | Stop reason: HOSPADM

## 2020-07-14 RX ORDER — PANTOPRAZOLE SODIUM 40 MG/1
40 TABLET, DELAYED RELEASE ORAL
Status: DISCONTINUED | OUTPATIENT
Start: 2020-07-15 | End: 2020-07-14 | Stop reason: HOSPADM

## 2020-07-14 RX ORDER — ASPIRIN 81 MG/1
324 TABLET, CHEWABLE ORAL ONCE
Status: DISCONTINUED | OUTPATIENT
Start: 2020-07-14 | End: 2020-07-14 | Stop reason: HOSPADM

## 2020-07-14 RX ORDER — ANASTROZOLE 1 MG/1
1 TABLET ORAL DAILY
Status: DISCONTINUED | OUTPATIENT
Start: 2020-07-14 | End: 2020-07-14 | Stop reason: HOSPADM

## 2020-07-14 RX ORDER — SODIUM CHLORIDE 0.9 % (FLUSH) 0.9 %
10 SYRINGE (ML) INJECTION EVERY 12 HOURS SCHEDULED
Status: DISCONTINUED | OUTPATIENT
Start: 2020-07-14 | End: 2020-07-14 | Stop reason: HOSPADM

## 2020-07-14 RX ORDER — LISINOPRIL 10 MG/1
5 TABLET ORAL DAILY
Status: DISCONTINUED | OUTPATIENT
Start: 2020-07-14 | End: 2020-07-14 | Stop reason: HOSPADM

## 2020-07-14 RX ORDER — FUROSEMIDE 20 MG/1
10 TABLET ORAL DAILY
Status: DISCONTINUED | OUTPATIENT
Start: 2020-07-14 | End: 2020-07-14 | Stop reason: HOSPADM

## 2020-07-14 RX ORDER — ANASTROZOLE 1 MG/1
1 TABLET ORAL DAILY
COMMUNITY
End: 2020-10-15 | Stop reason: SDUPTHER

## 2020-07-14 RX ORDER — CLOPIDOGREL BISULFATE 75 MG/1
75 TABLET ORAL DAILY
Status: DISCONTINUED | OUTPATIENT
Start: 2020-07-14 | End: 2020-07-14 | Stop reason: HOSPADM

## 2020-07-14 RX ORDER — SUCRALFATE 1 G/1
1 TABLET ORAL 4 TIMES DAILY
COMMUNITY
End: 2020-08-07 | Stop reason: ALTCHOICE

## 2020-07-14 RX ADMIN — FUROSEMIDE 40 MG: 10 INJECTION INTRAMUSCULAR; INTRAVENOUS at 18:34

## 2020-07-14 RX ADMIN — IOPAMIDOL 70 ML: 755 INJECTION, SOLUTION INTRAVENOUS at 14:58

## 2020-07-14 RX ADMIN — Medication 10 ML: at 14:58

## 2020-07-14 ASSESSMENT — PAIN DESCRIPTION - PAIN TYPE: TYPE: ACUTE PAIN

## 2020-07-14 ASSESSMENT — PAIN DESCRIPTION - LOCATION: LOCATION: CHEST

## 2020-07-14 ASSESSMENT — PAIN SCALES - GENERAL: PAINLEVEL_OUTOF10: 5

## 2020-07-14 ASSESSMENT — PAIN DESCRIPTION - ORIENTATION: ORIENTATION: LEFT

## 2020-07-14 NOTE — PROGRESS NOTES
Patient in ER with CP and SOB. CXR looks perfect. Labs are normal.   CTA negative for PE. No role for admission to cycle enzymes.   D/W Dr Carvajal Dural. 40 of lasix IV, breathing treatments at home, follow up with us tomorrow.    Melinda Koch

## 2020-07-14 NOTE — CONSULTS
history of a GI bleed  7. 2D echocardiogram February 3, 2020  Normal left ventricular chamber size. Normal left ventricular systolic function, LVEF is 57%. Distal septal and apical septal hypokinesis. Stage I diastolic dysfunction. Normal left atrial pressure. Left atrium is of normal size. Interatrial septum not well visualized but appears intact. Normal right ventricle structure and function. Normal mitral valve structure and function. No mitral valve prolapse. Normal aortic valve structure and function. Normal tricuspid valve structure. There is trace tricuspid regurgitation, RVSP 32mmHg. Normal aortic root size. No evidence of pericardial effusion. Pericardium appears normal.   No intra cardiac mass or thrombus. Compared to prior study from 4/2019 -Which showed EF 55%, trace of TR and   Stage I diastolic dysfunction. 8. Anterior MI late presentation coronary artery disease/ischemic cardiomyopathy with a mini vision 2.5 x 18 stent March 4, 2020, bare-metal was used due to recent GI bleed    Totally occluded left anterior descending artery with successful PCI  using 2.5 x 18 mm mini Vision bare metal stent (bare metal stent was  used due to the patient's recent history of GI bleed, recent mastectomy  surgery two days ago, and the plan for possible CABG down the road when  she heals from her mastectomy surgery). 2.  At least 50% distal left main disease. 3.  Mild-to-moderate ostial left circumflex disease with moderate  disease involving the vessel distally. 4.  Moderate disease involving the mid segment of the RCA. 5.  Moderate disease involving second OM branch and second diagonal  branch.     RECOMMENDATIONS:  1. Aspirin for life. 2.  P2Y12 inhibitors for at least a month, preferably longer. 3.  Aggressive coronary artery disease risk factor modifications. 4.  Evaluation by CT Surgery when stable from other comorbid condition  standpoint    9.    CABG on June 16, 2020 with a LIMA to the LAD and a saphenous vein graft to the obtuse marginal  10. Limited echo [place] 10, 2020    Summary   The left ventricle is mildly dilated. There is akinesis of the distal septum, the LV apex, and the basal   inferior wall and dyskinesis of the apical inferior wall. Septal motion is also consistent with post open heart state . Ejection fraction is visually estimated at 30+/-5%. There is doppler evidence of stage III diastolic dysfunction. Normal right ventricular size. Right ventricle global systolic function is mildly reduced ( TAPSE = 1.5 )   . Left atrium is of normal size. Mild mitral annular calcification. Mild mitral regurgitation. Mild tricuspid regurgitation. 11. Arthroscopic knee surgery on the left  12. Hysterectomy  13. Foot surgery on the right  14. Anemia post CABG in 2020  15. LifeVest since June 2020    Medications Prior to admit:  Prior to Admission medications    Medication Sig Start Date End Date Taking?  Authorizing Provider   amiodarone (CORDARONE) 200 MG tablet Take 200 mg by mouth daily 6/28/20 7/19/20 Yes Historical Provider, MD   anastrozole (ARIMIDEX) 1 MG tablet Take 1 mg by mouth daily   Yes Historical Provider, MD   amLODIPine (NORVASC) 10 MG tablet Take 10 mg by mouth daily   Yes Historical Provider, MD   sucralfate (CARAFATE) 1 GM tablet Take 1 g by mouth 4 times daily   Yes Historical Provider, MD   ferrous sulfate (IRON 325) 325 (65 Fe) MG tablet Take 1 tablet by mouth 2 times daily (with meals) 6/29/20 7/29/20 Yes Yas Ralph APRN - CNP   aspirin 81 MG EC tablet Take 1 tablet by mouth daily 6/22/20  Yes Ny Hands, APRN - CNP   lisinopril (PRINIVIL;ZESTRIL) 2.5 MG tablet Take 1 tablet by mouth daily 6/21/20  Yes Ny Hands, APRN - CNP   metoprolol succinate (TOPROL XL) 25 MG extended release tablet Take 1 tablet by mouth daily 6/22/20  Yes Ny Hands, APRN - CNP   furosemide (LASIX) 20 MG tablet Take 0.5 tablets by mouth daily 6/22/20  Yes ANNA MARIE Dillon - CNP   folic acid (FOLVITE) 1 MG tablet Take 1 tablet by mouth daily 6/22/20 7/22/20 Yes Meryl Johnson APRN - CNP   docusate sodium (COLACE) 100 MG capsule Take 1 capsule by mouth 2 times daily as needed for Constipation 6/21/20 7/21/20 Yes Meryl Johnson APRN - CNP   clopidogrel (PLAVIX) 75 MG tablet Take 1 tablet by mouth daily 6/22/20 8/21/20 Yes Meryl Johnson APRN - CNP   vitamin C (VITAMIN C) 500 MG tablet Take 1 tablet by mouth 2 times daily 6/21/20 7/21/20 Yes ANNA MARIE Dillon - CNP   pantoprazole (PROTONIX) 40 MG tablet Take 1 tablet by mouth 2 times daily (before meals) 6/13/20  Yes Domitila Gandara MD   glimepiride (AMARYL) 1 MG tablet Take 1 mg by mouth every morning (before breakfast)   Yes Historical Provider, MD   metFORMIN (GLUCOPHAGE) 500 MG tablet Take 500 mg by mouth 2 times daily (with meals)   Yes Historical Provider, MD   gabapentin (NEURONTIN) 300 MG capsule Take 300 mg by mouth nightly. .   Yes Historical Provider, MD       Current Medications:    Current Facility-Administered Medications: aspirin chewable tablet 324 mg, 324 mg, Oral, Once  nitroGLYCERIN (NITROSTAT) SL tablet 0.4 mg, 0.4 mg, Sublingual, Q5 Min PRN    Allergies:  Penicillins    Social History: Former smoker and occasional alcohol      Family History:   Family History   Problem Relation Age of Onset    Stroke Mother     Heart Failure Mother     Other Mother         ICD    Breast Cancer Mother     Pacemaker Father     Heart Failure Father     Hypertension Sister        REVIEW OF SYSTEMS:     · Constitutional: Admits to fatigue, but no fevers, chills or night sweats  · Eyes: Denies visual changes or drainage  · ENT: Denies headaches or hearing loss. No mouth sores or sore throat. No epistaxis   · Cardiovascular:  positive  chest pain, pressure but no palpitations. But positive for lower extremity swelling. · Respiratory: Positive ROMERO, but no cough,  or PND.  No hemoptysis · Gastrointestinal: Denies hematemesis or anorexia. No hematochezia or melena    · Genitourinary: Denies urgency, dysuria or hematuria. · Musculoskeletal: Denies gait disturbance, weakness or joint complaints  · Integumentary: Denies rash, hives or pruritis   · Neurological: Denies dizziness, headaches or seizures. No numbness or tingling  · Psychiatric: Denies anxiety or depression. · Endocrine: Denies temperature intolerance. No recent weight change. .  · Hematologic/Lymphatic: Denies abnormal bruising or bleeding. No swollen lymph nodes    PHYSICAL EXAM:   BP (!) 172/102   Pulse 80   Temp 97.7 °F (36.5 °C) (Temporal)   Resp 27   Ht 5' 5\" (1.651 m)   Wt 223 lb (101.2 kg)   SpO2 95%   BMI 37.11 kg/m²   CONST:  Well developed, well nourished who appears of stated age. Awake, alert and cooperative. No apparent distress. HEENT:   Head- Normocephalic, atraumatic   Eyes- Conjunctivae pink, anicteric  Throat- Oral mucosa pink and moist  Neck-  No stridor, trachea midline, no jugular venous distention. No carotid bruit. CHEST: Chest symmetrical and non-tender to palpation. No accessory muscle use or intercostal retractions  RESPIRATORY: Lung sounds - clear throughout fields   CARDIOVASCULAR:     Heart Inspection- shows no noted pulsations  Heart Palpation- no heaves or thrills; PMI is non-displaced   Heart Ausculation- Regular rate and rhythm, no murmur. No s3, s4 or rub   PV: Positive lower extremity edema. No varicosities. Pedal pulses palpable, no clubbing or cyanosis   ABDOMEN: Soft, non-tender to light palpation. Bowel sounds present. No palpable masses no organomegaly; no abdominal bruit  MS: Good muscle strength and tone. No atrophy or abnormal movements. : Deferred  SKIN: Warm and dry no statis dermatitis or ulcers   NEURO / PSYCH: Oriented to person, place and time. Speech clear and appropriate. Follows all commands.  Pleasant affect     DATA:    ECG / Tele strips: Sinus rhythm  Diagnostic:    No intake or output data in the 24 hours ending 07/14/20 1405    Labs:   CBC:   Recent Labs     07/14/20  1221   WBC 3.9*   HGB 9.7*   HCT 33.0*        BMP:   Recent Labs     07/14/20  1221      K 3.7   CO2 22   BUN 18   CREATININE 1.1*   LABGLOM >60   CALCIUM 9.5     Mag: No results for input(s): MG in the last 72 hours. Phos: No results for input(s): PHOS in the last 72 hours. TSH: No results for input(s): TSH in the last 72 hours. HgA1c:   Lab Results   Component Value Date    LABA1C 5.6 06/09/2020     No results found for: EAG  proBNP:   Recent Labs     07/14/20  1221   PROBNP 3,676*     PT/INR: No results for input(s): PROTIME, INR in the last 72 hours. APTT:No results for input(s): APTT in the last 72 hours. CARDIAC ENZYMES:  Recent Labs     07/14/20  1221   TROPONINI 0.02     FASTING LIPID PANEL:  Lab Results   Component Value Date    CHOL 69 03/04/2020    HDL 26 03/04/2020    LDLCALC 22 03/04/2020    TRIG 107 03/04/2020     LIVER PROFILE:  Recent Labs     07/14/20  1221   AST 12   ALT 8   LABALBU 4.1       Electronically signed by ANNA MARIE Raymond - CNP on 7/14/2020 at 2:05 PM     Reason for consult: Chest discomfort and dyspnea. Patient seen with ANNA MARIE Raymond. Agree with the findings and A/P. Management plan was discussed. I have personally interviewed the patient, independently performed a focused cardiac exam, reviewed the pertinent laboratory and diagnostic testing results and directly participated in the medical decision-making. HPI: 59-year-old obese female who is seen in consultation in the emergency department due to chest discomfort and dyspnea.   She has history of hypertension, hyperlipidemia, status post CVA in 2015, ex-smoking, status post right mastectomy in 2000 and modified radical mastectomy on the left in February 2020, anemia, GI bleed, CAD, status post anterior MI with late presentation in March 2020, status post bare-metal stent to LAD due to recent GI bleed. Patient underwent CABG with LIMA to LAD, and SVG to the obtuse marginal.  Ejection fraction was 30 to 35%, she is wearing a LifeVest.  Patient has been complaining of constant chest heaviness and dyspnea on exertion over the past 2 days. Reviewed the PMH, social history, FH and ROS from APRN note. Agree with the findings. See the full consult note for details. PE:   BP (!) 167/108   Pulse 79   Temp 97.7 °F (36.5 °C) (Temporal)   Resp (!) 32   Ht 5' 5\" (1.651 m)   Wt 223 lb (101.2 kg)   SpO2 92%   BMI 37.11 kg/m²   CONST: Middle-age obese female who appears of stated age. Awake, alert, cooperative, no apparent distress. HEENT: Head- normocephalic, atraumatic. Neck: no jugular venous distention. No carotid bruit noted. LUNGS: Clear. CARDIOVASCULAR:  RRR, no murmur, s3, s4 or rub noted. PV: No extremity edema. Pedal pulses palpable. ABDOMEN: Soft, non-tender to light palpation. Bowel sounds present. No palpable masses no hepatosplenomegaly or splenomegaly; no abdominal bruit / pulsation  SKIN: Warm and dry . NEURO / PSYCH: Oriented to person, place and time. Speech clear and appropriate. Follows all commands. She looks depressed. EKG as per my interpretation: Sinus rhythm at 62 bpm, left enlargement, left anterior fascicular block, anterior Q waves, PVC and prolonged QTC at 504 ms. CXR on my review: Cardiomegaly with no CHF.       Lab Review     Recent Labs     07/14/20  1221   WBC 3.9*   HGB 9.7*   HCT 33.0*          Recent Labs     07/14/20  1221      K 3.7   *   CO2 22   BUN 18   CREATININE 1.1*       Recent Labs     07/14/20  1221   AST 12   ALT 8   ALKPHOS 92         Last 3 Troponin:    Lab Results   Component Value Date    TROPONINI <0.01 07/14/2020    TROPONINI 0.02 07/14/2020    TROPONINI 1.96 03/04/2020            Recent Labs     07/14/20  1221   PROBNP 3,676*     CT of the chest: Absence of PE.      Assessment:  -Chest discomfort and dyspnea: Probably due to uncontrolled blood pressure and subclinical CHF. Her chest x-ray revealed no CHF but her proBNP is elevated. -CAD, status post CABG. -Ischemic cardiomyopathy with moderate systolic dysfunction.  -Anemia.  -Hyperlipidemia.  -Status post CVA. -History of breast cancer. Plan:  -Increase lisinopril to 5 mg p.o. twice daily and titrate up if needed for blood pressure control.  -Start Imdur 30 mg p.o. daily.  -Intravenous Lasix.    -Start Lipitor 40 mg p.o. daily.  -Start Aldactone 25 mg p.o. daily.  -Follow-up kidney function, electrolytes and proBNP. Thank you for the consult. Will follow. .    Electronically signed by Berenice Jackson MD on 7/14/2020 at 8:47 PM  [facility].

## 2020-07-14 NOTE — ED PROVIDER NOTES
HPI:  7/14/20,   Time: 5:34 PM EDT         Reid Arroyo is a 63 y.o. female presenting to the ED for pressure-like chest discomfort with associated shortness of breath, beginning days ago. The complaint has been persistent, moderate in severity, and worsened by nothing. Patient is status post stent to the LAD and then LIMA to the LAD and saphenous vein graft to the obtuse marginal 1 month ago. Patient developed increasing discomfort in her chest that is different from her incisional discomfort and was told to come to the emergency department. ROS:   Pertinent positives and negatives are stated within HPI, all other systems reviewed and are negative.  --------------------------------------------- PAST HISTORY ---------------------------------------------  Past Medical History:  has a past medical history of Breast cancer ([facility] [address] Breast cancer ([facility] [address] CAD (coronary artery disease), CVA (cerebral vascular accident) ([facility] [address] DM (diabetes mellitus) ([facility] [address] H/O: GI bleed, History of blood transfusion, Hyperlipidemia, and Hypertension. Past Surgical History:  has a past surgical history that includes Breast surgery (2000); Hysterectomy; Appendectomy; Knee arthroscopy; Foot surgery; Tonsillectomy; Breast reconstruction (Right); Mastectomy (Right, 2000); Colonoscopy; Upper gastrointestinal endoscopy; Upper gastrointestinal endoscopy (N/A, 2/5/2020); Mastectomy, modified radical (Left, 2/25/2020); Mastectomy, modified radical (Left, 2/25/2020); Coronary angioplasty with stent (03/04/2020); Upper gastrointestinal endoscopy (N/A, 6/12/2020); and Coronary artery bypass graft (N/A, 6/16/2020). Social History:  reports that she has quit smoking. Her smoking use included cigarettes. She has a 0.40 pack-year smoking history. She has never used smokeless tobacco. She reports previous alcohol use of about 6.0 standard drinks of alcohol per week. She reports previous drug use. Drug: Marijuana.     Family History: family history includes Breast Cancer in her mother; Heart Failure in her father and mother; Hypertension in her sister; Other in her mother; Pacemaker in her father; Stroke in her mother. The patients home medications have been reviewed.     Allergies: Penicillins    -------------------------------------------------- RESULTS -------------------------------------------------  All laboratory and radiology results have been personally reviewed by myself   LABS:  Results for orders placed or performed during the hospital encounter of 07/14/20   CBC auto differential   Result Value Ref Range    WBC 3.9 (L) 4.5 - 11.5 E9/L    RBC 3.93 3.50 - 5.50 E12/L    Hemoglobin 9.7 (L) 11.5 - 15.5 g/dL    Hematocrit 33.0 (L) 34.0 - 48.0 %    MCV 84.0 80.0 - 99.9 fL    MCH 24.7 (L) 26.0 - 35.0 pg    MCHC 29.4 (L) 32.0 - 34.5 %    RDW 21.6 (H) 11.5 - 15.0 fL    Platelets 872 443 - 354 E9/L    MPV 10.1 7.0 - 12.0 fL    Neutrophils % 82.3 (H) 43.0 - 80.0 %    Lymphocytes % 8.0 (L) 20.0 - 42.0 %    Monocytes % 8.0 2.0 - 12.0 %    Eosinophils % 1.8 0.0 - 6.0 %    Basophils % 0.3 0.0 - 2.0 %    Neutrophils Absolute 3.20 1.80 - 7.30 E9/L    Lymphocytes Absolute 0.31 (L) 1.50 - 4.00 E9/L    Monocytes Absolute 0.31 0.10 - 0.95 E9/L    Eosinophils Absolute 0.07 0.05 - 0.50 E9/L    Basophils Absolute 0.00 0.00 - 0.20 E9/L    Anisocytosis 1+     Polychromasia 3+     Poikilocytes 2+     Juliann Cells 1+     Ovalocytes 2+    Comprehensive Metabolic Panel w/ Reflex to MG   Result Value Ref Range    Sodium 144 132 - 146 mmol/L    Potassium reflex Magnesium 3.7 3.5 - 5.0 mmol/L    Chloride 110 (H) 98 - 107 mmol/L    CO2 22 22 - 29 mmol/L    Anion Gap 12 7 - 16 mmol/L    Glucose 76 74 - 99 mg/dL    BUN 18 8 - 23 mg/dL    CREATININE 1.1 (H) 0.5 - 1.0 mg/dL    GFR Non-African American >60 >=60 mL/min/1.73    GFR African American >60     Calcium 9.5 8.6 - 10.2 mg/dL    Total Protein 6.9 6.4 - 8.3 g/dL    Alb 4.1 3.5 - 5.2 g/dL    Total Bilirubin 0.5 0.0 - 1.2 mg/dL Alkaline Phosphatase 92 35 - 104 U/L    ALT 8 0 - 32 U/L    AST 12 0 - 31 U/L   Troponin   Result Value Ref Range    Troponin 0.02 0.00 - 0.03 ng/mL   Brain Natriuretic Peptide   Result Value Ref Range    Pro-BNP 3,676 (H) 0 - 125 pg/mL   Troponin   Result Value Ref Range    Troponin <0.01 0.00 - 0.03 ng/mL   SPECIMEN REJECTION   Result Value Ref Range    Rejected Test DIMER     Reason for Rejection see below    D-dimer, quantitative   Result Value Ref Range    D-Dimer, Quant 574 ng/mL DDU       RADIOLOGY:  Interpreted by Radiologist.  CTA PULMONARY W CONTRAST   Final Result   1. No pulmonary embolism. 2. Dilated main pulmonary artery, which may signify pulmonary arterial   hypertension. 3. Recent CABG with small fluid collection in the anterior abdominal   wall, likely representing a postoperative seroma. 4. Left nephrolithiasis. XR CHEST STANDARD (2 VW)   Final Result   Cardiomegaly   Findings compatible with atherosclerotic disease of the aorta.                         ------------------------- NURSING NOTES AND VITALS REVIEWED ---------------------------   The nursing notes within the ED encounter and vital signs as below have been reviewed. BP (!) 167/108   Pulse 79   Temp 97.7 °F (36.5 °C) (Temporal)   Resp (!) 32   Ht 5' 5\" (1.651 m)   Wt 223 lb (101.2 kg)   SpO2 92%   BMI 37.11 kg/m²   Oxygen Saturation Interpretation: Abnormal - but at baseline      ---------------------------------------------------PHYSICAL EXAM--------------------------------------      Constitutional/General: Alert and oriented x3, well appearing, non toxic in NAD  Head: NC/AT  Eyes: PERRL, EOMI  Mouth: Oropharynx clear, handling secretions, no trismus  Neck: Supple, full ROM, no meningeal signs  Pulmonary: Lungs clear to auscultation bilaterally, no wheezes, rales, or rhonchi. Not in respiratory distress  Cardiovascular:  Regular rate and rhythm, no murmurs, gallops, or rubs.  2+ distal pulses  Chest: Healing incision over sternum  Abdomen: Soft, non tender, non distended,   Extremities: Moves all extremities x 4. Warm and well perfused  Skin: warm and dry without rash  Neurologic: GCS 15,  Psych: Normal Affect      ------------------------------ ED COURSE/MEDICAL DECISION MAKING----------------------  Medications   aspirin chewable tablet 324 mg (0 mg Oral Held 7/14/20 1231)   nitroGLYCERIN (NITROSTAT) SL tablet 0.4 mg (has no administration in time range)   iopamidol (ISOVUE-370) 76 % injection 70 mL (70 mLs Intravenous Given 7/14/20 8408)   sodium chloride flush 0.9 % injection 10 mL (10 mLs Intravenous Given 7/14/20 1128)         Medical Decision Making:    Was given aspirin and nitroglycerin. Patient was discussed with the cardiothoracic nurse practitioner and seen by the cardiology nurse practitioner. No clear disposition plans were made and I spoke to the cardiologist who recommended admission with serial troponins. [facility] was consulted to admit the patient    Counseling: The emergency provider has spoken with the patient and discussed todays results, in addition to providing specific details for the plan of care and counseling regarding the diagnosis and prognosis. Questions are answered at this time and they are agreeable with the plan.      --------------------------------- IMPRESSION AND DISPOSITION ---------------------------------    IMPRESSION  1. Chest pain, unspecified type        DISPOSITION  Disposition: Admit to telemetry  Patient condition is serious                  Mary Hagan MD  07/14/20 2192    At [address] [facility] Dr. Rosy Dasilva called and asked that the patient be given a dose of Lasix and discharged home to follow-up with him in the office.   Patient was then discharged       Mary Hagan MD  07/14/20 4260

## 2020-07-14 NOTE — ED NOTES
Per Dr. Giuliana Ng, pt is okay to be discharged after IV lasix given.      Amy Walls RN  07/14/20 7194

## 2020-07-16 LAB
EKG ATRIAL RATE: 82 BPM
EKG P AXIS: 36 DEGREES
EKG P-R INTERVAL: 132 MS
EKG Q-T INTERVAL: 432 MS
EKG QRS DURATION: 86 MS
EKG QTC CALCULATION (BAZETT): 504 MS
EKG R AXIS: -33 DEGREES
EKG T AXIS: 117 DEGREES
EKG VENTRICULAR RATE: 82 BPM

## 2020-07-16 PROCEDURE — 93010 ELECTROCARDIOGRAM REPORT: CPT | Performed by: INTERNAL MEDICINE

## 2020-07-21 ENCOUNTER — OFFICE VISIT (OUTPATIENT)
Dept: CARDIOTHORACIC SURGERY | Age: 60
End: 2020-07-21

## 2020-07-21 VITALS
HEART RATE: 96 BPM | HEIGHT: 65 IN | DIASTOLIC BLOOD PRESSURE: 103 MMHG | WEIGHT: 223 LBS | SYSTOLIC BLOOD PRESSURE: 166 MMHG | OXYGEN SATURATION: 97 % | BODY MASS INDEX: 37.15 KG/M2

## 2020-07-21 PROCEDURE — 99024 POSTOP FOLLOW-UP VISIT: CPT | Performed by: PHYSICIAN ASSISTANT

## 2020-07-21 ASSESSMENT — ENCOUNTER SYMPTOMS
SHORTNESS OF BREATH: 0
CHEST TIGHTNESS: 0
COUGH: 0

## 2020-07-21 NOTE — PROGRESS NOTES
Subjective:      Patient ID: Johnnie Curtis is a 63 y.o. female. HPI   Patient presents today for routine post-op follow-up of urgent CABG x 2, ARNULFO exclusion on 6/16/2020. Patient denies chest pain, SOB, or incisional issues. Review of Systems   Constitutional: Negative for appetite change, chills, diaphoresis and fever. Respiratory: Negative for cough, chest tightness and shortness of breath. Cardiovascular: Negative for chest pain and palpitations. Neurological: Negative for dizziness and light-headedness. Psychiatric/Behavioral: Negative for confusion. Objective:   Physical Exam  Constitutional:       Appearance: Normal appearance. Cardiovascular:      Rate and Rhythm: Normal rate and regular rhythm. Comments: midsternal and chest tube incisions well healed without evidence of infection. Sternum stable. Pulmonary:      Effort: Pulmonary effort is normal.      Breath sounds: Normal breath sounds. Abdominal:      Palpations: Abdomen is soft. Skin:     General: Skin is warm and dry. Comments: Endoscopic vein harvest incisions intact without evidence of infection   Neurological:      Mental Status: She is alert. Psychiatric:         Mood and Affect: Mood normal.         Behavior: Behavior normal.            Assessment:      S/P CABG        Plan:      Remove sternal precautions in 2 weeks  Cardiac rehab referral  Continue follow up with PCP, Cardiology as scheduled. Encouraged to call office with any questions, concerns. Otherwise no further follow up necessary from CTS standpoint.         KRISTEN Turner

## 2020-07-22 ENCOUNTER — TELEPHONE (OUTPATIENT)
Dept: CARDIOLOGY CLINIC | Age: 60
End: 2020-07-22

## 2020-07-22 NOTE — TELEPHONE ENCOUNTER
Patient was scheduled to see you on July 29th - I called her to reschedule her visit and she states she has some fluid. She went to ER last week and they gave her IV lasix. She is having a hard time breathing. She is on Lasix 20mg 1/2 tablet daily. She took a whole one today with no relief. Seen Dr Ursula Landrum yesterday.   She states he said it was ok to drive, but needed your ok as well

## 2020-07-27 NOTE — TELEPHONE ENCOUNTER
I was unable to reach patient last week - phone was  Busy.   I spoke to her today and she states someone else is sending in and handling her diuretic at the present time

## 2020-07-28 RX ORDER — ISOSORBIDE MONONITRATE 30 MG/1
30 TABLET, EXTENDED RELEASE ORAL DAILY
Qty: 30 TABLET | Refills: 3 | OUTPATIENT
Start: 2020-07-28 | End: 2020-08-07 | Stop reason: ALTCHOICE

## 2020-07-28 RX ORDER — ATORVASTATIN CALCIUM 40 MG/1
40 TABLET, FILM COATED ORAL DAILY
Qty: 30 TABLET | Refills: 3 | OUTPATIENT
Start: 2020-07-28 | End: 2020-11-30

## 2020-07-28 RX ORDER — SPIRONOLACTONE 25 MG/1
25 TABLET ORAL DAILY
Qty: 30 TABLET | Refills: 3 | OUTPATIENT
Start: 2020-07-28 | End: 2020-08-07 | Stop reason: ALTCHOICE

## 2020-07-28 RX ORDER — LISINOPRIL 5 MG/1
5 TABLET ORAL 2 TIMES DAILY
Qty: 60 TABLET | Refills: 3 | Status: ON HOLD | OUTPATIENT
Start: 2020-07-28 | End: 2020-09-14 | Stop reason: HOSPADM

## 2020-08-07 ENCOUNTER — HOSPITAL ENCOUNTER (OUTPATIENT)
Dept: CARDIAC REHAB | Age: 60
Setting detail: THERAPIES SERIES
Discharge: HOME | End: 2020-08-07
Payer: MEDICARE

## 2020-08-07 VITALS
RESPIRATION RATE: 20 BRPM | BODY MASS INDEX: 36.65 KG/M2 | HEART RATE: 68 BPM | DIASTOLIC BLOOD PRESSURE: 92 MMHG | HEIGHT: 65 IN | OXYGEN SATURATION: 98 % | SYSTOLIC BLOOD PRESSURE: 146 MMHG | WEIGHT: 220 LBS

## 2020-08-07 RX ORDER — DOCUSATE SODIUM 100 MG/1
100 CAPSULE, LIQUID FILLED ORAL 2 TIMES DAILY
COMMUNITY
End: 2020-12-18

## 2020-08-07 RX ORDER — MAGNESIUM OXIDE 400 MG/1
400 TABLET ORAL DAILY
COMMUNITY
End: 2020-09-11

## 2020-08-07 RX ORDER — AMIODARONE HYDROCHLORIDE 200 MG/1
400 TABLET ORAL 2 TIMES DAILY
COMMUNITY
End: 2020-11-09 | Stop reason: SDUPTHER

## 2020-08-07 RX ORDER — ASCORBIC ACID 500 MG
500 TABLET ORAL 2 TIMES DAILY
COMMUNITY

## 2020-08-07 ASSESSMENT — PATIENT HEALTH QUESTIONNAIRE - PHQ9: SUM OF ALL RESPONSES TO PHQ QUESTIONS 1-9: 10

## 2020-08-12 ENCOUNTER — HOSPITAL ENCOUNTER (OUTPATIENT)
Dept: CARDIAC REHAB | Age: 60
Setting detail: THERAPIES SERIES
End: 2020-08-12
Payer: MEDICARE

## 2020-08-14 ENCOUNTER — HOSPITAL ENCOUNTER (OUTPATIENT)
Dept: CARDIAC REHAB | Age: 60
Setting detail: THERAPIES SERIES
Discharge: HOME | End: 2020-08-14
Payer: MEDICARE

## 2020-08-14 PROCEDURE — 93798 PHYS/QHP OP CAR RHAB W/ECG: CPT

## 2020-08-17 ENCOUNTER — HOSPITAL ENCOUNTER (OUTPATIENT)
Dept: CARDIAC REHAB | Age: 60
Setting detail: THERAPIES SERIES
Discharge: HOME | End: 2020-08-17
Payer: MEDICARE

## 2020-08-17 PROCEDURE — 93798 PHYS/QHP OP CAR RHAB W/ECG: CPT

## 2020-08-17 RX ORDER — CLOPIDOGREL BISULFATE 75 MG/1
TABLET ORAL
Qty: 60 TABLET | Refills: 0 | OUTPATIENT
Start: 2020-08-17

## 2020-08-21 ENCOUNTER — HOSPITAL ENCOUNTER (OUTPATIENT)
Dept: CARDIAC REHAB | Age: 60
Setting detail: THERAPIES SERIES
Discharge: HOME | End: 2020-08-21
Payer: MEDICARE

## 2020-08-21 PROCEDURE — 93798 PHYS/QHP OP CAR RHAB W/ECG: CPT

## 2020-08-24 ENCOUNTER — HOSPITAL ENCOUNTER (OUTPATIENT)
Dept: CARDIAC REHAB | Age: 60
Setting detail: THERAPIES SERIES
Discharge: HOME | End: 2020-08-24
Payer: MEDICARE

## 2020-08-24 PROCEDURE — 93798 PHYS/QHP OP CAR RHAB W/ECG: CPT

## 2020-08-28 ENCOUNTER — HOSPITAL ENCOUNTER (OUTPATIENT)
Dept: CARDIAC REHAB | Age: 60
Setting detail: THERAPIES SERIES
End: 2020-08-28
Payer: MEDICARE

## 2020-08-31 ENCOUNTER — HOSPITAL ENCOUNTER (OUTPATIENT)
Dept: CARDIAC REHAB | Age: 60
Setting detail: THERAPIES SERIES
Discharge: HOME | End: 2020-08-31
Payer: MEDICARE

## 2020-08-31 PROCEDURE — 93798 PHYS/QHP OP CAR RHAB W/ECG: CPT

## 2020-09-02 ENCOUNTER — HOSPITAL ENCOUNTER (OUTPATIENT)
Dept: CARDIAC REHAB | Age: 60
Setting detail: THERAPIES SERIES
Discharge: HOME | End: 2020-09-02
Payer: MEDICARE

## 2020-09-02 PROCEDURE — 93798 PHYS/QHP OP CAR RHAB W/ECG: CPT

## 2020-09-02 NOTE — PROGRESS NOTES
Sosa Jimenez MD at [address] [place] [address] Close ENDOSCOPY      UPPER GASTROINTESTINAL ENDOSCOPY N/A 2/5/2020    EGD BIOPSY performed by Roshan Valencia MD at [facility] N/A 6/12/2020    EGD BIOPSY performed by Roshan Valencia MD at [address]         Current Outpatient Medications   Medication Sig Dispense Refill    amiodarone (CORDARONE) 200 MG tablet Take 200 mg by mouth 2 times daily       vitamin C (ASCORBIC ACID) 500 MG tablet Take 500 mg by mouth 2 times daily      docusate sodium (COLACE) 100 MG capsule Take 100 mg by mouth 2 times daily      magnesium oxide (MAG-OX) 400 MG tablet Take 400 mg by mouth daily      lisinopril (PRINIVIL;ZESTRIL) 5 MG tablet Take 1 tablet by mouth 2 times daily 60 tablet 3    atorvastatin (LIPITOR) 40 MG tablet Take 1 tablet by mouth daily 30 tablet 3    anastrozole (ARIMIDEX) 1 MG tablet Take 1 mg by mouth daily      ferrous sulfate (IRON 325) 325 (65 Fe) MG tablet Take 1 tablet by mouth 2 times daily (with meals) 60 tablet 0    metoprolol succinate (TOPROL XL) 25 MG extended release tablet Take 1 tablet by mouth daily 30 tablet 2    furosemide (LASIX) 20 MG tablet Take 0.5 tablets by mouth daily (Patient taking differently: Take 40 mg by mouth 2 times daily ) 60 tablet 2    folic acid (FOLVITE) 1 MG tablet Take 1 tablet by mouth daily 30 tablet 0    clopidogrel (PLAVIX) 75 MG tablet Take 1 tablet by mouth daily 60 tablet 0    pantoprazole (PROTONIX) 40 MG tablet Take 1 tablet by mouth 2 times daily (before meals) 180 tablet 1    glimepiride (AMARYL) 1 MG tablet Take 1 mg by mouth every morning (before breakfast)      metFORMIN (GLUCOPHAGE) 500 MG tablet Take 500 mg by mouth 2 times daily (with meals)      gabapentin (NEURONTIN) 300 MG capsule Take 300 mg by mouth nightly. .      albuterol sulfate HFA (VENTOLIN HFA) 108 (90 Base) MCG/ACT inhaler Inhale 2 puffs into the lungs 4 times daily as needed for Social History Narrative    Denies caffeine. Family History   Problem Relation Age of Onset    Stroke Mother     Heart Failure Mother     Other Mother         ICD    Breast Cancer Mother     Pacemaker Father     Heart Failure Father     Hypertension Sister        REVIEW OF SYSTEMS:     CONSTITUTIONAL:  negative for  fevers, chills, sweats and fatigue  HEENT:  negative for  tinnitus, earaches, nasal congestion and epistaxis  RESPIRATORY:  negative for  dry cough, cough with sputum, dyspnea, wheezing and hemoptysis  GASTROINTESTINAL:  negative for nausea, vomiting, diarrhea, constipation, pruritus and jaundice  HEMATOLOGIC/LYMPHATIC:  negative for easy bruising, bleeding, lymphadenopathy and petechiae  ENDOCRINE:  negative for heat intolerance, cold intolerance, tremor, hair loss and diabetic symptoms including neither polyuria nor polydipsia nor blurred vision  MUSCULOSKELETAL:  negative for  myalgias, arthralgias, joint swelling, stiff joints and decreased range of motion  NEUROLOGICAL:  negative for memory problems, speech problems, visual disturbance, dysphagia, weakness and numbness      PHYSICAL EXAM:   CONSTITUTIONAL:  awake, alert, cooperative, no apparent distress, and appears stated age  EYES:  lids and lashes normal and pupils equal, round and reactive to light, anicteric sclerae  HEAD:  normocepalic, without obvious abnormality, atraumatic, pink, moist mucous membranes.   NECK:  Supple, symmetrical, trachea midline, no adenopathy, thyroid symmetric, not enlarged and no tenderness, skin normal  HEMATOLOGIC/LYMPHATICS:  no cervical lymphadenopathy and no supraclavicular lymphadenopathy  LUNGS:  No increased work of breathing, good air exchange, clear to auscultation bilaterally, no crackles or wheezing  CARDIOVASCULAR:  Normal apical impulse, regular rate and rhythm, normal S1 and S2, no S3 or S4, 2/6 systolic murmur at the apex, 3/6 systolic murmur at the left lower sternal border, no JVD, no carotid bruit, no pedal edema, good carotid upstroke bilaterally. ABDOMEN:  Soft, nontender, no masses, no hepatomegaly or splenomegaly, BS+  CHEST: nontender to palpation, expands symmetrically, well-healed midsternal incision wound, s/p left mastectomy  MUSCULOSKELETAL:  No clubbing no cyanosis. there is no redness, warmth, or swelling of the joints, full range of motion noted  NEUROLOGIC:  Alert, awake,oriented x3  SKIN:  no bruising or bleeding, normal skin color, texture, turgor and no redness, warmth, or swelling      /82   Pulse 68   Resp 16   Ht 5' 5\" (1.651 m)   Wt 217 lb (98.4 kg)   BMI 36.11 kg/m²     DATA:   I personally reviewed the visit EKG with the following interpretation: Sinus rhythm, LVH, poor R wave progression, nonspecific T wave changes    EKG 7/14/20 Sinus rhythm with premature supraventricular complexes and with occasional premature ventricular complexes  Possible Left atrial enlargement  Left axis deviation  Left ventricular hypertrophy  Septal infarct , age undetermined  T wave abnormality, consider lateral ischemia  Abnormal ECG  When compared with ECG of 09-JUN-2020 10:50,  Significant changes have occurred    ECHO: 6/18/20  Summary   The left ventricle is mildly dilated. There is akinesis of the distal septum, the LV apex, and the basal   inferior wall and dyskinesis of the apical inferior wall. Septal motion is also consistent with post open heart state . Ejection fraction is visually estimated at 30+/-5%. There is doppler evidence of stage III diastolic dysfunction. Normal right ventricular size. Right ventricle global systolic function is mildly reduced ( TAPSE = 1.5 )   Left atrium is of normal size. Mild mitral annular calcification. Mild mitral regurgitation. Mild tricuspid regurgitation. Stress Test: 2/7/20   FINDINGS:    Perfusion images demonstrate no no definite reversible perfusion    defect.  There is a fixed perfusion defect centered at the Value Date    APTT 30.0 06/16/2020     PTT Heparin:  No components found for: APTTHEP  Magnesium:    Lab Results   Component Value Date    MG 2.3 06/17/2020     TSH:    Lab Results   Component Value Date    TSH 1.500 02/04/2020     TROPONIN:  No components found for: TROP  BNP:  No results found for: BNP  FASTING LIPID PANEL:    Lab Results   Component Value Date    CHOL 69 03/04/2020    HDL 26 03/04/2020    TRIG 107 03/04/2020     No orders to display     I have personally reviewed the laboratory, cardiac diagnostic and radiographic testing as outlined above:      IMPRESSION:  1.  CAD: S/p PCI to LAD, s/p CABG with a LIMA to LAD, SVG to OM1, doing fine, will continue current treatment  2. Nonsustained ventricular tachycardia: On beta-blockers  3. Ischemic cardiomyopathy: Has LifeVest on, admits not feeling good all the time, aware of the risk of sudden cardiac death  4. Mitral valve regurgitation: Mild  5. Tricuspid valve regurgitation: Mild  6. Hypertension: Controlled  7. Type 2 diabetes mellitus  8. History of CVA  9. History of left mastectomy     RECOMMENDATIONS:   1. Continue current treatment  2. Preventive cardiology: Low-salt, low-cholesterol diet, daily exercise, total cholesterol of less than 200, LDL of less than 70, adherence to diabetic diet and diabetic medications,were all advised. 3.  CHF: Daily weight, take an extra Lasix for weight gain of more than 2-3 pounds in 24 hours, compliance with diuretics, low-salt diet were all advised. 4.  Strongly advised to wear LifeVest 24 hours a day 7 days a week until LV function is re-evaluated  5.  Echocardiogram to reevaluate ejection fraction and evaluate for candidacy for AICD  6. Follow-up with KRISTEN Sam as scheduled  7.   Follow-up with Dr. Milady Roberts in 3 months, sooner if symptomatic for any reason  I have reviewed my findings and recommendations with patient    Electronically signed by Josue Hinojosa MD on 9/6/2020 at 11:54 PM    NOTE: This report was transcribed using voice recognition software.  Every effort was made to ensure accuracy; however, inadvertent computerized transcription errors may be present

## 2020-09-03 ENCOUNTER — OFFICE VISIT (OUTPATIENT)
Dept: CARDIOLOGY CLINIC | Age: 60
End: 2020-09-03
Payer: MEDICARE

## 2020-09-03 VITALS
WEIGHT: 217 LBS | HEART RATE: 68 BPM | DIASTOLIC BLOOD PRESSURE: 82 MMHG | BODY MASS INDEX: 36.15 KG/M2 | RESPIRATION RATE: 16 BRPM | HEIGHT: 65 IN | SYSTOLIC BLOOD PRESSURE: 128 MMHG

## 2020-09-03 PROCEDURE — 99214 OFFICE O/P EST MOD 30 MIN: CPT | Performed by: INTERNAL MEDICINE

## 2020-09-03 PROCEDURE — 93000 ELECTROCARDIOGRAM COMPLETE: CPT | Performed by: INTERNAL MEDICINE

## 2020-09-04 ENCOUNTER — HOSPITAL ENCOUNTER (OUTPATIENT)
Dept: CARDIAC REHAB | Age: 60
Setting detail: THERAPIES SERIES
Discharge: HOME | End: 2020-09-04
Payer: MEDICARE

## 2020-09-04 PROCEDURE — 93798 PHYS/QHP OP CAR RHAB W/ECG: CPT

## 2020-09-07 ENCOUNTER — APPOINTMENT (OUTPATIENT)
Dept: CARDIAC REHAB | Age: 60
End: 2020-09-07
Payer: MEDICARE

## 2020-09-11 ENCOUNTER — APPOINTMENT (OUTPATIENT)
Dept: GENERAL RADIOLOGY | Age: 60
DRG: 683 | End: 2020-09-11
Payer: MEDICARE

## 2020-09-11 ENCOUNTER — HOSPITAL ENCOUNTER (INPATIENT)
Age: 60
LOS: 3 days | Discharge: HOME | DRG: 683 | End: 2020-09-14
Attending: EMERGENCY MEDICINE | Admitting: INTERNAL MEDICINE
Payer: MEDICARE

## 2020-09-11 ENCOUNTER — APPOINTMENT (OUTPATIENT)
Dept: CT IMAGING | Age: 60
DRG: 683 | End: 2020-09-11
Payer: MEDICARE

## 2020-09-11 ENCOUNTER — APPOINTMENT (OUTPATIENT)
Dept: ULTRASOUND IMAGING | Age: 60
DRG: 683 | End: 2020-09-11
Payer: MEDICARE

## 2020-09-11 PROBLEM — N17.9 AKI (ACUTE KIDNEY INJURY) (HCC): Status: ACTIVE | Noted: 2020-09-11

## 2020-09-11 LAB
ALBUMIN SERPL-MCNC: 3.6 G/DL (ref 3.5–5.2)
ALP BLD-CCNC: 84 U/L (ref 35–104)
ALT SERPL-CCNC: 15 U/L (ref 0–32)
AMORPHOUS: ABNORMAL
ANION GAP SERPL CALCULATED.3IONS-SCNC: 14 MMOL/L (ref 7–16)
ANISOCYTOSIS: ABNORMAL
AST SERPL-CCNC: 21 U/L (ref 0–31)
BACTERIA: ABNORMAL /HPF
BASOPHILS ABSOLUTE: 0.01 E9/L (ref 0–0.2)
BASOPHILS RELATIVE PERCENT: 0.2 % (ref 0–2)
BILIRUB SERPL-MCNC: 0.4 MG/DL (ref 0–1.2)
BILIRUBIN URINE: NEGATIVE
BLOOD, URINE: ABNORMAL
BUN BLDV-MCNC: 38 MG/DL (ref 8–23)
BURR CELLS: ABNORMAL
C-REACTIVE PROTEIN: 16.3 MG/DL (ref 0–0.4)
CALCIUM SERPL-MCNC: 9.8 MG/DL (ref 8.6–10.2)
CHLORIDE BLD-SCNC: 104 MMOL/L (ref 98–107)
CLARITY: ABNORMAL
CO2: 22 MMOL/L (ref 22–29)
COLOR: YELLOW
CREAT SERPL-MCNC: 2.4 MG/DL (ref 0.5–1)
EOSINOPHILS ABSOLUTE: 0.04 E9/L (ref 0.05–0.5)
EOSINOPHILS RELATIVE PERCENT: 0.7 % (ref 0–6)
GFR AFRICAN AMERICAN: 25
GFR NON-AFRICAN AMERICAN: 25 ML/MIN/1.73
GLUCOSE BLD-MCNC: 94 MG/DL (ref 74–99)
GLUCOSE URINE: NEGATIVE MG/DL
HCT VFR BLD CALC: 41.7 % (ref 34–48)
HEMOGLOBIN: 12.8 G/DL (ref 11.5–15.5)
HYALINE CASTS: ABNORMAL /LPF (ref 0–2)
IMMATURE GRANULOCYTES #: 0.04 E9/L
IMMATURE GRANULOCYTES %: 0.7 % (ref 0–5)
KETONES, URINE: ABNORMAL MG/DL
LACTIC ACID: 1.3 MMOL/L (ref 0.5–2.2)
LEUKOCYTE ESTERASE, URINE: ABNORMAL
LIPASE: 32 U/L (ref 13–60)
LYMPHOCYTES ABSOLUTE: 0.44 E9/L (ref 1.5–4)
LYMPHOCYTES RELATIVE PERCENT: 7.9 % (ref 20–42)
MCH RBC QN AUTO: 25.8 PG (ref 26–35)
MCHC RBC AUTO-ENTMCNC: 30.7 % (ref 32–34.5)
MCV RBC AUTO: 84.1 FL (ref 80–99.9)
MONOCYTES ABSOLUTE: 0.99 E9/L (ref 0.1–0.95)
MONOCYTES RELATIVE PERCENT: 17.8 % (ref 2–12)
NEUTROPHILS ABSOLUTE: 4.04 E9/L (ref 1.8–7.3)
NEUTROPHILS RELATIVE PERCENT: 72.7 % (ref 43–80)
NITRITE, URINE: NEGATIVE
OVALOCYTES: ABNORMAL
PDW BLD-RTO: 20.9 FL (ref 11.5–15)
PH UA: 6 (ref 5–9)
PLATELET # BLD: 238 E9/L (ref 130–450)
PMV BLD AUTO: 10.8 FL (ref 7–12)
POIKILOCYTES: ABNORMAL
POLYCHROMASIA: ABNORMAL
POTASSIUM REFLEX MAGNESIUM: 4.6 MMOL/L (ref 3.5–5)
PROTEIN UA: >=300 MG/DL
RBC # BLD: 4.96 E12/L (ref 3.5–5.5)
RBC UA: ABNORMAL /HPF (ref 0–2)
SCHISTOCYTES: ABNORMAL
SEDIMENTATION RATE, ERYTHROCYTE: 53 MM/HR (ref 0–20)
SODIUM BLD-SCNC: 140 MMOL/L (ref 132–146)
SPECIFIC GRAVITY UA: 1.02 (ref 1–1.03)
TARGET CELLS: ABNORMAL
TEAR DROP CELLS: ABNORMAL
TOTAL PROTEIN: 7.4 G/DL (ref 6.4–8.3)
TROPONIN: 0.02 NG/ML (ref 0–0.03)
TROPONIN: <0.01 NG/ML (ref 0–0.03)
UROBILINOGEN, URINE: 1 E.U./DL
WBC # BLD: 5.6 E9/L (ref 4.5–11.5)
WBC UA: >20 /HPF (ref 0–5)

## 2020-09-11 PROCEDURE — 71046 X-RAY EXAM CHEST 2 VIEWS: CPT

## 2020-09-11 PROCEDURE — APPSS45 APP SPLIT SHARED TIME 31-45 MINUTES: Performed by: NURSE PRACTITIONER

## 2020-09-11 PROCEDURE — 2060000000 HC ICU INTERMEDIATE R&B

## 2020-09-11 PROCEDURE — 85025 COMPLETE CBC W/AUTO DIFF WBC: CPT

## 2020-09-11 PROCEDURE — 74176 CT ABD & PELVIS W/O CONTRAST: CPT

## 2020-09-11 PROCEDURE — 99282 EMERGENCY DEPT VISIT SF MDM: CPT

## 2020-09-11 PROCEDURE — 2580000003 HC RX 258: Performed by: NURSE PRACTITIONER

## 2020-09-11 PROCEDURE — 76770 US EXAM ABDO BACK WALL COMP: CPT

## 2020-09-11 PROCEDURE — 99222 1ST HOSP IP/OBS MODERATE 55: CPT | Performed by: INTERNAL MEDICINE

## 2020-09-11 PROCEDURE — 83690 ASSAY OF LIPASE: CPT

## 2020-09-11 PROCEDURE — 6360000002 HC RX W HCPCS: Performed by: NURSE PRACTITIONER

## 2020-09-11 PROCEDURE — 87186 SC STD MICRODIL/AGAR DIL: CPT

## 2020-09-11 PROCEDURE — 86140 C-REACTIVE PROTEIN: CPT

## 2020-09-11 PROCEDURE — 6370000000 HC RX 637 (ALT 250 FOR IP): Performed by: NURSE PRACTITIONER

## 2020-09-11 PROCEDURE — 2500000003 HC RX 250 WO HCPCS: Performed by: NURSE PRACTITIONER

## 2020-09-11 PROCEDURE — 96374 THER/PROPH/DIAG INJ IV PUSH: CPT

## 2020-09-11 PROCEDURE — 36415 COLL VENOUS BLD VENIPUNCTURE: CPT

## 2020-09-11 PROCEDURE — 99283 EMERGENCY DEPT VISIT LOW MDM: CPT

## 2020-09-11 PROCEDURE — 85651 RBC SED RATE NONAUTOMATED: CPT

## 2020-09-11 PROCEDURE — 87088 URINE BACTERIA CULTURE: CPT

## 2020-09-11 PROCEDURE — 84484 ASSAY OF TROPONIN QUANT: CPT

## 2020-09-11 PROCEDURE — 81001 URINALYSIS AUTO W/SCOPE: CPT

## 2020-09-11 PROCEDURE — 83605 ASSAY OF LACTIC ACID: CPT

## 2020-09-11 PROCEDURE — 96375 TX/PRO/DX INJ NEW DRUG ADDON: CPT

## 2020-09-11 PROCEDURE — 93005 ELECTROCARDIOGRAM TRACING: CPT | Performed by: NURSE PRACTITIONER

## 2020-09-11 PROCEDURE — 80053 COMPREHEN METABOLIC PANEL: CPT

## 2020-09-11 RX ORDER — POLYETHYLENE GLYCOL 3350 17 G/17G
17 POWDER, FOR SOLUTION ORAL DAILY PRN
Status: DISCONTINUED | OUTPATIENT
Start: 2020-09-11 | End: 2020-09-14 | Stop reason: HOSPADM

## 2020-09-11 RX ORDER — PROMETHAZINE HYDROCHLORIDE 25 MG/1
12.5 TABLET ORAL EVERY 6 HOURS PRN
Status: DISCONTINUED | OUTPATIENT
Start: 2020-09-11 | End: 2020-09-14 | Stop reason: HOSPADM

## 2020-09-11 RX ORDER — MORPHINE SULFATE 2 MG/ML
2 INJECTION, SOLUTION INTRAMUSCULAR; INTRAVENOUS
Status: DISCONTINUED | OUTPATIENT
Start: 2020-09-11 | End: 2020-09-14 | Stop reason: HOSPADM

## 2020-09-11 RX ORDER — DOCUSATE SODIUM 100 MG/1
100 CAPSULE, LIQUID FILLED ORAL 2 TIMES DAILY
Status: DISCONTINUED | OUTPATIENT
Start: 2020-09-11 | End: 2020-09-14 | Stop reason: HOSPADM

## 2020-09-11 RX ORDER — GABAPENTIN 300 MG/1
300 CAPSULE ORAL NIGHTLY
Status: DISCONTINUED | OUTPATIENT
Start: 2020-09-11 | End: 2020-09-14 | Stop reason: HOSPADM

## 2020-09-11 RX ORDER — ASCORBIC ACID 500 MG
500 TABLET ORAL 2 TIMES DAILY
Status: DISCONTINUED | OUTPATIENT
Start: 2020-09-11 | End: 2020-09-14 | Stop reason: HOSPADM

## 2020-09-11 RX ORDER — HYDROCODONE BITARTRATE AND ACETAMINOPHEN 5; 325 MG/1; MG/1
1 TABLET ORAL EVERY 6 HOURS PRN
Status: DISCONTINUED | OUTPATIENT
Start: 2020-09-11 | End: 2020-09-14 | Stop reason: HOSPADM

## 2020-09-11 RX ORDER — FERROUS SULFATE 325(65) MG
325 TABLET ORAL 2 TIMES DAILY WITH MEALS
Status: DISCONTINUED | OUTPATIENT
Start: 2020-09-11 | End: 2020-09-14 | Stop reason: HOSPADM

## 2020-09-11 RX ORDER — SODIUM CHLORIDE 0.9 % (FLUSH) 0.9 %
10 SYRINGE (ML) INJECTION PRN
Status: DISCONTINUED | OUTPATIENT
Start: 2020-09-11 | End: 2020-09-14 | Stop reason: HOSPADM

## 2020-09-11 RX ORDER — FOLIC ACID 1 MG/1
1 TABLET ORAL DAILY
Status: DISCONTINUED | OUTPATIENT
Start: 2020-09-11 | End: 2020-09-14 | Stop reason: HOSPADM

## 2020-09-11 RX ORDER — 0.9 % SODIUM CHLORIDE 0.9 %
500 INTRAVENOUS SOLUTION INTRAVENOUS ONCE
Status: COMPLETED | OUTPATIENT
Start: 2020-09-11 | End: 2020-09-11

## 2020-09-11 RX ORDER — AMIODARONE HYDROCHLORIDE 200 MG/1
200 TABLET ORAL DAILY
Status: DISCONTINUED | OUTPATIENT
Start: 2020-09-12 | End: 2020-09-14 | Stop reason: HOSPADM

## 2020-09-11 RX ORDER — ATORVASTATIN CALCIUM 40 MG/1
40 TABLET, FILM COATED ORAL NIGHTLY
Status: DISCONTINUED | OUTPATIENT
Start: 2020-09-11 | End: 2020-09-14 | Stop reason: HOSPADM

## 2020-09-11 RX ORDER — ONDANSETRON 2 MG/ML
4 INJECTION INTRAMUSCULAR; INTRAVENOUS EVERY 6 HOURS PRN
Status: DISCONTINUED | OUTPATIENT
Start: 2020-09-11 | End: 2020-09-14 | Stop reason: HOSPADM

## 2020-09-11 RX ORDER — ACETAMINOPHEN 325 MG/1
650 TABLET ORAL EVERY 6 HOURS PRN
Status: DISCONTINUED | OUTPATIENT
Start: 2020-09-11 | End: 2020-09-14 | Stop reason: HOSPADM

## 2020-09-11 RX ORDER — METOPROLOL SUCCINATE 25 MG/1
25 TABLET, EXTENDED RELEASE ORAL DAILY
Status: DISCONTINUED | OUTPATIENT
Start: 2020-09-11 | End: 2020-09-14 | Stop reason: HOSPADM

## 2020-09-11 RX ORDER — ONDANSETRON 2 MG/ML
4 INJECTION INTRAMUSCULAR; INTRAVENOUS ONCE
Status: COMPLETED | OUTPATIENT
Start: 2020-09-11 | End: 2020-09-11

## 2020-09-11 RX ORDER — MAGNESIUM OXIDE 400 MG/1
400 TABLET ORAL DAILY
Status: DISCONTINUED | OUTPATIENT
Start: 2020-09-11 | End: 2020-09-11 | Stop reason: SDUPTHER

## 2020-09-11 RX ORDER — SODIUM CHLORIDE 0.9 % (FLUSH) 0.9 %
10 SYRINGE (ML) INJECTION EVERY 12 HOURS SCHEDULED
Status: DISCONTINUED | OUTPATIENT
Start: 2020-09-11 | End: 2020-09-14 | Stop reason: HOSPADM

## 2020-09-11 RX ORDER — ACETAMINOPHEN 650 MG/1
650 SUPPOSITORY RECTAL EVERY 6 HOURS PRN
Status: DISCONTINUED | OUTPATIENT
Start: 2020-09-11 | End: 2020-09-14 | Stop reason: HOSPADM

## 2020-09-11 RX ORDER — KETOROLAC TROMETHAMINE 30 MG/ML
15 INJECTION, SOLUTION INTRAMUSCULAR; INTRAVENOUS ONCE
Status: COMPLETED | OUTPATIENT
Start: 2020-09-11 | End: 2020-09-11

## 2020-09-11 RX ORDER — CLOPIDOGREL BISULFATE 75 MG/1
75 TABLET ORAL DAILY
Status: DISCONTINUED | OUTPATIENT
Start: 2020-09-11 | End: 2020-09-14 | Stop reason: HOSPADM

## 2020-09-11 RX ORDER — AMIODARONE HYDROCHLORIDE 200 MG/1
200 TABLET ORAL 2 TIMES DAILY
Status: DISCONTINUED | OUTPATIENT
Start: 2020-09-11 | End: 2020-09-11

## 2020-09-11 RX ORDER — SODIUM CHLORIDE 9 MG/ML
INJECTION, SOLUTION INTRAVENOUS CONTINUOUS
Status: DISCONTINUED | OUTPATIENT
Start: 2020-09-11 | End: 2020-09-12

## 2020-09-11 RX ORDER — ANASTROZOLE 1 MG/1
1 TABLET ORAL DAILY
Status: DISCONTINUED | OUTPATIENT
Start: 2020-09-11 | End: 2020-09-14 | Stop reason: HOSPADM

## 2020-09-11 RX ORDER — PANTOPRAZOLE SODIUM 40 MG/1
40 TABLET, DELAYED RELEASE ORAL
Status: DISCONTINUED | OUTPATIENT
Start: 2020-09-11 | End: 2020-09-12

## 2020-09-11 RX ADMIN — ANASTROZOLE 1 MG: 1 TABLET ORAL at 16:28

## 2020-09-11 RX ADMIN — CLOPIDOGREL BISULFATE 75 MG: 75 TABLET ORAL at 16:27

## 2020-09-11 RX ADMIN — DOCUSATE SODIUM 100 MG: 100 CAPSULE, LIQUID FILLED ORAL at 16:27

## 2020-09-11 RX ADMIN — KETOROLAC TROMETHAMINE 15 MG: 30 INJECTION, SOLUTION INTRAMUSCULAR at 11:16

## 2020-09-11 RX ADMIN — OXYCODONE HYDROCHLORIDE AND ACETAMINOPHEN 500 MG: 500 TABLET ORAL at 16:28

## 2020-09-11 RX ADMIN — SODIUM CHLORIDE 500 ML: 9 INJECTION, SOLUTION INTRAVENOUS at 11:08

## 2020-09-11 RX ADMIN — MAGNESIUM OXIDE TAB 400 MG (241.3 MG ELEMENTAL MG) 400 MG: 400 (241.3 MG) TAB at 17:09

## 2020-09-11 RX ADMIN — METOPROLOL SUCCINATE 25 MG: 25 TABLET, EXTENDED RELEASE ORAL at 16:27

## 2020-09-11 RX ADMIN — ONDANSETRON 4 MG: 2 INJECTION INTRAMUSCULAR; INTRAVENOUS at 11:15

## 2020-09-11 RX ADMIN — FOLIC ACID 1 MG: 1 TABLET ORAL at 16:27

## 2020-09-11 RX ADMIN — GABAPENTIN 300 MG: 300 CAPSULE ORAL at 19:44

## 2020-09-11 RX ADMIN — OXYCODONE HYDROCHLORIDE AND ACETAMINOPHEN 500 MG: 500 TABLET ORAL at 19:44

## 2020-09-11 RX ADMIN — ATORVASTATIN CALCIUM 40 MG: 40 TABLET, FILM COATED ORAL at 19:44

## 2020-09-11 RX ADMIN — FERROUS SULFATE TAB 325 MG (65 MG ELEMENTAL FE) 325 MG: 325 (65 FE) TAB at 16:27

## 2020-09-11 RX ADMIN — METRONIDAZOLE 500 MG: 500 INJECTION, SOLUTION INTRAVENOUS at 22:44

## 2020-09-11 RX ADMIN — SODIUM CHLORIDE, PRESERVATIVE FREE 10 ML: 5 INJECTION INTRAVENOUS at 19:45

## 2020-09-11 RX ADMIN — AMIODARONE HYDROCHLORIDE 200 MG: 200 TABLET ORAL at 16:27

## 2020-09-11 RX ADMIN — METRONIDAZOLE 500 MG: 500 INJECTION, SOLUTION INTRAVENOUS at 16:28

## 2020-09-11 RX ADMIN — PANTOPRAZOLE SODIUM 40 MG: 40 TABLET, DELAYED RELEASE ORAL at 16:27

## 2020-09-11 RX ADMIN — CEFTRIAXONE 1 G: 1 INJECTION, POWDER, FOR SOLUTION INTRAMUSCULAR; INTRAVENOUS at 13:46

## 2020-09-11 RX ADMIN — DOCUSATE SODIUM 100 MG: 100 CAPSULE, LIQUID FILLED ORAL at 19:44

## 2020-09-11 RX ADMIN — ENOXAPARIN SODIUM 30 MG: 30 INJECTION SUBCUTANEOUS at 16:26

## 2020-09-11 ASSESSMENT — PAIN SCALES - GENERAL
PAINLEVEL_OUTOF10: 0
PAINLEVEL_OUTOF10: 5
PAINLEVEL_OUTOF10: 0
PAINLEVEL_OUTOF10: 9

## 2020-09-11 NOTE — PROGRESS NOTES
Database complete. Medications reconciled. Care plans and education initiated. Cardiologist is Dr. Rito Oliva. Bilateral mastectomy with restrictions to LUE. Recent surgery with Dr. José Luis Pelayo and has wound to RLE. Uses cane and walker and goes to Cardiac rehab three days a week. Wears Life vest but is not here with pt.

## 2020-09-11 NOTE — ED PROVIDER NOTES
ED Attending  CC: Em       Department of Emergency Medicine   ED  Provider Note  Admit Date/RoomTime: 9/11/2020 10:22 AM  ED Room: 23/23  Chief Complaint:       Abdominal Pain    History of Present Illness   Source of history provided by:  patient. History/Exam Limitations: none. Mark Powers is a 63 y.o. old female who has a past medical history of:   Past Medical History:   Diagnosis Date    Breast cancer ([facility] 2000, 2005    right    Breast cancer ([facility] 2020    left    CAD (coronary artery disease)     follows with Dr. Dennie Kerbs CVA (cerebral vascular accident) [facility]) 2015    no deficits    DM [facility]diabetes mellitus[facility]     H/O: GI bleed     History of blood transfusion 02/2020    Hyperlipidemia     Hypertension     presents to the emergency department by private vehicle, ambulatory and accompanied by family member, for complaints of sudden onset aching, cramping, shooting, stabbing pain in the epigastrium, in the LUQ and in the LLQ without radiation which began 3 day(s) prior to arrival.   There has been no similar episodes in the past.  Since onset the symptoms have been persistent and worsening. The pain is associated with nothing pertinent. The pain is aggravated by none and relieved by nothing. There has been NO chills, cloudy urine, constipation, diarrhea, dysuria, headache, hematuria, sweating, urinary frequency, urinary incontinence, urinary urgency, vaginal discharge, vaginal itching or vomiting. She denies any bloody or tarry stools. She denies any chest pain or shortness of breath. She is currently enrolled in cardiac rehab at Monday, Wednesdays and Fridays. She is going be following with wound care this week for vein removal of the right lower extremity for her coronary bypass surgery. Which was in June. Abdominal surgeries include hysterectomy. Patient states she is supposed to wear LifeVest but does not have it on an emergency department.     .  ROS   Pertinent positives and negatives are stated within HPI, all other systems reviewed and are negative. Past Surgical History:   Procedure Laterality Date    APPENDECTOMY      BREAST RECONSTRUCTION Right     BREAST SURGERY  2000    right masectomy    COLONOSCOPY      CORONARY ANGIOPLASTY WITH STENT PLACEMENT  03/04/2020    Mini Vision 2.5 x 18 stent deployed proximal LAD by DR Zayra Lopez    CORONARY ARTERY BYPASS GRAFT N/A 6/16/2020    CORONARY ARTERY BYPASS, MARY JO performed by Anjel Blanco MD at [facility]      right    HYSTERECTOMY      fibroids    KNEE ARTHROSCOPY      left    MASTECTOMY Right 2000    MASTECTOMY, MODIFIED RADICAL Left 2/25/2020    LEFT BREAST SIMPLE MASTECTOMY performed by Christin Dunlap MD at [address] [facility], MODIFIED RADICAL Left 2/25/2020    LEFT BREAST MASTECTOMY POST OP BLEED CONTROL, EVACUATION OF LEFT CHEST WALL HEMATOMA performed by Christin Dunlap MD at [address] [place] [address] GASTROINTESTINAL ENDOSCOPY N/A 2/5/2020    EGD BIOPSY performed by Christin Dunlap MD at [place] [address] N/A 6/12/2020    EGD BIOPSY performed by Christin Dunlap MD at [address] [place] [address] History:  reports that she quit smoking about 40 years ago. Her smoking use included cigarettes. She has a 0.40 pack-year smoking history. She has never used smokeless tobacco. She reports previous alcohol use of about 6.0 standard drinks of alcohol per week. She reports previous drug use. Drug: Marijuana. Family History: family history includes Breast Cancer in her mother; Heart Failure in her father and mother; Hypertension in her sister; Other in her mother; Pacemaker in her father; Stroke in her mother.    Allergies: Penicillins    Physical Exam            ED Triage Vitals   BP Temp Temp Source Pulse Resp SpO2 Height Weight   09/11/20 1031 09/11/20 1017 09/11/20 1031 09/11/20 1017 09/11/20 1017 09/11/20 1017 09/11/20 1031 09/11/20 1031   105/70 97 °F (36.1 °C) Oral 95 16 98 % 5' 5\" (1.651 m) 217 lb (98.4 kg)      Oxygen Saturation Interpretation: Normal.    · General Appearance/Constitutional:  Alert, development consistent with age  · HEENT:  NC/NT. PERRLA. Airway patent. · Neck:  Supple. No lymphadenopathy. · Respiratory:  No retractions. Lungs Clear to auscultation and breath sounds equal.  · CV:  Regular rate and rhythm. Surgical scar noted  · GI:  General Appearance: normal.         Bowel sounds: normal bowel sounds. Distension:  None. Tenderness: moderate tenderness is present in the LUQ and in the LLQ, no rebound tenderness, no guarding. Liver: non-tender. Spleen:  non-tender. Pulsatile Mass: absent. Hernia:  no inguinal or femoral hernias noted. · Back: CVA Tenderness: No.  · Integument:  Normal turgor. Warm, dry, without visible rash, unless noted elsewhere. Patient has slow healing wound on anterior right lower leg. There is no drainage or erythema noted. No signs symptoms of infection. Pedal pulses 2+. Cap refill is less than 3 seconds. · Lymphatics: No edema, cap.refill <3sec. · Neurological:  Orientation age-appropriate. Motor functions intact.     Lab / Imaging Results   (All laboratory and radiology results have been personally reviewed by myself)  Labs:  Results for orders placed or performed during the hospital encounter of 09/11/20   CBC Auto Differential   Result Value Ref Range    WBC 5.6 4.5 - 11.5 E9/L    RBC 4.96 3.50 - 5.50 E12/L    Hemoglobin 12.8 11.5 - 15.5 g/dL    Hematocrit 41.7 34.0 - 48.0 %    MCV 84.1 80.0 - 99.9 fL    MCH 25.8 (L) 26.0 - 35.0 pg    MCHC 30.7 (L) 32.0 - 34.5 %    RDW 20.9 (H) 11.5 - 15.0 fL    Platelets 857 832 - 837 E9/L    MPV 10.8 7.0 - 12.0 fL    Neutrophils % 72.7 43.0 - 80.0 %    Immature Granulocytes % 0.7 0.0 - 5.0 %    Lymphocytes % 7.9 (L) 20.0 - 42.0 %    Monocytes % 17.8 (H) 2.0 - 12.0 % improving. Consultations:             IP CONSULT TO UROLOGY  1304- Spoke with Dr. Nohelia Ordonez regarding patient. She agrees and accepts admission to telemetry for  ARTURO. 1320-Spoke with Monique Canchola NP for urology regarding patient. She states she will come evaluated patient. Procedures:   none    MDM:  Patient presents to the ED for left-sided abdominal pain radiating to the left flank. Differential diagnoses included but not limited to kidney stone versus UTI. Workup in the ED revealed CBC was unremarkable. CMP revealed abnormal kidney function of a BUN of 38, creatinine of 2.4 and a GFR of 25. Troponin was slightly elevated at 0.02. There was no EKG changes. Lipase was normal at 32. Urinalysis revealed moderate amount of blood and small amount leukocytes. Sed rate was elevated at 53. Lactic acid was normal at 1.3. Chest x-ray revealed stable borderline cardiomegaly and postoperative changes. CT of abdomen pelvis without contrast due to patient's kidney function was unable to do with contrast.  Revealed obstructive uropathy on the left to the level of the UVJ. There is no obstructing calcification present. There may have been a recently passed stone. Noncalcified obstructing lesion including neoplasm is not excluded. Numerous subcentimeter calcification elsewhere within the kidney. There is inflammation changes within the left hemisphere and adjacent to the diverticulum within sigmoid colon. Possibly superimposed underlying diverticulitis is not occluded patient was given Toradol, Zofran and IV fluids and Rocephin for their symptoms with moderate improvement. Patient requires continued workup and management of their symptoms and will be admitted to the hospital for further evaluation and treatment.       Counseling:   I have spoken with the patient and discussed todays results, in addition to providing specific details for the plan of care and counseling regarding the diagnosis and prognosis and are agreeable with the plan. Assessment      1. ARTURO (acute kidney injury) ([facility]    2. Kidney stone      This patient's ED course included: a personal history and physicial examination, re-evaluation prior to disposition, multiple bedside re-evaluations, IV medications, cardiac monitoring and continuous pulse oximetry  This patient has remained hemodynamically stable during their ED course. Plan   Admit to telemetry. Patient condition is stable. New Medications     New Prescriptions    No medications on file     Electronically signed by ANNA MARIE Read CNP   DD: 9/11/20  **This report was transcribed using voice recognition software. Every effort was made to ensure accuracy; however, inadvertent computerized transcription errors may be present.   END OF PROVIDER NOTE     ANNA MARIE Read CNP  09/11/20 5822

## 2020-09-11 NOTE — PROGRESS NOTES
Please be aware of the possible interaction of QT-interval prolongation with Amiodarone, Zofran and Phenergan. Monitor the patient closely. Thank Stephan Zuluaga Pharm. D 9/11/2020 5:08 PM

## 2020-09-11 NOTE — H&P
[facility] History and Physical      CHIEF COMPLAINT:  Pain in lower abdomen, back    History of Present Illness:     Patient is a 64 yo female patient who follows with PCP with Roni PETERSON with a significant past medical history of breast cancer, cervical cancer, CVA, CAD, s/p CABG in June, Ischemic cardiomyopathy- life vest, non-sustained ventricular tachycardia, DM, HTN, and HLD. Patient presented to the ER with complaints of pain- reporting left sided of abdomen wrapping around into back. Started 4 days ago. Constant, moderate, and ongoing. Reporting taking tylenol with minimal relied. She thought that she was maybe constipated. She reported she then ate foods she thought would help move her bowels. She ate a type of sausage. Reporting she then did have 2 large loose stools, but did not feel better. Reporting formed Bm today. Reporting she has never had pain like this before. She is in cardiac rehab 3x weekly. Reporting she has been walking with cane- strength improving. She does get dyspneic with exertion at times. She is supposed to follow at wound clinic for right lower leg that is non healing. They called today when she was in ER. Reporting she has been having drainage from leg. Reporting she does have life vest- but has not been wearing it. Patient awake, alert, resting in ER in no acute distress. Denies chest pain, sob, fevers, chills, nausea, vomiting, dysuria, hematuria, hematochezia.            Informant(s) for H&P:patient, chart review     REVIEW OF SYSTEMS:  A comprehensive review of systems was negative except for: what is in the HPI      PMH:  Past Medical History:   Diagnosis Date    Breast cancer ([facility] 2000, 2005    right    Breast cancer ([facility] 2020    left    CAD (coronary artery disease)     follows with Dr. Mely Benz CVA (cerebral vascular accident) ([facility] 2015    no deficits    DM (diabetes mellitus) ([facility]     H/O: GI bleed     History of blood transfusion 02/2020    Hyperlipidemia     Hypertension        Surgical History:  Past Surgical History:   Procedure Laterality Date    APPENDECTOMY      BREAST RECONSTRUCTION Right     BREAST SURGERY  2000    right masectomy    COLONOSCOPY      CORONARY ANGIOPLASTY WITH STENT PLACEMENT  03/04/2020    Mini Vision 2.5 x 18 stent deployed proximal LAD by DR Mimi Hendrickson    CORONARY ARTERY BYPASS GRAFT N/A 6/16/2020    CORONARY ARTERY BYPASS, MARY JO performed by Jackie Ragland MD at [facility]      right    HYSTERECTOMY      fibroids    KNEE ARTHROSCOPY      left    MASTECTOMY Right 2000    MASTECTOMY, MODIFIED RADICAL Left 2/25/2020    LEFT BREAST SIMPLE MASTECTOMY performed by Elizabeth Amezcua MD at [facility] Left 2/25/2020    LEFT BREAST MASTECTOMY POST OP BLEED CONTROL, EVACUATION OF LEFT CHEST WALL HEMATOMA performed by Elizabeth Amezcua MD at [address] GASTROINTESTINAL ENDOSCOPY N/A 2/5/2020    EGD BIOPSY performed by Elizabeth Amezcua MD at [address] [place] [address] N/A 6/12/2020    EGD BIOPSY performed by Elizabeth Amezcua MD at [address] [place] [address]       Medications Prior to Admission:    Prior to Admission medications    Medication Sig Start Date End Date Taking?  Authorizing Provider   amiodarone (CORDARONE) 200 MG tablet Take 200 mg by mouth 2 times daily     Historical Provider, MD   vitamin C (ASCORBIC ACID) 500 MG tablet Take 500 mg by mouth 2 times daily    Historical Provider, MD   docusate sodium (COLACE) 100 MG capsule Take 100 mg by mouth 2 times daily    Historical Provider, MD   magnesium oxide (MAG-OX) 400 MG tablet Take 400 mg by mouth daily    Historical Provider, MD   lisinopril (PRINIVIL;ZESTRIL) 5 MG tablet Take 1 tablet by mouth 2 times daily 7/28/20   Louie White MD   atorvastatin (LIPITOR) 40 MG tablet Take 1 tablet by mouth daily 7/28/20   Louie White MD   anastrozole mother; Pacemaker in her father; Stroke in her mother. PHYSICAL EXAM:  Vitals:  /70   Pulse 56   Temp 98.2 °F (36.8 °C) (Oral)   Resp 16   Ht 5' 5\" (1.651 m)   Wt 217 lb (98.4 kg)   SpO2 96%   BMI 36.11 kg/m²     General Appearance: alert and oriented to person, place and time and in no acute distress  Skin: warm and dry- incision right leg- no erythema scabbed   Head: normocephalic and atraumatic  Neck: neck supple and non tender without mass   Pulmonary/Chest: clear to auscultation bilaterally-   Cardiovascular: normal rate, normal S1 and S2 and no carotid bruits  Abdomen: soft, non-tender, non-distended, normal bowel sounds, no masses or organomegaly + CVA tenderness  Extremities: no cyanosis, no clubbing and no edema  Neurologic: speech normal-       LABS:  Recent Labs     09/11/20  1057      K 4.6      CO2 22   BUN 38*   CREATININE 2.4*   GLUCOSE 94   CALCIUM 9.8       Recent Labs     09/11/20  1057   WBC 5.6   RBC 4.96   HGB 12.8   HCT 41.7   MCV 84.1   MCH 25.8*   MCHC 30.7*   RDW 20.9*      MPV 10.8       No results for input(s): POCGLU in the last 72 hours. Radiology:   XR CHEST (2 VW)   Final Result   Stable borderline cardiomegaly and postoperative changes. CT ABDOMEN PELVIS WO CONTRAST Additional Contrast? None   Final Result      Obstructive uropathy on the left to the level of the ureterovesical   junction. No obstructing calcification is present. There may have been   a recently passed stone. Noncalcified obstructing lesion including   neoplasm is not excluded. Numerous subcentimeter calyceal calcifications elsewhere within the   left kidney. Inflammatory changes within the left hemipelvis are adjacent to the   diverticulum ridden sigmoid colon. The possibility of superimposed   underlying diverticulitis is not excluded. Cardiomegaly.             ASSESSMENT:      Active Problems:    ARTURO (acute kidney injury) ([address]  Resolved Problems:    * No resolved hospital problems. *      PLAN:    1. ARTURO likely related to obstructive uropathy/ ? Dehydration/ hypotension: pt presented to the ER with complaints of left lower abdominal pain/ left flank pain. Per patient started suddenly 4 days ago. Moderate and constant. Creatinine on admission 2.4. Creatinine from July 1.1. CT showing concern for obstructive uropathy. On furosemide/ ace. BP running lower. Patient reporting she has enriqueta having diarrhea. Urology consulted. IVF. Follow creatinine. 2. Obstructive uropathy: CT abdomen/ pelvis showing obstructive uropathy on the left to level of ureterovesical junction. No obstructing calcification seen. ? Passed stone. Non calcified obstructing lesion not excluded. Consult urology. 3. Possible diverticulitis: pt reporting she did have several loose stools last few days as she ate a type of sausage to help her have BM as she thought she was constipated. Now having formed BMs. 4. Possible UTI : associated to above: continue ceftriaxone; follow culture    5. CAD recent CABG in June: currently active in cardiac rehab    6. Ischemic cardiomyopathy: EF on echo in June 30 +/- 5. Non compliant with life vest     7. DM; . hga1c 5.6. on amaryl- hold    8. H/o breast/ cervical cancer    9. H/o CVA    10. HTN: continue meds    11. HLD: statin     Code Status: FULL  DVT prophylaxis: lovenox      NOTE: This report was transcribed using voice recognition software. Every effort was made to ensure accuracy; however, inadvertent computerized transcription errors may be present.   Electronically signed by ROCHELLE Sanchez on 9/11/2020 at 1:13 PM

## 2020-09-11 NOTE — CONSULTS
TONSILLECTOMY      UPPER GASTROINTESTINAL ENDOSCOPY      UPPER GASTROINTESTINAL ENDOSCOPY N/A 2/5/2020    EGD BIOPSY performed by Brenda Marr MD at [place] [address] N/A 6/12/2020    EGD BIOPSY performed by Brenda Marr MD at [address] [place] [address]       Medications Prior to Admission:    Not in a hospital admission. Allergies:    Penicillins    Social History:    reports that she quit smoking about 40 years ago. Her smoking use included cigarettes. She has a 0.40 pack-year smoking history. She has never used smokeless tobacco. She reports previous alcohol use of about 6.0 standard drinks of alcohol per week. She reports previous drug use. Drug: Marijuana. Family History:   Non-contributory to this Urological problem  family history includes Breast Cancer in her mother; Heart Failure in her father and mother; Hypertension in her sister; Other in her mother; Pacemaker in her father; Stroke in her mother. Review of Systems:  Constitutional: No fever or chills   Respiratory: negative for cough and hemoptysis  Cardiovascular: negative for chest pain and dyspnea  Gastrointestinal: negative for abdominal pain, diarrhea, nausea and vomiting   Derm: negative for rash and skin lesion(s)  Neurological: negative for seizures and tremors  Musculoskeletal: Negative    Psychiatric: Negative   : As above in the HPI, otherwise negative  All other reviews are negative    Physical Exam:     Vitals:  /70   Pulse 56   Temp 98.2 °F (36.8 °C) (Oral)   Resp 16   Ht 5' 5\" (1.651 m)   Wt 217 lb (98.4 kg)   SpO2 96%   BMI 36.11 kg/m²     General:  Awake, alert, oriented X 3. No apparent distress. HEENT:  Normocephalic, atraumatic. Lungs:  Respirations symmetric and non-labored. Abdomen:  soft, nontender, no masses  Extremities:  No clubbing, cyanosis, or edema  Skin:  Warm and dry, no open lesions or rashes  Neuro:  There are no motor or sensory deficits in the 4 quadrant extremities Rectal: deferred  Genitourinary:  No bartlett     Labs:     Recent Labs     09/11/20  1057   WBC 5.6   RBC 4.96   HGB 12.8   HCT 41.7   MCV 84.1   MCH 25.8*   MCHC 30.7*   RDW 20.9*      MPV 10.8         Recent Labs     09/11/20  1057   CREATININE 2.4*       Imaging:   Narrative    This examination and all examinations utilizing ionizing radiation at    this facility are done so according to the ALARA (as low as reasonably    achievable) principal for radiation dose reduction.         CLINICAL INDICATION:         Left-sided abdominal pain.         TECHNIQUE:         Axial, sagittal, and coronal computed tomography of the abdomen and    pelvis was performed without contrast.         COMPARISON:         February 25, 2010.         FINDINGS:         There is moderate left hydroureteronephrosis to the level of the    ureterovesical junction. No obstructing calcification is identified. There are inflammatory changes surrounding the left renal collecting    system and the ureter throughout its course. Multiple subcentimeter    calyceal calcifications are identified elsewhere within the left    kidney. The right kidney is unremarkable. The urinary bladder is    evacuated.         There is postoperative change of the anterior abdominal wall on the    left suggesting extensive herniorrhaphy.         Minimal dependent atelectasis. The heart is enlarged. Prior median    sternotomy wires are present. There are metallic clips of right    anterior chest wall and stimulator wires of the left midline anterior    chest wall. No evidence of pericardial effusion.         Elsewhere within the abdomen, the liver is negative for focal or    diffuse abnormality. The gallbladder, hepatobiliary tree, pancreas and    adrenal glands are unremarkable. Punctate calcified granulomas are    present within the spleen. The spleen is otherwise unremarkable.     Within the right lower quadrant of the abdomen, there is no evidence    of appendicitis. There is no evidence of free fluid, free air, or    gastrointestinal obstruction. There is no evidence for mesenteric    inflammation or lymphadenopathy.         Within the pelvis, there is severe diverticular disease of the sigmoid    colon. Some of the inflammation around the left ureter extends to the    level of the sigmoid colon. The possibility of superimposed    diverticulitis is not excluded.         Degenerative spondylosis and facet arthropathy are identified within    the spine. Osteophytosis and eburnation of sacroiliac and acetabular    articulating surfaces. Skeletal structures are negative for evidence    of aggressive process or acute fracture. There are calcifications    within the aorta and its branches.              Impression         Obstructive uropathy on the left to the level of the ureterovesical    junction. No obstructing calcification is present. There may have been    a recently passed stone. Noncalcified obstructing lesion including    neoplasm is not excluded.         Numerous subcentimeter calyceal calcifications elsewhere within the    left kidney.         Inflammatory changes within the left hemipelvis are adjacent to the    diverticulum ridden sigmoid colon. The possibility of superimposed    underlying diverticulitis is not excluded.         Cardiomegaly.          Assessment/plan:  Left Hydroureteronephrosis   Possible recently passed stone  Nonobstructing left renal calculi   Azotemia     CT reviewed, left hydronephrosis without clear evidence of obstructing calculi  Will obtain renal lasix scan to rule out obstruction  Cont to watch the creatinine  UA reviewed  Urine culture ordered  Cont antibiotics per medical team   NPO after midnight  Cont to watch the creatinine  We will cont to follow             Electronically signed by ANNA MARIE Love CNP on 9/11/2020 at 1:18 PM

## 2020-09-12 ENCOUNTER — APPOINTMENT (OUTPATIENT)
Dept: NUCLEAR MEDICINE | Age: 60
DRG: 683 | End: 2020-09-12
Payer: MEDICARE

## 2020-09-12 LAB
ANION GAP SERPL CALCULATED.3IONS-SCNC: 12 MMOL/L (ref 7–16)
BUN BLDV-MCNC: 42 MG/DL (ref 8–23)
CALCIUM SERPL-MCNC: 9.2 MG/DL (ref 8.6–10.2)
CHLORIDE BLD-SCNC: 106 MMOL/L (ref 98–107)
CHLORIDE URINE RANDOM: 95 MMOL/L
CO2: 23 MMOL/L (ref 22–29)
CREAT SERPL-MCNC: 2.6 MG/DL (ref 0.5–1)
CREATININE URINE: 40 MG/DL (ref 29–226)
EKG ATRIAL RATE: 68 BPM
EKG P AXIS: 28 DEGREES
EKG P-R INTERVAL: 134 MS
EKG Q-T INTERVAL: 422 MS
EKG QRS DURATION: 92 MS
EKG QTC CALCULATION (BAZETT): 448 MS
EKG R AXIS: -21 DEGREES
EKG T AXIS: 138 DEGREES
EKG VENTRICULAR RATE: 68 BPM
GFR AFRICAN AMERICAN: 23
GFR NON-AFRICAN AMERICAN: 23 ML/MIN/1.73
GLUCOSE BLD-MCNC: 79 MG/DL (ref 74–99)
HCT VFR BLD CALC: 38.1 % (ref 34–48)
HEMOGLOBIN: 11.5 G/DL (ref 11.5–15.5)
MAGNESIUM: 2.5 MG/DL (ref 1.6–2.6)
MCH RBC QN AUTO: 26 PG (ref 26–35)
MCHC RBC AUTO-ENTMCNC: 30.2 % (ref 32–34.5)
MCV RBC AUTO: 86 FL (ref 80–99.9)
OSMOLALITY URINE: 334 MOSM/KG (ref 300–900)
PDW BLD-RTO: 20.8 FL (ref 11.5–15)
PHOSPHORUS: 5 MG/DL (ref 2.5–4.5)
PLATELET # BLD: 207 E9/L (ref 130–450)
PMV BLD AUTO: 11.4 FL (ref 7–12)
POTASSIUM SERPL-SCNC: 4.4 MMOL/L (ref 3.5–5)
POTASSIUM, UR: 23.5 MMOL/L
PROTEIN PROTEIN: 5 MG/DL (ref 0–12)
PROTEIN/CREAT RATIO: 0.1
PROTEIN/CREAT RATIO: 0.1 (ref 0–0.2)
RBC # BLD: 4.43 E12/L (ref 3.5–5.5)
SODIUM BLD-SCNC: 141 MMOL/L (ref 132–146)
SODIUM URINE: 90 MMOL/L
UREA NITROGEN, UR: 253 MG/DL (ref 800–1666)
WBC # BLD: 5.4 E9/L (ref 4.5–11.5)

## 2020-09-12 PROCEDURE — APPSS30 APP SPLIT SHARED TIME 16-30 MINUTES: Performed by: NURSE PRACTITIONER

## 2020-09-12 PROCEDURE — 6370000000 HC RX 637 (ALT 250 FOR IP): Performed by: NURSE PRACTITIONER

## 2020-09-12 PROCEDURE — 6360000002 HC RX W HCPCS: Performed by: NURSE PRACTITIONER

## 2020-09-12 PROCEDURE — 93010 ELECTROCARDIOGRAM REPORT: CPT | Performed by: INTERNAL MEDICINE

## 2020-09-12 PROCEDURE — 82570 ASSAY OF URINE CREATININE: CPT

## 2020-09-12 PROCEDURE — 78708 K FLOW/FUNCT IMAGE W/DRUG: CPT

## 2020-09-12 PROCEDURE — 84300 ASSAY OF URINE SODIUM: CPT

## 2020-09-12 PROCEDURE — 84156 ASSAY OF PROTEIN URINE: CPT

## 2020-09-12 PROCEDURE — 99232 SBSQ HOSP IP/OBS MODERATE 35: CPT | Performed by: INTERNAL MEDICINE

## 2020-09-12 PROCEDURE — 2580000003 HC RX 258: Performed by: INTERNAL MEDICINE

## 2020-09-12 PROCEDURE — 2500000003 HC RX 250 WO HCPCS: Performed by: NURSE PRACTITIONER

## 2020-09-12 PROCEDURE — 84133 ASSAY OF URINE POTASSIUM: CPT

## 2020-09-12 PROCEDURE — 84100 ASSAY OF PHOSPHORUS: CPT

## 2020-09-12 PROCEDURE — 2060000000 HC ICU INTERMEDIATE R&B

## 2020-09-12 PROCEDURE — 80048 BASIC METABOLIC PNL TOTAL CA: CPT

## 2020-09-12 PROCEDURE — 36415 COLL VENOUS BLD VENIPUNCTURE: CPT

## 2020-09-12 PROCEDURE — 84540 ASSAY OF URINE/UREA-N: CPT

## 2020-09-12 PROCEDURE — 83935 ASSAY OF URINE OSMOLALITY: CPT

## 2020-09-12 PROCEDURE — 83735 ASSAY OF MAGNESIUM: CPT

## 2020-09-12 PROCEDURE — 82436 ASSAY OF URINE CHLORIDE: CPT

## 2020-09-12 PROCEDURE — 85027 COMPLETE CBC AUTOMATED: CPT

## 2020-09-12 PROCEDURE — 2580000003 HC RX 258: Performed by: NURSE PRACTITIONER

## 2020-09-12 PROCEDURE — A9562 TC99M MERTIATIDE: HCPCS | Performed by: RADIOLOGY

## 2020-09-12 PROCEDURE — 3430000000 HC RX DIAGNOSTIC RADIOPHARMACEUTICAL: Performed by: RADIOLOGY

## 2020-09-12 PROCEDURE — 2500000003 HC RX 250 WO HCPCS: Performed by: INTERNAL MEDICINE

## 2020-09-12 RX ORDER — FUROSEMIDE 10 MG/ML
10 INJECTION INTRAMUSCULAR; INTRAVENOUS ONCE
Status: COMPLETED | OUTPATIENT
Start: 2020-09-12 | End: 2020-09-12

## 2020-09-12 RX ORDER — PANTOPRAZOLE SODIUM 40 MG/1
40 TABLET, DELAYED RELEASE ORAL
Status: DISCONTINUED | OUTPATIENT
Start: 2020-09-13 | End: 2020-09-14 | Stop reason: HOSPADM

## 2020-09-12 RX ADMIN — GABAPENTIN 300 MG: 300 CAPSULE ORAL at 20:55

## 2020-09-12 RX ADMIN — DOCUSATE SODIUM 100 MG: 100 CAPSULE, LIQUID FILLED ORAL at 11:06

## 2020-09-12 RX ADMIN — OXYCODONE HYDROCHLORIDE AND ACETAMINOPHEN 500 MG: 500 TABLET ORAL at 20:55

## 2020-09-12 RX ADMIN — MAGNESIUM OXIDE TAB 400 MG (241.3 MG ELEMENTAL MG) 400 MG: 400 (241.3 MG) TAB at 11:05

## 2020-09-12 RX ADMIN — DOCUSATE SODIUM 100 MG: 100 CAPSULE, LIQUID FILLED ORAL at 20:55

## 2020-09-12 RX ADMIN — FERROUS SULFATE TAB 325 MG (65 MG ELEMENTAL FE) 325 MG: 325 (65 FE) TAB at 11:06

## 2020-09-12 RX ADMIN — METRONIDAZOLE 500 MG: 500 INJECTION, SOLUTION INTRAVENOUS at 23:06

## 2020-09-12 RX ADMIN — METRONIDAZOLE 500 MG: 500 INJECTION, SOLUTION INTRAVENOUS at 14:37

## 2020-09-12 RX ADMIN — SODIUM BICARBONATE: 84 INJECTION, SOLUTION INTRAVENOUS at 12:40

## 2020-09-12 RX ADMIN — CEFTRIAXONE 1 G: 1 INJECTION, POWDER, FOR SOLUTION INTRAMUSCULAR; INTRAVENOUS at 14:38

## 2020-09-12 RX ADMIN — SODIUM CHLORIDE, PRESERVATIVE FREE 10 ML: 5 INJECTION INTRAVENOUS at 21:04

## 2020-09-12 RX ADMIN — METRONIDAZOLE 500 MG: 500 INJECTION, SOLUTION INTRAVENOUS at 06:19

## 2020-09-12 RX ADMIN — Medication 10 MILLICURIE: at 09:41

## 2020-09-12 RX ADMIN — ATORVASTATIN CALCIUM 40 MG: 40 TABLET, FILM COATED ORAL at 20:55

## 2020-09-12 RX ADMIN — ENOXAPARIN SODIUM 30 MG: 30 INJECTION SUBCUTANEOUS at 14:38

## 2020-09-12 RX ADMIN — METOPROLOL SUCCINATE 25 MG: 25 TABLET, EXTENDED RELEASE ORAL at 11:06

## 2020-09-12 RX ADMIN — OXYCODONE HYDROCHLORIDE AND ACETAMINOPHEN 500 MG: 500 TABLET ORAL at 11:06

## 2020-09-12 RX ADMIN — FERROUS SULFATE TAB 325 MG (65 MG ELEMENTAL FE) 325 MG: 325 (65 FE) TAB at 18:09

## 2020-09-12 RX ADMIN — ACETAMINOPHEN 650 MG: 325 TABLET ORAL at 14:52

## 2020-09-12 RX ADMIN — FUROSEMIDE 10 MG: 10 INJECTION, SOLUTION INTRAVENOUS at 10:14

## 2020-09-12 RX ADMIN — SODIUM CHLORIDE, PRESERVATIVE FREE 10 ML: 5 INJECTION INTRAVENOUS at 11:07

## 2020-09-12 RX ADMIN — ANASTROZOLE 1 MG: 1 TABLET ORAL at 11:05

## 2020-09-12 RX ADMIN — FOLIC ACID 1 MG: 1 TABLET ORAL at 11:05

## 2020-09-12 RX ADMIN — AMIODARONE HYDROCHLORIDE 200 MG: 200 TABLET ORAL at 11:05

## 2020-09-12 ASSESSMENT — PAIN DESCRIPTION - PAIN TYPE: TYPE: ACUTE PAIN

## 2020-09-12 ASSESSMENT — PAIN DESCRIPTION - PROGRESSION: CLINICAL_PROGRESSION: NOT CHANGED

## 2020-09-12 ASSESSMENT — PAIN - FUNCTIONAL ASSESSMENT: PAIN_FUNCTIONAL_ASSESSMENT: ACTIVITIES ARE NOT PREVENTED

## 2020-09-12 ASSESSMENT — PAIN SCALES - GENERAL
PAINLEVEL_OUTOF10: 0
PAINLEVEL_OUTOF10: 3
PAINLEVEL_OUTOF10: 0

## 2020-09-12 ASSESSMENT — PAIN DESCRIPTION - DESCRIPTORS: DESCRIPTORS: HEADACHE

## 2020-09-12 ASSESSMENT — PAIN DESCRIPTION - LOCATION: LOCATION: HEAD

## 2020-09-12 ASSESSMENT — PAIN DESCRIPTION - ONSET: ONSET: ON-GOING

## 2020-09-12 ASSESSMENT — PAIN DESCRIPTION - FREQUENCY: FREQUENCY: INTERMITTENT

## 2020-09-12 NOTE — PROGRESS NOTES
Spoke to Stephen in [place] regarding order for NM renal with lasix needs done today. States will be done this afternoon.

## 2020-09-12 NOTE — PROGRESS NOTES
NM states no nurse available to do NM renal with lasix. States if we clarify with urology if it is necessary today and can't wait until Monday, they will call someone out. Paged urology.

## 2020-09-12 NOTE — CONSULTS
Department of Internal Medicine  Nephrology Attending Consult Note      Reason for Consult: ARTURO  Requesting Physician:  Claudia BYRD    CHIEF COMPLAINT:  cramps    History Obtained From:  patient, electronic medical record    HISTORY OF PRESENT ILLNESS:    Briefly Mrs. Tamia Nieves is a 56-year-old [facility] American woman with history of hypertension, type 2 diabetes mellitus over 2 years, breast cancer status post bilateral mastectomy, CVA with residual left-sided weakness,  CABG with ARTURO in June 2020 that recovered with iv fluids, hysterectomy, cervical cancer presently on chemotherapy and s/p multiple radiation therapy, CAD with previous NSTEMI . Patient was admitted yesterday for LLQ pain for three days prior to arrival. Work up in ED revealed  Elevated serum creatinine of 2.4 mg/dl increased from baseline 1.2 mg/dl therefore this consultation was requested. Review of her medical record showed no administration of IV contrast, nor administration of NSAIDs. Presently reports no shortness of breath, no urinary symptoms.      Past Medical History:        Diagnosis Date    Breast cancer ([facility] [address] 2000, 2005    right    Breast cancer ([facility] [address] 2020    left    CAD (coronary artery disease)     follows with Dr. Edie Robertson CVA (cerebral vascular accident) [facility]) 2015    no deficits    DM (diabetes mellitus) ([facility] [address]     H/O: GI bleed     History of blood transfusion 02/2020    Hyperlipidemia     Hypertension      Past Surgical History:        Procedure Laterality Date    APPENDECTOMY      BREAST RECONSTRUCTION Right     BREAST SURGERY  2000    right masectomy    COLONOSCOPY      CORONARY ANGIOPLASTY WITH STENT PLACEMENT  03/04/2020    Mini Vision 2.5 x 18 stent deployed proximal LAD by DR Meredith Haines    CORONARY ARTERY BYPASS GRAFT N/A 6/16/2020    CORONARY ARTERY BYPASS, MARY JO performed by Prasanna Griggs MD at [address]      right    HYSTERECTOMY      fibroids    KNEE ARTHROSCOPY      left    MASTECTOMY Right 2000    MASTECTOMY, MODIFIED RADICAL Left 2/25/2020    LEFT BREAST SIMPLE MASTECTOMY performed by Nely Padron MD at [facility], MODIFIED RADICAL Left 2/25/2020    LEFT BREAST MASTECTOMY POST OP BLEED CONTROL, EVACUATION OF LEFT CHEST WALL HEMATOMA performed by Nely Padron MD at [facility] ENDOSCOPY      UPPER GASTROINTESTINAL ENDOSCOPY N/A 2/5/2020    EGD BIOPSY performed by Nely Padron MD at [facility] N/A 6/12/2020    EGD BIOPSY performed by Nely Padron MD at [address] [place] [address]     Current Medications:    Current Facility-Administered Medications: sodium bicarbonate 75 mEq in sodium chloride 0.45 % 1,000 mL infusion, , Intravenous, Continuous  sodium chloride flush 0.9 % injection 10 mL, 10 mL, Intravenous, 2 times per day  sodium chloride flush 0.9 % injection 10 mL, 10 mL, Intravenous, PRN  acetaminophen (TYLENOL) tablet 650 mg, 650 mg, Oral, Q6H PRN **OR** acetaminophen (TYLENOL) suppository 650 mg, 650 mg, Rectal, Q6H PRN  polyethylene glycol (GLYCOLAX) packet 17 g, 17 g, Oral, Daily PRN  promethazine (PHENERGAN) tablet 12.5 mg, 12.5 mg, Oral, Q6H PRN **OR** ondansetron (ZOFRAN) injection 4 mg, 4 mg, Intravenous, Q6H PRN  enoxaparin (LOVENOX) injection 30 mg, 30 mg, Subcutaneous, Daily  anastrozole (ARIMIDEX) tablet 1 mg, 1 mg, Oral, Daily  atorvastatin (LIPITOR) tablet 40 mg, 40 mg, Oral, Nightly  clopidogrel (PLAVIX) tablet 75 mg, 75 mg, Oral, Daily  docusate sodium (COLACE) capsule 100 mg, 100 mg, Oral, BID  ferrous sulfate (IRON 325) tablet 325 mg, 325 mg, Oral, BID WC  folic acid (FOLVITE) tablet 1 mg, 1 mg, Oral, Daily  gabapentin (NEURONTIN) capsule 300 mg, 300 mg, Oral, Nightly  metoprolol succinate (TOPROL XL) extended release tablet 25 mg, 25 mg, Oral, Daily  pantoprazole (PROTONIX) tablet 40 mg, 40 mg, Oral, BID AC  ascorbic acid (VITAMIN C) tablet 500 mg, 500 mg, Oral, BID  cefTRIAXone (ROCEPHIN) 1 g in sterile water 10 mL IV syringe, 1 g, Intravenous, Q24H  metronidazole (FLAGYL) 500 mg in NaCl 100 mL IVPB premix, 500 mg, Intravenous, Q8H  HYDROcodone-acetaminophen (NORCO) 5-325 MG per tablet 1 tablet, 1 tablet, Oral, Q6H PRN  morphine (PF) injection 2 mg, 2 mg, Intravenous, Q3H PRN  magnesium oxide (MAG-OX) tablet 400 mg, 400 mg, Oral, Daily  amiodarone (CORDARONE) tablet 200 mg, 200 mg, Oral, Daily  Allergies:  Penicillins    Social History:    TOBACCO: quit 40 years ago  ETOH:  6 drinks per week in the past  DRUGS:  Used weed in the past    Family History:       Problem Relation Age of Onset    Stroke Mother     Heart Failure Mother     Other Mother         ICD    Breast Cancer Mother     Pacemaker Father     Heart Failure Father     Hypertension Sister      REVIEW OF SYSTEMS:    CONSTITUTIONAL:  positive for  fatigue  EYES:  negative  HEENT:  negative  RESPIRATORY:  negative  CARDIOVASCULAR:  negative  GASTROINTESTINAL:  negative  GENITOURINARY:  positive for urinary incontinence  INTEGUMENT/BREAST:  negative  HEMATOLOGIC/LYMPHATIC:  negative  ALLERGIC/IMMUNOLOGIC:  negative  ENDOCRINE:  negative  MUSCULOSKELETAL:  positive for  muscle weakness  NEUROLOGICAL:  negative  BEHAVIOR/PSYCH:  negative  PHYSICAL EXAM:      Vitals:    VITALS:  BP (!) 142/78   Pulse 70   Temp 97.8 °F (36.6 °C) (Axillary)   Resp 16   Ht 5' 5\" (1.651 m)   Wt 212 lb 8 oz (96.4 kg)   SpO2 97%   BMI 35.36 kg/m²   24HR BLOOD PRESSURE RANGE:  Systolic (70WBZ), BWW:695 , Min:100 , CYV:461   ; Diastolic (29GKH), GJS:23, Min:63, Max:80    24HR INTAKE/OUTPUT:    Intake/Output Summary (Last 24 hours) at 9/12/2020 1125  Last data filed at 9/12/2020 1120  Gross per 24 hour   Intake 1360 ml   Output 300 ml   Net 1060 ml     8HR INTAKE OUTPUT:  In: 1360 [P.O.:960; I.V.:400]  Out: 300 [Urine:300]     Constitutional:  Awake alert in no distress  HEENT:  PERRLA  Respiratory:  Lungs clear  Cardiovascular/Edema:  Heart sounds regular  Gastrointestinal:  Abdomen soft  Neurologic:  Trace left side weakness  Skin:  No lesions , old surgical scars at extremities healed well  Other:  No edema     DATA:    CBC:   Lab Results   Component Value Date    WBC 5.4 09/12/2020    RBC 4.43 09/12/2020    HGB 11.5 09/12/2020    HCT 38.1 09/12/2020    MCV 86.0 09/12/2020    MCH 26.0 09/12/2020    MCHC 30.2 09/12/2020    RDW 20.8 09/12/2020     09/12/2020    MPV 11.4 09/12/2020     CMP:    Lab Results   Component Value Date     09/12/2020    K 4.4 09/12/2020    K 4.6 09/11/2020     09/12/2020    CO2 23 09/12/2020    BUN 42 09/12/2020    CREATININE 2.6 09/12/2020    GFRAA 23 09/12/2020    LABGLOM 23 09/12/2020    GLUCOSE 79 09/12/2020    GLUCOSE 96 02/23/2012    PROT 7.4 09/11/2020    LABALBU 3.6 09/11/2020    LABALBU 4.2 02/21/2012    CALCIUM 9.2 09/12/2020    BILITOT 0.4 09/11/2020    ALKPHOS 84 09/11/2020    AST 21 09/11/2020    ALT 15 09/11/2020     Magnesium:    Lab Results   Component Value Date    MG 2.5 09/12/2020     Phosphorus:    Lab Results   Component Value Date    PHOS 5.0 09/12/2020     Uric Acid:    Lab Results   Component Value Date    LABURIC 10.1 01/14/2015     U/A:    Lab Results   Component Value Date    COLORU Yellow 09/11/2020    PROTEINU >=300 09/11/2020    PHUR 6.0 09/11/2020    WBCUA >20 09/11/2020    RBCUA 1-3 09/11/2020    BACTERIA FEW 09/11/2020    CLARITYU CLOUDY 09/11/2020    SPECGRAV 1.025 09/11/2020    LEUKOCYTESUR SMALL 09/11/2020    UROBILINOGEN 1.0 09/11/2020    BILIRUBINUR Negative 09/11/2020    BLOODU MODERATE 09/11/2020    GLUCOSEU Negative 09/11/2020    AMORPHOUS MANY 09/11/2020     Radiology Review: There is moderate left hydroureteronephrosis to the level of the    ureterovesical junction. No obstructing calcification is identified. There are inflammatory changes surrounding the left renal collecting    system and the ureter throughout its course. Multiple subcentimeter    calyceal calcifications are identified elsewhere within the left    kidney. The right kidney is unremarkable. The urinary bladder is    evacuated. IMPRESSION/RECOMMENDATIONS:      Briefly Mrs. Vanesa Ware is a 69-year-old [facility] American woman with history of hypertension, type 2 diabetes mellitus over 2 years, breast cancer status post bilateral mastectomy, CVA with residual left-sided weakness,  CABG with ARTURO in June 2020 that recovered with iv fluids, hysterectomy, cervical cancer presently on chemotherapy and s/p multiple radiation therapy, CAD with previous NSTEMI . Patient was admitted yesterday for LLQ pain for three days prior to arrival. Work up in ED revealed  Elevated serum creatinine of 2.4 mg/dl increased from baseline 1.2 mg/dl therefore this consultation was requested. 1. ARTURO - multifactorial including prerenal state and obstructive uropathy  rule out ATN/AIN  2. Left hydronephrosis/probably related to cervical cancer and radiation therapy   Await cystoscopy and retrograde studies by urology  3. UTI - agree with rocephin  4. Colitis? Radiation- on flagyl    PLAN:    - hold diuretics  - check urine studies  - adjust antibiotic doses to renal function  - check lasix renal scan  - start 1/2ns with 75 meq sodium bicarbonate at 100 cc/hr  - check bmp daily  - monitor intake and output  - keep MAP>70 mm hg    Thank you for this consultation. .. Tonye Cogan Tonye Cogan Tonye Cogan Deidra Goldman MD    Discussed with DONNY

## 2020-09-12 NOTE — PROGRESS NOTES
[facility] Quality Flow/Interdisciplinary Rounds Progress Note        Quality Flow Rounds held on September 12, 2020    Disciplines Attending:  Bedside Nurse,  and     Jane Duffy was admitted on 9/11/2020 10:22 AM    Anticipated Discharge Date:  Expected Discharge Date: 09/14/20    Disposition:    Rojas Score:  Rojas Scale Score: 23    Readmission Risk              Risk of Unplanned Readmission:        30           Discussed patient goal for the day, patient clinical progression, and barriers to discharge. The following Goal(s) of the Day/Commitment(s) have been identified:  check nephro consult's plan, NM renal scan today.       Juan Wang  September 12, 2020

## 2020-09-12 NOTE — PROGRESS NOTES
[facility] Progress Note    Admitting Date and Time: 9/11/2020 10:22 AM  Admit Dx: ARTURO (acute kidney injury) ([facility] [address] [N17.9]  ARTURO (acute kidney injury) ([facility] [address] [N17.9]    Subjective:  Patient is being followed for ARTURO (acute kidney injury) ([facility] [address] [N17.9]  ARTURO (acute kidney injury) ([facility] [address] [N17.9]     Patient awake, alert, sitting on the edge of bed in no acute distress  Reporting she slept well last night  Pt stating that pain on left side is better today- feels like a \" pressure\"  C/o pressure with urination   For lasix renal scan today  Appetite good        ROS: denies fever, chills, cp, sob, n/v, HA unless stated above.       [START ON 9/13/2020] pantoprazole  40 mg Oral QAM AC    sodium chloride flush  10 mL Intravenous 2 times per day    enoxaparin  30 mg Subcutaneous Daily    anastrozole  1 mg Oral Daily    atorvastatin  40 mg Oral Nightly    clopidogrel  75 mg Oral Daily    docusate sodium  100 mg Oral BID    ferrous sulfate  325 mg Oral BID WC    folic acid  1 mg Oral Daily    gabapentin  300 mg Oral Nightly    metoprolol succinate  25 mg Oral Daily    vitamin C  500 mg Oral BID    cefTRIAXone (ROCEPHIN) IV  1 g Intravenous Q24H    metroNIDAZOLE  500 mg Intravenous Q8H    magnesium oxide  400 mg Oral Daily    amiodarone  200 mg Oral Daily     sodium chloride flush, 10 mL, PRN  acetaminophen, 650 mg, Q6H PRN    Or  acetaminophen, 650 mg, Q6H PRN  polyethylene glycol, 17 g, Daily PRN  promethazine, 12.5 mg, Q6H PRN    Or  ondansetron, 4 mg, Q6H PRN  HYDROcodone 5 mg - acetaminophen, 1 tablet, Q6H PRN  morphine, 2 mg, Q3H PRN         Objective:    BP (!) 142/78   Pulse 70   Temp 97.8 °F (36.6 °C) (Axillary)   Resp 16   Ht 5' 5\" (1.651 m)   Wt 212 lb 8 oz (96.4 kg)   SpO2 97%   BMI 35.36 kg/m²      General Appearance: alert and oriented to person, place and time and in no acute distress  Skin: warm and dry- incision right leg- no erythema scabbed   Head: normocephalic and atraumatic  Neck: neck supple and non tender without mass   Pulmonary/Chest: clear to auscultation bilaterally-   Cardiovascular: normal rate, normal S1 and S2 and no carotid bruits  Abdomen: soft, non-tender, non-distended, normal bowel sounds, no masses or organomegaly + CVA tenderness  Extremities: no cyanosis, no clubbing and no edema  Neurologic: speech normal-            Recent Labs     09/11/20  1057 09/12/20  0410    141   K 4.6 4.4    106   CO2 22 23   BUN 38* 42*   CREATININE 2.4* 2.6*   GLUCOSE 94 79   CALCIUM 9.8 9.2       Recent Labs     09/11/20  1057 09/12/20  0410   WBC 5.6 5.4   RBC 4.96 4.43   HGB 12.8 11.5   HCT 41.7 38.1   MCV 84.1 86.0   MCH 25.8* 26.0   MCHC 30.7* 30.2*   RDW 20.9* 20.8*    207   MPV 10.8 11.4           Assessment:    Active Problems:    ARTURO (acute kidney injury) ([facility] [address]  Resolved Problems:    * No resolved hospital problems. *      Plan:  1. ARTURO likely related to obstructive uropathy/ ? Dehydration/ hypotension: pt presented to the ER with complaints of left lower abdominal pain/ left flank pain. Per patient started suddenly 4 days ago. Moderate and constant. Creatinine on admission 2.4. Creatinine from July 1.1. CT showing concern for obstructive uropathy. On furosemide/ ace. BP running lower. Patient reporting she has enriqueta having diarrhea. Urology consulted. IVF. Follow creatinine. Consult Nephrology as creatinine increasing. She was followed by nephrology after CABG in June.      2. Obstructive uropathy: CT abdomen/ pelvis showing obstructive uropathy on the left to level of ureterovesical junction. No obstructing calcification seen. ? Passed stone. Non calcified obstructing lesion not excluded. Consult urology- appreciate input. Renal US shows non obstructing stone. For renal lasix scan.      3.  Possible diverticulitis: pt reporting she did have several loose stools last few days as she ate a type of sausage to help her have BM as she thought she was constipated. Now having formed BMs.      4. Ecoli UTI : associated to above: continue ceftriaxone; follow culture     5. CAD recent CABG in June: currently active in cardiac rehab     6. Ischemic cardiomyopathy: EF on echo in June 30 +/- 5. Non compliant with life vest      7. DM; . hga1c 5.6. on amaryl- hold     8. H/o breast/ cervical cancer     9. H/o CVA     10. HTN: continue meds     11. HLD: statin     12. Right lower leg wound: wound care      Code Status: FULL  DVT prophylaxis: lovenox      NOTE: This report was transcribed using voice recognition software. Every effort was made to ensure accuracy; however, inadvertent computerized transcription errors may be present.   Electronically signed by ROCHELLE Lee on 9/12/2020 at 12:13 PM

## 2020-09-12 NOTE — PROGRESS NOTES
[facility]  (Sabino/ Juan/Cinthia)      PROGRESS NOTE         PATIENT NAME: Audi Camacho   YOB: 1960  ADMISSION DATE: 9/11/2020 10:22 AM   TODAY'S DATE: 9/12/2020        Subjective       Pt feeling much better. JESSICA yesterday after CT shows resolution of hydronephrosis on the right. SCR still elevated. Denies nausea or emesis. Left flank pain has resolved      Objective     VS:   /63   Pulse 76   Temp 97.8 °F (36.6 °C) (Oral)   Resp 16   Ht 5' 5\" (1.651 m)   Wt 212 lb 8 oz (96.4 kg)   SpO2 98%   BMI 35.36 kg/m²     I & O - 24hr:  No intake or output data in the 24 hours ending 09/12/20 6501      Physical Exam:  General:  Neck:  Resp:  Abdomen:   No acute distress  Supple  Normal effort  Soft, non-tender, nondistended   :  Denies left flank pain this morning. Skin: Skin color, texture, turgor normal, no rashes or lesions       Labs and Imaging Studies   Labs:    CBC:   Recent Labs     09/11/20  1057 09/12/20  0410   WBC 5.6 5.4   HGB 12.8 11.5   HCT 41.7 38.1   MCV 84.1 86.0    207     BMP:  Recent Labs     09/11/20  1057 09/12/20  0410    141   K 4.6 4.4    106   CO2 22 23   PHOS  --  5.0*   BUN 38* 42*   CREATININE 2.4* 2.6*       Magnesium:   Lab Results   Component Value Date    MG 2.5 09/12/2020      Phosphate:   Lab Results   Component Value Date    PHOS 5.0 09/12/2020     PT/INR: No results for input(s): PROTIME, INR in the last 72 hours. U/A:   Lab Results   Component Value Date    LEUKOCYTESUR SMALL 09/11/2020    PHUR 6.0 09/11/2020    WBCUA >20 09/11/2020    RBCUA 1-3 09/11/2020    BACTERIA FEW 09/11/2020    SPECGRAV 1.025 09/11/2020    BLOODU MODERATE 09/11/2020    GLUCOSEU Negative 09/11/2020       Urine Culture:       Component Value Date/Time    LABURIN >100,000 CFU/ml 06/09/2020 1030        Blood Culture: none    Imaging Studies:    JESSICA:   Nonobstructing nephrolithiasis in the inferior pole of the    left kidney with a 0.8 cm stone. No hydronephrosis. Normal Doppler interrogation of the kidneys. Assessment and Plan       ASSESMENT:  64 y/o F with left flank pain hydronephrosis on left side on CT, Likely left phlebolith     PLAN:  Regular diet. Unable to get NM  Lasix today. Will watch SCr   If pain returns will try to NM lasix on Monday if patient is still admitted. With no hydro on JESSICA after CT I do not feel that cystoscopy and retrograde pyelograms are indicated at this time.    Will follow      Viraj Soliz MD  [facility]  9/12/2020  8:12 AM

## 2020-09-13 LAB
ALBUMIN SERPL-MCNC: 3.1 G/DL (ref 3.5–5.2)
ALP BLD-CCNC: 88 U/L (ref 35–104)
ALT SERPL-CCNC: 18 U/L (ref 0–32)
ANION GAP SERPL CALCULATED.3IONS-SCNC: 9 MMOL/L (ref 7–16)
AST SERPL-CCNC: 21 U/L (ref 0–31)
BILIRUB SERPL-MCNC: <0.2 MG/DL (ref 0–1.2)
BUN BLDV-MCNC: 38 MG/DL (ref 8–23)
CALCIUM SERPL-MCNC: 8.6 MG/DL (ref 8.6–10.2)
CHLORIDE BLD-SCNC: 107 MMOL/L (ref 98–107)
CO2: 26 MMOL/L (ref 22–29)
CREAT SERPL-MCNC: 1.9 MG/DL (ref 0.5–1)
GFR AFRICAN AMERICAN: 32
GFR NON-AFRICAN AMERICAN: 32 ML/MIN/1.73
GLUCOSE BLD-MCNC: 78 MG/DL (ref 74–99)
ORGANISM: ABNORMAL
POTASSIUM SERPL-SCNC: 4.3 MMOL/L (ref 3.5–5)
PRO-BNP: 1983 PG/ML (ref 0–125)
SODIUM BLD-SCNC: 142 MMOL/L (ref 132–146)
TOTAL PROTEIN: 6.3 G/DL (ref 6.4–8.3)
URINE CULTURE, ROUTINE: ABNORMAL

## 2020-09-13 PROCEDURE — 36415 COLL VENOUS BLD VENIPUNCTURE: CPT

## 2020-09-13 PROCEDURE — 80053 COMPREHEN METABOLIC PANEL: CPT

## 2020-09-13 PROCEDURE — 6360000002 HC RX W HCPCS: Performed by: INTERNAL MEDICINE

## 2020-09-13 PROCEDURE — P9047 ALBUMIN (HUMAN), 25%, 50ML: HCPCS | Performed by: INTERNAL MEDICINE

## 2020-09-13 PROCEDURE — 2500000003 HC RX 250 WO HCPCS: Performed by: NURSE PRACTITIONER

## 2020-09-13 PROCEDURE — 2500000003 HC RX 250 WO HCPCS: Performed by: INTERNAL MEDICINE

## 2020-09-13 PROCEDURE — 6370000000 HC RX 637 (ALT 250 FOR IP): Performed by: INTERNAL MEDICINE

## 2020-09-13 PROCEDURE — 2580000003 HC RX 258: Performed by: NURSE PRACTITIONER

## 2020-09-13 PROCEDURE — 83880 ASSAY OF NATRIURETIC PEPTIDE: CPT

## 2020-09-13 PROCEDURE — 6360000002 HC RX W HCPCS: Performed by: NURSE PRACTITIONER

## 2020-09-13 PROCEDURE — 2580000003 HC RX 258: Performed by: INTERNAL MEDICINE

## 2020-09-13 PROCEDURE — APPSS30 APP SPLIT SHARED TIME 16-30 MINUTES: Performed by: NURSE PRACTITIONER

## 2020-09-13 PROCEDURE — 2060000000 HC ICU INTERMEDIATE R&B

## 2020-09-13 PROCEDURE — 6370000000 HC RX 637 (ALT 250 FOR IP): Performed by: NURSE PRACTITIONER

## 2020-09-13 PROCEDURE — 99232 SBSQ HOSP IP/OBS MODERATE 35: CPT | Performed by: INTERNAL MEDICINE

## 2020-09-13 RX ORDER — ALBUMIN (HUMAN) 12.5 G/50ML
25 SOLUTION INTRAVENOUS EVERY 8 HOURS
Status: COMPLETED | OUTPATIENT
Start: 2020-09-13 | End: 2020-09-14

## 2020-09-13 RX ORDER — DEXTROSE AND SODIUM CHLORIDE 5; .45 G/100ML; G/100ML
INJECTION, SOLUTION INTRAVENOUS CONTINUOUS
Status: DISCONTINUED | OUTPATIENT
Start: 2020-09-13 | End: 2020-09-14

## 2020-09-13 RX ADMIN — AMIODARONE HYDROCHLORIDE 200 MG: 200 TABLET ORAL at 09:28

## 2020-09-13 RX ADMIN — DOCUSATE SODIUM 100 MG: 100 CAPSULE, LIQUID FILLED ORAL at 09:29

## 2020-09-13 RX ADMIN — OXYCODONE HYDROCHLORIDE AND ACETAMINOPHEN 500 MG: 500 TABLET ORAL at 20:41

## 2020-09-13 RX ADMIN — METRONIDAZOLE 500 MG: 500 INJECTION, SOLUTION INTRAVENOUS at 14:19

## 2020-09-13 RX ADMIN — MAGNESIUM OXIDE TAB 400 MG (241.3 MG ELEMENTAL MG) 400 MG: 400 (241.3 MG) TAB at 09:29

## 2020-09-13 RX ADMIN — FOLIC ACID 1 MG: 1 TABLET ORAL at 09:29

## 2020-09-13 RX ADMIN — ATORVASTATIN CALCIUM 40 MG: 40 TABLET, FILM COATED ORAL at 20:41

## 2020-09-13 RX ADMIN — METRONIDAZOLE 500 MG: 500 INJECTION, SOLUTION INTRAVENOUS at 06:09

## 2020-09-13 RX ADMIN — ENOXAPARIN SODIUM 30 MG: 30 INJECTION SUBCUTANEOUS at 14:19

## 2020-09-13 RX ADMIN — ANASTROZOLE 1 MG: 1 TABLET ORAL at 09:28

## 2020-09-13 RX ADMIN — ALBUMIN (HUMAN) 25 G: 0.25 INJECTION, SOLUTION INTRAVENOUS at 15:44

## 2020-09-13 RX ADMIN — SODIUM CHLORIDE, PRESERVATIVE FREE 10 ML: 5 INJECTION INTRAVENOUS at 09:28

## 2020-09-13 RX ADMIN — CEFTRIAXONE 1 G: 1 INJECTION, POWDER, FOR SOLUTION INTRAMUSCULAR; INTRAVENOUS at 14:19

## 2020-09-13 RX ADMIN — METRONIDAZOLE 500 MG: 500 INJECTION, SOLUTION INTRAVENOUS at 22:18

## 2020-09-13 RX ADMIN — FERROUS SULFATE TAB 325 MG (65 MG ELEMENTAL FE) 325 MG: 325 (65 FE) TAB at 09:28

## 2020-09-13 RX ADMIN — OXYCODONE HYDROCHLORIDE AND ACETAMINOPHEN 500 MG: 500 TABLET ORAL at 09:29

## 2020-09-13 RX ADMIN — SODIUM CHLORIDE, PRESERVATIVE FREE 10 ML: 5 INJECTION INTRAVENOUS at 20:42

## 2020-09-13 RX ADMIN — PANTOPRAZOLE SODIUM 40 MG: 40 TABLET, DELAYED RELEASE ORAL at 06:09

## 2020-09-13 RX ADMIN — GABAPENTIN 300 MG: 300 CAPSULE ORAL at 20:41

## 2020-09-13 RX ADMIN — DEXTROSE AND SODIUM CHLORIDE: 5; 450 INJECTION, SOLUTION INTRAVENOUS at 14:20

## 2020-09-13 RX ADMIN — DOCUSATE SODIUM 100 MG: 100 CAPSULE, LIQUID FILLED ORAL at 20:41

## 2020-09-13 RX ADMIN — METOPROLOL SUCCINATE 25 MG: 25 TABLET, EXTENDED RELEASE ORAL at 09:29

## 2020-09-13 RX ADMIN — CLOPIDOGREL BISULFATE 75 MG: 75 TABLET ORAL at 09:29

## 2020-09-13 RX ADMIN — SODIUM BICARBONATE: 84 INJECTION, SOLUTION INTRAVENOUS at 01:09

## 2020-09-13 RX ADMIN — ALBUMIN (HUMAN) 25 G: 0.25 INJECTION, SOLUTION INTRAVENOUS at 23:26

## 2020-09-13 RX ADMIN — FERROUS SULFATE TAB 325 MG (65 MG ELEMENTAL FE) 325 MG: 325 (65 FE) TAB at 16:40

## 2020-09-13 RX ADMIN — HYDROCODONE BITARTRATE AND ACETAMINOPHEN 1 TABLET: 5; 325 TABLET ORAL at 14:19

## 2020-09-13 ASSESSMENT — PAIN DESCRIPTION - FREQUENCY: FREQUENCY: INTERMITTENT

## 2020-09-13 ASSESSMENT — PAIN DESCRIPTION - DESCRIPTORS: DESCRIPTORS: ACHING;SORE

## 2020-09-13 ASSESSMENT — PAIN - FUNCTIONAL ASSESSMENT: PAIN_FUNCTIONAL_ASSESSMENT: ACTIVITIES ARE NOT PREVENTED

## 2020-09-13 ASSESSMENT — PAIN SCALES - GENERAL
PAINLEVEL_OUTOF10: 0
PAINLEVEL_OUTOF10: 7

## 2020-09-13 ASSESSMENT — PAIN DESCRIPTION - PROGRESSION
CLINICAL_PROGRESSION: NOT CHANGED
CLINICAL_PROGRESSION: NOT CHANGED

## 2020-09-13 ASSESSMENT — PAIN DESCRIPTION - ORIENTATION: ORIENTATION: RIGHT

## 2020-09-13 ASSESSMENT — PAIN DESCRIPTION - PAIN TYPE: TYPE: ACUTE PAIN

## 2020-09-13 ASSESSMENT — PAIN DESCRIPTION - LOCATION: LOCATION: FOOT

## 2020-09-13 ASSESSMENT — PAIN DESCRIPTION - ONSET: ONSET: ON-GOING

## 2020-09-13 NOTE — PROGRESS NOTES
Department of Internal Medicine  Nephrology Attending Progress Note  Events reviewed. SUBJECTIVE:  We are following Mrs. Angelika Olson for ARTURO. She reports no complaints, states that she feels fine.     PHYSICAL EXAM:      Vitals:    VITALS:  /76   Pulse 67   Temp 98 °F (36.7 °C) (Oral)   Resp 16   Ht 5' 5\" (1.651 m)   Wt 161 lb 4.8 oz (73.2 kg)   SpO2 97%   BMI 26.84 kg/m²   24HR BLOOD PRESSURE RANGE:  Systolic (01LUD), HDV:747 , Min:117 , MSO:772   ; Diastolic (58HGM), IJA:53, Min:62, Max:87    24HR INTAKE/OUTPUT:      Intake/Output Summary (Last 24 hours) at 9/13/2020 1305  Last data filed at 9/13/2020 0845  Gross per 24 hour   Intake 1196.67 ml   Output 1000 ml   Net 196.67 ml     8HR INTAKE OUTPUT:  In: 240 [P.O.:240]  Out: 600 [Urine:600]     Constitutional:  Awake alert in no distress  HEENT:  PERRLA  Respiratory:  Faint crackles bilateral bases  Cardiovascular/Edema:  Heart sounds regular  Gastrointestinal:  Abdomen soft  Neurologic:  Trace left side weakness  Skin:  No lesions, scarring/keloid at saphenous vein graft sites on RLE  Other:  No edema     Scheduled Meds:   pantoprazole  40 mg Oral QAM AC    sodium chloride flush  10 mL Intravenous 2 times per day    enoxaparin  30 mg Subcutaneous Daily    anastrozole  1 mg Oral Daily    atorvastatin  40 mg Oral Nightly    clopidogrel  75 mg Oral Daily    docusate sodium  100 mg Oral BID    ferrous sulfate  325 mg Oral BID WC    folic acid  1 mg Oral Daily    gabapentin  300 mg Oral Nightly    metoprolol succinate  25 mg Oral Daily    vitamin C  500 mg Oral BID    cefTRIAXone (ROCEPHIN) IV  1 g Intravenous Q24H    metroNIDAZOLE  500 mg Intravenous Q8H    magnesium oxide  400 mg Oral Daily    amiodarone  200 mg Oral Daily     Continuous Infusions:   sodium bicarbonate infusion 100 mL/hr at 09/13/20 0109     PRN Meds:.sodium chloride flush, acetaminophen **OR** acetaminophen, polyethylene glycol, promethazine **OR** ondansetron, HYDROcodone 5 mg - acetaminophen, morphine    DATA:    CBC:   Lab Results   Component Value Date    WBC 5.4 09/12/2020    RBC 4.43 09/12/2020    HGB 11.5 09/12/2020    HCT 38.1 09/12/2020    MCV 86.0 09/12/2020    MCH 26.0 09/12/2020    MCHC 30.2 09/12/2020    RDW 20.8 09/12/2020     09/12/2020    MPV 11.4 09/12/2020     CMP:    Lab Results   Component Value Date     09/13/2020    K 4.3 09/13/2020    K 4.6 09/11/2020     09/13/2020    CO2 26 09/13/2020    BUN 38 09/13/2020    CREATININE 1.9 09/13/2020    GFRAA 32 09/13/2020    LABGLOM 32 09/13/2020    GLUCOSE 78 09/13/2020    GLUCOSE 96 02/23/2012    PROT 6.3 09/13/2020    LABALBU 3.1 09/13/2020    LABALBU 4.2 02/21/2012    CALCIUM 8.6 09/13/2020    BILITOT <0.2 09/13/2020    ALKPHOS 88 09/13/2020    AST 21 09/13/2020    ALT 18 09/13/2020     Magnesium:    Lab Results   Component Value Date    MG 2.5 09/12/2020     Phosphorus:    Lab Results   Component Value Date    PHOS 5.0 09/12/2020     Uric Acid:    Lab Results   Component Value Date    LABURIC 10.1 01/14/2015     U/A:    Lab Results   Component Value Date    COLORU Yellow 09/11/2020    PROTEINU >=300 09/11/2020    PHUR 6.0 09/11/2020    WBCUA >20 09/11/2020    RBCUA 1-3 09/11/2020    BACTERIA FEW 09/11/2020    CLARITYU CLOUDY 09/11/2020    SPECGRAV 1.025 09/11/2020    LEUKOCYTESUR SMALL 09/11/2020    UROBILINOGEN 1.0 09/11/2020    BILIRUBINUR Negative 09/11/2020    BLOODU MODERATE 09/11/2020    GLUCOSEU Negative 09/11/2020    AMORPHOUS MANY 09/11/2020     Radiology Review: There is moderate left hydroureteronephrosis to the level of the    ureterovesical junction. No obstructing calcification is identified. There are inflammatory changes surrounding the left renal collecting    system and the ureter throughout its course. Multiple subcentimeter    calyceal calcifications are identified elsewhere within the left    kidney. The right kidney is unremarkable.  The urinary bladder is evacuated. BRIEF SUMMARY OF INITIAL CONSULT:    Briefly Mrs. Ashly Rodriguez is a 56-year-old [facility] American woman with history of hypertension, type 2 diabetes mellitus over 2 years, breast cancer status post bilateral mastectomy, CVA with residual left-sided weakness,  CABG with ARTURO in June 2020 that recovered with iv fluids, hysterectomy, cervical cancer presently on chemotherapy and s/p multiple radiation therapy, CAD with previous NSTEMI . Patient was admitted yesterday for LLQ pain for three days prior to arrival. Work up in ED revealed elevated serum creatinine of 2.4 mg/dL increased from baseline 1.2 mg/dl therefore this consultation was requested. IMPRESSION/RECOMMENDATIONS:      1. ARTURO stage II, volume responsive prerenal ARTURO due to decreased PO intake in the setting of diarrhea, lasix and ACE inhibition versus obstructive uropathy. Initial CT of the abdomen pelvis showed obstruction on the left at the level of the UV junction. NM lasix renal scan without evidence of obstruction. ·  Renal function continued to improve in response to IVF administration. Creatinine down to 1.9 mg/dL today with adequate urine output. Will cut down IVF rate. 2. Left hydronephrosis without clear evidence of obstructing calculi, possibly related to cervical cancer and radiation therapy. Urology following, s/p lasix renal scan with no obstruction. 3. UTI, on rocephin  4. Colitis? Radiation, on flagyl  5.  HFrEF 30%, with stage III diastolic dysfunction    PLAN:    · Change IVF to D5 1/2 normal saline @ 75 mL/hr  · Obtain Pro-BNP  · Continue to hold diuretics and ACE inhibition  · Continue to monitor BMP  · Maintain strict I&Os  · Follow up in office in 2-3 weeks p[ost discharge    Electronically signed by ANNA MARIE Garcia CNP on 9/13/2020 at 1:06 PM

## 2020-09-13 NOTE — PROGRESS NOTES
[facility]  (Sabino/ Juan/Cinthia)      PROGRESS NOTE         PATIENT NAME: Mark Powers   YOB: 1960  ADMISSION DATE: 9/11/2020 10:22 AM   TODAY'S DATE: 9/13/2020        Subjective       Pt doing well. No flank pain. SCr 1.9 from 2.6  Renal lasix scan shows no obstruction. JESSICA negative for hydronephrosis. UTI - e. coli      Objective     VS:   /76   Pulse 67   Temp 98 °F (36.7 °C) (Oral)   Resp 16   Ht 5' 5\" (1.651 m)   Wt 161 lb 4.8 oz (73.2 kg)   SpO2 97%   BMI 26.84 kg/m²     I & O - 24hr:    Intake/Output Summary (Last 24 hours) at 9/13/2020 1042  Last data filed at 9/13/2020 0845  Gross per 24 hour   Intake 2076.67 ml   Output 1400 ml   Net 676.67 ml         Physical Exam:  General:  Neck:  Resp:  Abdomen:   No acute distress  Supple  Normal effort  Soft, non-tender, nondistended   :  deferred   Skin: Skin color, texture, turgor normal, no rashes or lesions       Labs and Imaging Studies   Labs:    CBC:   Recent Labs     09/11/20  1057 09/12/20  0410   WBC 5.6 5.4   HGB 12.8 11.5   HCT 41.7 38.1   MCV 84.1 86.0    207     BMP:  Recent Labs     09/11/20  1057 09/12/20  0410 09/13/20  0330    141 142   K 4.6 4.4 4.3    106 107   CO2 22 23 26   PHOS  --  5.0*  --    BUN 38* 42* 38*   CREATININE 2.4* 2.6* 1.9*       Magnesium:   Lab Results   Component Value Date    MG 2.5 09/12/2020      Phosphate:   Lab Results   Component Value Date    PHOS 5.0 09/12/2020     PT/INR: No results for input(s): PROTIME, INR in the last 72 hours.     U/A:   Lab Results   Component Value Date    LEUKOCYTESUR SMALL 09/11/2020    PHUR 6.0 09/11/2020    WBCUA >20 09/11/2020    RBCUA 1-3 09/11/2020    BACTERIA FEW 09/11/2020    SPECGRAV 1.025 09/11/2020    BLOODU MODERATE 09/11/2020    GLUCOSEU Negative 09/11/2020       Urine Culture:       Component Value Date/Time    LABURIN >100,000 CFU/ml 09/11/2020 1057        Blood Culture: none    Imaging Studies:    JESSICA: Nonobstructing nephrolithiasis in the inferior pole of the    left kidney with a 0.8 cm stone. No hydronephrosis. Normal Doppler interrogation of the kidneys. NM Lasix:   Impression    The findings suggest impaired renal function, no convincing evidence    for obstruction comparing to the previous ultrasound                Assessment and Plan       ASSESMENT:  62 y/o F with left flank pain hydronephrosis on left side on CT, Likely left phlebolith        PLAN:  Regular diet. Lasix scan along with JESSICA do not show obstruction. With SCr trending down and no symptoms at this time I am not worried about obstruction of the left or right collecting systems. Treatment of her UTI per primary. No futher  interventions. Please call if needed.        Rachana Barraza MD  [facility]  9/13/2020  10:42 AM

## 2020-09-13 NOTE — PROGRESS NOTES
[facility] Progress Note    Admitting Date and Time: 9/11/2020 10:22 AM  Admit Dx: ARTURO (acute kidney injury) ([facility] [N17.9]  ARTURO (acute kidney injury) ([facility] [N17.9]    Subjective:  Patient is being followed for ARTURO (acute kidney injury) ([facility] [N17.9]  ARTURO (acute kidney injury) ([facility] [N17.9]       Patient awake, alert, resting in bed in no acute distress  Reporting she slept well  Abdominal discomfort continuing to improve  Reporting mild pain with BM  Denies fevers or chills        ROS: denies fever, chills, cp, sob, n/v, HA unless stated above.       pantoprazole  40 mg Oral QAM AC    sodium chloride flush  10 mL Intravenous 2 times per day    enoxaparin  30 mg Subcutaneous Daily    anastrozole  1 mg Oral Daily    atorvastatin  40 mg Oral Nightly    clopidogrel  75 mg Oral Daily    docusate sodium  100 mg Oral BID    ferrous sulfate  325 mg Oral BID WC    folic acid  1 mg Oral Daily    gabapentin  300 mg Oral Nightly    metoprolol succinate  25 mg Oral Daily    vitamin C  500 mg Oral BID    cefTRIAXone (ROCEPHIN) IV  1 g Intravenous Q24H    metroNIDAZOLE  500 mg Intravenous Q8H    magnesium oxide  400 mg Oral Daily    amiodarone  200 mg Oral Daily     sodium chloride flush, 10 mL, PRN  acetaminophen, 650 mg, Q6H PRN    Or  acetaminophen, 650 mg, Q6H PRN  polyethylene glycol, 17 g, Daily PRN  promethazine, 12.5 mg, Q6H PRN    Or  ondansetron, 4 mg, Q6H PRN  HYDROcodone 5 mg - acetaminophen, 1 tablet, Q6H PRN  morphine, 2 mg, Q3H PRN         Objective:    /76   Pulse 67   Temp 98 °F (36.7 °C) (Oral)   Resp 16   Ht 5' 5\" (1.651 m)   Wt 161 lb 4.8 oz (73.2 kg)   SpO2 97%   BMI 26.84 kg/m²      General Appearance: alert and oriented to person, place and time and in no acute distress  Skin: warm and dry- incision right leg- no erythema scabbed   Head: normocephalic and atraumatic  Neck: neck supple and non tender without mass   Pulmonary/Chest: clear to auscultation bilaterally-   Cardiovascular: normal rate, normal S1 and S2 and no carotid bruits  Abdomen: soft, non-tender, non-distended, normal bowel sounds, no masses or organomegaly + CVA tenderness  Extremities: no cyanosis, no clubbing and no edema  Neurologic: speech normal-            Recent Labs     09/11/20  1057 09/12/20  0410 09/13/20  0330    141 142   K 4.6 4.4 4.3    106 107   CO2 22 23 26   BUN 38* 42* 38*   CREATININE 2.4* 2.6* 1.9*   GLUCOSE 94 79 78   CALCIUM 9.8 9.2 8.6       Recent Labs     09/11/20  1057 09/12/20  0410   WBC 5.6 5.4   RBC 4.96 4.43   HGB 12.8 11.5   HCT 41.7 38.1   MCV 84.1 86.0   MCH 25.8* 26.0   MCHC 30.7* 30.2*   RDW 20.9* 20.8*    207   MPV 10.8 11.4         Assessment:    Active Problems:    ARTURO (acute kidney injury) ([facility]  Resolved Problems:    * No resolved hospital problems. *      Plan:  1.  ARTURO likely related to obstructive uropathy/ ? Dehydration/ hypotension: pt presented to the ER with complaints of left lower abdominal pain/ left flank pain. Per patient started suddenly 4 days PTA. Moderate and constant. Creatinine on admission 2.4. Creatinine from July 1.1. CT showing concern for obstructive uropathy. On furosemide/ ace. BP running lower on admission. Patient reporting she has been having diarrhea. Urology consulted. IVF. Follow creatinine. She was followed by nephrology after CABG in June. Nephrology following- appreciate input. On bicarb gtt. Creatinine slowly trending down. Cr 1.9. Lasix renal scan noted- impaired renal function- no evidence of obstruction.     2. Obstructive uropathy: CT abdomen/ pelvis showing obstructive uropathy on the left to level of ureterovesical junction. No obstructing calcification seen. ? Passed stone. Non calcified obstructing lesion not excluded. Consult urology- appreciate input. Renal US shows non obstructing stone. Lasix renal scan showing impaired renal function- no evidence of obstruction.       3.  Possible diverticulitis: pt reporting she did have several loose stools last few days as she ate a type of sausage to help her have BM as she thought she was constipated. Now having formed BMs.      4. Ecoli UTI : associated to above: continue ceftriaxone; culture- reviewed/ sensitive.      5. CAD recent CABG in June: currently active in cardiac rehab     6. Ischemic cardiomyopathy: EF on echo in June 30 +/- 5. Non compliant with life vest      7. DM; . hga1c 5.6. on amaryl- hold amaryl     8. H/o breast/ cervical cancer     9. H/o CVA     10. HTN: bp initially low on admission. Holding ace.      11.  HLD: statin      12. Right lower leg wound: wound care     Code Status: FULL  DVT prophylaxis: lovenox      NOTE: This report was transcribed using voice recognition software. Every effort was made to ensure accuracy; however, inadvertent computerized transcription errors may be present.   Electronically signed by ROCHELLE Tom on 9/13/2020 at 10:42 AM

## 2020-09-13 NOTE — PLAN OF CARE
Problem: Falls - Risk of:  Goal: Will remain free from falls  Description: Will remain free from falls  9/12/2020 2204 by Gail Alvarez RN  Outcome: Met This Shift     Problem: Falls - Risk of:  Goal: Absence of physical injury  Description: Absence of physical injury  9/12/2020 2204 by Gail Alvarez RN  Outcome: Met This Shift     Problem: Pain:  Goal: Control of acute pain  Description: Control of acute pain  9/12/2020 2204 by Gail Alvarez RN  Outcome: Met This Shift

## 2020-09-14 ENCOUNTER — HOSPITAL ENCOUNTER (OUTPATIENT)
Dept: CARDIAC REHAB | Age: 60
Setting detail: THERAPIES SERIES
End: 2020-09-14
Payer: MEDICARE

## 2020-09-14 VITALS
SYSTOLIC BLOOD PRESSURE: 126 MMHG | BODY MASS INDEX: 38.3 KG/M2 | TEMPERATURE: 98.8 F | HEIGHT: 65 IN | RESPIRATION RATE: 16 BRPM | WEIGHT: 229.9 LBS | HEART RATE: 82 BPM | DIASTOLIC BLOOD PRESSURE: 76 MMHG | OXYGEN SATURATION: 97 %

## 2020-09-14 LAB
ANION GAP SERPL CALCULATED.3IONS-SCNC: 11 MMOL/L (ref 7–16)
BUN BLDV-MCNC: 29 MG/DL (ref 8–23)
CALCIUM SERPL-MCNC: 9.1 MG/DL (ref 8.6–10.2)
CHLORIDE BLD-SCNC: 107 MMOL/L (ref 98–107)
CO2: 23 MMOL/L (ref 22–29)
CREAT SERPL-MCNC: 1.4 MG/DL (ref 0.5–1)
GFR AFRICAN AMERICAN: 46
GFR NON-AFRICAN AMERICAN: 46 ML/MIN/1.73
GLUCOSE BLD-MCNC: 136 MG/DL (ref 74–99)
HCT VFR BLD CALC: 31.8 % (ref 34–48)
HEMOGLOBIN: 9.8 G/DL (ref 11.5–15.5)
MAGNESIUM: 2.1 MG/DL (ref 1.6–2.6)
MCH RBC QN AUTO: 26.3 PG (ref 26–35)
MCHC RBC AUTO-ENTMCNC: 30.8 % (ref 32–34.5)
MCV RBC AUTO: 85.5 FL (ref 80–99.9)
PDW BLD-RTO: 20.5 FL (ref 11.5–15)
PHOSPHORUS: 2 MG/DL (ref 2.5–4.5)
PLATELET # BLD: 190 E9/L (ref 130–450)
PMV BLD AUTO: 11.3 FL (ref 7–12)
POTASSIUM SERPL-SCNC: 4.8 MMOL/L (ref 3.5–5)
RBC # BLD: 3.72 E12/L (ref 3.5–5.5)
SODIUM BLD-SCNC: 141 MMOL/L (ref 132–146)
WBC # BLD: 7.6 E9/L (ref 4.5–11.5)

## 2020-09-14 PROCEDURE — 84100 ASSAY OF PHOSPHORUS: CPT

## 2020-09-14 PROCEDURE — 99239 HOSP IP/OBS DSCHRG MGMT >30: CPT | Performed by: STUDENT IN AN ORGANIZED HEALTH CARE EDUCATION/TRAINING PROGRAM

## 2020-09-14 PROCEDURE — 2580000003 HC RX 258: Performed by: INTERNAL MEDICINE

## 2020-09-14 PROCEDURE — P9047 ALBUMIN (HUMAN), 25%, 50ML: HCPCS | Performed by: INTERNAL MEDICINE

## 2020-09-14 PROCEDURE — 6370000000 HC RX 637 (ALT 250 FOR IP): Performed by: INTERNAL MEDICINE

## 2020-09-14 PROCEDURE — 36415 COLL VENOUS BLD VENIPUNCTURE: CPT

## 2020-09-14 PROCEDURE — 2500000003 HC RX 250 WO HCPCS: Performed by: NURSE PRACTITIONER

## 2020-09-14 PROCEDURE — 2580000003 HC RX 258: Performed by: NURSE PRACTITIONER

## 2020-09-14 PROCEDURE — 2500000003 HC RX 250 WO HCPCS: Performed by: INTERNAL MEDICINE

## 2020-09-14 PROCEDURE — APPSS45 APP SPLIT SHARED TIME 31-45 MINUTES: Performed by: PHYSICIAN ASSISTANT

## 2020-09-14 PROCEDURE — 83735 ASSAY OF MAGNESIUM: CPT

## 2020-09-14 PROCEDURE — 6360000002 HC RX W HCPCS: Performed by: NURSE PRACTITIONER

## 2020-09-14 PROCEDURE — 85027 COMPLETE CBC AUTOMATED: CPT

## 2020-09-14 PROCEDURE — 80048 BASIC METABOLIC PNL TOTAL CA: CPT

## 2020-09-14 PROCEDURE — 6370000000 HC RX 637 (ALT 250 FOR IP): Performed by: NURSE PRACTITIONER

## 2020-09-14 PROCEDURE — 6360000002 HC RX W HCPCS: Performed by: INTERNAL MEDICINE

## 2020-09-14 RX ORDER — METRONIDAZOLE 500 MG/1
500 TABLET ORAL 3 TIMES DAILY
Qty: 9 TABLET | Refills: 0 | Status: SHIPPED | OUTPATIENT
Start: 2020-09-14 | End: 2020-09-17

## 2020-09-14 RX ORDER — CEFDINIR 300 MG/1
300 CAPSULE ORAL 2 TIMES DAILY
Qty: 6 CAPSULE | Refills: 0 | Status: SHIPPED | OUTPATIENT
Start: 2020-09-14 | End: 2020-09-17

## 2020-09-14 RX ADMIN — OXYCODONE HYDROCHLORIDE AND ACETAMINOPHEN 500 MG: 500 TABLET ORAL at 08:28

## 2020-09-14 RX ADMIN — CLOPIDOGREL BISULFATE 75 MG: 75 TABLET ORAL at 08:28

## 2020-09-14 RX ADMIN — FERROUS SULFATE TAB 325 MG (65 MG ELEMENTAL FE) 325 MG: 325 (65 FE) TAB at 08:28

## 2020-09-14 RX ADMIN — DOCUSATE SODIUM 100 MG: 100 CAPSULE, LIQUID FILLED ORAL at 09:07

## 2020-09-14 RX ADMIN — FOLIC ACID 1 MG: 1 TABLET ORAL at 08:28

## 2020-09-14 RX ADMIN — HYDROCODONE BITARTRATE AND ACETAMINOPHEN 1 TABLET: 5; 325 TABLET ORAL at 10:23

## 2020-09-14 RX ADMIN — PANTOPRAZOLE SODIUM 40 MG: 40 TABLET, DELAYED RELEASE ORAL at 06:04

## 2020-09-14 RX ADMIN — METOPROLOL SUCCINATE 25 MG: 25 TABLET, EXTENDED RELEASE ORAL at 08:28

## 2020-09-14 RX ADMIN — ANASTROZOLE 1 MG: 1 TABLET ORAL at 08:28

## 2020-09-14 RX ADMIN — ONDANSETRON 4 MG: 2 INJECTION INTRAMUSCULAR; INTRAVENOUS at 03:03

## 2020-09-14 RX ADMIN — METRONIDAZOLE 500 MG: 500 INJECTION, SOLUTION INTRAVENOUS at 06:05

## 2020-09-14 RX ADMIN — ACETAMINOPHEN 650 MG: 325 TABLET ORAL at 05:52

## 2020-09-14 RX ADMIN — ALBUMIN (HUMAN) 25 G: 0.25 INJECTION, SOLUTION INTRAVENOUS at 06:05

## 2020-09-14 RX ADMIN — MAGNESIUM OXIDE TAB 400 MG (241.3 MG ELEMENTAL MG) 400 MG: 400 (241.3 MG) TAB at 08:28

## 2020-09-14 RX ADMIN — SODIUM CHLORIDE, PRESERVATIVE FREE 10 ML: 5 INJECTION INTRAVENOUS at 09:07

## 2020-09-14 RX ADMIN — AMIODARONE HYDROCHLORIDE 200 MG: 200 TABLET ORAL at 08:28

## 2020-09-14 RX ADMIN — SODIUM PHOSPHATE, MONOBASIC, MONOHYDRATE 15 MMOL: 276; 142 INJECTION, SOLUTION INTRAVENOUS at 11:48

## 2020-09-14 ASSESSMENT — PAIN SCALES - GENERAL
PAINLEVEL_OUTOF10: 0
PAINLEVEL_OUTOF10: 6
PAINLEVEL_OUTOF10: 8

## 2020-09-14 NOTE — PROGRESS NOTES
CLINICAL PHARMACY NOTE: MEDS TO [address] Select Patient?: No  Total # of Prescriptions Filled: 2   The following medications were delivered to the patient:  · cefdiner 300  · Metronidazole 500  Total # of Interventions Completed: 6  Time Spent (min): 45    Additional Documentation:

## 2020-09-14 NOTE — PROGRESS NOTES
Spoke with Dr. Roxanne Farias regading the patient's new onset of SOB and crackles heard bilaterally in her lungs. Per Dr. Roxanne Farias we are to hold fluids until the morning. Will continue to monitor.

## 2020-09-14 NOTE — PLAN OF CARE
Problem: Falls - Risk of:  Goal: Will remain free from falls  Description: Will remain free from falls  Outcome: Met This Shift     Problem: Falls - Risk of:  Goal: Absence of physical injury  Description: Absence of physical injury  Outcome: Met This Shift     Problem: Pain:  Goal: Pain level will decrease  Description: Pain level will decrease  Outcome: Met This Shift

## 2020-09-14 NOTE — DISCHARGE SUMMARY
[facility] EMERGENCY SERVICE Physician Discharge Summary       Renard Winkler, [address] [facility] [address] [phone]    Call      Silver Amaya MD  [address] [facility] [id number] [phone]    In 2 weeks  ARTURO, CKD, proteinuria      Activity level: As tolerated    Diet: DIET GENERAL; Carb Control: 5 carb choices (75 gms)/meal      Dispo:Home    Condition at discharge: Stable    Continue to wear LifeVest as directed    Patient ID:  Valeria Delarosa  [id number]  63 y.o.  1960    Admit date: 9/11/2020    Discharge date and time:  9/14/2020  12:46 PM    Admission Diagnoses: Active Problems:    ARTURO (acute kidney injury) ([facility]  Resolved Problems:    * No resolved hospital problems. *      Discharge Diagnoses: Active Problems:    ARTURO (acute kidney injury) ([facility]  Resolved Problems:    * No resolved hospital problems. *      Consults:  IP CONSULT TO UROLOGY  IP CONSULT TO NEPHROLOGY    Procedures: None    Hospital Course: Patient was admitted with ARTURO (acute kidney injury) ([facility] [N17.9]  ARTURO (acute kidney injury) ([facility] [N17.9]. Patient is a 48-year-old female with a past medical history of breast cancer, cervical cancer, CVA, CAD, s/p CABG in June, Ischemic cardiomyopathy- life vest, non-sustained ventricular tachycardia, DM, HTN, and HLD who presented to the ED on 9-11-20 complaining of lower abdominal and back pain. Patient was admitted for ARTURO in the setting of possible obstructive uropathy. Creatinine was 2.4 on admission, nephrology was consulted and patient was given IV fluids. Creatinine has recovered to 1.4. Urology was consulted, Lasix scan was without evidence of obstruction urology states no further intervention needed at this time. Nephrology has now discontinued fluids and states patient is okay for discharge. Patient was also treated for possible diverticulitis with ciprofloxacin and Flagyl.   Patient does have a history of ischemic cardiomyopathy and obstruction comparing to the previous ultrasound            US RETROPERITONEAL COMPLETE   Final Result   Nonobstructing nephrolithiasis in the inferior pole of the   left kidney with a 0.8 cm stone. No hydronephrosis. Normal Doppler interrogation of the kidneys. XR CHEST (2 VW)   Final Result   Stable borderline cardiomegaly and postoperative changes. CT ABDOMEN PELVIS WO CONTRAST Additional Contrast? None   Final Result      Obstructive uropathy on the left to the level of the ureterovesical   junction. No obstructing calcification is present. There may have been   a recently passed stone. Noncalcified obstructing lesion including   neoplasm is not excluded. Numerous subcentimeter calyceal calcifications elsewhere within the   left kidney. Inflammatory changes within the left hemipelvis are adjacent to the   diverticulum ridden sigmoid colon. The possibility of superimposed   underlying diverticulitis is not excluded. Cardiomegaly.           Patient Instructions:      Medication List      START taking these medications    cefdinir 300 MG capsule  Commonly known as:  OMNICEF  Take 1 capsule by mouth 2 times daily for 3 days     metroNIDAZOLE 500 MG tablet  Commonly known as:  FLAGYL  Take 1 tablet by mouth 3 times daily for 3 days        CHANGE how you take these medications    furosemide 20 MG tablet  Commonly known as:  LASIX  Take 0.5 tablets by mouth daily  What changed:    · how much to take  · when to take this        CONTINUE taking these medications    albuterol sulfate  (90 Base) MCG/ACT inhaler  Commonly known as:  Ventolin HFA  Inhale 2 puffs into the lungs 4 times daily as needed for Wheezing     amiodarone 200 MG tablet  Commonly known as:  CORDARONE     anastrozole 1 MG tablet  Commonly known as:  ARIMIDEX     atorvastatin 40 MG tablet  Commonly known as:  LIPITOR  Take 1 tablet by mouth daily     clopidogrel 75 MG tablet  Commonly known as:  PLAVIX  Take 1 tablet by mouth daily     docusate sodium 100 MG capsule  Commonly known as:  COLACE     ferrous sulfate 325 (65 Fe) MG tablet  Commonly known as:  IRON 325  Take 1 tablet by mouth 2 times daily (with meals)     folic acid 1 MG tablet  Commonly known as:  FOLVITE  Take 1 tablet by mouth daily     gabapentin 300 MG capsule  Commonly known as:  NEURONTIN     glimepiride 1 MG tablet  Commonly known as:  AMARYL     metoprolol succinate 25 MG extended release tablet  Commonly known as:  TOPROL XL  Take 1 tablet by mouth daily     pantoprazole 40 MG tablet  Commonly known as:  PROTONIX  Take 1 tablet by mouth 2 times daily (before meals)     vitamin C 500 MG tablet  Commonly known as:  ASCORBIC ACID        STOP taking these medications    lisinopril 5 MG tablet  Commonly known as:  PRINIVIL;ZESTRIL     metFORMIN 500 MG tablet  Commonly known as:  GLUCOPHAGE           Where to Get Your Medications      These medications were sent to [address] [place] [address] [phone]  [address] [place] [address]    Phone:  [phone]   · cefdinir 300 MG capsule  · metroNIDAZOLE 500 MG tablet           Note that more than 30 minutes was spent in preparing discharge papers, discussing discharge with patient, medication review, etc.    Signed:  Electronically signed by Serge Gan PA-C on 9/14/2020 at 12:46 PM    NOTE: This report was transcribed using voice recognition software. Every effort was made to ensure accuracy; however, inadvertent computerized transcription errors may be present.

## 2020-09-14 NOTE — PROGRESS NOTES
Department of Internal Medicine  Nephrology Attending Progress Note  Events reviewed. SUBJECTIVE:  We are following Mrs. Scott Chávez for ARTURO. She reports no complaints, states that she feels fine. Diuresing well    PHYSICAL EXAM:      Vitals:    VITALS:  /76   Pulse 82   Temp 98.8 °F (37.1 °C) (Oral)   Resp 16   Ht 5' 5\" (1.651 m)   Wt 229 lb 14.4 oz (104.3 kg)   SpO2 97%   BMI 38.26 kg/m²   24HR BLOOD PRESSURE RANGE:  Systolic (47KNL), DPL:715 , Min:122 , GQR:760   ; Diastolic (87IFV), CECILLE:89, Min:72, Max:83    24HR INTAKE/OUTPUT:      Intake/Output Summary (Last 24 hours) at 9/14/2020 0915  Last data filed at 9/13/2020 2347  Gross per 24 hour   Intake 1027.5 ml   Output 900 ml   Net 127.5 ml     8HR INTAKE OUTPUT:  No intake/output data recorded.      Constitutional:  Awake alert in no distress  HEENT:  PERRLA  Respiratory:  No w/r/r  Cardiovascular/Edema:  Heart sounds regular  Gastrointestinal:  Abdomen soft  Neurologic:  Trace left side weakness  Skin:  No lesions, scarring/keloid at saphenous vein graft sites on RLE  Other:  No edema     Scheduled Meds:   pantoprazole  40 mg Oral QAM AC    sodium chloride flush  10 mL Intravenous 2 times per day    enoxaparin  30 mg Subcutaneous Daily    anastrozole  1 mg Oral Daily    atorvastatin  40 mg Oral Nightly    clopidogrel  75 mg Oral Daily    docusate sodium  100 mg Oral BID    ferrous sulfate  325 mg Oral BID WC    folic acid  1 mg Oral Daily    gabapentin  300 mg Oral Nightly    metoprolol succinate  25 mg Oral Daily    vitamin C  500 mg Oral BID    cefTRIAXone (ROCEPHIN) IV  1 g Intravenous Q24H    metroNIDAZOLE  500 mg Intravenous Q8H    magnesium oxide  400 mg Oral Daily    amiodarone  200 mg Oral Daily     Continuous Infusions:   dextrose 5 % and 0.45 % NaCl Stopped (09/13/20 6197)     PRN Meds:.sodium chloride flush, acetaminophen **OR** acetaminophen, polyethylene glycol, promethazine **OR** ondansetron, HYDROcodone 5 mg - acetaminophen, morphine    DATA:    CBC:   Lab Results   Component Value Date    WBC 7.6 09/14/2020    RBC 3.72 09/14/2020    HGB 9.8 09/14/2020    HCT 31.8 09/14/2020    MCV 85.5 09/14/2020    MCH 26.3 09/14/2020    MCHC 30.8 09/14/2020    RDW 20.5 09/14/2020     09/14/2020    MPV 11.3 09/14/2020     CMP:    Lab Results   Component Value Date     09/14/2020    K 4.8 09/14/2020    K 4.6 09/11/2020     09/14/2020    CO2 23 09/14/2020    BUN 29 09/14/2020    CREATININE 1.4 09/14/2020    GFRAA 46 09/14/2020    LABGLOM 46 09/14/2020    GLUCOSE 136 09/14/2020    GLUCOSE 96 02/23/2012    PROT 6.3 09/13/2020    LABALBU 3.1 09/13/2020    LABALBU 4.2 02/21/2012    CALCIUM 9.1 09/14/2020    BILITOT <0.2 09/13/2020    ALKPHOS 88 09/13/2020    AST 21 09/13/2020    ALT 18 09/13/2020     Magnesium:    Lab Results   Component Value Date    MG 2.1 09/14/2020     Phosphorus:    Lab Results   Component Value Date    PHOS 2.0 09/14/2020     Uric Acid:    Lab Results   Component Value Date    LABURIC 10.1 01/14/2015     U/A:    Lab Results   Component Value Date    COLORU Yellow 09/11/2020    PROTEINU >=300 09/11/2020    PHUR 6.0 09/11/2020    WBCUA >20 09/11/2020    RBCUA 1-3 09/11/2020    BACTERIA FEW 09/11/2020    CLARITYU CLOUDY 09/11/2020    SPECGRAV 1.025 09/11/2020    LEUKOCYTESUR SMALL 09/11/2020    UROBILINOGEN 1.0 09/11/2020    BILIRUBINUR Negative 09/11/2020    BLOODU MODERATE 09/11/2020    GLUCOSEU Negative 09/11/2020    AMORPHOUS MANY 09/11/2020     Radiology Review: There is moderate left hydroureteronephrosis to the level of the    ureterovesical junction. No obstructing calcification is identified. There are inflammatory changes surrounding the left renal collecting    system and the ureter throughout its course. Multiple subcentimeter    calyceal calcifications are identified elsewhere within the left    kidney. The right kidney is unremarkable. The urinary bladder is    evacuated.       BRIEF SUMMARY OF INITIAL CONSULT:    Briefly Mrs. Richa Landa is a 69-year-old [facility] American woman with history of hypertension, type 2 diabetes mellitus over 2 years, breast cancer status post bilateral mastectomy, CVA with residual left-sided weakness,  CABG with ARTURO in June 2020 that recovered with iv fluids, hysterectomy, cervical cancer presently on chemotherapy and s/p multiple radiation therapy, CAD with previous NSTEMI . Patient was admitted yesterday for LLQ pain for three days prior to arrival. Work up in ED revealed elevated serum creatinine of 2.4 mg/dL increased from baseline 1.2 mg/dl therefore this consultation was requested. IMPRESSION/RECOMMENDATIONS:      1. ARTURO stage II, volume responsive prerenal ARTURO due to decreased PO intake in the setting of diarrhea, lasix and ACE inhibition versus obstructive uropathy. Initial CT of the abdomen pelvis showed obstruction on the left at the level of the UV junction. NM lasix renal scan without evidence of obstruction. ·  Renal function continued to improve in response to IVF administration. Creatinine down to 1.9 mg/dL today with adequate urine output. Will cut down IVF rate. 2. Left hydronephrosis without clear evidence of obstructing calculi, possibly related to cervical cancer and radiation therapy. Urology following, s/p lasix renal scan with no obstruction. 3. UTI, on rocephin  4. Colitis? Radiation, on flagyl  5. HFrEF 30%, with stage III diastolic dysfunction  6. Hypohphospahtemia    PLAN:    · Renal function continues to improve with adequate UO  · Stop IVF  · Continue to hold Lisinopril, Metformin at discharge.   · Restart home dose of Lasix at discharge  · Sodium phos 15 mmol IV once  · OK to discharge from renal standpoint  · Follow up in our office in 1-2 weeks      Electronically signed by Rosario Caldwell MD on 9/14/2020 at 9:15 AM

## 2020-09-14 NOTE — PROGRESS NOTES
P Quality Flow/Interdisciplinary Rounds Progress Note        Quality Flow Rounds held on 2020    Disciplines Attending:  Bedside Nurse,  and     Smooth Dutton was admitted on 2020 10:22 AM    Anticipated Discharge Date:  Expected Discharge Date: 20    Disposition:    Rojas Score:  Rojas Scale Score: 21    Readmission Risk              Risk of Unplanned Readmission:        33           Discussed patient goal for the day, patient clinical progression, and barriers to discharge. The following Goal(s) of the Day/Commitment(s) have been identified:  discharge later today.       Darshan Marcelo  2020

## 2020-09-14 NOTE — PROGRESS NOTES
Consult for right leg incisions, right forearm skin tear and blister  Sitting on side of bed. Rt forearm      Blister 2x0.5 not tense. Clear fluid. Small pink areas dried  Patient states will use neosporin at home  Rt leg      Area dried, scabbed. Plan wound gel. Opticel. stratasorb  Will follow up with wound center this week  Urbano Mann.  Marie Toribio, CNS, Wound Care

## 2020-09-16 ENCOUNTER — APPOINTMENT (OUTPATIENT)
Dept: CARDIAC REHAB | Age: 60
End: 2020-09-16
Payer: MEDICARE

## 2020-09-18 ENCOUNTER — APPOINTMENT (OUTPATIENT)
Dept: CARDIAC REHAB | Age: 60
End: 2020-09-18
Payer: MEDICARE

## 2020-09-18 ENCOUNTER — HOSPITAL ENCOUNTER (OUTPATIENT)
Dept: WOUND CARE | Age: 60
Discharge: HOME | End: 2020-09-18
Payer: MEDICARE

## 2020-09-18 VITALS
SYSTOLIC BLOOD PRESSURE: 140 MMHG | TEMPERATURE: 98.6 F | BODY MASS INDEX: 35.65 KG/M2 | RESPIRATION RATE: 20 BRPM | HEART RATE: 77 BPM | HEIGHT: 65 IN | DIASTOLIC BLOOD PRESSURE: 80 MMHG | WEIGHT: 214 LBS

## 2020-09-18 PROCEDURE — 11042 DBRDMT SUBQ TIS 1ST 20SQCM/<: CPT

## 2020-09-18 PROCEDURE — 87075 CULTR BACTERIA EXCEPT BLOOD: CPT

## 2020-09-18 PROCEDURE — 99213 OFFICE O/P EST LOW 20 MIN: CPT

## 2020-09-18 PROCEDURE — 87070 CULTURE OTHR SPECIMN AEROBIC: CPT

## 2020-09-18 RX ORDER — LIDOCAINE HYDROCHLORIDE 40 MG/ML
SOLUTION TOPICAL ONCE
Status: DISCONTINUED | OUTPATIENT
Start: 2020-09-18 | End: 2020-09-19 | Stop reason: HOSPADM

## 2020-09-18 RX ORDER — MAGNESIUM OXIDE 400 MG/1
400 TABLET ORAL DAILY
COMMUNITY
End: 2020-12-18 | Stop reason: ALTCHOICE

## 2020-09-18 ASSESSMENT — PAIN SCALES - GENERAL: PAINLEVEL_OUTOF10: 0

## 2020-09-18 NOTE — PROGRESS NOTES
[facility]aling Center  History and Physical/Consultation  Podiatry    Referring Physician : Chris Carlisle, [address] [place] RECORD NUMBER:  [id number]  AGE: 63 y.o. GENDER: female  : 1960  EPISODE DATE:  2020  Subjective:     Chief Complaint   Patient presents with    Wound Check     RIGHT LEG WOUNDS         HISTORY of PRESENT ILLNESS HPI     Keyur Saunders is a 63 y.o. female who presents today for wound/ulcer evaluation. History of Wound Context:  The  patient has had a wound of surgical dehiscence  which was first noted approximately months . This has been treated antibiotic. On their initial visit to the wound healing center, 20 ,  the patient has noted that the wound has not been improving. The patient has not had similar previous wounds in the past.      Pt is not on abx at time of initial visit.       Wound/Ulcer Pain Timing/Severity: constant  Quality of pain: aching  Severity:  4 / 10   Modifying Factors: Pain worsens with walking  Associated Signs/Symptoms: edema, erythema and drainage    Ulcer Identification:  Ulcer Type: venous  Contributing Factors: edema, venous stasis, lymphedema and none    Diabetic/Pressure/Non Pressure Ulcers onl y:  Ulcer: Non-Pressure ulcer, fat layer exposed    If patient has diabetic lower extremity wounds  Wallace Classification of diabetic lower extremity wounds:    Grade Description   []  0 No open wound   []  1 Superficial ulcer involving the full skin thickness   []  2 Deep ulcer involves ligament, tendon, joint capsule, or fascia  No bone involvement or abscess presence   []  3 Deep Ulcer with abcess formation and/or osteomyelitis   []  4 Localized gangrene   []  5 Extensive gangrene of the foot     Wound: Internal surgical dehiscence        PAST MEDICAL HISTORY      Diagnosis Date    Breast [facility] ([facility] [address] 2005    right    [facility]     [facility]    CAD (coronary artery disease)     follows with Dr. Dean Santos CVA used briefly when she turned \"59\"     Allergies   Allergen Reactions    Penicillins Hives and Rash     Current Outpatient Medications on File Prior to Encounter   Medication Sig Dispense Refill    magnesium oxide (MAG-OX) 400 MG tablet Take 400 mg by mouth daily      amiodarone (CORDARONE) 200 MG tablet Take 400 mg by mouth 2 times daily       vitamin C (ASCORBIC ACID) 500 MG tablet Take 500 mg by mouth 2 times daily      docusate sodium (COLACE) 100 MG capsule Take 100 mg by mouth 2 times daily      atorvastatin (LIPITOR) 40 MG tablet Take 1 tablet by mouth daily 30 tablet 3    anastrozole (ARIMIDEX) 1 MG tablet Take 1 mg by mouth daily      albuterol sulfate HFA (VENTOLIN HFA) 108 (90 Base) MCG/ACT inhaler Inhale 2 puffs into the lungs 4 times daily as needed for Wheezing 1 Inhaler 0    ferrous sulfate (IRON 325) 325 (65 Fe) MG tablet Take 1 tablet by mouth 2 times daily (with meals) 60 tablet 0    metoprolol succinate (TOPROL XL) 25 MG extended release tablet Take 1 tablet by mouth daily 30 tablet 2    furosemide (LASIX) 20 MG tablet Take 0.5 tablets by mouth daily (Patient taking differently: Take 40 mg by mouth 2 times daily ) 60 tablet 2    folic acid (FOLVITE) 1 MG tablet Take 1 tablet by mouth daily 30 tablet 0    clopidogrel (PLAVIX) 75 MG tablet Take 1 tablet by mouth daily 60 tablet 0    pantoprazole (PROTONIX) 40 MG tablet Take 1 tablet by mouth 2 times daily (before meals) 180 tablet 1    glimepiride (AMARYL) 1 MG tablet Take 1 mg by mouth every morning (before breakfast)      gabapentin (NEURONTIN) 300 MG capsule Take 300 mg by mouth nightly. .       No current facility-administered medications on file prior to encounter.         REVIEW OF SYSTEMS   ROS : All others Negative if blank [], Positive if [x]  General Vascular   [] Fevers [] Claudication   [] Chills [] Rest Pain   Skin Neurologic   [] Tissue Loss [] Lower extremity neuropathy     Objective:    BP (!) 140/80   Pulse 77   Temp 98.6 °F (37 °C) (Temporal)   Resp 20   Ht 5' 5\" (1.651 m)   Wt 214 lb (97.1 kg)   BMI 35.61 kg/m²   Wt Readings from Last 3 Encounters:   09/18/20 214 lb (97.1 kg)   09/14/20 229 lb 14.4 oz (104.3 kg)   09/03/20 217 lb (98.4 kg)       PHYSICAL EXAM   CONSTITUTIONAL:   Awake, alert, cooperative   PSYCHIATRIC :  Oriented to time, place and person      normal insight to disease process  EXTREMITIES:   R LE Open wounds are noted   Skin color is abnormal with ulcers   Edema is  noted   Sensation intact to light touch   Palpation of the foot does cause pain   5/5 strength DF/PF  L LE Open wounds are not noted   Skin color is normal   Edema is  noted   Sensation intact to light touch   Palpation of the foot does cause pain   5/5 strength DF/PF  R dorsalis pedis 1 L dorsalis pedis 1   R posterior tibial 1 L posterior tibial 1     Assessment:     Problem List Items Addressed This Visit     None          Pre Debridement Measurements:  Are located in the [place]  Documentation Flow Sheet  Post Debridement Measurements:  Wound/Ulcer Descriptions are Pre Debridement except measurements:     Wound 09/18/20 Leg Medial;Proximal;Right #1 acquired 6/2020 (Active)   Wound Image   09/18/20 1007   Wound Length (cm) 1.3 cm 09/18/20 1007   Wound Width (cm) 0.7 cm 09/18/20 1007   Wound Depth (cm) 0.1 cm 09/18/20 1007   Wound Surface Area (cm^2) 0.91 cm^2 09/18/20 1007   Wound Volume (cm^3) 0.09 cm^3 09/18/20 1007   Post-Procedure Length (cm) 1.3 cm 09/18/20 1030   Post-Procedure Width (cm) 0.7 cm 09/18/20 1030   Post-Procedure Depth (cm) 0.1 cm 09/18/20 1030   Post-Procedure Surface Area (cm^2) 0.91 cm^2 09/18/20 1030   Post-Procedure Volume (cm^3) 0.09 cm^3 09/18/20 1030   Wound Assessment Yellow 09/18/20 1007   Drainage Amount Small 09/18/20 1007   Drainage Description Yellow 09/18/20 1007   Odor None 09/18/20 1007   [place]-wound Assessment Intact;Dry 09/18/20 1007   Number of days: 0       Wound 09/18/20 Leg Right;Medial;Distal #2 acquired 6/2020 (Active)   Wound Image   09/18/20 1007   Wound Length (cm) 2.3 cm 09/18/20 1007   Wound Width (cm) 0.5 cm 09/18/20 1007   Wound Depth (cm) 0.1 cm 09/18/20 1007   Wound Surface Area (cm^2) 1.15 cm^2 09/18/20 1007   Wound Volume (cm^3) 0.12 cm^3 09/18/20 1007   Post-Procedure Length (cm) 2.3 cm 09/18/20 1030   Post-Procedure Width (cm) 0.5 cm 09/18/20 1030   Post-Procedure Depth (cm) 0.1 cm 09/18/20 1030   Post-Procedure Surface Area (cm^2) 1.15 cm^2 09/18/20 1030   Post-Procedure Volume (cm^3) 0.12 cm^3 09/18/20 1030   Wound Assessment Brown 09/18/20 1007   Drainage Amount None 09/18/20 1007   Odor None 09/18/20 1007   [place]-wound Assessment Intact;Dry 09/18/20 1007   Number of days: 0          Procedure Note  Indications:  Based on my examination of this patient's wound(s)/ulcer(s) today, debridement is required to promote healing and evaluate the wound base. Performed by: Celi Verduzco DPM    Consent obtained:  Yes    Time out taken:  Yes    Pain Control: Anesthetic  Anesthetic: 4% Lidocaine Liquid Topical     Debridement:Excisional Debridement    Using #15 blade scalpel the wound(s)/ulcer(s) was/were sharply debrided down through and including the removal of subcutaneous tissue. Devitalized Tissue Debrided:  fibrin, biofilm, slough and necrotic/eschar to stimulate bleeding to promote healing, post debridement good bleeding base and wound edges noted    Wound/Ulcer #: 1 and 2    Percent of Wound/Ulcer Debrided: 100%    Total Surface Area Debrided:  2 sq cm     Estimated Blood Loss:  Minimal  Hemostasis Achieved:  by pressure    Procedural Pain:  2  / 10   Post Procedural Pain:  4 / 10     Response to treatment:  Well tolerated by patient. A culture was done.       Plan:     Pt is not a smoker   - Discussed relationship of smoking and negative affects on wound healing   - Emphasized importance of tobacco avoidace/cessation   - Script for nicotine patch given to patient   - Information regarding support groups for smoking cessation given    In my professional opinion and based off the information that is available at this time this patient has appropriate indication for HBO Therapy: Maybe    Treatment Note please see attached Discharge Instructions    Written patient dismissal instructions given to patient and signed by patient or POA. Discharge Instructions       Visit Discharge/Physician Orders    Discharge condition: Stable    Assessment of pain at discharge:  none    Anesthetic used: 4% lidocaine solution    Discharge to: Home    Left via:Private automobile    Accompanied by: accompanied by self    ECF/HHA: [facility] for dressing supplies    Dressing Orders:  Clean right lower leg ulcer with normal saline. Apply Santyl the thickness of a nickel. Cover with saline moist gauze and secure with dry dressing. Change every day    Treatment Orders:  Tissue C&S taken of right lower leg ulcer. Contact your doctor if you have a temperature above 100.4 with any of the following:                         Persistent cough             Shortness of breath            Chills            Fatigue            Chest pain or pressure            Difficulty breathing            Decreased consciousness of confusion             Headache    Recommend:                       Wash hands often and for 20 seconds or more             Avoid touching eyes, nose, mouth and face             Social distance ( 6 feet)             Stay at home as much as possible             Call health care provider with any concerns    [facility] followup visit ________one week with Dr. Pierce_____________________  (Please note your next appointment above and if you are unable to keep, kindly give a 24 hour notice.  Thank you.)    Physician signature:__________________________      If you experience any of the following, please call the [address] [place] [address] Road during business hours:    * Increase in Pain  * Temperature over 101  * Increase in drainage from your wound  * Drainage with a foul odor  * Bleeding  * Increase in swelling  * Need for compression bandage changes due to slippage, breakthrough drainage. If you need medical attention outside of the business hours of the [address] [place] [address] Road please contact your PCP or go to the nearest emergency room.         Electronically signed by Arcadio Burks DPM on 9/18/2020 at 10:51 AM

## 2020-09-21 ENCOUNTER — HOSPITAL ENCOUNTER (OUTPATIENT)
Dept: CARDIAC REHAB | Age: 60
Setting detail: THERAPIES SERIES
Discharge: HOME | End: 2020-09-21
Payer: MEDICARE

## 2020-09-21 LAB
ANAEROBIC CULTURE: NORMAL
WOUND/ABSCESS: NORMAL

## 2020-09-21 PROCEDURE — 93798 PHYS/QHP OP CAR RHAB W/ECG: CPT

## 2020-09-25 ENCOUNTER — HOSPITAL ENCOUNTER (OUTPATIENT)
Dept: WOUND CARE | Age: 60
Discharge: HOME | End: 2020-09-25
Payer: MEDICARE

## 2020-09-25 ENCOUNTER — HOSPITAL ENCOUNTER (OUTPATIENT)
Dept: CARDIAC REHAB | Age: 60
Setting detail: THERAPIES SERIES
Discharge: HOME | End: 2020-09-25
Payer: MEDICARE

## 2020-09-25 VITALS
TEMPERATURE: 96.4 F | HEART RATE: 73 BPM | SYSTOLIC BLOOD PRESSURE: 150 MMHG | RESPIRATION RATE: 18 BRPM | DIASTOLIC BLOOD PRESSURE: 86 MMHG

## 2020-09-25 PROCEDURE — 93798 PHYS/QHP OP CAR RHAB W/ECG: CPT

## 2020-09-25 PROCEDURE — 11042 DBRDMT SUBQ TIS 1ST 20SQCM/<: CPT

## 2020-09-25 RX ORDER — LIDOCAINE HYDROCHLORIDE 40 MG/ML
SOLUTION TOPICAL ONCE
Status: DISCONTINUED | OUTPATIENT
Start: 2020-09-25 | End: 2020-09-26 | Stop reason: HOSPADM

## 2020-09-25 NOTE — PROGRESS NOTES
Wound Healing Center  History and Physical/Consultation  Podiatry    Referring Physician : Woody Munoz, [address] RECORD NUMBER:  [id number]  AGE: 63 y.o. GENDER: female  : 1960  EPISODE DATE:  2020  Subjective:     Chief Complaint   Patient presents with    Wound Check     wound right leg         HISTORY of PRESENT ILLNESS HPI     Ivon Gong is a 63 y.o. female who presents today for wound/ulcer evaluation. History of Wound Context:  The  patient has had a wound of surgical dehiscence  which was first noted approximately months . This has been treated antibiotic. On their initial visit to the wound healing center, 20 ,  the patient has noted that the wound has not been improving. The patient has not had similar previous wounds in the past.      Pt is not on abx at time of initial visit.       Wound/Ulcer Pain Timing/Severity: constant  Quality of pain: aching  Severity:  4 / 10   Modifying Factors: Pain worsens with walking  Associated Signs/Symptoms: edema, erythema and drainage    Ulcer Identification:  Ulcer Type: venous  Contributing Factors: edema, venous stasis, lymphedema and none    Diabetic/Pressure/Non Pressure Ulcers onl y:  Ulcer: Non-Pressure ulcer, fat layer exposed    If patient has diabetic lower extremity wounds  Wallace Classification of diabetic lower extremity wounds:    Grade Description   []  0 No open wound   []  1 Superficial ulcer involving the full skin thickness   []  2 Deep ulcer involves ligament, tendon, joint capsule, or fascia  No bone involvement or abscess presence   []  3 Deep Ulcer with abcess formation and/or osteomyelitis   []  4 Localized gangrene   []  5 Extensive gangrene of the foot     Wound: Internal surgical dehiscence     20  Gailed, santyl      PAST MEDICAL HISTORY      Diagnosis Date    [facility] ([facility] [address] ,     right    Breast cancer ([facility] [address]     left    CAD (coronary artery disease)     follows with Dr. Herber Gonzalez CVA (cerebral vascular accident) [facility]) 2015    no deficits    DM (diabetes mellitus) ([facility] [address]     H/O: GI bleed     History of blood transfusion 02/2020    Hyperlipidemia     Hypertension      Past Surgical History:   Procedure Laterality Date    ABDOMEN SURGERY      APPENDECTOMY      BREAST RECONSTRUCTION Right     BREAST SURGERY  2000    right masectomy    COLONOSCOPY      CORONARY ANGIOPLASTY WITH STENT PLACEMENT  03/04/2020    Mini Vision 2.5 x 18 stent deployed proximal LAD by DR Sabine Arteaga CORONARY ARTERY BYPASS GRAFT N/A 6/16/2020    CORONARY ARTERY BYPASS, MARY JO performed by Andrews Palmer MD at [facility], COLON, DIAGNOSTIC      FOOT SURGERY      right    HYSTERECTOMY      fibroids    KNEE ARTHROSCOPY      left    MASTECTOMY Right 2000    MASTECTOMY, MODIFIED RADICAL Left 2/25/2020    LEFT BREAST SIMPLE MASTECTOMY performed by Camila Taylor MD at [address], MODIFIED RADICAL Left 2/25/2020    LEFT BREAST MASTECTOMY POST OP BLEED CONTROL, EVACUATION OF LEFT CHEST WALL HEMATOMA performed by Camila Taylor MD at [address] UPPER GASTROINTESTINAL ENDOSCOPY N/A 2/5/2020    EGD BIOPSY performed by Camila Taylor MD at [address] [facility] N/A 6/12/2020    EGD BIOPSY performed by Camila Taylor MD at [address] [facility] History   Problem Relation Age of Onset    Stroke Mother     Heart Failure Mother     Other Mother         ICD    Breast Cancer Mother     Pacemaker Father     Heart Failure Father     Hypertension Sister      Social History     Tobacco Use    Smoking status: Never Smoker    Smokeless tobacco: Never Used    Tobacco comment: per pt   Substance Use Topics    Alcohol use: Not Currently     Alcohol/week: 6.0 standard drinks     Types: 6 Standard drinks or equivalent per week     Comment: No coffee.   green tea    Drug use: Not Currently     Types: Marijuana     Comment: used briefly when she turned \"59\"     Allergies   Allergen Reactions    Penicillins Hives and Rash     Current Outpatient Medications on File Prior to Encounter   Medication Sig Dispense Refill    magnesium oxide (MAG-OX) 400 MG tablet Take 400 mg by mouth daily      amiodarone (CORDARONE) 200 MG tablet Take 400 mg by mouth 2 times daily       vitamin C (ASCORBIC ACID) 500 MG tablet Take 500 mg by mouth 2 times daily      docusate sodium (COLACE) 100 MG capsule Take 100 mg by mouth 2 times daily      atorvastatin (LIPITOR) 40 MG tablet Take 1 tablet by mouth daily 30 tablet 3    anastrozole (ARIMIDEX) 1 MG tablet Take 1 mg by mouth daily      metoprolol succinate (TOPROL XL) 25 MG extended release tablet Take 1 tablet by mouth daily 30 tablet 2    furosemide (LASIX) 20 MG tablet Take 0.5 tablets by mouth daily (Patient taking differently: Take 40 mg by mouth 2 times daily ) 60 tablet 2    pantoprazole (PROTONIX) 40 MG tablet Take 1 tablet by mouth 2 times daily (before meals) 180 tablet 1    glimepiride (AMARYL) 1 MG tablet Take 1 mg by mouth every morning (before breakfast)      gabapentin (NEURONTIN) 300 MG capsule Take 300 mg by mouth nightly. Keith Saldana ferrous sulfate (IRON 325) 325 (65 Fe) MG tablet Take 1 tablet by mouth 2 times daily (with meals) 60 tablet 0    folic acid (FOLVITE) 1 MG tablet Take 1 tablet by mouth daily 30 tablet 0    clopidogrel (PLAVIX) 75 MG tablet Take 1 tablet by mouth daily 60 tablet 0     No current facility-administered medications on file prior to encounter.         REVIEW OF SYSTEMS   ROS : All others Negative if blank [], Positive if [x]  General Vascular   [] Fevers [] Claudication   [] Chills [] Rest Pain   Skin Neurologic   [] Tissue Loss [] Lower extremity neuropathy     Objective:    BP (!) 150/86   Pulse 73   Temp 96.4 °F (35.8 °C) (Temporal)   Resp 18   Wt Readings from Last 3 Encounters:   09/18/20 214 lb (97.1 kg)   09/14/20 229 lb 14.4 oz (104.3 kg)   09/03/20 217 lb (98.4 kg)       PHYSICAL EXAM   CONSTITUTIONAL:   Awake, alert, cooperative   PSYCHIATRIC :  Oriented to time, place and person      normal insight to disease process  EXTREMITIES:   R LE Open wounds are noted   Skin color is abnormal with ulcers   Edema is  noted   Sensation intact to light touch   Palpation of the foot does cause pain   5/5 strength DF/PF  L LE Open wounds are not noted   Skin color is normal   Edema is  noted   Sensation intact to light touch   Palpation of the foot does cause pain   5/5 strength DF/PF  R dorsalis pedis 1 L dorsalis pedis 1   R posterior tibial 1 L posterior tibial 1     Assessment:     Problem List Items Addressed This Visit     None          Pre Debridement Measurements:  Are located in the [place]  Documentation Flow Sheet  Post Debridement Measurements:  Wound/Ulcer Descriptions are Pre Debridement except measurements:     Wound 09/18/20 Leg Medial;Proximal;Right #1 acquired 6/2020 venous (Active)   Wound Image   09/18/20 1007   Wound Venous 09/18/20 1007   Dressing Changed Changed/New 09/18/20 1052   Dressing/Treatment Hydrating gel 09/18/20 1052   Wound Length (cm) 1.2 cm 09/25/20 1033   Wound Width (cm) 0.6 cm 09/25/20 1033   Wound Depth (cm) 0.2 cm 09/25/20 1033   Wound Surface Area (cm^2) 0.72 cm^2 09/25/20 1033   Change in Wound Size % (l*w) 20.88 09/25/20 1033   Wound Volume (cm^3) 0.14 cm^3 09/25/20 1033   Wound Healing % -56 09/25/20 1033   Post-Procedure Length (cm) 1.2 cm 09/25/20 1102   Post-Procedure Width (cm) 0.6 cm 09/25/20 1102   Post-Procedure Depth (cm) 0.2 cm 09/25/20 1102   Post-Procedure Surface Area (cm^2) 0.72 cm^2 09/25/20 1102   Post-Procedure Volume (cm^3) 0.14 cm^3 09/25/20 1102   Wound Assessment Yellow 09/25/20 1033   Drainage Amount Small 09/25/20 1033   Drainage Description Yellow 09/25/20 1033   Odor None 09/25/20 1033   [place]-wound Assessment Dry; Intact 09/25/20 1033   Number of days: 7       Wound 09/18/20 Leg Right;Medial;Distal #2 acquired 6/2020 (Active)   Wound Image   09/18/20 1007   Dressing Changed Changed/New 09/18/20 1052   Dressing/Treatment Hydrating gel 09/18/20 1052   Wound Length (cm) 1.7 cm 09/25/20 1033   Wound Width (cm) 0.5 cm 09/25/20 1033   Wound Depth (cm) 0.2 cm 09/25/20 1033   Wound Surface Area (cm^2) 0.85 cm^2 09/25/20 1033   Change in Wound Size % (l*w) 26.09 09/25/20 1033   Wound Volume (cm^3) 0.17 cm^3 09/25/20 1033   Wound Healing % -42 09/25/20 1033   Post-Procedure Length (cm) 1.7 cm 09/25/20 1102   Post-Procedure Width (cm) 0.5 cm 09/25/20 1102   Post-Procedure Depth (cm) 0.2 cm 09/25/20 1102   Post-Procedure Surface Area (cm^2) 0.85 cm^2 09/25/20 1102   Post-Procedure Volume (cm^3) 0.17 cm^3 09/25/20 1102   Wound Assessment [place];Yellow 09/25/20 1033   Drainage Amount Scant 09/25/20 1033   Drainage Description Yellow 09/25/20 1033   Odor None 09/25/20 1033   [place]-wound Assessment Dry; Intact 09/25/20 1033   Number of days: 7          Procedure Note  Indications:  Based on my examination of this patient's wound(s)/ulcer(s) today, debridement is required to promote healing and evaluate the wound base. Performed by: Kolby Sanchez DPM    Consent obtained:  Yes    Time out taken:  Yes    Pain Control: Anesthetic  Anesthetic: 4% Lidocaine Liquid Topical     Debridement:Excisional Debridement    Using #15 blade scalpel the wound(s)/ulcer(s) was/were sharply debrided down through and including the removal of subcutaneous tissue.         Devitalized Tissue Debrided:  fibrin, biofilm, slough and necrotic/eschar to stimulate bleeding to promote healing, post debridement good bleeding base and wound edges noted    Wound/Ulcer #: 1 and 2    Percent of Wound/Ulcer Debrided: 100%    Total Surface Area Debrided:  2 sq cm     Estimated Blood Loss:  Minimal  Hemostasis Achieved:  by pressure    Procedural Pain:  2  / 10   Post Procedural Pain:  4 / 10     Response to treatment:  Well Stan_____________________  (Please note your next appointment above and if you are unable to keep, kindly give a 24 hour notice. Thank you.)    Physician signature:__________________________      If you experience any of the following, please call the [facility] [address] during business hours:    * Increase in Pain  * Temperature over 101  * Increase in drainage from your wound  * Drainage with a foul odor  * Bleeding  * Increase in swelling  * Need for compression bandage changes due to slippage, breakthrough drainage. If you need medical attention outside of the business hours of the [facility] [address] please contact your PCP or go to the nearest emergency room.         Electronically signed by Michelle Espinoza DPM on 9/25/2020 at 11:04 AM

## 2020-09-28 ENCOUNTER — HOSPITAL ENCOUNTER (OUTPATIENT)
Dept: CARDIAC REHAB | Age: 60
Setting detail: THERAPIES SERIES
Discharge: HOME | End: 2020-09-28
Payer: MEDICARE

## 2020-09-28 PROCEDURE — 93798 PHYS/QHP OP CAR RHAB W/ECG: CPT

## 2020-09-30 ENCOUNTER — HOSPITAL ENCOUNTER (OUTPATIENT)
Dept: CARDIAC REHAB | Age: 60
Setting detail: THERAPIES SERIES
Discharge: HOME | End: 2020-09-30
Payer: MEDICARE

## 2020-09-30 PROCEDURE — 93798 PHYS/QHP OP CAR RHAB W/ECG: CPT

## 2020-10-02 ENCOUNTER — HOSPITAL ENCOUNTER (OUTPATIENT)
Dept: CARDIAC REHAB | Age: 60
Setting detail: THERAPIES SERIES
Discharge: HOME | End: 2020-10-02
Payer: MEDICARE

## 2020-10-02 ENCOUNTER — HOSPITAL ENCOUNTER (OUTPATIENT)
Dept: WOUND CARE | Age: 60
Discharge: HOME | End: 2020-10-02
Payer: MEDICARE

## 2020-10-02 PROCEDURE — 93798 PHYS/QHP OP CAR RHAB W/ECG: CPT

## 2020-10-05 ENCOUNTER — HOSPITAL ENCOUNTER (OUTPATIENT)
Dept: INFUSION THERAPY | Age: 60
End: 2020-10-05
Payer: MEDICARE

## 2020-10-05 ENCOUNTER — HOSPITAL ENCOUNTER (OUTPATIENT)
Dept: INFUSION THERAPY | Age: 60
Discharge: HOME | End: 2020-09-28
Payer: MEDICARE

## 2020-10-05 ENCOUNTER — HOSPITAL ENCOUNTER (OUTPATIENT)
Dept: CARDIAC REHAB | Age: 60
Setting detail: THERAPIES SERIES
End: 2020-10-05
Payer: MEDICARE

## 2020-10-07 ENCOUNTER — HOSPITAL ENCOUNTER (OUTPATIENT)
Dept: CARDIAC REHAB | Age: 60
Setting detail: THERAPIES SERIES
Discharge: HOME | End: 2020-10-07
Payer: MEDICARE

## 2020-10-07 PROCEDURE — 93798 PHYS/QHP OP CAR RHAB W/ECG: CPT

## 2020-10-09 ENCOUNTER — HOSPITAL ENCOUNTER (OUTPATIENT)
Dept: CARDIAC REHAB | Age: 60
Setting detail: THERAPIES SERIES
Discharge: HOME | End: 2020-10-09
Payer: MEDICARE

## 2020-10-09 PROCEDURE — 93798 PHYS/QHP OP CAR RHAB W/ECG: CPT

## 2020-10-14 ENCOUNTER — HOSPITAL ENCOUNTER (OUTPATIENT)
Dept: CARDIAC REHAB | Age: 60
Setting detail: THERAPIES SERIES
Discharge: HOME | End: 2020-10-14
Payer: MEDICARE

## 2020-10-14 ENCOUNTER — HOSPITAL ENCOUNTER (OUTPATIENT)
Dept: WOUND CARE | Age: 60
Discharge: HOME | End: 2020-10-14
Payer: MEDICARE

## 2020-10-14 VITALS
HEART RATE: 70 BPM | TEMPERATURE: 96.8 F | SYSTOLIC BLOOD PRESSURE: 138 MMHG | RESPIRATION RATE: 18 BRPM | DIASTOLIC BLOOD PRESSURE: 70 MMHG

## 2020-10-14 PROCEDURE — 11042 DBRDMT SUBQ TIS 1ST 20SQCM/<: CPT

## 2020-10-14 PROCEDURE — 93798 PHYS/QHP OP CAR RHAB W/ECG: CPT

## 2020-10-14 RX ORDER — LIDOCAINE HYDROCHLORIDE 40 MG/ML
SOLUTION TOPICAL ONCE
Status: DISCONTINUED | OUTPATIENT
Start: 2020-10-14 | End: 2020-10-15 | Stop reason: HOSPADM

## 2020-10-14 NOTE — PROGRESS NOTES
[facility]aling Center  History and Physical/Consultation  Podiatry    Referring Physician : Norman Moore, [address] [facility] RECORD NUMBER:  [id number]  AGE: 63 y.o. GENDER: female  : 1960  EPISODE DATE:  10/14/2020  Subjective:     Chief Complaint   Patient presents with    Wound Check     wound right lower leg         HISTORY of PRESENT ILLNESS HPI     Parvin Coleman is a 63 y.o. female who presents today for wound/ulcer evaluation. History of Wound Context:  The  patient has had a wound of surgical dehiscence  which was first noted approximately months . This has been treated antibiotic. On their initial visit to the wound healing center, 20 ,  the patient has noted that the wound has not been improving. The patient has not had similar previous wounds in the past.      Pt is not on abx at time of initial visit.       Wound/Ulcer Pain Timing/Severity: constant  Quality of pain: aching  Severity:  4 / 10   Modifying Factors: Pain worsens with walking  Associated Signs/Symptoms: edema, erythema and drainage    Ulcer Identification:  Ulcer Type: venous  Contributing Factors: edema, venous stasis, lymphedema and none    Diabetic/Pressure/Non Pressure Ulcers onl y:  Ulcer: Non-Pressure ulcer, fat layer exposed    If patient has diabetic lower extremity wounds  Wallace Classification of diabetic lower extremity wounds:    Grade Description   []  0 No open wound   []  1 Superficial ulcer involving the full skin thickness   []  2 Deep ulcer involves ligament, tendon, joint capsule, or fascia  No bone involvement or abscess presence   []  3 Deep Ulcer with abcess formation and/or osteomyelitis   []  4 Localized gangrene   []  5 Extensive gangrene of the foot     Wound: Internal surgical dehiscence     20  Debrided, santyl        10-14-20  Debrided, santyl  PAST MEDICAL HISTORY      Diagnosis Date    Breast cancer ([address] ,     right    Breast cancer ([address]     left    CAD tea    Drug use: Not Currently     Types: Marijuana     Comment: used briefly when she turned \"59\"     Allergies   Allergen Reactions    Penicillins Hives and Rash     Current Outpatient Medications on File Prior to Encounter   Medication Sig Dispense Refill    magnesium oxide (MAG-OX) 400 MG tablet Take 400 mg by mouth daily      amiodarone (CORDARONE) 200 MG tablet Take 400 mg by mouth 2 times daily       vitamin C (ASCORBIC ACID) 500 MG tablet Take 500 mg by mouth 2 times daily      docusate sodium (COLACE) 100 MG capsule Take 100 mg by mouth 2 times daily      atorvastatin (LIPITOR) 40 MG tablet Take 1 tablet by mouth daily 30 tablet 3    anastrozole (ARIMIDEX) 1 MG tablet Take 1 mg by mouth daily      metoprolol succinate (TOPROL XL) 25 MG extended release tablet Take 1 tablet by mouth daily 30 tablet 2    furosemide (LASIX) 20 MG tablet Take 0.5 tablets by mouth daily (Patient taking differently: Take 40 mg by mouth 2 times daily ) 60 tablet 2    pantoprazole (PROTONIX) 40 MG tablet Take 1 tablet by mouth 2 times daily (before meals) 180 tablet 1    glimepiride (AMARYL) 1 MG tablet Take 1 mg by mouth every morning (before breakfast)      gabapentin (NEURONTIN) 300 MG capsule Take 300 mg by mouth nightly. Veronica Alamin ferrous sulfate (IRON 325) 325 (65 Fe) MG tablet Take 1 tablet by mouth 2 times daily (with meals) 60 tablet 0    folic acid (FOLVITE) 1 MG tablet Take 1 tablet by mouth daily 30 tablet 0    clopidogrel (PLAVIX) 75 MG tablet Take 1 tablet by mouth daily 60 tablet 0     No current facility-administered medications on file prior to encounter.         REVIEW OF SYSTEMS   ROS : All others Negative if blank [], Positive if [x]  General Vascular   [] Fevers [] Claudication   [] Chills [] Rest Pain   Skin Neurologic   [] Tissue Loss [] Lower extremity neuropathy     Objective:    /70   Pulse 70   Temp 96.8 °F (36 °C) (Temporal)   Resp 18   Wt Readings from Last 3 Encounters:   09/18/20 214 lb (97.1 kg)   09/14/20 229 lb 14.4 oz (104.3 kg)   09/03/20 217 lb (98.4 kg)       PHYSICAL EXAM   CONSTITUTIONAL:   Awake, alert, cooperative   PSYCHIATRIC :  Oriented to time, place and person      normal insight to disease process  EXTREMITIES:   R LE Open wounds are noted   Skin color is abnormal with ulcers   Edema is  noted   Sensation intact to light touch   Palpation of the foot does cause pain   5/5 strength DF/PF  L LE Open wounds are not noted   Skin color is normal   Edema is  noted   Sensation intact to light touch   Palpation of the foot does cause pain   5/5 strength DF/PF  R dorsalis pedis 1 L dorsalis pedis 1   R posterior tibial 1 L posterior tibial 1     Assessment:     Problem List Items Addressed This Visit     None          Pre Debridement Measurements:  Are located in the [place]  Documentation Flow Sheet  Post Debridement Measurements:  Wound/Ulcer Descriptions are Pre Debridement except measurements:     Wound 09/18/20 Leg Medial;Proximal;Right #1 acquired 6/2020 venous (Active)   Wound Image   10/14/20 1027   Wound Etiology Venous 09/18/20 1007   Dressing Status New drainage noted 10/14/20 1117   Wound Cleansed Cleansed with saline 10/14/20 1117   Dressing/Treatment Hydrating gel;Silicone border 76/03/09 1117   Wound Length (cm) 0.7 cm 10/14/20 1027   Wound Width (cm) 0.3 cm 10/14/20 1027   Wound Depth (cm) 0.1 cm 10/14/20 1027   Wound Surface Area (cm^2) 0.21 cm^2 10/14/20 1027   Change in Wound Size % (l*w) 76.92 10/14/20 1027   Wound Volume (cm^3) 0.02 cm^3 10/14/20 1027   Wound Healing % 78 10/14/20 1027   Post-Procedure Length (cm) 0.7 cm 10/14/20 1047   Post-Procedure Width (cm) 0.3 cm 10/14/20 1047   Post-Procedure Depth (cm) 0.1 cm 10/14/20 1047   Post-Procedure Surface Area (cm^2) 0.21 cm^2 10/14/20 1047   Post-Procedure Volume (cm^3) 0.02 cm^3 10/14/20 1047   Wound Assessment Dry 10/14/20 1027   Drainage Amount None 10/14/20 1027   Odor None 10/14/20 1027   [place]-wound Assessment Intact 10/14/20 1027   Number of days: 26       Wound 09/18/20 Leg Right;Medial;Distal #2 acquired 6/2020 (Active)   Wound Image   10/14/20 1027   Dressing Status New drainage noted 10/14/20 1117   Wound Cleansed Cleansed with saline 10/14/20 1117   Dressing/Treatment Hydrating gel;Silicone border 35/57/15 1117   Wound Length (cm) 1.5 cm 10/14/20 1027   Wound Width (cm) 0.5 cm 10/14/20 1027   Wound Depth (cm) 0.2 cm 10/14/20 1027   Wound Surface Area (cm^2) 0.75 cm^2 10/14/20 1027   Change in Wound Size % (l*w) 34.78 10/14/20 1027   Wound Volume (cm^3) 0.15 cm^3 10/14/20 1027   Wound Healing % -25 10/14/20 1027   Post-Procedure Length (cm) 1.5 cm 10/14/20 1047   Post-Procedure Width (cm) 0.5 cm 10/14/20 1047   Post-Procedure Depth (cm) 0.2 cm 10/14/20 1047   Post-Procedure Surface Area (cm^2) 0.75 cm^2 10/14/20 1047   Post-Procedure Volume (cm^3) 0.15 cm^3 10/14/20 1047   Wound Assessment Dry 10/14/20 1027   Drainage Amount None 10/14/20 1027   Odor None 10/14/20 1027   [place]-wound Assessment Intact 10/14/20 1027   Number of days: 26          Procedure Note  Indications:  Based on my examination of this patient's wound(s)/ulcer(s) today, debridement is required to promote healing and evaluate the wound base. Performed by: Autumn Valenzuela DPM    Consent obtained:  Yes    Time out taken:  Yes    Pain Control: Anesthetic  Anesthetic: 4% Lidocaine Liquid Topical     Debridement:Excisional Debridement    Using #15 blade scalpel the wound(s)/ulcer(s) was/were sharply debrided down through and including the removal of subcutaneous tissue.         Devitalized Tissue Debrided:  fibrin, biofilm, slough and necrotic/eschar to stimulate bleeding to promote healing, post debridement good bleeding base and wound edges noted    Wound/Ulcer #: 1 and 2    Percent of Wound/Ulcer Debrided: 100%    Total Surface Area Debrided:  2 sq cm     Estimated Blood Loss:  Minimal  Hemostasis Achieved:  by pressure    Procedural Pain:  2  / 10   Post Procedural Pain:  4 / 10     Response to treatment:  Well tolerated by patient. A culture was done. Plan:     Pt is not a smoker   - Discussed relationship of smoking and negative affects on wound healing   - Emphasized importance of tobacco avoidace/cessation   - Script for nicotine patch given to patient   - Information regarding support groups for smoking cessation given    In my professional opinion and based off the information that is available at this time this patient has appropriate indication for HBO Therapy: Maybe    Treatment Note please see attached Discharge Instructions    Written patient dismissal instructions given to patient and signed by patient or POA. Discharge Instructions       Visit Discharge/Physician Orders    Discharge condition: Stable     Assessment of pain at discharge:  none     Anesthetic used: 4% lidocaine solution     Discharge to: Home     Left via:Private automobile     Accompanied by: accompanied by self     ECF/HHA: [facility] for dressing supplies     Dressing Orders:  Clean right lower leg ulcer with normal saline. Apply Santyl the thickness of a nickel. Cover with saline moist gauze and secure with dry dressing.  Change every day     Treatment Orders:       Contact your doctor if you have a temperature above 100.4 with any of the following:                         Persistent cough             Shortness of breath            Chills            Fatigue            Chest pain or pressure            Difficulty breathing            Decreased consciousness of confusion             Headache     Recommend:                       Wash hands often and for 20 seconds or more             Avoid touching eyes, nose, mouth and face             Social distance ( 6 feet)             Stay at home as much as possible             Call health care provider with any concerns     [address] [place] [address],[address] followup visit ________one week with Dr. Pierce_____________________  (Please note your next appointment above and if you are unable to keep, kindly give a 24 hour notice. Thank you.)    Physician signature:__________________________      If you experience any of the following, please call the [facility] during business hours:    * Increase in Pain  * Temperature over 101  * Increase in drainage from your wound  * Drainage with a foul odor  * Bleeding  * Increase in swelling  * Need for compression bandage changes due to slippage, breakthrough drainage. If you need medical attention outside of the business hours of the [facility] please contact your PCP or go to the nearest emergency room.         Electronically signed by Cleo Franklin DPM on 10/14/2020 at 11:31 AM

## 2020-10-15 ENCOUNTER — OFFICE VISIT (OUTPATIENT)
Dept: ONCOLOGY | Age: 60
End: 2020-10-15
Payer: MEDICARE

## 2020-10-15 ENCOUNTER — HOSPITAL ENCOUNTER (OUTPATIENT)
Dept: INFUSION THERAPY | Age: 60
Discharge: HOME | End: 2020-10-15
Payer: MEDICARE

## 2020-10-15 VITALS
WEIGHT: 222 LBS | OXYGEN SATURATION: 96 % | BODY MASS INDEX: 36.99 KG/M2 | HEIGHT: 65 IN | HEART RATE: 89 BPM | TEMPERATURE: 98.4 F

## 2020-10-15 DIAGNOSIS — C53.0 CANCER OF ENDOCERVIX (HCC): ICD-10-CM

## 2020-10-15 LAB
ALBUMIN SERPL-MCNC: 4.1 G/DL (ref 3.5–5.2)
ALP BLD-CCNC: 82 U/L (ref 35–104)
ALT SERPL-CCNC: 15 U/L (ref 0–32)
ANION GAP SERPL CALCULATED.3IONS-SCNC: 11 MMOL/L (ref 7–16)
AST SERPL-CCNC: 18 U/L (ref 0–31)
BASOPHILS ABSOLUTE: 0.02 E9/L (ref 0–0.2)
BASOPHILS RELATIVE PERCENT: 0.4 % (ref 0–2)
BILIRUB SERPL-MCNC: 0.2 MG/DL (ref 0–1.2)
BUN BLDV-MCNC: 33 MG/DL (ref 8–23)
CA 125: 10.3 U/ML (ref 0–35)
CALCIUM SERPL-MCNC: 9.8 MG/DL (ref 8.6–10.2)
CHLORIDE BLD-SCNC: 105 MMOL/L (ref 98–107)
CO2: 24 MMOL/L (ref 22–29)
CREAT SERPL-MCNC: 1.6 MG/DL (ref 0.5–1)
EOSINOPHILS ABSOLUTE: 0.04 E9/L (ref 0.05–0.5)
EOSINOPHILS RELATIVE PERCENT: 0.8 % (ref 0–6)
GFR AFRICAN AMERICAN: 40
GFR NON-AFRICAN AMERICAN: 40 ML/MIN/1.73
GLUCOSE BLD-MCNC: 90 MG/DL (ref 74–99)
HCT VFR BLD CALC: 40.2 % (ref 34–48)
HEMOGLOBIN: 12.3 G/DL (ref 11.5–15.5)
IMMATURE GRANULOCYTES #: 0.02 E9/L
IMMATURE GRANULOCYTES %: 0.4 % (ref 0–5)
LYMPHOCYTES ABSOLUTE: 0.77 E9/L (ref 1.5–4)
LYMPHOCYTES RELATIVE PERCENT: 16.1 % (ref 20–42)
MCH RBC QN AUTO: 26.2 PG (ref 26–35)
MCHC RBC AUTO-ENTMCNC: 30.6 % (ref 32–34.5)
MCV RBC AUTO: 85.5 FL (ref 80–99.9)
MONOCYTES ABSOLUTE: 0.68 E9/L (ref 0.1–0.95)
MONOCYTES RELATIVE PERCENT: 14.2 % (ref 2–12)
NEUTROPHILS ABSOLUTE: 3.25 E9/L (ref 1.8–7.3)
NEUTROPHILS RELATIVE PERCENT: 68.1 % (ref 43–80)
PDW BLD-RTO: 18.9 FL (ref 11.5–15)
PLATELET # BLD: 236 E9/L (ref 130–450)
PMV BLD AUTO: 10.1 FL (ref 7–12)
POTASSIUM SERPL-SCNC: 4.8 MMOL/L (ref 3.5–5)
RBC # BLD: 4.7 E12/L (ref 3.5–5.5)
SODIUM BLD-SCNC: 140 MMOL/L (ref 132–146)
TOTAL PROTEIN: 6.9 G/DL (ref 6.4–8.3)
WBC # BLD: 4.8 E9/L (ref 4.5–11.5)

## 2020-10-15 PROCEDURE — G8484 FLU IMMUNIZE NO ADMIN: HCPCS | Performed by: INTERNAL MEDICINE

## 2020-10-15 PROCEDURE — 86304 IMMUNOASSAY TUMOR CA 125: CPT

## 2020-10-15 PROCEDURE — 1036F TOBACCO NON-USER: CPT | Performed by: INTERNAL MEDICINE

## 2020-10-15 PROCEDURE — 85025 COMPLETE CBC W/AUTO DIFF WBC: CPT

## 2020-10-15 PROCEDURE — 99214 OFFICE O/P EST MOD 30 MIN: CPT | Performed by: INTERNAL MEDICINE

## 2020-10-15 PROCEDURE — G8427 DOCREV CUR MEDS BY ELIG CLIN: HCPCS | Performed by: INTERNAL MEDICINE

## 2020-10-15 PROCEDURE — 80053 COMPREHEN METABOLIC PANEL: CPT

## 2020-10-15 PROCEDURE — 36415 COLL VENOUS BLD VENIPUNCTURE: CPT

## 2020-10-15 PROCEDURE — G8417 CALC BMI ABV UP PARAM F/U: HCPCS | Performed by: INTERNAL MEDICINE

## 2020-10-15 PROCEDURE — 3017F COLORECTAL CA SCREEN DOC REV: CPT | Performed by: INTERNAL MEDICINE

## 2020-10-15 PROCEDURE — 99212 OFFICE O/P EST SF 10 MIN: CPT

## 2020-10-15 RX ORDER — ANASTROZOLE 1 MG/1
1 TABLET ORAL DAILY
Qty: 90 TABLET | Refills: 1 | Status: SHIPPED | OUTPATIENT
Start: 2020-10-15 | End: 2021-03-15 | Stop reason: SDUPTHER

## 2020-10-15 NOTE — PROGRESS NOTES
Department of [facility] Oncology  Attending Clinic Note    Reason for Visit: Follow-up on a patient with Cervical Cancer and Breast Cancer    PCP:  KRISTEN Marshall    History of Present Illness  63 y.o. female with past medical hx of right breast cancer with recurrence (s/p total mastectomy with reconstruction/TRAM flap & XRT, HTN, chest pain, CVA, left sided weakness, low back pain with sciatica, type II diabetes, hypertensive left ventricular hypertrophy. PAP smear on 04/11/2019 revealing HSIL and endometrial cells. She then underwent a colposcopy on 07/10/2019 and a LEEP on 08/22/2019. PATH REVIEW [facility]:  FINAL DIAGNOSIS   1. Cervix, LEEP specimen ([id number]) - Invasive poorly-differentiated adenocarcinoma. - The invasive adenocarcinoma involves the deep (radial) excision margin. 2. Endocervix, LEEP specimen ([id number]) - Focus of cauterized adenocarcinoma invovling excision magin. COMMENT   In the cervix LEEP specimen ([id number]) the adenocarcinoma invades the cervical stroma to a thickness of at least 2.5 mm. However, since the invasive adenocarcinoma involves the deep (radial) excision margin, the full extent of invasion cannot be determined. The invasive adenocarcinoma involves five tissue sections. Therefore, the width (circumferential measurement) is estimated at approximately 13 mm. Vascular invasion is not identified. PET[facility]/12/2019 [facility]:   There is no hypermetabolic abdominal or pelvic lymphadenopathy.    There is mild low-level activity in the cervical region (max SUV 2.9).       Evaluated by GYN ONC (Dr. Donald Corral at [facility]) who recommended ChemoRT with weekly Cisplatin  followed by HDR brachytherapy for Stage 1B1 Cervical Cancer. Patient would like to receive treatments locally. EBRT was started on 11/20/2019. Cycle # 1 weekly Cisplatin was on 11/20/2019. Cycle # 1 weekly Cisplatin was on 11/20/2019. Cycle # 5 weekly Cisplatin was on 12/19/2019.  EBRT was completed 12/27/2019. HDR brachytherapy (1/14/2020-1/16/2020)  PET/CT scan 05/18/2020 noted no evidence of disease. Follows locally with Dr. Terry Chambers in [facility], [place]    Left Breast Cancer Feb 2020  Left simple mastectomy on 02/25/2020:  A. Left breast, simple mastectomy:   - Invasive carcinoma of no special type (ductal), grade 2; tumor size 0.8 x 0.7 x 0.5 cm; LVI not identified.   - Ductal carcinoma in situ, intermediate to high nuclear grade,cribriform and solid types, with necrosis;   - Sclerosing fibroadenomatoid nodules and gynecomastia-like changes;   - Microcalcifications in DCIS;   - Scar and foreign body-type granuloma of prior biopsy site;  - Additional skin/breast tissue showing no pathologic alteration. B. Left breast, new inferior-medial margin, excision: Negative for malignancy. pT1b pNx  Breast Cancer Marker Studies:  Estrogen Receptors (ER): Positive >90%  Progesterone Receptors (NC): Positive:40%  Her-2/heidi (c-erb B-2) protein expression: Positive (score 3+)    Started Arimidex 1 mg po daily on 06/01/2020 with fair tolerance. Not on Herceptin due to CHF and CAD. EF 34% Feb 2020  No SLNB done. Left axillary U/S 06/01/2020:  No evidence of malignancy. DEXA scan 06/01/2020: normal bone density in LS and jasson hips. Tumor Board Discussion with [facility] at [facility]: Defer LN evaluation and HER2 targeted therapies due to cardiac risks; Endocrine therapy only indicated. Admitted on 06/10/2020 for blood transfusions in preparation for CABG s/p NSTEMI. EGD on 06/12/2020 noted mild gastritis. Gastric antrum, biopsy: Chronic antral gastritis.  Negative immunostain for H.pylori. Urgent sternotomy/Urgent CABG x 2 (LIMA-LAD, SVG-OM1)/Exploration of PDA/Open GSV harvest RLE/ARNULFO exclusion with 40mm AtriClip/Rigid internal fixation of the sternum with KLS plates x 2 done on 70/61/7841. DC home on 06/21/2020. She presents today 10/15/2020 for f/u.  She continues to tolerate the Arimidex well. Denies fever, chills, chest pain, SOB, nausea, or vomiting. Continues to follow in cardiac rehab. Continue Arimidex and Ca/VitD. Review of Systems;  CONSTITUTIONAL: No fever, chills. Fair appetite and energy level. ENMT: Eyes: No diplopia; Nose: No epistaxis. Mouth: No sore throat. RESPIRATORY: No hemoptysis, shortness of breath, cough. CARDIOVASCULAR: No chest pain, palpitations. GASTROINTESTINAL: No nausea/vomiting, abdominal pain, diarrhea/constipation. GENITOURINARY: No dysuria, urinary frequency, hematuria. NEURO: No syncope, presyncope, headache. Remainder:  ROS NEGATIVE    Past Medical History:      Diagnosis Date    Breast cancer ([facility] 2000, 2005    right    Breast cancer ([facility] 2020    left    CAD (coronary artery disease)     follows with Dr. Rj Lott Chronic ulcer of right leg, limited to breakdown of skin ([facility]     CVA (cerebral vascular accident) ([facility] 2015    no deficits    DM (diabetes mellitus) ([facility]     H/O: GI bleed     History of blood transfusion 02/2020    Hyperlipidemia     Hypertension      Medications:  Reviewed and reconciled. Allergies: Allergies   Allergen Reactions    Penicillins Hives and Rash     Physical Exam:  Pulse 89   Temp 98.4 °F (36.9 °C)   Ht 5' 5\" (1.651 m)   Wt 222 lb (100.7 kg)   SpO2 96%   BMI 36.94 kg/m²   GENERAL: Alert, oriented x 3, not in acute distress. HEENT: PERRLA; EOMI. Oropharynx clear. NECK: Supple. Without lymphadenopathy. LUNGS: Good air entry bilaterally. No wheezing, crackles or ronchi. CARDIOVASCULAR: Regular rate. No murmurs, rubs or gallops. ABDOMEN: Soft. Non-tender, non-distended. Positive bowel sounds. EXTREMITIES: Without clubbing, cyanosis, or edema. NEUROLOGIC: No focal deficits.    ECOG PS 1     Impression/Plan:  63 y.o. female with hx of hx of right breast cancer with recurrence (s/p total mastectomy with reconstruction/TRAM flap & XRT with Stage 1B1 Cervical Cancer    PET/CT 10/12/2019 at [facility]:   There is no hypermetabolic abdominal or pelvic lymphadenopathy.    There is mild low-level activity in the cervical region (max SUV 2.9).       Evaluated by GYN ONC (Dr. Junior Vance at [facility]) who recommended concurrent chemoRT with weekly Cisplatin followed by HDR brachytherapy for her Stage 1B1 Cervical Cancer. Radiation therapy started on 11/20/2019. Cycle # 1 weekly Cisplatin was on 11/20/2019. Cycle # 5 weekly Cisplatin was on 12/19/2019. EBRT was completed 12/27/2019. HDR brachytherapy (1/14/2020-1/16/2020)  PET/CT scan 05/18/2020 noted no evidence of disease. Follows locally with Dr. David Hollis in [facility]    Left Breast Cancer Feb 2020  Left simple mastectomy on 02/25/2020:  A. Left breast, simple mastectomy:   - Invasive carcinoma of no special type (ductal), grade 2; tumor size 0.8 x 0.7 x 0.5 cm; Negative margins. LVI not identified.   - Ductal carcinoma in situ, intermediate to high nuclear grade,cribriform and solid types, with necrosis;   - Sclerosing fibroadenomatoid nodules and gynecomastia-like changes;   - Microcalcifications in DCIS;   - Scar and foreign body-type granuloma of prior biopsy site;  - Additional skin/breast tissue showing no pathologic alteration. B. Left breast, new inferior-medial margin, excision: Negative for malignancy. pT1b pNx  Breast Cancer Marker Studies:  Estrogen Receptors (ER): Positive >90%  Progesterone Receptors ([place]): Positive:40%  Her-2/heidi (c-erb B-2) protein expression: Positive (score 3+)    Started Arimidex 1 mg po daily on 06/01/2020 with fair tolerance. Not on Herceptin due to CHF and CAD. EF 34% Feb 2020  No SLNB done. Left axillary U/S 06/01/2020:  No evidence of malignancy. DEXA scan 06/01/2020: normal bone density in LS and jasson hips. Tumor Board Discussion with Breast Team at [facility]: Defer LN evaluation and HER2 targeted therapies due to cardiac risks; Endocrine therapy only indicated.     Admitted on 06/10/2020 for blood transfusions in preparation for CABG s/p NSTEMI. EGD on 06/12/2020 noted mild gastritis. Gastric antrum, biopsy: Chronic antral gastritis.  Negative immunostain for H.pylori. Urgent sternotomy/Urgent CABG x 2 (LIMA-LAD, SVG-OM1)/Exploration of PDA/Open GSV harvest RLE/ARNULFO exclusion with 40mm AtriClip/Rigid internal fixation of the sternum with KLS plates x 2 done on 34/07/2635. DC home on 06/21/2020. Continue Arimidex and Ca/VitD. RTC March 2021    Genetics: My-Risk testing-negative. No clinically significant mutation identified. No VUS identified.     Bonnie Trotter MD   26/94/2095  Board Certified Medical Oncologist

## 2020-10-16 ENCOUNTER — HOSPITAL ENCOUNTER (OUTPATIENT)
Dept: CARDIAC REHAB | Age: 60
Setting detail: THERAPIES SERIES
Discharge: HOME | End: 2020-10-16
Payer: MEDICARE

## 2020-10-16 PROCEDURE — 93798 PHYS/QHP OP CAR RHAB W/ECG: CPT

## 2020-10-19 ENCOUNTER — TELEPHONE (OUTPATIENT)
Dept: CARDIOLOGY | Age: 60
End: 2020-10-19

## 2020-10-21 ENCOUNTER — HOSPITAL ENCOUNTER (OUTPATIENT)
Dept: CARDIOLOGY | Age: 60
Discharge: HOME | End: 2020-10-21
Payer: MEDICARE

## 2020-10-21 LAB
LV EF: 38 %
LVEF MODALITY: NORMAL

## 2020-10-21 PROCEDURE — 93306 TTE W/DOPPLER COMPLETE: CPT

## 2020-10-26 ENCOUNTER — HOSPITAL ENCOUNTER (OUTPATIENT)
Dept: CARDIAC REHAB | Age: 60
Setting detail: THERAPIES SERIES
Discharge: HOME | End: 2020-10-26
Payer: MEDICARE

## 2020-10-26 ENCOUNTER — TELEPHONE (OUTPATIENT)
Dept: CARDIOLOGY CLINIC | Age: 60
End: 2020-10-26

## 2020-10-26 PROCEDURE — 93798 PHYS/QHP OP CAR RHAB W/ECG: CPT

## 2020-10-26 NOTE — TELEPHONE ENCOUNTER
Patient had echo performed last week.   Is she able to remove life vest?  She has a follow up in December

## 2020-10-30 ENCOUNTER — HOSPITAL ENCOUNTER (OUTPATIENT)
Dept: WOUND CARE | Age: 60
Discharge: HOME | End: 2020-10-30
Payer: MEDICARE

## 2020-10-30 VITALS
HEIGHT: 65 IN | SYSTOLIC BLOOD PRESSURE: 146 MMHG | WEIGHT: 222 LBS | RESPIRATION RATE: 20 BRPM | DIASTOLIC BLOOD PRESSURE: 84 MMHG | BODY MASS INDEX: 36.99 KG/M2 | TEMPERATURE: 96.6 F | HEART RATE: 76 BPM

## 2020-10-30 PROCEDURE — 11042 DBRDMT SUBQ TIS 1ST 20SQCM/<: CPT

## 2020-10-30 RX ORDER — LIDOCAINE HYDROCHLORIDE 40 MG/ML
SOLUTION TOPICAL ONCE
Status: DISCONTINUED | OUTPATIENT
Start: 2020-10-30 | End: 2020-10-31 | Stop reason: HOSPADM

## 2020-10-30 ASSESSMENT — PAIN SCALES - GENERAL: PAINLEVEL_OUTOF10: 0

## 2020-10-30 NOTE — PROGRESS NOTES
[facility] Healing Center  History and Physical/Consultation  Podiatry    Referring Physician : Andrew Álvarez, [address] [facility] RECORD NUMBER:  [id number]  AGE: 63 y.o. GENDER: female  : 1960  EPISODE DATE:  10/30/2020  Subjective:     Chief Complaint   Patient presents with    Wound Check     RIGHT LEG WOUND         HISTORY of PRESENT ILLNESS HPI     Carole Askew is a 63 y.o. female who presents today for wound/ulcer evaluation. History of Wound Context:  The  patient has had a wound of surgical dehiscence  which was first noted approximately months . This has been treated antibiotic. On their initial visit to the wound healing center, 20 ,  the patient has noted that the wound has not been improving. The patient has not had similar previous wounds in the past.      Pt is not on abx at time of initial visit.       Wound/Ulcer Pain Timing/Severity: constant  Quality of pain: aching  Severity:   10   Modifying Factors: Pain worsens with walking  Associated Signs/Symptoms: edema, erythema and drainage    Ulcer Identification:  Ulcer Type: venous  Contributing Factors: edema, venous stasis, lymphedema and none    Diabetic/Pressure/Non Pressure Ulcers onl y:  Ulcer: Non-Pressure ulcer, fat layer exposed    If patient has diabetic lower extremity wounds  Wallace Classification of diabetic lower extremity wounds:    Grade Description   []  0 No open wound   []  1 Superficial ulcer involving the full skin thickness   []  2 Deep ulcer involves ligament, tendon, joint capsule, or fascia  No bone involvement or abscess presence   []  3 Deep Ulcer with abcess formation and/or osteomyelitis   []  4 Localized gangrene   []  5 Extensive gangrene of the foot     Wound: Internal surgical dehiscence     20  Debrided, santyl        10-14-20  Debrided, santyl    10-30-20  Debrided, cont tx and care   PAST MEDICAL HISTORY      Diagnosis Date    Breast [facility] ([facility] [address] ,     right    Breast cancer [facility]) 2020    left    CAD (coronary artery disease)     follows with Dr. Ana Anthony Chronic ulcer of right leg, limited to breakdown of skin ([facility] [address]     CVA (cerebral vascular accident) ([facility] [address] 2015    no deficits    DM (diabetes mellitus) ([facility] [address]     H/O: GI bleed     History of blood transfusion 02/2020    Hyperlipidemia     Hypertension      Past Surgical History:   Procedure Laterality Date    ABDOMEN SURGERY      APPENDECTOMY      BREAST RECONSTRUCTION Right     BREAST SURGERY  2000    right masectomy    COLONOSCOPY      CORONARY ANGIOPLASTY WITH STENT PLACEMENT  03/04/2020    Mini Vision 2.5 x 18 stent deployed proximal LAD by DR Sylvie Rubio    CORONARY ARTERY BYPASS GRAFT N/A 6/16/2020    CORONARY ARTERY BYPASS, MARY JO performed by Celestine Ortez MD at [facility], DIAGNOSTIC      FOOT SURGERY      right    HYSTERECTOMY      fibroids    KNEE ARTHROSCOPY      left    MASTECTOMY Right 2000    MASTECTOMY, MODIFIED RADICAL Left 2/25/2020    LEFT BREAST SIMPLE MASTECTOMY performed by Jerome Parekh MD at [facility], MODIFIED RADICAL Left 2/25/2020    LEFT BREAST MASTECTOMY POST OP BLEED CONTROL, EVACUATION OF LEFT CHEST WALL HEMATOMA performed by Jerome Parekh MD at [address] UPPER GASTROINTESTINAL ENDOSCOPY N/A 2/5/2020    EGD BIOPSY performed by Jerome Parekh MD at [address] N/A 6/12/2020    EGD BIOPSY performed by Jerome Parekh MD at [facility] History   Problem Relation Age of Onset    Stroke Mother     Heart Failure Mother     Other Mother         ICD    Breast Cancer Mother     Pacemaker Father     Heart Failure Father     Hypertension Sister      Social History     Tobacco Use    Smoking status: Never Smoker    Smokeless tobacco: Never Used    Tobacco comment: per pt   Substance Use Topics    Alcohol use: Not Currently     Alcohol/week: 6.0 standard drinks     Types: 6 Standard drinks or equivalent per week     Comment: No coffee. green tea    Drug use: Not Currently     Types: Marijuana     Comment: used briefly when she turned \"59\"     Allergies   Allergen Reactions    Penicillins Hives and Rash     Current Outpatient Medications on File Prior to Encounter   Medication Sig Dispense Refill    anastrozole (ARIMIDEX) 1 MG tablet Take 1 tablet by mouth daily 90 tablet 1    magnesium oxide (MAG-OX) 400 MG tablet Take 400 mg by mouth daily      amiodarone (CORDARONE) 200 MG tablet Take 400 mg by mouth 2 times daily       vitamin C (ASCORBIC ACID) 500 MG tablet Take 500 mg by mouth 2 times daily      docusate sodium (COLACE) 100 MG capsule Take 100 mg by mouth 2 times daily      atorvastatin (LIPITOR) 40 MG tablet Take 1 tablet by mouth daily 30 tablet 3    ferrous sulfate (IRON 325) 325 (65 Fe) MG tablet Take 1 tablet by mouth 2 times daily (with meals) 60 tablet 0    metoprolol succinate (TOPROL XL) 25 MG extended release tablet Take 1 tablet by mouth daily 30 tablet 2    furosemide (LASIX) 20 MG tablet Take 0.5 tablets by mouth daily (Patient taking differently: Take 40 mg by mouth 2 times daily ) 60 tablet 2    folic acid (FOLVITE) 1 MG tablet Take 1 tablet by mouth daily 30 tablet 0    clopidogrel (PLAVIX) 75 MG tablet Take 1 tablet by mouth daily 60 tablet 0    pantoprazole (PROTONIX) 40 MG tablet Take 1 tablet by mouth 2 times daily (before meals) 180 tablet 1    glimepiride (AMARYL) 1 MG tablet Take 1 mg by mouth every morning (before breakfast)      gabapentin (NEURONTIN) 300 MG capsule Take 300 mg by mouth nightly. .       No current facility-administered medications on file prior to encounter.         REVIEW OF SYSTEMS   ROS : All others Negative if blank [], Positive if [x]  General Vascular   [] Fevers [] Claudication   [] Chills [] Rest Pain   Skin Neurologic   [] Tissue Loss [] Lower extremity neuropathy     Objective:    BP 10/30/20 1207   Wound Assessment Slough 10/30/20 1155   Drainage Amount None 10/30/20 1155   Odor None 10/30/20 1155   [place]-wound Assessment Intact 10/30/20 1155   Number of days: 42          Procedure Note  Indications:  Based on my examination of this patient's wound(s)/ulcer(s) today, debridement is required to promote healing and evaluate the wound base. Performed by: Kanchan Sandoval DPM    Consent obtained:  Yes    Time out taken:  Yes    Pain Control: Anesthetic  Anesthetic: 4% Lidocaine Liquid Topical     Debridement:Excisional Debridement    Using #15 blade scalpel the wound(s)/ulcer(s) was/were sharply debrided down through and including the removal of subcutaneous tissue. Devitalized Tissue Debrided:  fibrin, biofilm, slough and necrotic/eschar to stimulate bleeding to promote healing, post debridement good bleeding base and wound edges noted    Wound/Ulcer #: 1 and 2    Percent of Wound/Ulcer Debrided: 100%    Total Surface Area Debrided:  2 sq cm     Estimated Blood Loss:  Minimal  Hemostasis Achieved:  by pressure    Procedural Pain:  2  / 10   Post Procedural Pain:  4 / 10     Response to treatment:  Well tolerated by patient. A culture was done. Plan:     Pt is not a smoker   - Discussed relationship of smoking and negative affects on wound healing   - Emphasized importance of tobacco avoidace/cessation   - Script for nicotine patch given to patient   - Information regarding support groups for smoking cessation given    In my professional opinion and based off the information that is available at this time this patient has appropriate indication for HBO Therapy: Maybe    Treatment Note please see attached Discharge Instructions    Written patient dismissal instructions given to patient and signed by patient or POA.          Discharge Instructions       Visit Discharge/Physician Orders    Discharge condition: Stable     Assessment of pain at discharge:  none     Anesthetic used: 4% lidocaine solution     Discharge to: Home     Left via:Private automobile     Accompanied by: accompanied by self     ECF/HHA: [facility] for dressing supplies     Dressing Orders:  Clean right lower leg ulcer with normal saline. Apply Santyl the thickness of a nickel. Cover with saline moist gauze and secure with dry dressing. Change every day     Treatment Orders:       Contact your doctor if you have a temperature above 100.4 with any of the following:                         Persistent cough             Shortness of breath            Chills            Fatigue            Chest pain or pressure            Difficulty breathing            Decreased consciousness of confusion             Headache     Recommend:                       Wash hands often and for 20 seconds or more             Avoid touching eyes, nose, mouth and face             Social distance ( 6 feet)             Stay at home as much as possible             Call health care provider with any concerns     [facility] followup visit ________TWO weeks with Dr. Pierce_____________________  (Please note your next appointment above and if you are unable to keep, kindly give a 24 hour notice. Thank you.)    Physician signature:__________________________      If you experience any of the following, please call the [facility] during business hours:    * Increase in Pain  * Temperature over 101  * Increase in drainage from your wound  * Drainage with a foul odor  * Bleeding  * Increase in swelling  * Need for compression bandage changes due to slippage, breakthrough drainage. If you need medical attention outside of the business hours of the [facility] please contact your PCP or go to the nearest emergency room.         Electronically signed by Tabatha Espinoza DPM on 10/30/2020 at 12:07 PM

## 2020-11-09 ENCOUNTER — HOSPITAL ENCOUNTER (OUTPATIENT)
Dept: CARDIAC REHAB | Age: 60
Setting detail: THERAPIES SERIES
Discharge: HOME | End: 2020-11-09
Payer: MEDICARE

## 2020-11-09 PROCEDURE — 93798 PHYS/QHP OP CAR RHAB W/ECG: CPT

## 2020-11-09 RX ORDER — AMIODARONE HYDROCHLORIDE 200 MG/1
200 TABLET ORAL DAILY
Qty: 60 TABLET | Refills: 0 | Status: SHIPPED
Start: 2020-11-09 | End: 2020-12-18

## 2020-11-13 ENCOUNTER — HOSPITAL ENCOUNTER (OUTPATIENT)
Dept: WOUND CARE | Age: 60
Discharge: HOME | End: 2020-11-13
Payer: MEDICARE

## 2020-11-13 VITALS
TEMPERATURE: 97.8 F | HEIGHT: 65 IN | WEIGHT: 222 LBS | DIASTOLIC BLOOD PRESSURE: 88 MMHG | RESPIRATION RATE: 18 BRPM | SYSTOLIC BLOOD PRESSURE: 142 MMHG | HEART RATE: 72 BPM | BODY MASS INDEX: 36.99 KG/M2

## 2020-11-13 PROCEDURE — 11042 DBRDMT SUBQ TIS 1ST 20SQCM/<: CPT

## 2020-11-13 RX ORDER — LIDOCAINE HYDROCHLORIDE 40 MG/ML
SOLUTION TOPICAL ONCE
Status: DISCONTINUED | OUTPATIENT
Start: 2020-11-13 | End: 2020-11-13

## 2020-11-13 ASSESSMENT — PAIN SCALES - GENERAL: PAINLEVEL_OUTOF10: 0

## 2020-11-13 NOTE — PROGRESS NOTES
Breast cancer ([facility] [address] 2000, 2005    right    Breast cancer ([facility] [address] 2020    left    CAD (coronary artery disease)     follows with Dr. Margareth Peguero Chronic ulcer of right leg, limited to breakdown of skin ([facility] [address]     CVA (cerebral vascular accident) ([facility] [address] 2015    no deficits    DM (diabetes mellitus) ([facility] [address]     H/O: GI bleed     History of blood transfusion 02/2020    Hyperlipidemia     Hypertension      Past Surgical History:   Procedure Laterality Date    ABDOMEN SURGERY      APPENDECTOMY      BREAST RECONSTRUCTION Right     BREAST SURGERY  2000    right masectomy    COLONOSCOPY      CORONARY ANGIOPLASTY WITH STENT PLACEMENT  03/04/2020    Mini Vision 2.5 x 18 stent deployed proximal LAD by DR Eliceo Sandoval    CORONARY ARTERY BYPASS GRAFT N/A 6/16/2020    CORONARY ARTERY BYPASS, MARY JO performed by Mora Donahue MD at [address], COLON, DIAGNOSTIC      FOOT SURGERY      right    HYSTERECTOMY      fibroids    KNEE ARTHROSCOPY      left    MASTECTOMY Right 2000    MASTECTOMY, MODIFIED RADICAL Left 2/25/2020    LEFT BREAST SIMPLE MASTECTOMY performed by Sawyer Baumann MD at [facility], MODIFIED RADICAL Left 2/25/2020    LEFT BREAST MASTECTOMY POST OP BLEED CONTROL, EVACUATION OF LEFT CHEST WALL HEMATOMA performed by Sawyer Baumann MD at [address] UPPER GASTROINTESTINAL ENDOSCOPY N/A 2/5/2020    EGD BIOPSY performed by Sawyer Baumann MD at [facility] N/A 6/12/2020    EGD BIOPSY performed by Sawyer Baumann MD at [address] History   Problem Relation Age of Onset    Stroke Mother     Heart Failure Mother     Other Mother         ICD    Breast Cancer Mother     Pacemaker Father     Heart Failure Father     Hypertension Sister      Social History     Tobacco Use    Smoking status: Never Smoker    Smokeless tobacco: Never Used    Tobacco comment: per pt   Substance Use Topics  Alcohol use: Not Currently     Alcohol/week: 6.0 standard drinks     Types: 6 Standard drinks or equivalent per week     Comment: No coffee. green tea    Drug use: Not Currently     Types: Marijuana     Comment: used briefly when she turned \"59\"     Allergies   Allergen Reactions    Penicillins Hives and Rash     Current Outpatient Medications on File Prior to Encounter   Medication Sig Dispense Refill    amiodarone (CORDARONE) 200 MG tablet Take 1 tablet by mouth daily 60 tablet 0    anastrozole (ARIMIDEX) 1 MG tablet Take 1 tablet by mouth daily 90 tablet 1    magnesium oxide (MAG-OX) 400 MG tablet Take 400 mg by mouth daily      vitamin C (ASCORBIC ACID) 500 MG tablet Take 500 mg by mouth 2 times daily      docusate sodium (COLACE) 100 MG capsule Take 100 mg by mouth 2 times daily      atorvastatin (LIPITOR) 40 MG tablet Take 1 tablet by mouth daily 30 tablet 3    ferrous sulfate (IRON 325) 325 (65 Fe) MG tablet Take 1 tablet by mouth 2 times daily (with meals) 60 tablet 0    metoprolol succinate (TOPROL XL) 25 MG extended release tablet Take 1 tablet by mouth daily 30 tablet 2    furosemide (LASIX) 20 MG tablet Take 0.5 tablets by mouth daily (Patient taking differently: Take 40 mg by mouth 2 times daily ) 60 tablet 2    clopidogrel (PLAVIX) 75 MG tablet Take 1 tablet by mouth daily 60 tablet 0    pantoprazole (PROTONIX) 40 MG tablet Take 1 tablet by mouth 2 times daily (before meals) 180 tablet 1    glimepiride (AMARYL) 1 MG tablet Take 1 mg by mouth every morning (before breakfast)       No current facility-administered medications on file prior to encounter.         REVIEW OF SYSTEMS   ROS : All others Negative if blank [], Positive if [x]  General Vascular   [] Fevers [] Claudication   [] Chills [] Rest Pain   Skin Neurologic   [] Tissue Loss [] Lower extremity neuropathy     Objective:    BP (!) 142/88   Pulse 72   Temp 97.8 °F (36.6 °C) (Temporal)   Resp 18   Ht 5' 5\" (1.651 m)   Wt 222 lb (100.7 kg)   BMI 36.94 kg/m²   Wt Readings from Last 3 Encounters:   11/13/20 222 lb (100.7 kg)   10/30/20 222 lb (100.7 kg)   10/15/20 222 lb (100.7 kg)       PHYSICAL EXAM   CONSTITUTIONAL:   Awake, alert, cooperative   PSYCHIATRIC :  Oriented to time, place and person      normal insight to disease process  EXTREMITIES:   R LE Open wounds are noted   Skin color is abnormal with ulcers   Edema is  noted   Sensation intact to light touch   Palpation of the foot does cause pain   5/5 strength DF/PF  L LE Open wounds are not noted   Skin color is normal   Edema is  noted   Sensation intact to light touch   Palpation of the foot does cause pain   5/5 strength DF/PF  R dorsalis pedis 1 L dorsalis pedis 1   R posterior tibial 1 L posterior tibial 1     Assessment:     Problem List Items Addressed This Visit     None          Pre Debridement Measurements:  Are located in the [facility]  Documentation Flow Sheet  Post Debridement Measurements:  Wound/Ulcer Descriptions are Pre Debridement except measurements:     Wound 09/18/20 Leg Right;Medial;Distal #2 acquired 6/2020 (Active)   Wound Image   11/13/20 0927   Dressing Status New dressing applied 11/13/20 0942   Wound Cleansed Cleansed with saline 11/13/20 0942   Dressing/Treatment Collagen with Ag;Moist to dry;Silicone border 58/86/07 0942   Offloading for Diabetic Foot Ulcers No offloading required 10/30/20 1207   Wound Length (cm) 1.4 cm 11/13/20 0910   Wound Width (cm) 0.1 cm 11/13/20 0910   Wound Depth (cm) 0.1 cm 11/13/20 0910   Wound Surface Area (cm^2) 0.14 cm^2 11/13/20 0910   Change in Wound Size % (l*w) 87.83 11/13/20 0910   Wound Volume (cm^3) 0.01 cm^3 11/13/20 0910   Wound Healing % 92 11/13/20 0910   Post-Procedure Length (cm) 1.4 cm 11/13/20 0927   Post-Procedure Width (cm) 0.1 cm 11/13/20 0927   Post-Procedure Depth (cm) 0.1 cm 11/13/20 0927   Post-Procedure Surface Area (cm^2) 0.14 cm^2 11/13/20 0927   Post-Procedure Volume (cm^3) 0.01 cm^3 11/13/20 0927   Wound Assessment Pale granulation tissue 11/13/20 0910   Drainage Amount None 11/13/20 0910   Odor None 11/13/20 0910   [place]-wound Assessment Intact 11/13/20 0910   Number of days: 56          Procedure Note  Indications:  Based on my examination of this patient's wound(s)/ulcer(s) today, debridement is required to promote healing and evaluate the wound base. Performed by: Caitie Ferrer DPM    Consent obtained:  Yes    Time out taken:  Yes    Pain Control: Anesthetic  Anesthetic: None     Debridement:Excisional Debridement    Using #15 blade scalpel the wound(s)/ulcer(s) was/were sharply debrided down through and including the removal of subcutaneous tissue. Devitalized Tissue Debrided:  fibrin, biofilm, slough and necrotic/eschar to stimulate bleeding to promote healing, post debridement good bleeding base and wound edges noted    Wound/Ulcer #: 1 and 2    Percent of Wound/Ulcer Debrided: 100%    Total Surface Area Debrided:  1 sq cm     Estimated Blood Loss:  Minimal  Hemostasis Achieved:  by pressure    Procedural Pain:  2  / 10   Post Procedural Pain:  4 / 10     Response to treatment:  Well tolerated by patient. A culture was done. Plan:     Pt is not a smoker   - Discussed relationship of smoking and negative affects on wound healing   - Emphasized importance of tobacco avoidace/cessation   - Script for nicotine patch given to patient   - Information regarding support groups for smoking cessation given    In my professional opinion and based off the information that is available at this time this patient has appropriate indication for HBO Therapy: Maybe    Treatment Note please see attached Discharge Instructions    Written patient dismissal instructions given to patient and signed by patient or POA.          Discharge Instructions       Visit Discharge/Physician Orders    Discharge condition: Stable     Assessment of pain at discharge:  none     Anesthetic used: 4% lidocaine solution     Discharge to: Home     Left via:Private automobile     Accompanied by: accompanied by self     ECF/HHA: [facility] for dressing supplies     Dressing Orders: Bill Crease right lower leg ulcer with normal saline.  Apply Saline moistened juve.  Cover with saline moist gauze and secure with dry dressing. Change every other day     Treatment Orders:       Contact your doctor if you have a temperature above 100.4 with any of the following:                         Persistent cough             Shortness of breath            Chills            Fatigue            Chest pain or pressure            Difficulty breathing            Decreased consciousness of confusion             Headache     Recommend:                       Wash hands often and for 20 seconds or more             Avoid touching eyes, nose, mouth and face             Social distance ( 6 feet)             Stay at home as much as possible             Call health care provider with any concerns    Starting December 1st, [facility] wound care will be at new location. [address] [place], [place] [address]  [facility]  Phone: [phone]     [address] [place] [address],[address] followup visit ________one week with Dr. Pierce_______In observance of the Thanksgiving Holiday, wound care will be closed Friday November 27th_______________  (Please note your next appointment above and if you are unable to keep, kindly give a 24 hour notice.  Thank you.)     Physician signature:__________________________        If you experience any of the following, please call the [address] [place] [address] [facility] during [facility] hours:     * Increase in Pain  * Temperature over 101  * Increase in drainage from your wound  * Drainage with a foul odor  * Bleeding  * Increase in swelling  * Need for compression bandage changes due to slippage, breakthrough drainage.     If you need medical attention outside of the business hours of the [facility] please contact your PCP or go to the nearest emergency room.        Electronically signed by Leoncio Jimenez DPM on 11/13/2020 at 10:29 AM

## 2020-11-16 ENCOUNTER — TELEPHONE (OUTPATIENT)
Dept: CARDIAC REHAB | Age: 60
End: 2020-11-16

## 2020-11-16 ENCOUNTER — HOSPITAL ENCOUNTER (OUTPATIENT)
Dept: CARDIAC REHAB | Age: 60
Setting detail: THERAPIES SERIES
End: 2020-11-16
Payer: MEDICARE

## 2020-11-17 ENCOUNTER — CLINICAL DOCUMENTATION (OUTPATIENT)
Dept: CARDIAC REHAB | Age: 60
End: 2020-11-17

## 2020-11-17 NOTE — PROGRESS NOTES
Denapan Alexis has completed 20/36 monitored exercise sessions. Pt does not plan to complete program d/t the colder weather and her arthritis. Ronal Donohue was able to achieve 70 minutes of cardio at 1.8 METs per session.

## 2020-11-18 ENCOUNTER — APPOINTMENT (OUTPATIENT)
Dept: CARDIAC REHAB | Age: 60
End: 2020-11-18
Payer: MEDICARE

## 2020-11-20 ENCOUNTER — APPOINTMENT (OUTPATIENT)
Dept: CARDIAC REHAB | Age: 60
End: 2020-11-20
Payer: MEDICARE

## 2020-11-23 ENCOUNTER — APPOINTMENT (OUTPATIENT)
Dept: CARDIAC REHAB | Age: 60
End: 2020-11-23
Payer: MEDICARE

## 2020-11-25 ENCOUNTER — HOSPITAL ENCOUNTER (OUTPATIENT)
Dept: WOUND CARE | Age: 60
Discharge: HOME | End: 2020-11-25
Payer: MEDICARE

## 2020-11-25 ENCOUNTER — APPOINTMENT (OUTPATIENT)
Dept: CARDIAC REHAB | Age: 60
End: 2020-11-25
Payer: MEDICARE

## 2020-11-25 VITALS
DIASTOLIC BLOOD PRESSURE: 80 MMHG | HEIGHT: 65 IN | HEART RATE: 70 BPM | TEMPERATURE: 97.6 F | RESPIRATION RATE: 20 BRPM | SYSTOLIC BLOOD PRESSURE: 140 MMHG | WEIGHT: 222 LBS | BODY MASS INDEX: 36.99 KG/M2

## 2020-11-25 PROCEDURE — 11042 DBRDMT SUBQ TIS 1ST 20SQCM/<: CPT

## 2020-11-25 RX ORDER — LIDOCAINE HYDROCHLORIDE 40 MG/ML
SOLUTION TOPICAL ONCE
Status: DISCONTINUED | OUTPATIENT
Start: 2020-11-25 | End: 2020-11-26 | Stop reason: HOSPADM

## 2020-11-25 ASSESSMENT — PAIN SCALES - GENERAL: PAINLEVEL_OUTOF10: 0

## 2020-11-25 NOTE — PROGRESS NOTES
HISTORY      Diagnosis Date    Breast cancer ([facility] 2000, 2005    right    Breast cancer ([facility] 2020    left    CAD (coronary artery disease)     follows with Dr. Salina Bull Chronic ulcer of right leg, limited to breakdown of skin ([facility]     CVA (cerebral vascular accident) ([facility] 2015    no deficits    DM (diabetes mellitus) ([facility]     H/O: GI bleed     History of blood transfusion 02/2020    Hyperlipidemia     Hypertension      Past Surgical History:   Procedure Laterality Date    ABDOMEN SURGERY      APPENDECTOMY      BREAST RECONSTRUCTION Right     BREAST SURGERY  2000    right masectomy    COLONOSCOPY      CORONARY ANGIOPLASTY WITH STENT PLACEMENT  03/04/2020    Mini Vision 2.5 x 18 stent deployed proximal LAD by DR Dona William    CORONARY ARTERY BYPASS GRAFT N/A 6/16/2020    CORONARY ARTERY BYPASS, MARY JO performed by Willa Granger MD at [address], COLON, DIAGNOSTIC      FOOT SURGERY      right    HYSTERECTOMY      fibroids    KNEE ARTHROSCOPY      left    MASTECTOMY Right 2000    MASTECTOMY, MODIFIED RADICAL Left 2/25/2020    LEFT BREAST SIMPLE MASTECTOMY performed by Mike Culp MD at [address] [facility], MODIFIED RADICAL Left 2/25/2020    LEFT BREAST MASTECTOMY POST OP BLEED CONTROL, EVACUATION OF LEFT CHEST WALL HEMATOMA performed by Mike Culp MD at [address] UPPER GASTROINTESTINAL ENDOSCOPY N/A 2/5/2020    EGD BIOPSY performed by Mike Culp MD at [facility] N/A 6/12/2020    EGD BIOPSY performed by Mike Culp MD at [facility] History   Problem Relation Age of Onset    Stroke Mother     Heart Failure Mother     Other Mother         ICD    Breast Cancer Mother     Pacemaker Father     Heart Failure Father     Hypertension Sister      Social History     Tobacco Use    Smoking status: Never Smoker    Smokeless tobacco: Never Used    Tobacco comment: per pt   Substance Use Topics    Alcohol use: Not Currently     Alcohol/week: 6.0 standard drinks     Types: 6 Standard drinks or equivalent per week     Comment: No coffee. green tea    Drug use: Not Currently     Types: Marijuana     Comment: used briefly when she turned \"59\"     Allergies   Allergen Reactions    Penicillins Hives and Rash     Current Outpatient Medications on File Prior to Encounter   Medication Sig Dispense Refill    amiodarone (CORDARONE) 200 MG tablet Take 1 tablet by mouth daily 60 tablet 0    anastrozole (ARIMIDEX) 1 MG tablet Take 1 tablet by mouth daily 90 tablet 1    magnesium oxide (MAG-OX) 400 MG tablet Take 400 mg by mouth daily      vitamin C (ASCORBIC ACID) 500 MG tablet Take 500 mg by mouth 2 times daily      docusate sodium (COLACE) 100 MG capsule Take 100 mg by mouth 2 times daily      atorvastatin (LIPITOR) 40 MG tablet Take 1 tablet by mouth daily 30 tablet 3    ferrous sulfate (IRON 325) 325 (65 Fe) MG tablet Take 1 tablet by mouth 2 times daily (with meals) 60 tablet 0    metoprolol succinate (TOPROL XL) 25 MG extended release tablet Take 1 tablet by mouth daily 30 tablet 2    furosemide (LASIX) 20 MG tablet Take 0.5 tablets by mouth daily (Patient taking differently: Take 40 mg by mouth 2 times daily ) 60 tablet 2    clopidogrel (PLAVIX) 75 MG tablet Take 1 tablet by mouth daily 60 tablet 0    pantoprazole (PROTONIX) 40 MG tablet Take 1 tablet by mouth 2 times daily (before meals) 180 tablet 1    glimepiride (AMARYL) 1 MG tablet Take 1 mg by mouth every morning (before breakfast)       No current facility-administered medications on file prior to encounter.         REVIEW OF SYSTEMS   ROS : All others Negative if blank [], Positive if [x]  General Vascular   [] Fevers [] Claudication   [] Chills [] Rest Pain   Skin Neurologic   [] Tissue Loss [] Lower extremity neuropathy     Objective:    BP (!) 140/80   Pulse 70   Temp 97.6 °F (36.4 °C) (Temporal)   Resp 20   Ht 5' 5\" (1.651 m)   Wt 222 lb (100.7 kg)   BMI 36.94 kg/m²   Wt Readings from Last 3 Encounters:   11/25/20 222 lb (100.7 kg)   11/13/20 222 lb (100.7 kg)   10/30/20 222 lb (100.7 kg)       PHYSICAL EXAM   CONSTITUTIONAL:   Awake, alert, cooperative   PSYCHIATRIC :  Oriented to time, place and person      normal insight to disease process  EXTREMITIES:   R LE Open wounds are noted   Skin color is abnormal with ulcers   Edema is  noted   Sensation intact to light touch   Palpation of the foot does cause pain   5/5 strength DF/PF  L LE Open wounds are not noted   Skin color is normal   Edema is  noted   Sensation intact to light touch   Palpation of the foot does cause pain   5/5 strength DF/PF  R dorsalis pedis 1 L dorsalis pedis 1   R posterior tibial 1 L posterior tibial 1     Assessment:     Problem List Items Addressed This Visit     None          Pre Debridement Measurements:  Are located in the [place]  Documentation Flow Sheet  Post Debridement Measurements:  Wound/Ulcer Descriptions are Pre Debridement except measurements:     Wound 09/18/20 Leg Right;Medial;Distal #2 acquired 6/2020 (Active)   Wound Image   11/13/20 0927   Dressing Status New dressing applied 11/13/20 0942   Wound Cleansed Cleansed with saline 11/13/20 0942   Dressing/Treatment Collagen with Ag;Moist to dry;Silicone border 08/93/55 0942   Offloading for Diabetic Foot Ulcers No offloading required 10/30/20 1207   Wound Length (cm) 0.7 cm 11/25/20 1109   Wound Width (cm) 0.1 cm 11/25/20 1109   Wound Depth (cm) 0.1 cm 11/25/20 1109   Wound Surface Area (cm^2) 0.07 cm^2 11/25/20 1109   Change in Wound Size % (l*w) 93.91 11/25/20 1109   Wound Volume (cm^3) 0.01 cm^3 11/25/20 1109   Wound Healing % 92 11/25/20 1109   Post-Procedure Length (cm) 0.7 cm 11/25/20 1136   Post-Procedure Width (cm) 0.1 cm 11/25/20 1136   Post-Procedure Depth (cm) 0.1 cm 11/25/20 1136   Post-Procedure Surface Area (cm^2) 0.07 cm^2 11/25/20 1139 Post-Procedure Volume (cm^3) 0.01 cm^3 11/25/20 1136   Wound Assessment Slough;Pale granulation tissue 11/25/20 1109   Drainage Amount Scant 11/25/20 1109   Drainage Description Yellow 11/25/20 1109   Odor None 11/25/20 1109   [place]-wound Assessment Intact 11/25/20 1109   Number of days: 68          Procedure Note  Indications:  Based on my examination of this patient's wound(s)/ulcer(s) today, debridement is required to promote healing and evaluate the wound base. Performed by: Caitie Ferrer DPM    Consent obtained:  Yes    Time out taken:  Yes    Pain Control: Anesthetic  Anesthetic: 4% Lidocaine Liquid Topical     Debridement:Excisional Debridement    Using #15 blade scalpel the wound(s)/ulcer(s) was/were sharply debrided down through and including the removal of subcutaneous tissue. Devitalized Tissue Debrided:  fibrin, biofilm, slough and necrotic/eschar to stimulate bleeding to promote healing, post debridement good bleeding base and wound edges noted    Wound/Ulcer #: 1 and 2    Percent of Wound/Ulcer Debrided: 100%    Total Surface Area Debrided:  1 sq cm     Estimated Blood Loss:  Minimal  Hemostasis Achieved:  by pressure    Procedural Pain:  2  / 10   Post Procedural Pain:  4 / 10     Response to treatment:  Well tolerated by patient. A culture was done. Plan:     Pt is not a smoker   - Discussed relationship of smoking and negative affects on wound healing   - Emphasized importance of tobacco avoidace/cessation   - Script for nicotine patch given to patient   - Information regarding support groups for smoking cessation given    In my professional opinion and based off the information that is available at this time this patient has appropriate indication for HBO Therapy: Maybe    Treatment Note please see attached Discharge Instructions    Written patient dismissal instructions given to patient and signed by patient or POA.          Discharge Instructions       Visit Discharge/Physician Orders    Discharge condition: Stable     Assessment of pain at discharge:  none     Anesthetic used: 4% lidocaine solution     Discharge to: Home     Left via:Private automobile     Accompanied by: accompanied by self     ECF/HHA: [facility] for dressing supplies     Dressing Orders: Sherine Duty right lower leg ulcer with normal saline.  Apply Saline moistened juve.  Cover with saline moist gauze and secure with dry dressing. Change every other day     Treatment Orders:       Contact your doctor if you have a temperature above 100.4 with any of the following:                         Persistent cough             Shortness of breath            Chills            Fatigue            Chest pain or pressure            Difficulty breathing            Decreased consciousness of confusion             Headache     Recommend:                       Wash hands often and for 20 seconds or more             Avoid touching eyes, nose, mouth and face             Social distance ( 6 feet)             Stay at home as much as possible             Call health care provider with any concerns     Starting December 1st, [place] wound care will be at new location. [address] [place] [address]  Phone: [phone]     [address] [place] [address],[address] followup visit ________one week with Dr. Pierce_______In observance of the Thanksgiving Holiday, wound care will be closed Friday November 27th_______________  (Please note your next appointment above and if you are unable to keep, kindly give a 24 hour notice.  Thank you.)     Physician signature:__________________________        If you experience any of the following, please call the [address] [place] [address] Road during business hours:     * Increase in Pain  * Temperature over 101  * Increase in drainage from your wound  * Drainage with a foul odor  * Bleeding  * Increase in swelling  * Need for compression bandage changes due to slippage, breakthrough drainage.     If you need medical attention outside of the business hours of the [address] [place] [address] Road please contact your PCP or go to the nearest emergency room.         Electronically signed by Thais Quevedo DPM on 11/25/2020 at 11:45 AM

## 2020-11-27 ENCOUNTER — APPOINTMENT (OUTPATIENT)
Dept: CARDIAC REHAB | Age: 60
End: 2020-11-27
Payer: MEDICARE

## 2020-11-30 ENCOUNTER — APPOINTMENT (OUTPATIENT)
Dept: CARDIAC REHAB | Age: 60
End: 2020-11-30
Payer: MEDICARE

## 2020-11-30 RX ORDER — ATORVASTATIN CALCIUM 40 MG/1
40 TABLET, FILM COATED ORAL DAILY
Qty: 90 TABLET | Refills: 2 | Status: SHIPPED
Start: 2020-11-30 | End: 2021-09-01 | Stop reason: SDUPTHER

## 2020-12-04 ENCOUNTER — HOSPITAL ENCOUNTER (OUTPATIENT)
Dept: WOUND CARE | Age: 60
Discharge: HOME | End: 2020-12-04
Payer: MEDICARE

## 2020-12-11 ENCOUNTER — HOSPITAL ENCOUNTER (OUTPATIENT)
Dept: WOUND CARE | Age: 60
Discharge: HOME | End: 2020-12-11
Payer: MEDICARE

## 2020-12-17 NOTE — PROGRESS NOTES
[id number] [facility] Cardiology Progress Note  Dr. Skye Thompson      Referring Physician: KRISTEN Chowdhury  CHIEF COMPLAINT:   Chief Complaint   Patient presents with    Coronary Artery Disease     3 month ov. Echo done 10/21/20. No cardiac complaints. Denies ED/Hosp admits.  Cardiomyopathy    Cardiac Valve Problem    Hypertension    Diabetes    Cerebrovascular Accident       HISTORY OF PRESENT ILLNESS:   Patient is 63years old female with history of CAD status post PCI to LAD, s/p CABG, CVA, type 2 diabetes mellitus, hypertension, hyperlipidemia, anemia,is here for follow-up appointment. Patient denies any chest pain, no shortness of breath, no lightheadedness, no dizziness, no palpitations, no pedal edema, no PND, no orthopnea, no syncope, no presyncopal episodes.   Functional capacity is back to baseline      Past Medical History:   Diagnosis Date    Breast cancer ([address] 2000, 2005    right    Breast cancer ([address] 2020    left    CAD (coronary artery disease)     follows with Dr. Immanuel Garcia Chronic ulcer of right leg, limited to breakdown of skin ([address]     CVA (cerebral vascular accident) ([address] 2015    no deficits    DM (diabetes mellitus) ([address]     H/O: GI bleed     History of blood transfusion 02/2020    Hyperlipidemia     Hypertension     Ischemic cardiomyopathy     Valvular heart disease          Past Surgical History:   Procedure Laterality Date    ABDOMEN SURGERY      APPENDECTOMY      BREAST RECONSTRUCTION Right     BREAST SURGERY  2000    right masectomy    COLONOSCOPY      CORONARY ANGIOPLASTY WITH STENT PLACEMENT  03/04/2020    Mini Vision 2.5 x 18 stent deployed proximal LAD by DR Jeffie Hodgkin    CORONARY ARTERY BYPASS GRAFT N/A 6/16/2020    CORONARY ARTERY BYPASS, MARY JO performed by Jose Calloway MD at [address] [place] [address] Rd, COLON, DIAGNOSTIC      FOOT SURGERY      right    HYSTERECTOMY      fibroids    KNEE ARTHROSCOPY      left    MASTECTOMY Right 2000    MASTECTOMY, MODIFIED RADICAL Left 2/25/2020    LEFT BREAST SIMPLE MASTECTOMY performed by Jerome Parekh MD at [facility], MODIFIED RADICAL Left 2/25/2020    LEFT BREAST MASTECTOMY POST OP BLEED CONTROL, EVACUATION OF LEFT CHEST WALL HEMATOMA performed by Jerome Parekh MD at [facility] GASTROINTESTINAL ENDOSCOPY N/A 2/5/2020    EGD BIOPSY performed by Jerome Parekh MD at [facility] N/A 6/12/2020    EGD BIOPSY performed by Jerome Parekh MD at [facility]         Current Outpatient Medications   Medication Sig Dispense Refill    atorvastatin (LIPITOR) 40 MG tablet Take 1 tablet by mouth daily 90 tablet 2    amiodarone (CORDARONE) 200 MG tablet Take 1 tablet by mouth daily 60 tablet 0    anastrozole (ARIMIDEX) 1 MG tablet Take 1 tablet by mouth daily 90 tablet 1    vitamin C (ASCORBIC ACID) 500 MG tablet Take 500 mg by mouth 2 times daily      ferrous sulfate (IRON 325) 325 (65 Fe) MG tablet Take 1 tablet by mouth 2 times daily (with meals) 60 tablet 0    metoprolol succinate (TOPROL XL) 25 MG extended release tablet Take 1 tablet by mouth daily 30 tablet 2    furosemide (LASIX) 20 MG tablet Take 0.5 tablets by mouth daily (Patient taking differently: Take 40 mg by mouth 2 times daily ) 60 tablet 2    clopidogrel (PLAVIX) 75 MG tablet Take 1 tablet by mouth daily 60 tablet 0    pantoprazole (PROTONIX) 40 MG tablet Take 1 tablet by mouth 2 times daily (before meals) 180 tablet 1    glimepiride (AMARYL) 1 MG tablet Take 1 mg by mouth every morning (before breakfast)       No current facility-administered medications for this visit.       Facility-Administered Medications Ordered in Other Visits   Medication Dose Route Frequency Provider Last Rate Last Admin    lidocaine (XYLOCAINE) 4 % external solution   Topical Once Donnell Bradshaw DPM             Allergies as of 12/18/2020 - Review Complete 12/18/2020 Allergen Reaction Noted    Penicillins Hives and Rash 02/23/2012       Social History     Socioeconomic History    Marital status: Single     Spouse name: Not on file    Number of children: Not on file    Years of education: Not on file    Highest education level: Not on file   Occupational History    Not on file   Social Needs    Financial resource strain: Not on file    Food insecurity     Worry: Not on file     Inability: Not on file    Transportation needs     Medical: Not on file     Non-medical: Not on file   Tobacco Use    Smoking status: Never Smoker    Smokeless tobacco: Never Used    Tobacco comment: per pt   Substance and Sexual Activity    Alcohol use: Not Currently     Alcohol/week: 6.0 standard drinks     Types: 6 Standard drinks or equivalent per week    Drug use: Not Currently     Types: Marijuana     Comment: used briefly when she turned \"59\"    Sexual activity: Not Currently   Lifestyle    Physical activity     Days per week: Not on file     Minutes per session: Not on file    Stress: Not on file   Relationships    Social connections     Talks on phone: Not on file     Gets together: Not on file     Attends Episcopal service: Not on file     Active member of club or organization: Not on file     Attends meetings of clubs or organizations: Not on file     Relationship status: Not on file    Intimate partner violence     Fear of current or ex partner: Not on file     Emotionally abused: Not on file     Physically abused: Not on file     Forced sexual activity: Not on file   Other Topics Concern    Not on file   Social History Narrative    Denies caffeine.         Family History   Problem Relation Age of Onset    Stroke Mother     Heart Failure Mother     Other Mother         ICD    Breast Cancer Mother     Pacemaker Father     Heart Failure Father     Hypertension Sister        REVIEW OF SYSTEMS:     CONSTITUTIONAL:  negative for  fevers, chills, sweats and fatigue  HEENT: negative for  tinnitus, earaches, nasal congestion and epistaxis  RESPIRATORY:  negative for  dry cough, cough with sputum, wheezing and hemoptysis  GASTROINTESTINAL:  negative for nausea, vomiting, diarrhea, constipation, pruritus and jaundice  HEMATOLOGIC/LYMPHATIC:  negative for easy bruising, bleeding, lymphadenopathy and petechiae  ENDOCRINE:  negative for heat intolerance, cold intolerance, tremor, hair loss and diabetic symptoms including neither polyuria nor polydipsia nor blurred vision  MUSCULOSKELETAL:  negative for  myalgias, arthralgias, joint swelling, stiff joints and decreased range of motion  NEUROLOGICAL:  negative for memory problems, speech problems, visual disturbance, dysphagia, weakness and numbness      PHYSICAL EXAM:   CONSTITUTIONAL:  awake, alert, cooperative, no apparent distress, and appears stated age  HEAD:  normocepalic, without obvious abnormality, atraumatic, pink, moist mucous membranes. NECK:  Supple, symmetrical, trachea midline, no adenopathy, thyroid symmetric, not enlarged and no tenderness, skin normal  LUNGS:  No increased work of breathing, good air exchange, clear to auscultation bilaterally, no crackles or wheezing  CARDIOVASCULAR:  Normal apical impulse, regular rate and rhythm, normal S1 and S2, no S3 or S4, 2/6 systolic murmur at the apex, 3/6 systolic murmur at the left lower sternal border, no JVD, no carotid bruit, no pedal edema, good carotid upstroke bilaterally. ABDOMEN:  Soft, nontender, no masses, no hepatomegaly or splenomegaly, BS+  CHEST: nontender to palpation, expands symmetrically, well-healed midsternal incision wound, s/p left mastectomy  MUSCULOSKELETAL:  No clubbing no cyanosis. there is no redness, warmth, or swelling of the joints, full range of motion noted  NEUROLOGIC:  Alert, awake,oriented x3  SKIN:  no bruising or bleeding, normal skin color, texture, turgor and no redness, warmth, or swelling      BP (!) 142/80   Pulse 60   Resp 16   Ht 5' 5\" (1.651 m)   Wt 232 lb (105.2 kg)   SpO2 98%   BMI 38.61 kg/m²     DATA:   I personally reviewed the visit EKG with the following interpretation: Sinus rhythm, old anteroseptal wall MI age undetermined, LVH by voltage criteria, nonspecific T wave changes, possible left atrial enlargement. EKG 9/11/20 Normal sinus rhythm  Possible Left atrial enlargement  Left ventricular hypertrophy  T wave abnormality, consider lateral ischemia  Abnormal ECG    ECHO: 10/21/20 Summary   Compared to prior echo, no significant changes noted. Technically adequate   study. Left ventricle is mildly enlarged . Ejection fraction is visually estimated at 35-40%. global wall hypokinesis with apical akinesis   There is doppler evidence of stage III diastolic dysfunction. Mild mitral regurgitation is present. Trace aortic valve regurgitation. Mild tricuspid regurgitation. RVSP is 31 mmHg. Pulmonary hypertension is mild . Stress Test: 2/7/20   FINDINGS:    Perfusion images demonstrate no no definite reversible perfusion    defect. There is a fixed perfusion defect centered at the apex    centered at the apex. Wall motion is globally hypokinetic with particular severity at the    septum. The end diastolic volume is 754 ml. The end systolic volume is 74 ml. The estimated ejection fraction is 34 %.           Impression    1. No definite reversible perfusion defect. See above. 2. Ejection fraction is 34 %. 3. Global hypokinesis which is most severe at the septum.             Angiography: 3/4/20 IMPRESSION:  1. Totally occluded left anterior descending artery with successful PCI  using 2.5 x 18 mm mini Vision bare metal stent (bare metal stent was  used due to the patient's recent history of GI bleed, recent mastectomy  surgery two days ago, and the plan for possible CABG down the road when  she heals from her mastectomy surgery). 2.  At least 50% distal left main disease.   3.  Mild-to-moderate ostial left circumflex disease with moderate  disease involving the vessel distally. 4.  Moderate disease involving the mid segment of the RCA. 5.  Moderate disease involving second OM branch and second diagonal  Branch. 6/16/20 CABG  coronary artery bypass grafting x2; left internal mammary artery to the LAD, saphenous vein graft to the obtuse marginal   Cardiology Labs: BMP:    Lab Results   Component Value Date     10/15/2020    K 4.8 10/15/2020    K 4.6 09/11/2020     10/15/2020    CO2 24 10/15/2020    BUN 33 10/15/2020    CREATININE 1.6 10/15/2020     CMP:    Lab Results   Component Value Date     10/15/2020    K 4.8 10/15/2020    K 4.6 09/11/2020     10/15/2020    CO2 24 10/15/2020    BUN 33 10/15/2020    CREATININE 1.6 10/15/2020    PROT 6.9 10/15/2020     CBC:    Lab Results   Component Value Date    WBC 4.8 10/15/2020    RBC 4.70 10/15/2020    HGB 12.3 10/15/2020    HCT 40.2 10/15/2020    MCV 85.5 10/15/2020    RDW 18.9 10/15/2020     10/15/2020     PT/INR:  No results found for: PTINR  PT/INR Warfarin:  No components found for: PTPATWAR, PTINRWAR  PTT:    Lab Results   Component Value Date    APTT 30.0 06/16/2020     PTT Heparin:  No components found for: APTTHEP  Magnesium:    Lab Results   Component Value Date    MG 2.1 09/14/2020     TSH:    Lab Results   Component Value Date    TSH 1.500 02/04/2020     TROPONIN:  No components found for: TROP  BNP:  No results found for: BNP  FASTING LIPID PANEL:    Lab Results   Component Value Date    CHOL 69 03/04/2020    HDL 26 03/04/2020    TRIG 107 03/04/2020     No orders to display     I have personally reviewed the laboratory, cardiac diagnostic and radiographic testing as outlined above:      IMPRESSION:  1.  CAD: S/p PCI to LAD, s/p CABG with a LIMA to LAD, SVG to OM1, doing fine, will continue current treatment  2. History of nonsustained ventricular tachycardia: On beta-blockers, no recurrence  3.     Ischemic cardiomyopathy: Did not wear LifeVest as advised, ejection fraction is up to 35-40%, will continue current treatment, no need for ICD at this point of time  4. Mitral valve regurgitation: Mild  5. Tricuspid valve regurgitation: Mild  6. Pulmonary hypertension: Mild  7. Hypertension: Controlled  8. Type 2 diabetes mellitus  9. History of CVA  10. History of left mastectomy     RECOMMENDATIONS:   1. Continue current treatment  2. Preventive cardiology: Low-salt, low-cholesterol diet, daily exercise, total cholesterol of less than 200, LDL of less than 70, adherence to diabetic diet and diabetic medications,were all advised. 3.  CHF: Daily weight, take an extra Lasix for weight gain of more than 2-3 pounds in 24 hours, compliance with diuretics, low-salt diet were all advised. 4.  Follow-up with KRISTEN Hernandez as scheduled  5. Follow-up with Dr. Katie Vick in 6 months, sooner if symptomatic for any reason    I have reviewed my findings and recommendations with patient    Electronically signed by Alexei Pringle MD on 12/18/2020 at 11:01 AM    NOTE: This report was transcribed using voice recognition software.  Every effort was made to ensure accuracy; however, inadvertent computerized transcription errors may be present

## 2020-12-18 ENCOUNTER — HOSPITAL ENCOUNTER (OUTPATIENT)
Dept: WOUND CARE | Age: 60
Discharge: HOME | End: 2020-12-18
Payer: MEDICARE

## 2020-12-18 ENCOUNTER — OFFICE VISIT (OUTPATIENT)
Dept: CARDIOLOGY CLINIC | Age: 60
End: 2020-12-18
Payer: MEDICARE

## 2020-12-18 VITALS
WEIGHT: 222 LBS | HEART RATE: 66 BPM | BODY MASS INDEX: 36.99 KG/M2 | HEIGHT: 65 IN | RESPIRATION RATE: 18 BRPM | DIASTOLIC BLOOD PRESSURE: 70 MMHG | SYSTOLIC BLOOD PRESSURE: 122 MMHG | TEMPERATURE: 97.1 F

## 2020-12-18 VITALS
DIASTOLIC BLOOD PRESSURE: 80 MMHG | OXYGEN SATURATION: 98 % | RESPIRATION RATE: 16 BRPM | BODY MASS INDEX: 38.65 KG/M2 | HEART RATE: 60 BPM | SYSTOLIC BLOOD PRESSURE: 142 MMHG | HEIGHT: 65 IN | WEIGHT: 232 LBS

## 2020-12-18 PROCEDURE — G8427 DOCREV CUR MEDS BY ELIG CLIN: HCPCS | Performed by: INTERNAL MEDICINE

## 2020-12-18 PROCEDURE — 1036F TOBACCO NON-USER: CPT | Performed by: INTERNAL MEDICINE

## 2020-12-18 PROCEDURE — G8417 CALC BMI ABV UP PARAM F/U: HCPCS | Performed by: INTERNAL MEDICINE

## 2020-12-18 PROCEDURE — G8484 FLU IMMUNIZE NO ADMIN: HCPCS | Performed by: INTERNAL MEDICINE

## 2020-12-18 PROCEDURE — 93000 ELECTROCARDIOGRAM COMPLETE: CPT | Performed by: INTERNAL MEDICINE

## 2020-12-18 PROCEDURE — 99212 OFFICE O/P EST SF 10 MIN: CPT

## 2020-12-18 PROCEDURE — 3017F COLORECTAL CA SCREEN DOC REV: CPT | Performed by: INTERNAL MEDICINE

## 2020-12-18 PROCEDURE — 99214 OFFICE O/P EST MOD 30 MIN: CPT | Performed by: INTERNAL MEDICINE

## 2020-12-18 RX ORDER — LIDOCAINE HYDROCHLORIDE 40 MG/ML
SOLUTION TOPICAL ONCE
Status: DISCONTINUED | OUTPATIENT
Start: 2020-12-18 | End: 2020-12-19 | Stop reason: HOSPADM

## 2020-12-18 SDOH — HEALTH STABILITY: MENTAL HEALTH: HOW OFTEN DO YOU HAVE A DRINK CONTAINING ALCOHOL?: NOT ASKED

## 2020-12-18 SDOH — HEALTH STABILITY: MENTAL HEALTH: HOW MANY STANDARD DRINKS CONTAINING ALCOHOL DO YOU HAVE ON A TYPICAL DAY?: NOT ASKED

## 2020-12-18 ASSESSMENT — PAIN SCALES - GENERAL: PAINLEVEL_OUTOF10: 0

## 2020-12-18 NOTE — PROGRESS NOTES
Patient right leg ulcer is healed. Discharge instructions given. Patient demonstrated understanding.  Aida Cortés

## 2020-12-18 NOTE — PROGRESS NOTES
[facility]  History and Physical/Consultation  Podiatry    Referring Physician : Caro Khan, [address] RECORD NUMBER:  [id number]  AGE: 63 y.o. GENDER: female  : 1960  EPISODE DATE:  2020  Subjective:     Chief Complaint   Patient presents with    Wound Check     right leg         HISTORY of PRESENT ILLNESS HPI     Quentin Pack is a 63 y.o. female who presents today for wound/ulcer evaluation. History of Wound Context:  The  patient has had a wound of surgical dehiscence  which was first noted approximately months . This has been treated antibiotic. On their initial visit to the wound healing center, 20 ,  the patient has noted that the wound has not been improving. The patient has not had similar previous wounds in the past.      Pt is not on abx at time of initial visit.       Wound/Ulcer Pain Timing/Severity: constant  Quality of pain: aching  Severity:  4 / 10   Modifying Factors: Pain worsens with walking  Associated Signs/Symptoms: edema, erythema and drainage    Ulcer Identification:  Ulcer Type: venous  Contributing Factors: edema, venous stasis, lymphedema and none    Diabetic/Pressure/Non Pressure Ulcers onl y:  Ulcer: Non-Pressure ulcer, fat layer exposed    If patient has diabetic lower extremity wounds  Wallace Classification of diabetic lower extremity wounds:    Grade Description   []  0 No open wound   []  1 Superficial ulcer involving the full skin thickness   []  2 Deep ulcer involves ligament, tendon, joint capsule, or fascia  No bone involvement or abscess presence   []  3 Deep Ulcer with abcess formation and/or osteomyelitis   []  4 Localized gangrene   []  5 Extensive gangrene of the foot     Wound: Internal surgical dehiscence     20  Debrided, santyl        10-14-20  Debrided, santyl    10-30-20  Debrided, cont tx and care     20  Debrided, cont tx and care     20  Debrided, cont tx and care, juve    20 Healed  PAST MEDICAL HISTORY      Diagnosis Date    Breast cancer ([facility] 2000, 2005    right    Breast cancer ([facility] 2020    left    CAD (coronary artery disease)     follows with Dr. Cat Pringle Chronic ulcer of right leg, limited to breakdown of skin ([facility]     CVA (cerebral vascular accident) ([facility] 2015    no deficits    DM (diabetes mellitus) ([facility]     H/O: GI bleed     History of blood transfusion 02/2020    Hyperlipidemia     Hypertension     Ischemic cardiomyopathy     Valvular heart disease      Past Surgical History:   Procedure Laterality Date    ABDOMEN SURGERY      APPENDECTOMY      BREAST RECONSTRUCTION Right     BREAST SURGERY  2000    right masectomy    COLONOSCOPY      CORONARY ANGIOPLASTY WITH STENT PLACEMENT  03/04/2020    Mini Vision 2.5 x 18 stent deployed proximal LAD by DR Severiano Hoe    CORONARY ARTERY BYPASS GRAFT N/A 6/16/2020    CORONARY ARTERY BYPASS, MARY JO performed by Norris Zacarias MD at [address] [place] [address], DIAGNOSTIC      FOOT SURGERY      right    HYSTERECTOMY      fibroids    KNEE ARTHROSCOPY      left    MASTECTOMY Right 2000    MASTECTOMY, MODIFIED RADICAL Left 2/25/2020    LEFT BREAST SIMPLE MASTECTOMY performed by Meredith Tubbs MD at [facility], MODIFIED RADICAL Left 2/25/2020    LEFT BREAST MASTECTOMY POST OP BLEED CONTROL, EVACUATION OF LEFT CHEST WALL HEMATOMA performed by Meredith Tubbs MD at [facility] GASTROINTESTINAL ENDOSCOPY N/A 2/5/2020    EGD BIOPSY performed by Meredith Tubbs MD at [address] [place] [address] N/A 6/12/2020    EGD BIOPSY performed by Meredith Tubbs MD at [facility] ENDOSCOPY     Family History   Problem Relation Age of Onset    Stroke Mother     Heart Failure Mother     Other Mother         ICD    Breast Cancer Mother     Pacemaker Father     Heart Failure Father     Hypertension Sister      Social History     Tobacco Use  Smoking status: Never Smoker    Smokeless tobacco: Never Used    Tobacco comment: per pt   Substance Use Topics    Alcohol use: Not Currently     Alcohol/week: 6.0 standard drinks     Types: 6 Standard drinks or equivalent per week    Drug use: Not Currently     Types: Marijuana     Comment: used briefly when she turned \"59\"     Allergies   Allergen Reactions    Penicillins Hives and Rash     Current Outpatient Medications on File Prior to Encounter   Medication Sig Dispense Refill    atorvastatin (LIPITOR) 40 MG tablet Take 1 tablet by mouth daily 90 tablet 2    amiodarone (CORDARONE) 200 MG tablet Take 1 tablet by mouth daily 60 tablet 0    anastrozole (ARIMIDEX) 1 MG tablet Take 1 tablet by mouth daily 90 tablet 1    magnesium oxide (MAG-OX) 400 MG tablet Take 400 mg by mouth daily      vitamin C (ASCORBIC ACID) 500 MG tablet Take 500 mg by mouth 2 times daily      ferrous sulfate (IRON 325) 325 (65 Fe) MG tablet Take 1 tablet by mouth 2 times daily (with meals) 60 tablet 0    metoprolol succinate (TOPROL XL) 25 MG extended release tablet Take 1 tablet by mouth daily 30 tablet 2    furosemide (LASIX) 20 MG tablet Take 0.5 tablets by mouth daily (Patient taking differently: Take 40 mg by mouth 2 times daily ) 60 tablet 2    clopidogrel (PLAVIX) 75 MG tablet Take 1 tablet by mouth daily 60 tablet 0    pantoprazole (PROTONIX) 40 MG tablet Take 1 tablet by mouth 2 times daily (before meals) 180 tablet 1    glimepiride (AMARYL) 1 MG tablet Take 1 mg by mouth every morning (before breakfast)       No current facility-administered medications on file prior to encounter.         REVIEW OF SYSTEMS   ROS : All others Negative if blank [], Positive if [x]  General Vascular   [] Fevers [] Claudication   [] Chills [] Rest Pain   Skin Neurologic   [] Tissue Loss [] Lower extremity neuropathy     Objective: Post-Procedure Surface Area (cm^2) 0.07 cm^2 11/25/20 1136   Post-Procedure Volume (cm^3) 0.01 cm^3 11/25/20 1136   Wound Assessment Dry 12/18/20 0816   Drainage Amount Scant 12/18/20 0816   Drainage Description Yellow 12/18/20 0816   Odor None 12/18/20 0816   [place]-wound Assessment Intact 12/18/20 0816   Number of days: 91          Procedure Note  Indications:  Based on my examination of this patient's wound(s)/ulcer(s) today, debridement is required to promote healing and evaluate the wound base. Performed by: Siri Juarez DPM    Consent obtained:  Yes    Time out taken:  Yes    Pain Control: Anesthetic  Anesthetic: 4% Lidocaine Liquid Topical     Debridement:Excisional Debridement    Using #15 blade scalpel the wound(s)/ulcer(s) was/were sharply debrided down through and including the removal of subcutaneous tissue. Devitalized Tissue Debrided:  fibrin, biofilm, slough and necrotic/eschar to stimulate bleeding to promote healing, post debridement good bleeding base and wound edges noted    Wound/Ulcer #: 2    Percent of Wound/Ulcer Debrided: 100%    Total Surface Area Debrided:  0 sq cm     Estimated Blood Loss:  Minimal  Hemostasis Achieved:  by pressure    Procedural Pain:  2  / 10   Post Procedural Pain:  4 / 10     Response to treatment:  Well tolerated by patient. A culture was done. Healed    Plan:     Pt is not a smoker   - Discussed relationship of smoking and negative affects on wound healing   - Emphasized importance of tobacco avoidace/cessation   - Script for nicotine patch given to patient   - Information regarding support groups for smoking cessation given    In my professional opinion and based off the information that is available at this time this patient has appropriate indication for HBO Therapy: Maybe    Treatment Note please see attached Discharge Instructions    Written patient dismissal instructions given to patient and signed by patient or POA. Discharge Instructions       Visit Discharge/Physician Orders    Discharge condition: Stable     Assessment of pain at discharge:  none     Anesthetic used: 4% lidocaine solution     Discharge to: Home     Left via:Private automobile     Accompanied by: accompanied by self     ECF/HHA: [facility] for dressing supplies     Dressing Orders:  right leg ulcer healed     Treatment Orders:      [address] [place] [address],[address] followup visit _______discharge of wound care___In observance of the Christmas and New Year holidays, wound care will be close fridays of December 25th and January 1st_(However wound care will be open during the morning hours of Thursday December 24th)_______________  (Please note your next appointment above and if you are unable to keep, kindly give a 24 hour notice. Thank you.)     Physician signature:__________________________        If you experience any of the following, please call the "[facility] [address] during business hours:     * Increase in Pain  * Temperature over 101  * Increase in drainage from your wound  * Drainage with a foul odor  * Bleeding  * Increase in swelling  * Need for compression bandage changes due to slippage, breakthrough drainage.     If you need medical attention outside of the business hours of the "[facility] [address] please contact your PCP or go to the nearest emergency room.         Electronically signed by Huang Richter DPM on 12/18/2020 at 10:51 AM

## 2021-01-04 RX ORDER — AMIODARONE HYDROCHLORIDE 200 MG/1
TABLET ORAL
Qty: 60 TABLET | Refills: 0 | OUTPATIENT
Start: 2021-01-04

## 2021-01-05 RX ORDER — AMIODARONE HYDROCHLORIDE 200 MG/1
200 TABLET ORAL DAILY
Qty: 90 TABLET | Refills: 1 | OUTPATIENT
Start: 2021-01-05

## 2021-02-26 ENCOUNTER — TELEPHONE (OUTPATIENT)
Dept: ONCOLOGY | Age: 61
End: 2021-02-26

## 2021-02-26 NOTE — TELEPHONE ENCOUNTER
Called patient to reschedule 03/08/21 follow up appointment with Dr. Hilaria Diaz. Patient's phone message stated the person being called cannot accept calls at this time and then hangs up. Checked patient's most recent [address] Physician Communication Release of Information form scanned into patient's chart. It only lists her name, our office has no permission to call anyone else on her behalf. Will keep trying to call patient and hopefully get through to notify her that her 03/08/21 follow up appointment is cancelled.

## 2021-03-12 DIAGNOSIS — Z85.3 PERSONAL HISTORY OF BREAST CANCER: Primary | ICD-10-CM

## 2021-03-15 ENCOUNTER — OFFICE VISIT (OUTPATIENT)
Dept: ONCOLOGY | Age: 61
End: 2021-03-15
Payer: MEDICARE

## 2021-03-15 ENCOUNTER — HOSPITAL ENCOUNTER (OUTPATIENT)
Dept: INFUSION THERAPY | Age: 61
Discharge: HOME | End: 2021-03-15
Payer: MEDICARE

## 2021-03-15 VITALS
OXYGEN SATURATION: 96 % | TEMPERATURE: 97.2 F | DIASTOLIC BLOOD PRESSURE: 86 MMHG | HEIGHT: 65 IN | SYSTOLIC BLOOD PRESSURE: 172 MMHG | WEIGHT: 245 LBS | BODY MASS INDEX: 40.82 KG/M2 | HEART RATE: 63 BPM

## 2021-03-15 DIAGNOSIS — Z17.0 MALIGNANT NEOPLASM OF RIGHT BREAST IN FEMALE, ESTROGEN RECEPTOR POSITIVE, UNSPECIFIED SITE OF BREAST (HCC): ICD-10-CM

## 2021-03-15 DIAGNOSIS — C50.911 MALIGNANT NEOPLASM OF RIGHT BREAST IN FEMALE, ESTROGEN RECEPTOR POSITIVE, UNSPECIFIED SITE OF BREAST (HCC): ICD-10-CM

## 2021-03-15 DIAGNOSIS — Z85.3 PERSONAL HISTORY OF BREAST CANCER: ICD-10-CM

## 2021-03-15 LAB
ALBUMIN SERPL-MCNC: 3.9 G/DL (ref 3.5–5.2)
ALP BLD-CCNC: 99 U/L (ref 35–104)
ALT SERPL-CCNC: 13 U/L (ref 0–32)
ANION GAP SERPL CALCULATED.3IONS-SCNC: 12 MMOL/L (ref 7–16)
AST SERPL-CCNC: 16 U/L (ref 0–31)
BASOPHILS ABSOLUTE: 0.01 E9/L (ref 0–0.2)
BASOPHILS RELATIVE PERCENT: 0.3 % (ref 0–2)
BILIRUB SERPL-MCNC: 0.3 MG/DL (ref 0–1.2)
BUN BLDV-MCNC: 40 MG/DL (ref 8–23)
CALCIUM SERPL-MCNC: 9.6 MG/DL (ref 8.6–10.2)
CHLORIDE BLD-SCNC: 109 MMOL/L (ref 98–107)
CO2: 21 MMOL/L (ref 22–29)
CREAT SERPL-MCNC: 1.8 MG/DL (ref 0.5–1)
EOSINOPHILS ABSOLUTE: 0.09 E9/L (ref 0.05–0.5)
EOSINOPHILS RELATIVE PERCENT: 2.4 % (ref 0–6)
GFR AFRICAN AMERICAN: 35
GFR NON-AFRICAN AMERICAN: 35 ML/MIN/1.73
GLUCOSE BLD-MCNC: 92 MG/DL (ref 74–99)
HCT VFR BLD CALC: 41.9 % (ref 34–48)
HEMOGLOBIN: 13.5 G/DL (ref 11.5–15.5)
IMMATURE GRANULOCYTES #: 0.01 E9/L
IMMATURE GRANULOCYTES %: 0.3 % (ref 0–5)
LYMPHOCYTES ABSOLUTE: 0.78 E9/L (ref 1.5–4)
LYMPHOCYTES RELATIVE PERCENT: 20.9 % (ref 20–42)
MCH RBC QN AUTO: 28.3 PG (ref 26–35)
MCHC RBC AUTO-ENTMCNC: 32.2 % (ref 32–34.5)
MCV RBC AUTO: 87.8 FL (ref 80–99.9)
MONOCYTES ABSOLUTE: 0.56 E9/L (ref 0.1–0.95)
MONOCYTES RELATIVE PERCENT: 15 % (ref 2–12)
NEUTROPHILS ABSOLUTE: 2.29 E9/L (ref 1.8–7.3)
NEUTROPHILS RELATIVE PERCENT: 61.1 % (ref 43–80)
PDW BLD-RTO: 15.3 FL (ref 11.5–15)
PLATELET # BLD: 213 E9/L (ref 130–450)
PMV BLD AUTO: 11.7 FL (ref 7–12)
POTASSIUM SERPL-SCNC: 4.9 MMOL/L (ref 3.5–5)
RBC # BLD: 4.77 E12/L (ref 3.5–5.5)
SODIUM BLD-SCNC: 142 MMOL/L (ref 132–146)
TOTAL PROTEIN: 7.1 G/DL (ref 6.4–8.3)
WBC # BLD: 3.7 E9/L (ref 4.5–11.5)

## 2021-03-15 PROCEDURE — G8417 CALC BMI ABV UP PARAM F/U: HCPCS | Performed by: INTERNAL MEDICINE

## 2021-03-15 PROCEDURE — 85025 COMPLETE CBC W/AUTO DIFF WBC: CPT

## 2021-03-15 PROCEDURE — 99214 OFFICE O/P EST MOD 30 MIN: CPT | Performed by: INTERNAL MEDICINE

## 2021-03-15 PROCEDURE — G8484 FLU IMMUNIZE NO ADMIN: HCPCS | Performed by: INTERNAL MEDICINE

## 2021-03-15 PROCEDURE — 36415 COLL VENOUS BLD VENIPUNCTURE: CPT

## 2021-03-15 PROCEDURE — 80053 COMPREHEN METABOLIC PANEL: CPT

## 2021-03-15 PROCEDURE — 1036F TOBACCO NON-USER: CPT | Performed by: INTERNAL MEDICINE

## 2021-03-15 PROCEDURE — 3017F COLORECTAL CA SCREEN DOC REV: CPT | Performed by: INTERNAL MEDICINE

## 2021-03-15 PROCEDURE — G8427 DOCREV CUR MEDS BY ELIG CLIN: HCPCS | Performed by: INTERNAL MEDICINE

## 2021-03-15 PROCEDURE — 99213 OFFICE O/P EST LOW 20 MIN: CPT

## 2021-03-15 RX ORDER — ANASTROZOLE 1 MG/1
1 TABLET ORAL DAILY
Qty: 90 TABLET | Refills: 1 | Status: SHIPPED
Start: 2021-03-15 | End: 2021-11-30 | Stop reason: SDUPTHER

## 2021-03-15 NOTE — PROGRESS NOTES
Department of [facility] Oncology  Attending Clinic Note    Reason for Visit: Follow-up on a patient with Cervical Cancer and Breast Cancer    PCP:  KRISTEN Rodriguez    History of Present Illness  63 y.o. female with past medical hx of right breast cancer with recurrence (s/p total mastectomy with reconstruction/TRAM flap & XRT, HTN, chest pain, CVA, left sided weakness, low back pain with sciatica, type II diabetes, hypertensive left ventricular hypertrophy. PAP smear on 04/11/2019 revealing HSIL and endometrial cells. She then underwent a colposcopy on 07/10/2019 and a LEEP on 08/22/2019. PATH REVIEW [facility]:  FINAL DIAGNOSIS   1. Cervix, LEEP specimen ([id number]) - Invasive poorly-differentiated adenocarcinoma. - The invasive adenocarcinoma involves the deep (radial) excision margin. 2. Endocervix, LEEP specimen ([id number]) - Focus of cauterized adenocarcinoma invovling excision magin. COMMENT   In the cervix LEEP specimen ([id number]) the adenocarcinoma invades the cervical stroma to a thickness of at least 2.5 mm. However, since the invasive adenocarcinoma involves the deep (radial) excision margin, the full extent of invasion cannot be determined. The invasive adenocarcinoma involves five tissue sections. Therefore, the width (circumferential measurement) is estimated at approximately 13 mm. Vascular invasion is not identified. PET[facility] 10/12/2019 at [facility]:   There is no hypermetabolic abdominal or pelvic lymphadenopathy.    There is mild low-level activity in the cervical region (max SUV 2.9).       Evaluated by GYN ONC (Dr. Glen Saldana at [facility]) who recommended ChemoRT with weekly Cisplatin  followed by HDR brachytherapy for Stage 1B1 Cervical Cancer. Patient would like to receive treatments locally. EBRT was started on 11/20/2019. Cycle # 1 weekly Cisplatin was on 11/20/2019. Cycle # 1 weekly Cisplatin was on 11/20/2019. Cycle # 5 weekly Cisplatin was on 12/19/2019.  EBRT was completed 12/27/2019. HDR brachytherapy (1/14/2020-1/16/2020)  PET/CT scan 05/18/2020 noted no evidence of disease. Follows locally with [facility] Kaitlynn Oh in [facility]    Left Breast Cancer Feb 2020  Left simple mastectomy on 02/25/2020:  A. Left breast, simple mastectomy:   - Invasive carcinoma of no special type (ductal), grade 2; tumor size 0.8 x 0.7 x 0.5 cm; LVI not identified.   - Ductal carcinoma in situ, intermediate to high nuclear grade,cribriform and solid types, with necrosis;   - Sclerosing fibroadenomatoid nodules and gynecomastia-like changes;   - Microcalcifications in DCIS;   - Scar and foreign body-type granuloma of prior biopsy site;  - Additional skin/breast tissue showing no pathologic alteration. B. Left breast, new inferior-medial margin, excision: Negative for malignancy. pT1b pNx  Breast Cancer Marker Studies:  Estrogen Receptors (ER): Positive >90%  Progesterone Receptors (TX): Positive:40%  Her-2/heidi (c-erb B-2) protein expression: Positive (score 3+)    Started Arimidex 1 mg po daily on 06/01/2020 with fair tolerance. Not on Herceptin due to CHF and CAD. EF 34% Feb 2020  No SLNB done. Left axillary U/S 06/01/2020:  No evidence of malignancy. DEXA scan 06/01/2020: normal bone density in LS and jasson hips. Tumor Board Discussion with Breast Team at [facility]: Defer LN evaluation and HER2 targeted therapies due to cardiac risks; Endocrine therapy only indicated. Admitted on 06/10/2020 for blood transfusions in preparation for CABG s/p NSTEMI. EGD on 06/12/2020 noted mild gastritis. Gastric antrum, biopsy: Chronic antral gastritis.  Negative immunostain for H.pylori. Urgent sternotomy/Urgent CABG x 2 (LIMA-LAD, SVG-OM1)/Exploration of PDA/Open GSV harvest RLE/ARNULFO exclusion with 40mm AtriClip/Rigid internal fixation of the sternum with KLS plates x 2 done on 37/23/1085. DC home on 06/21/2020. Today 03/15/2021 for f/u. She continues to tolerate the Arimidex well.  Denies fever, chills, chest pain, SOB, nausea, or vomiting. Continue Arimidex and Ca/VitD. Review of Systems;  CONSTITUTIONAL: No fever, chills. Fair appetite and energy level. ENMT: Eyes: No diplopia; Nose: No epistaxis. Mouth: No sore throat. RESPIRATORY: No hemoptysis, shortness of breath, cough. CARDIOVASCULAR: No chest pain, palpitations. GASTROINTESTINAL: No nausea/vomiting, abdominal pain, diarrhea/constipation. GENITOURINARY: No dysuria, urinary frequency, hematuria. NEURO: No syncope, presyncope, headache. Remainder:  ROS NEGATIVE    Past Medical History:      Diagnosis Date    Breast cancer ([facility] 2000, 2005    right    Breast cancer ([facility] 2020    left    CAD (coronary artery disease)     follows with Dr. Yasmeen Mena Chronic ulcer of right leg, limited to breakdown of skin ([facility]     CVA (cerebral vascular accident) ([facility] 2015    no deficits    [facility]     H/O: GI bleed     History of blood transfusion 02/2020    Hyperlipidemia     Hypertension     Ischemic cardiomyopathy     Valvular heart disease      Medications:  Reviewed and reconciled. Allergies: Allergies   Allergen Reactions    Penicillins Hives and Rash     Physical Exam:  BP (!) 172/86 (Site: Right Upper Arm, Position: Sitting, Cuff Size: Medium Adult)   Pulse 63   Temp 97.2 °F (36.2 °C) (Temporal)   Ht 5' 5\" (1.651 m)   Wt 245 lb (111.1 kg)   SpO2 96%   BMI 40.77 kg/m²   GENERAL: Alert, oriented x 3, not in acute distress. HEENT: PERRLA; EOMI. Oropharynx clear. NECK: Supple. Without lymphadenopathy. LUNGS: Good air entry bilaterally. No wheezing, crackles or ronchi. CARDIOVASCULAR: Regular rate. No murmurs, rubs or gallops. ABDOMEN: Soft. Non-tender, non-distended. Positive bowel sounds. EXTREMITIES: Without clubbing, cyanosis, or edema. NEUROLOGIC: No focal deficits.    ECOG PS 1     Impression/Plan:  63 y.o. female with hx of right breast cancer with recurrence (s/p total right mastectomy with reconstruction/TRAM flap & XRT with Stage 1B1 Cervical Cancer    PET/CT 10/12/2019 at [facility]:   There is no hypermetabolic abdominal or pelvic lymphadenopathy.    There is mild low-level activity in the cervical region (max SUV 2.9).       Evaluated by GYN ONC ([facility] Margi Eller [facility]) who recommended concurrent chemoRT with weekly Cisplatin followed by HDR brachytherapy for her Stage 1B1 Cervical Cancer. Radiation therapy started on 11/20/2019. Cycle # 1 weekly Cisplatin was on 11/20/2019. Cycle # 5 weekly Cisplatin was on 12/19/2019. EBRT was completed 12/27/2019. HDR brachytherapy (1/14/2020-1/16/2020)  PET/CT scan 05/18/2020 noted no evidence of disease. Follows locally with Dr. Liset Trujillo in [facility] [place]    Left Breast Cancer Feb 2020  Left simple mastectomy on 02/25/2020:  A. Left breast, simple mastectomy:   - Invasive carcinoma of no special type (ductal), grade 2; tumor size 0.8 x 0.7 x 0.5 cm; Negative margins. LVI not identified.   - Ductal carcinoma in situ, intermediate to high nuclear grade,cribriform and solid types, with necrosis;   - Sclerosing fibroadenomatoid nodules and gynecomastia-like changes;   - Microcalcifications in DCIS;   - Scar and foreign body-type granuloma of prior biopsy site;  - Additional skin/breast tissue showing no pathologic alteration. B. Left breast, new inferior-medial margin, excision: Negative for malignancy. pT1b pNx  Breast Cancer Marker Studies:  Estrogen Receptors (ER): Positive >90%  Progesterone Receptors ([place]): Positive:40%  Her-2/heidi (c-erb B-2) protein expression: Positive (score 3+)    Started Arimidex 1 mg po daily on 06/01/2020 with fair tolerance. Not on Herceptin due to CHF and CAD. EF 34% Feb 2020  No SLNB done. Left axillary U/S 06/01/2020:  No evidence of malignancy. DEXA scan 06/01/2020: normal bone density in LS and jasson hips.    Tumor Board Discussion with Breast Team at [facility]: Defer LN evaluation and HER2 targeted therapies due to cardiac risks; Endocrine therapy only indicated. Admitted on 06/10/2020 for blood transfusions in preparation for CABG s/p NSTEMI. EGD on 06/12/2020 noted mild gastritis. Gastric antrum, biopsy: Chronic antral gastritis.  Negative immunostain for H.pylori. Urgent sternotomy/Urgent CABG x 2 (LIMA-LAD, SVG-OM1)/Exploration of PDA/Open GSV harvest RLE/ARNULFO exclusion with 40mm AtriClip/Rigid internal fixation of the sternum with KLS plates x 2 done on 65/37/3213. DC home on 06/21/2020. LORI  Continue Arimidex and Ca/VitD. RTC July 2021 DEXA scan same day    Genetics: My-Risk testing-negative. No clinically significant mutation identified. No VUS identified.     Rachel Collazo MD   03/15/2021

## 2021-05-13 DIAGNOSIS — Z79.811 USE OF AROMATASE INHIBITORS: Primary | ICD-10-CM

## 2021-07-02 NOTE — PROGRESS NOTES
[facility] Progress Note  Dr. Joanne Barrios      Referring Physician: KRISTEN Rubio  CHIEF COMPLAINT:   Chief Complaint   Patient presents with    Coronary Artery Disease     7 mo ov     Cardiomyopathy       HISTORY OF PRESENT ILLNESS:   Patient is 57years old female with history of CAD status post PCI to LAD, s/p CABG, CVA, type 2 diabetes mellitus, hypertension, hyperlipidemia, anemia,is here for follow-up appointment. Patient denies any chest pain, no shortness of breath, no lightheadedness, no dizziness, no palpitations, no pedal edema, no PND, no orthopnea, no syncope, no presyncopal episodes.     Functional capacity is at baseline      Past Medical History:   Diagnosis Date    Breast cancer ([facility] [address] [facility] 2000, 2005    right    Breast cancer ([facility] [address] 2020    left    CAD (coronary artery disease)     follows with Dr. Radha Leyva Chronic ulcer of right leg, limited to breakdown of skin ([address]     CVA (cerebral vascular accident) ([address] 2015    no deficits    DM (diabetes mellitus) ([facility] [address]     H/O: GI bleed     History of blood transfusion 02/2020    Hyperlipidemia     Hypertension     Ischemic cardiomyopathy     Valvular heart disease          Past Surgical History:   Procedure Laterality Date    ABDOMEN SURGERY      APPENDECTOMY      BREAST RECONSTRUCTION Right     BREAST SURGERY  2000    right masectomy    COLONOSCOPY      CORONARY ANGIOPLASTY WITH STENT PLACEMENT  03/04/2020    Mini Vision 2.5 x 18 stent deployed proximal LAD by DR Regina Dasilva    CORONARY ARTERY BYPASS GRAFT N/A 6/16/2020    CORONARY ARTERY BYPASS, MARY JO performed by Barby Alonzo MD at [facility], COLON, DIAGNOSTIC      FOOT SURGERY      right    HYSTERECTOMY      fibroids    KNEE ARTHROSCOPY      left    MASTECTOMY Right 2000    MASTECTOMY, MODIFIED RADICAL Left 2/25/2020    LEFT BREAST SIMPLE MASTECTOMY performed by Desire Parmar MD at [facility] 25 Left 2/25/2020    LEFT BREAST MASTECTOMY POST OP BLEED CONTROL, EVACUATION OF LEFT CHEST WALL HEMATOMA performed by Jeremy Cruz MD at [facility] ENDOSCOPY      UPPER GASTROINTESTINAL ENDOSCOPY N/A 2/5/2020    EGD BIOPSY performed by Jeremy Cruz MD at [address] [place] [address] N/A 6/12/2020    EGD BIOPSY performed by Jeremy Cruz MD at [facility]         Current Outpatient Medications   Medication Sig Dispense Refill    colchicine (COLCRYS) 0.6 MG tablet Take 0.6 mg by mouth daily      spironolactone (ALDACTONE) 25 MG tablet Take 25 mg by mouth 2 times daily      sodium bicarbonate 650 MG tablet Take 650 mg by mouth 2 times daily      anastrozole (ARIMIDEX) 1 MG tablet Take 1 tablet by mouth daily 90 tablet 1    atorvastatin (LIPITOR) 40 MG tablet Take 1 tablet by mouth daily 90 tablet 2    vitamin C (ASCORBIC ACID) 500 MG tablet Take 500 mg by mouth 2 times daily      ferrous sulfate (IRON 325) 325 (65 Fe) MG tablet Take 1 tablet by mouth 2 times daily (with meals) 60 tablet 0    metoprolol succinate (TOPROL XL) 25 MG extended release tablet Take 1 tablet by mouth daily 30 tablet 2    furosemide (LASIX) 20 MG tablet Take 0.5 tablets by mouth daily (Patient taking differently: Take 40 mg by mouth 2 times daily ) 60 tablet 2    clopidogrel (PLAVIX) 75 MG tablet Take 1 tablet by mouth daily 60 tablet 0    glimepiride (AMARYL) 1 MG tablet Take 1 mg by mouth every morning (before breakfast)      pantoprazole (PROTONIX) 40 MG tablet Take 1 tablet by mouth 2 times daily (before meals) (Patient not taking: Reported on 7/6/2021) 180 tablet 1     No current facility-administered medications for this visit.          Allergies as of 07/06/2021 - Fully Reviewed 07/06/2021   Allergen Reaction Noted    Penicillins Hives and Rash 02/23/2012       Social History     Socioeconomic History    Marital status: Single     Spouse name: Not on file    Number of children: Not on for  dry cough, cough with sputum, wheezing and hemoptysis  GASTROINTESTINAL:  negative for nausea, vomiting, diarrhea, constipation, pruritus and jaundice  HEMATOLOGIC/LYMPHATIC:  negative for easy bruising, bleeding, lymphadenopathy and petechiae  ENDOCRINE:  negative for heat intolerance, cold intolerance, tremor, hair loss and diabetic symptoms including neither polyuria nor polydipsia nor blurred vision  MUSCULOSKELETAL:  negative for  myalgias, arthralgias, joint swelling, stiff joints and decreased range of motion  NEUROLOGICAL:  negative for memory problems, speech problems, visual disturbance, dysphagia, weakness and numbness      PHYSICAL EXAM:   CONSTITUTIONAL:  awake, alert, cooperative, no apparent distress, and appears stated age  HEAD:  normocepalic, without obvious abnormality, atraumatic, pink, moist mucous membranes. NECK:  Supple, symmetrical, trachea midline, no adenopathy, thyroid symmetric, not enlarged and no tenderness, skin normal  LUNGS:  No increased work of breathing, good air exchange, clear to auscultation bilaterally, no crackles or wheezing  CARDIOVASCULAR:  Normal apical impulse, regular rate and rhythm, normal S1 and S2, no S3 or S4, 2/6 systolic murmur at the apex, 3/6 systolic murmur at the left lower sternal border, no JVD, no carotid bruit, no pedal edema, good carotid upstroke bilaterally. ABDOMEN:  Soft, nontender, no masses, no hepatomegaly or splenomegaly, BS+  CHEST: nontender to palpation, expands symmetrically, well-healed midsternal incision wound, s/p left mastectomy  MUSCULOSKELETAL:  No clubbing no cyanosis. there is no redness, warmth, or swelling of the joints, full range of motion noted  NEUROLOGIC:  Alert, awake,oriented x3  SKIN:  no bruising or bleeding, normal skin color, texture, turgor and no redness, warmth, or swelling      /80 (Site: Right Upper Arm, Position: Sitting)   Pulse 59   Resp 16   Ht 5' 5\" (1.651 m)   Wt 241 lb 9.6 oz (109.6 kg) BMI 40.20 kg/m²     DATA:   I personally reviewed the visit EKG with the following interpretation: Sinus bradycardia, old anterior wall MI age undetermined, LVH by voltage criteria, nonspecific T wave changes    EKG 12/18/20  Sinus rhythm, old anteroseptal wall MI age undetermined, LVH by voltage criteria, nonspecific T wave changes, possible left atrial enlargement. ECHO: 10/21/20 Summary   Compared to prior echo, no significant changes noted. Technically adequate   study. Left ventricle is mildly enlarged . Ejection fraction is visually estimated at 35-40%. global wall hypokinesis with apical akinesis   There is doppler evidence of stage III diastolic dysfunction. Mild mitral regurgitation is present. Trace aortic valve regurgitation. Mild tricuspid regurgitation. RVSP is 31 mmHg. Pulmonary hypertension is mild . Stress Test: 2/7/20   FINDINGS:    Perfusion images demonstrate no no definite reversible perfusion    defect. There is a fixed perfusion defect centered at the apex    centered at the apex. Wall motion is globally hypokinetic with particular severity at the    septum. The end diastolic volume is 833 ml. The end systolic volume is 74 ml. The estimated ejection fraction is 34 %.           Impression    1. No definite reversible perfusion defect. See above. 2. Ejection fraction is 34 %. 3. Global hypokinesis which is most severe at the septum.             Angiography: 3/4/20 IMPRESSION:  1. Totally occluded left anterior descending artery with successful PCI  using 2.5 x 18 mm mini Vision bare metal stent (bare metal stent was  used due to the patient's recent history of GI bleed, recent mastectomy  surgery two days ago, and the plan for possible CABG down the road when  she heals from her mastectomy surgery). 2.  At least 50% distal left main disease.   3.  Mild-to-moderate ostial left circumflex disease with moderate  disease involving the vessel distally. 4.  Moderate disease involving the mid segment of the RCA. 5.  Moderate disease involving second OM branch and second diagonal  Branch. 6/16/20 CABG  coronary artery bypass grafting x2; left internal mammary artery to the LAD, saphenous vein graft to the obtuse marginal    Cardiology Labs: BMP:    Lab Results   Component Value Date     03/15/2021    K 4.9 03/15/2021    K 4.6 09/11/2020     03/15/2021    CO2 21 03/15/2021    BUN 40 03/15/2021    CREATININE 1.8 03/15/2021     CMP:    Lab Results   Component Value Date     03/15/2021    K 4.9 03/15/2021    K 4.6 09/11/2020     03/15/2021    CO2 21 03/15/2021    BUN 40 03/15/2021    CREATININE 1.8 03/15/2021    PROT 7.1 03/15/2021     CBC:    Lab Results   Component Value Date    WBC 3.7 03/15/2021    RBC 4.77 03/15/2021    HGB 13.5 03/15/2021    HCT 41.9 03/15/2021    MCV 87.8 03/15/2021    RDW 15.3 03/15/2021     03/15/2021     PT/INR:  No results found for: PTINR  PT/INR Warfarin:  No components found for: PTPATWAR, PTINRWAR  PTT:    Lab Results   Component Value Date    APTT 30.0 06/16/2020     PTT Heparin:  No components found for: APTTHEP  Magnesium:    Lab Results   Component Value Date    MG 2.1 09/14/2020     TSH:    Lab Results   Component Value Date    TSH 1.500 02/04/2020     TROPONIN:  No components found for: TROP  BNP:  No results found for: BNP  FASTING LIPID PANEL:    Lab Results   Component Value Date    CHOL 69 03/04/2020    HDL 26 03/04/2020    TRIG 107 03/04/2020     No orders to display     I have personally reviewed the laboratory, cardiac diagnostic and radiographic testing as outlined above:      IMPRESSION:  1.  CAD: S/p PCI to LAD, s/p CABG with a LIMA to LAD, SVG to OM1, doing fine, will continue current treatment  2. History of nonsustained ventricular tachycardia: On beta-blockers, no recurrence  3.   Ischemic cardiomyopathy: Did not wear LifeVest as advised, ejection fraction is up to 35-40%, will continue current treatment. 4.  Mitral valve regurgitation: Mild  5. Tricuspid valve regurgitation: Mild  6. Pulmonary hypertension: Mild  7. Hypertension: Controlled  8. Type 2 diabetes mellitus  9. History of CVA  10. History of left mastectomy     RECOMMENDATIONS:   1. Continue current treatment  2. Preventive cardiology: Low-salt, low-cholesterol diet, daily exercise, total cholesterol of less than 200, LDL of less than 70, adherence to diabetic diet and diabetic medications,were all advised. 3.  CHF: Daily weight, take an extra Lasix for weight gain of more than 2-3 pounds in 24 hours, compliance with diuretics, low-salt diet were all advised. 4.  Follow-up with KRISTEN Nixon as scheduled  5. Follow-up with Dr. Eliceo Sandoval in 6 months, sooner if symptomatic for any reason    I have reviewed my findings and recommendations with patient    Electronically signed by Isabel Rolon MD on 7/6/2021 at 4:28 PM    NOTE: This report was transcribed using voice recognition software.  Every effort was made to ensure accuracy; however, inadvertent computerized transcription errors may be present

## 2021-07-06 ENCOUNTER — OFFICE VISIT (OUTPATIENT)
Dept: CARDIOLOGY CLINIC | Age: 61
End: 2021-07-06
Payer: MEDICARE

## 2021-07-06 VITALS
HEIGHT: 65 IN | HEART RATE: 59 BPM | WEIGHT: 241.6 LBS | DIASTOLIC BLOOD PRESSURE: 80 MMHG | RESPIRATION RATE: 16 BRPM | BODY MASS INDEX: 40.25 KG/M2 | SYSTOLIC BLOOD PRESSURE: 134 MMHG

## 2021-07-06 DIAGNOSIS — I25.110 CORONARY ARTERY DISEASE INVOLVING NATIVE CORONARY ARTERY OF NATIVE HEART WITH UNSTABLE ANGINA PECTORIS (HCC): Primary | ICD-10-CM

## 2021-07-06 PROCEDURE — G8427 DOCREV CUR MEDS BY ELIG CLIN: HCPCS | Performed by: INTERNAL MEDICINE

## 2021-07-06 PROCEDURE — 93000 ELECTROCARDIOGRAM COMPLETE: CPT | Performed by: INTERNAL MEDICINE

## 2021-07-06 PROCEDURE — G8417 CALC BMI ABV UP PARAM F/U: HCPCS | Performed by: INTERNAL MEDICINE

## 2021-07-06 PROCEDURE — 99214 OFFICE O/P EST MOD 30 MIN: CPT | Performed by: INTERNAL MEDICINE

## 2021-07-06 PROCEDURE — 3017F COLORECTAL CA SCREEN DOC REV: CPT | Performed by: INTERNAL MEDICINE

## 2021-07-06 PROCEDURE — 1036F TOBACCO NON-USER: CPT | Performed by: INTERNAL MEDICINE

## 2021-07-06 RX ORDER — SODIUM BICARBONATE 650 MG/1
650 TABLET ORAL 2 TIMES DAILY
COMMUNITY
Start: 2021-06-11

## 2021-07-06 RX ORDER — SPIRONOLACTONE 50 MG/1
50 TABLET, FILM COATED ORAL 2 TIMES DAILY
COMMUNITY
End: 2022-05-19 | Stop reason: DRUGHIGH

## 2021-07-06 RX ORDER — COLCHICINE 0.6 MG/1
0.6 TABLET ORAL DAILY
COMMUNITY
Start: 2021-06-14 | End: 2022-06-14

## 2021-07-19 ENCOUNTER — TELEPHONE (OUTPATIENT)
Dept: ONCOLOGY | Age: 61
End: 2021-07-19

## 2021-07-19 ENCOUNTER — HOSPITAL ENCOUNTER (OUTPATIENT)
Dept: INFUSION THERAPY | Age: 61
Discharge: HOME | End: 2021-07-19
Payer: MEDICARE

## 2021-07-19 NOTE — TELEPHONE ENCOUNTER
Patient called and left message she is cancelling today's, 07/19/21, follow up appointment with Dr. Basilia Lira due to a family emergency. Patient will call our office back to reschedule when she is ready.

## 2021-09-01 RX ORDER — ATORVASTATIN CALCIUM 40 MG/1
40 TABLET, FILM COATED ORAL DAILY
Qty: 90 TABLET | Refills: 3 | Status: SHIPPED
Start: 2021-09-01 | End: 2022-08-09

## 2021-11-29 ENCOUNTER — HOSPITAL ENCOUNTER (EMERGENCY)
Age: 61
Discharge: HOME | End: 2021-11-29
Attending: EMERGENCY MEDICINE
Payer: MEDICARE

## 2021-11-29 ENCOUNTER — APPOINTMENT (OUTPATIENT)
Dept: GENERAL RADIOLOGY | Age: 61
End: 2021-11-29
Payer: MEDICARE

## 2021-11-29 VITALS
BODY MASS INDEX: 40.15 KG/M2 | SYSTOLIC BLOOD PRESSURE: 162 MMHG | RESPIRATION RATE: 20 BRPM | HEART RATE: 68 BPM | OXYGEN SATURATION: 98 % | DIASTOLIC BLOOD PRESSURE: 82 MMHG | TEMPERATURE: 97.2 F | HEIGHT: 65 IN | WEIGHT: 241 LBS

## 2021-11-29 DIAGNOSIS — M25.552 LEFT HIP PAIN: ICD-10-CM

## 2021-11-29 DIAGNOSIS — R19.5 STOOL GUAIAC POSITIVE: Primary | ICD-10-CM

## 2021-11-29 LAB
ABO/RH: NORMAL
ANION GAP SERPL CALCULATED.3IONS-SCNC: 13 MMOL/L (ref 7–16)
ANTIBODY SCREEN: NORMAL
APTT: 33.4 SEC (ref 24.5–35.1)
BASOPHILS ABSOLUTE: 0.01 E9/L (ref 0–0.2)
BASOPHILS RELATIVE PERCENT: 0.3 % (ref 0–2)
BUN BLDV-MCNC: 38 MG/DL (ref 6–23)
CALCIUM SERPL-MCNC: 9.7 MG/DL (ref 8.6–10.2)
CHLORIDE BLD-SCNC: 102 MMOL/L (ref 98–107)
CO2: 22 MMOL/L (ref 22–29)
CREAT SERPL-MCNC: 1.8 MG/DL (ref 0.5–1)
EOSINOPHILS ABSOLUTE: 0.04 E9/L (ref 0.05–0.5)
EOSINOPHILS RELATIVE PERCENT: 1.3 % (ref 0–6)
GFR AFRICAN AMERICAN: 34
GFR NON-AFRICAN AMERICAN: 34 ML/MIN/1.73
GLUCOSE BLD-MCNC: 108 MG/DL (ref 74–99)
HCT VFR BLD CALC: 40.9 % (ref 34–48)
HEMOGLOBIN: 13.2 G/DL (ref 11.5–15.5)
IMMATURE GRANULOCYTES #: 0 E9/L
IMMATURE GRANULOCYTES %: 0 % (ref 0–5)
INR BLD: 1
LYMPHOCYTES ABSOLUTE: 0.72 E9/L (ref 1.5–4)
LYMPHOCYTES RELATIVE PERCENT: 24.1 % (ref 20–42)
MCH RBC QN AUTO: 28.4 PG (ref 26–35)
MCHC RBC AUTO-ENTMCNC: 32.3 % (ref 32–34.5)
MCV RBC AUTO: 88.1 FL (ref 80–99.9)
MONOCYTES ABSOLUTE: 0.41 E9/L (ref 0.1–0.95)
MONOCYTES RELATIVE PERCENT: 13.7 % (ref 2–12)
NEUTROPHILS ABSOLUTE: 1.81 E9/L (ref 1.8–7.3)
NEUTROPHILS RELATIVE PERCENT: 60.6 % (ref 43–80)
PDW BLD-RTO: 15 FL (ref 11.5–15)
PLATELET # BLD: 166 E9/L (ref 130–450)
PMV BLD AUTO: 12.3 FL (ref 7–12)
POTASSIUM SERPL-SCNC: 4.5 MMOL/L (ref 3.5–5)
PROTHROMBIN TIME: 11.1 SEC (ref 9.3–12.4)
RBC # BLD: 4.64 E12/L (ref 3.5–5.5)
SODIUM BLD-SCNC: 137 MMOL/L (ref 132–146)
WBC # BLD: 3 E9/L (ref 4.5–11.5)

## 2021-11-29 PROCEDURE — 36415 COLL VENOUS BLD VENIPUNCTURE: CPT

## 2021-11-29 PROCEDURE — 73502 X-RAY EXAM HIP UNI 2-3 VIEWS: CPT

## 2021-11-29 PROCEDURE — 99285 EMERGENCY DEPT VISIT HI MDM: CPT

## 2021-11-29 PROCEDURE — 85025 COMPLETE CBC W/AUTO DIFF WBC: CPT

## 2021-11-29 PROCEDURE — 86900 BLOOD TYPING SEROLOGIC ABO: CPT

## 2021-11-29 PROCEDURE — 6370000000 HC RX 637 (ALT 250 FOR IP): Performed by: STUDENT IN AN ORGANIZED HEALTH CARE EDUCATION/TRAINING PROGRAM

## 2021-11-29 PROCEDURE — 85730 THROMBOPLASTIN TIME PARTIAL: CPT

## 2021-11-29 PROCEDURE — 72110 X-RAY EXAM L-2 SPINE 4/>VWS: CPT

## 2021-11-29 PROCEDURE — 86850 RBC ANTIBODY SCREEN: CPT

## 2021-11-29 PROCEDURE — 85610 PROTHROMBIN TIME: CPT

## 2021-11-29 PROCEDURE — 86901 BLOOD TYPING SEROLOGIC RH(D): CPT

## 2021-11-29 PROCEDURE — 80048 BASIC METABOLIC PNL TOTAL CA: CPT

## 2021-11-29 RX ORDER — PANTOPRAZOLE SODIUM 40 MG/1
40 TABLET, DELAYED RELEASE ORAL ONCE
Status: COMPLETED | OUTPATIENT
Start: 2021-11-29 | End: 2021-11-29

## 2021-11-29 RX ORDER — PANTOPRAZOLE SODIUM 40 MG/1
40 TABLET, DELAYED RELEASE ORAL
Qty: 30 TABLET | Refills: 0 | Status: SHIPPED | OUTPATIENT
Start: 2021-11-29

## 2021-11-29 RX ADMIN — PANTOPRAZOLE SODIUM 40 MG: 40 TABLET, DELAYED RELEASE ORAL at 20:06

## 2021-11-29 ASSESSMENT — PAIN DESCRIPTION - DESCRIPTORS: DESCRIPTORS: ACHING;CONSTANT

## 2021-11-29 ASSESSMENT — PAIN SCALES - GENERAL: PAINLEVEL_OUTOF10: 7

## 2021-11-29 ASSESSMENT — PAIN DESCRIPTION - PROGRESSION: CLINICAL_PROGRESSION: GRADUALLY WORSENING

## 2021-11-29 ASSESSMENT — PAIN DESCRIPTION - ORIENTATION: ORIENTATION: LEFT

## 2021-11-29 ASSESSMENT — PAIN DESCRIPTION - ONSET: ONSET: GRADUAL

## 2021-11-29 ASSESSMENT — PAIN DESCRIPTION - PAIN TYPE: TYPE: ACUTE PAIN

## 2021-11-29 ASSESSMENT — PAIN DESCRIPTION - LOCATION: LOCATION: HIP

## 2021-11-29 ASSESSMENT — PAIN DESCRIPTION - FREQUENCY: FREQUENCY: CONTINUOUS

## 2021-11-29 NOTE — ED NOTES
Department of Emergency Medicine  FIRST PROVIDER TRIAGE NOTE             Independent MLP         11/29/21  12:45 PM EST    Date of Encounter: 11/29/21   MRN: [id number]      HPI: Madhu Pena is a 57 y.o. female who presents to the ED for Hip Pain and Rectal Bleeding (on plavix, bright red, onset 4 days ago )   FIRST CONTACT WITH PATIENT: 12:45 PM EST      ROS: Negative for cp or sob. PE: Gen Appearance/Constitutional: alert  CV: regular rate     Initial Plan of Care: All treatment areas with department are currently occupied. Plan to order/Initiate the following while awaiting opening in ED: labs and imaging studies. Initial Plan of Care: Initiate Treatment-Testing, Proceed toTreatment Area When Bed Available for ED Attending/MLP to Continue Care    Electronically signed by KRISTEN Braden   DD: 11/29/21    .      KRISTEN Monreal  11/29/21 [id number]

## 2021-11-30 DIAGNOSIS — Z17.0 MALIGNANT NEOPLASM OF RIGHT BREAST IN FEMALE, ESTROGEN RECEPTOR POSITIVE, UNSPECIFIED SITE OF BREAST (HCC): ICD-10-CM

## 2021-11-30 DIAGNOSIS — C50.911 MALIGNANT NEOPLASM OF RIGHT BREAST IN FEMALE, ESTROGEN RECEPTOR POSITIVE, UNSPECIFIED SITE OF BREAST (HCC): ICD-10-CM

## 2021-11-30 RX ORDER — ANASTROZOLE 1 MG/1
1 TABLET ORAL DAILY
Qty: 90 TABLET | Refills: 1 | Status: SHIPPED | OUTPATIENT
Start: 2021-11-30 | End: 2022-01-12 | Stop reason: SDUPTHER

## 2021-11-30 NOTE — ED NOTES
Patient complaints of bright red rectal bleeding with bowel movements. Patient denies abdominal pain or any other problems.       Beatriz Neal RN  11/29/21 [id number]

## 2021-11-30 NOTE — ED PROVIDER NOTES
[facility]  Department of Emergency Medicine     Written by: Nury Matthews DO  Patient Name: Madhu Pena  Attending Provider: No att. providers found  Admit Date: 2021  6:31 PM  MRN: [id number]                   : 1960        Chief Complaint   Patient presents with    Hip Pain    Rectal Bleeding     on plavix, bright red, onset 4 days ago     - Chief complaint    HPI     Patient is a 57 y.o. female presenting for evaluation of left hip pain and rectal bleeding; for this examiner patient states that she has been having dark stools as opposed to bright red blood. Complaint is moderate in severity, made worse by nothing, relieved by Tylenol somewhat; dark stools began 4 days ago, left hip pain has been chronic over the past year and acutely worsening since last night when patient went to a baby shower and was \"dancing all night long. \" Patient does have a history of dark stool in the past, thinks it was from an ulcer; she followed with Dr. Miriam Hogan in the past for this, as well as for breast cancer in the past. Patient denies any headache, dizziness, lightheadedness, nausea or vomiting, abdominal pain, diarrhea, or syncope. Review of Systems   Constitutional: Negative for chills, fatigue and fever. HENT: Negative for congestion, rhinorrhea and sore throat. Eyes: Negative for visual disturbance. Respiratory: Negative for cough, shortness of breath and wheezing. Cardiovascular: Negative for chest pain and palpitations. Gastrointestinal: Positive for blood in stool (dark black stools). Negative for abdominal pain, diarrhea, nausea and vomiting. Endocrine: Negative for polydipsia and polyuria. Genitourinary: Negative for dysuria and frequency. Musculoskeletal: Positive for arthralgias (left hip pain). Negative for back pain and myalgias. Skin: Negative for rash and wound. Neurological: Negative for weakness and headaches.    Psychiatric/Behavioral: Negative for confusion. All other systems reviewed and are negative. Physical Exam  Vitals and nursing note reviewed. Constitutional:       General: She is not in acute distress. Appearance: She is not ill-appearing or toxic-appearing. HENT:      Head: Normocephalic and atraumatic. Right Ear: External ear normal.      Left Ear: External ear normal.      Nose: Nose normal. No rhinorrhea. Mouth/Throat:      Mouth: Mucous membranes are moist.      Pharynx: Oropharynx is clear. Eyes:      Extraocular Movements: Extraocular movements intact. Conjunctiva/sclera: Conjunctivae normal.      Pupils: Pupils are equal, round, and reactive to light. Cardiovascular:      Rate and Rhythm: Normal rate and regular rhythm. Pulses: Normal pulses. Heart sounds: Normal heart sounds. Pulmonary:      Effort: Pulmonary effort is normal. No respiratory distress. Breath sounds: Normal breath sounds. No wheezing or rales. Abdominal:      General: Bowel sounds are normal.      Palpations: Abdomen is soft. Tenderness: There is no abdominal tenderness. There is no right CVA tenderness, left CVA tenderness, guarding or rebound. Genitourinary:     Rectum: Guaiac result positive. Musculoskeletal:         General: Normal range of motion. Cervical back: Normal range of motion and neck supple. No rigidity. Right lower leg: No edema. Left lower leg: No edema. Skin:     General: Skin is warm and dry. Capillary Refill: Capillary refill takes less than 2 seconds. Coloration: Skin is not jaundiced or pale. Findings: No rash. Neurological:      General: No focal deficit present. Mental Status: She is alert and oriented to person, place, and time. Sensory: No sensory deficit. Motor: No weakness.    Psychiatric:         Mood and Affect: Mood normal.         Behavior: Behavior normal.          Procedures       [facility]     ED Course as of 12/01/21 0249   [facility] Nov 29, 2021 1909 Left-sided hip pain is chronic, she had a fall several months ago and has left hip pain on and off. This is not the reason why she is here today. She is here because she is having dark stools intermixed with some bright red blood for the past 2 days on and off. This did happen to her once before in the past but she is unsure exactly why she had the bleeding; she does not remember if she had to have blood transfusion or not. Patient states she does take a blood thinner but is she is unsure what it is. [VG]   1910 Creatinine(!): 1.8  Baseline   [VG]   2118 EKG 12 Lead [VG]   2118 XR LUMBAR SPINE (MIN 4 VIEWS)    IMPRESSION:  Facet joint degenerative changes. [VG]   2119 XR HIP 2-3 VW W PELVIS LEFT  IMPRESSION:  Grossly normal left hip. [VG]   2120 Patient reported that she is not taking her prescribed Protonix and states that she no longer has the pills at home. Her originally prescribed Protonix was discontinued here in the ED so that we could rewrite her new prescription. She was instructed to take this daily as instructed, she voiced understanding and will do so. [VG]   2121 Rectal exam performed, stool is dark brown in appearance and is positive for occult blood. There is no gross blood or bright red blood noted on exam and rectal exam was not painful. [VG]   2121 Patient's vitals remained stable, she ambulates in the department without difficulty whatsoever, she is alert and oriented and she is in no distress. She is not toxic in appearance. Her left hip is no different than it is from baseline and she has no pain on ambulation at this time. States \"I was dancing all day yesterday at an outdoor baby shower at [place]. \"  X-ray results were reviewed with the patient, lab results reviewed with patient.   Feel she may follow-up outpatient with her general surgeon regarding her Hemoccult positive stool, her vitals have been stable and she is not hypotensive or tachycardic and her hemoglobin is 13.2; she will be discharged with prescription for p.o. Protonix, strict return precautions were discussed; patient understands signs/symptoms for which to return to the ED. [VG]      ED Course User Index  [VG] DO JENNIFER Hernandez    This is a 57 y.o. female presenting for evaluation of left hip pain (chronic) and dark stools. On arrival patient is alert and oriented; she is in no distress; vitals are stable; patient ambulating easily in the department without difficulty; she is pleasant and cooperative. Vitals are notable for hypertension; otherwise stable. Exam is notable for dark stool on rectal exam which is guaiac positive; no gross bleeding or bright red blood was noted. Lungs CTA bilaterally. Patient moves all extremities without issue. No motor or sensory deficits noted. Labs and imaging ordered. Patient seen and examined. Labs reviewed, hgb is stable at 13.2; Cr 1.8 which is baseline. Imaging reviewed, XR lumbar spine and left hip with pelvis show no acute abnormality. Treatment included PO protonix. Decision made to discharge this patient. Her vitals are stable hb is normal, no abdominal pain, and she is ambulating without difficulty. She was provided rx for protonix and instructed to take it as directed. She will follow up outpatient by calling her PCP and general surgeon tomorrow. She understands signs/symptoms for which to return to the ER. Discussed results and plan with the patient, she voices understanding and is amenable. Return precautions were discussed. I have discussed this patient with my attending, who has seen the patient and agrees with this disposition. Patient was seen and evaluated by myself and my attending Beth Herring DO.  Assessment and Plan discussed with attending provider, please see attestation for final plan of care.       --------------------------------------------- PAST HISTORY MCV 88.1 80.0 - 99.9 fL    MCH 28.4 26.0 - 35.0 pg    MCHC 32.3 32.0 - 34.5 %    RDW 15.0 11.5 - 15.0 fL    Platelets 233 040 - 978 E9/L    MPV 12.3 (H) 7.0 - 12.0 fL    Neutrophils % 60.6 43.0 - 80.0 %    Immature Granulocytes % 0.0 0.0 - 5.0 %    Lymphocytes % 24.1 20.0 - 42.0 %    Monocytes % 13.7 (H) 2.0 - 12.0 %    Eosinophils % 1.3 0.0 - 6.0 %    Basophils % 0.3 0.0 - 2.0 %    Neutrophils Absolute 1.81 1.80 - 7.30 E9/L    Immature Granulocytes # 0.00 E9/L    Lymphocytes Absolute 0.72 (L) 1.50 - 4.00 E9/L    Monocytes Absolute 0.41 0.10 - 0.95 E9/L    Eosinophils Absolute 0.04 (L) 0.05 - 0.50 E9/L    Basophils Absolute 0.01 0.00 - 0.20 U2/H   Basic Metabolic Panel   Result Value Ref Range    Sodium 137 132 - 146 mmol/L    Potassium 4.5 3.5 - 5.0 mmol/L    Chloride 102 98 - 107 mmol/L    CO2 22 22 - 29 mmol/L    Anion Gap 13 7 - 16 mmol/L    Glucose 108 (H) 74 - 99 mg/dL    BUN 38 (H) 6 - 23 mg/dL    CREATININE 1.8 (H) 0.5 - 1.0 mg/dL    GFR Non-African American 34 >=60 mL/min/1.73    GFR African American 34     Calcium 9.7 8.6 - 10.2 mg/dL   Protime-INR   Result Value Ref Range    Protime 11.1 9.3 - 12.4 sec    INR 1.0    APTT   Result Value Ref Range    aPTT 33.4 24.5 - 35.1 sec   TYPE AND SCREEN   Result Value Ref Range    ABO/Rh O POS     Antibody Screen NEG        Radiology:  XR HIP 2-3 VW W PELVIS LEFT   Final Result   Grossly normal left hip. XR LUMBAR SPINE (MIN 4 VIEWS)   Final Result   Facet joint degenerative changes. ------------------------- NURSING NOTES AND VITALS REVIEWED ---------------------------  Date / Time Roomed:  11/29/2021  6:31 PM  ED Bed Assignment:  [id number]    The nursing notes within the ED encounter and vital signs as below have been reviewed.    BP (!) 162/82   Pulse 68   Temp 97.2 °F (36.2 °C) (Oral)   Resp 20   Ht 5' 5\" (1.651 m)   Wt 241 lb (109.3 kg)   SpO2 98%   BMI 40.10 kg/m²   Oxygen Saturation Interpretation: Normal      ------------------------------------------ PROGRESS NOTES ------------------------------------------  2:52 AM EST  I have spoken with the patient and discussed todays results, in addition to providing specific details for the plan of care and counseling regarding the diagnosis and prognosis. Their questions are answered at this time and they are agreeable with the plan. I discussed at length with them reasons for immediate return here for re evaluation. They will followup with their primary care physician and general surgeon by calling their office tomorrow. --------------------------------- ADDITIONAL PROVIDER NOTES ---------------------------------  At this time the patient is without objective evidence of an acute process requiring hospitalization or inpatient management. They have remained hemodynamically stable throughout their entire ED visit and are stable for discharge with outpatient follow-up. The plan has been discussed in detail and they are aware of the specific conditions for emergent return, as well as the importance of follow-up. Discharge Medication List as of 11/29/2021  9:23 PM          Diagnosis:  1. Stool guaiac positive    2. Left hip pain        Disposition:  Patient's disposition: Discharge to home  Patient's condition is stable.        Shazia Thakkar DO  Resident  12/01/21 [id number]

## 2021-12-01 ASSESSMENT — ENCOUNTER SYMPTOMS
RHINORRHEA: 0
WHEEZING: 0
BACK PAIN: 0
SHORTNESS OF BREATH: 0
VOMITING: 0
ABDOMINAL PAIN: 0
NAUSEA: 0
DIARRHEA: 0
BLOOD IN STOOL: 1
COUGH: 0
SORE THROAT: 0

## 2022-01-11 DIAGNOSIS — Z79.811 USE OF AROMATASE INHIBITORS: ICD-10-CM

## 2022-01-11 DIAGNOSIS — Z17.0 MALIGNANT NEOPLASM OF RIGHT BREAST IN FEMALE, ESTROGEN RECEPTOR POSITIVE, UNSPECIFIED SITE OF BREAST (HCC): Primary | ICD-10-CM

## 2022-01-11 DIAGNOSIS — C50.911 MALIGNANT NEOPLASM OF RIGHT BREAST IN FEMALE, ESTROGEN RECEPTOR POSITIVE, UNSPECIFIED SITE OF BREAST (HCC): Primary | ICD-10-CM

## 2022-01-12 ENCOUNTER — HOSPITAL ENCOUNTER (OUTPATIENT)
Dept: INFUSION THERAPY | Age: 62
Discharge: HOME | End: 2022-01-12
Payer: MEDICARE

## 2022-01-12 ENCOUNTER — OFFICE VISIT (OUTPATIENT)
Dept: ONCOLOGY | Age: 62
End: 2022-01-12
Payer: MEDICARE

## 2022-01-12 VITALS
TEMPERATURE: 97.2 F | HEIGHT: 65 IN | RESPIRATION RATE: 16 BRPM | BODY MASS INDEX: 38.49 KG/M2 | DIASTOLIC BLOOD PRESSURE: 93 MMHG | OXYGEN SATURATION: 98 % | WEIGHT: 231 LBS | HEART RATE: 56 BPM | SYSTOLIC BLOOD PRESSURE: 161 MMHG

## 2022-01-12 DIAGNOSIS — Z17.0 MALIGNANT NEOPLASM OF RIGHT BREAST IN FEMALE, ESTROGEN RECEPTOR POSITIVE, UNSPECIFIED SITE OF BREAST (HCC): ICD-10-CM

## 2022-01-12 DIAGNOSIS — Z79.811 USE OF AROMATASE INHIBITORS: ICD-10-CM

## 2022-01-12 DIAGNOSIS — C50.911 MALIGNANT NEOPLASM OF RIGHT BREAST IN FEMALE, ESTROGEN RECEPTOR POSITIVE, UNSPECIFIED SITE OF BREAST (HCC): ICD-10-CM

## 2022-01-12 LAB
ALBUMIN SERPL-MCNC: 4.2 G/DL (ref 3.5–5.2)
ALP BLD-CCNC: 93 U/L (ref 35–104)
ALT SERPL-CCNC: 24 U/L (ref 0–32)
ANION GAP SERPL CALCULATED.3IONS-SCNC: 12 MMOL/L (ref 7–16)
AST SERPL-CCNC: 21 U/L (ref 0–31)
BASOPHILS ABSOLUTE: 0.01 E9/L (ref 0–0.2)
BASOPHILS RELATIVE PERCENT: 0.3 % (ref 0–2)
BILIRUB SERPL-MCNC: 0.3 MG/DL (ref 0–1.2)
BUN BLDV-MCNC: 27 MG/DL (ref 6–23)
CALCIUM SERPL-MCNC: 9.9 MG/DL (ref 8.6–10.2)
CHLORIDE BLD-SCNC: 108 MMOL/L (ref 98–107)
CO2: 26 MMOL/L (ref 22–29)
CREAT SERPL-MCNC: 1.5 MG/DL (ref 0.5–1)
EOSINOPHILS ABSOLUTE: 0.04 E9/L (ref 0.05–0.5)
EOSINOPHILS RELATIVE PERCENT: 1 % (ref 0–6)
GFR AFRICAN AMERICAN: 42
GFR NON-AFRICAN AMERICAN: 42 ML/MIN/1.73
GLUCOSE BLD-MCNC: 100 MG/DL (ref 74–99)
HCT VFR BLD CALC: 44.7 % (ref 34–48)
HEMOGLOBIN: 14 G/DL (ref 11.5–15.5)
IMMATURE GRANULOCYTES #: 0.01 E9/L
IMMATURE GRANULOCYTES %: 0.3 % (ref 0–5)
LYMPHOCYTES ABSOLUTE: 0.78 E9/L (ref 1.5–4)
LYMPHOCYTES RELATIVE PERCENT: 19.7 % (ref 20–42)
MCH RBC QN AUTO: 28.3 PG (ref 26–35)
MCHC RBC AUTO-ENTMCNC: 31.3 % (ref 32–34.5)
MCV RBC AUTO: 90.5 FL (ref 80–99.9)
MONOCYTES ABSOLUTE: 0.55 E9/L (ref 0.1–0.95)
MONOCYTES RELATIVE PERCENT: 13.9 % (ref 2–12)
NEUTROPHILS ABSOLUTE: 2.57 E9/L (ref 1.8–7.3)
NEUTROPHILS RELATIVE PERCENT: 64.8 % (ref 43–80)
PDW BLD-RTO: 15 FL (ref 11.5–15)
PLATELET # BLD: 177 E9/L (ref 130–450)
PMV BLD AUTO: 12.8 FL (ref 7–12)
POTASSIUM SERPL-SCNC: 4.2 MMOL/L (ref 3.5–5)
RBC # BLD: 4.94 E12/L (ref 3.5–5.5)
SODIUM BLD-SCNC: 146 MMOL/L (ref 132–146)
TOTAL PROTEIN: 7.2 G/DL (ref 6.4–8.3)
WBC # BLD: 4 E9/L (ref 4.5–11.5)

## 2022-01-12 PROCEDURE — 85025 COMPLETE CBC W/AUTO DIFF WBC: CPT

## 2022-01-12 PROCEDURE — 36415 COLL VENOUS BLD VENIPUNCTURE: CPT

## 2022-01-12 PROCEDURE — G8484 FLU IMMUNIZE NO ADMIN: HCPCS | Performed by: INTERNAL MEDICINE

## 2022-01-12 PROCEDURE — 1036F TOBACCO NON-USER: CPT | Performed by: INTERNAL MEDICINE

## 2022-01-12 PROCEDURE — G8427 DOCREV CUR MEDS BY ELIG CLIN: HCPCS | Performed by: INTERNAL MEDICINE

## 2022-01-12 PROCEDURE — G8417 CALC BMI ABV UP PARAM F/U: HCPCS | Performed by: INTERNAL MEDICINE

## 2022-01-12 PROCEDURE — 99214 OFFICE O/P EST MOD 30 MIN: CPT | Performed by: INTERNAL MEDICINE

## 2022-01-12 PROCEDURE — 80053 COMPREHEN METABOLIC PANEL: CPT

## 2022-01-12 PROCEDURE — 99212 OFFICE O/P EST SF 10 MIN: CPT

## 2022-01-12 PROCEDURE — 3017F COLORECTAL CA SCREEN DOC REV: CPT | Performed by: INTERNAL MEDICINE

## 2022-01-12 RX ORDER — ANASTROZOLE 1 MG/1
1 TABLET ORAL DAILY
Qty: 90 TABLET | Refills: 1 | Status: SHIPPED | OUTPATIENT
Start: 2022-01-12

## 2022-01-12 NOTE — PROGRESS NOTES
Department of [facility] Oncology    Attending Clinic Note    Reason for Visit: Follow-up on a patient with Cervical Cancer and Breast Cancer    PCP:  KRISTEN Pena    History of Present Illness  57 y.o. female with past medical hx of right breast cancer with recurrence (s/p total mastectomy with reconstruction/TRAM flap & XRT, HTN, chest pain, CVA, left sided weakness, low back pain with sciatica, type II diabetes, hypertensive left ventricular hypertrophy. PAP smear on 04/11/2019 revealing HSIL and endometrial cells. She then underwent a colposcopy on 07/10/2019 and a LEEP on 08/22/2019. PATH REVIEW [facility]:  FINAL DIAGNOSIS   1. Cervix, LEEP specimen ([id number]) - Invasive poorly-differentiated adenocarcinoma. - The invasive adenocarcinoma involves the deep (radial) excision margin. 2. Endocervix, LEEP specimen ([id number]) - Focus of cauterized adenocarcinoma invovling excision magin. COMMENT   In the cervix LEEP specimen ([id number]) the adenocarcinoma invades the cervical stroma to a thickness of at least 2.5 mm. However, since the invasive adenocarcinoma involves the deep (radial) excision margin, the full extent of invasion cannot be determined. The invasive adenocarcinoma involves five tissue sections. Therefore, the width (circumferential measurement) is estimated at approximately 13 mm. Vascular invasion is not identified. PET/CT 10/12/2019 at [facility]:   There is no hypermetabolic abdominal or pelvic lymphadenopathy.    There is mild low-level activity in the cervical region (max SUV 2.9).       Evaluated by GYN ONC (Dr. Jamie Hernandez at [facility]) who recommended ChemoRT with weekly Cisplatin  followed by HDR brachytherapy for Stage 1B1 Cervical Cancer. Patient would like to receive treatments locally. EBRT was started on 11/20/2019. Cycle # 1 weekly Cisplatin was on 11/20/2019. Cycle # 1 weekly Cisplatin was on 11/20/2019. Cycle # 5 weekly Cisplatin was on 12/19/2019.  EBRT was completed 12/27/2019. HDR brachytherapy (1/14/2020-1/16/2020)  PET/CT scan 05/18/2020 noted no evidence of disease. Follows locally with Dr. Saba Torres in [facility], [facility]    Left Breast Cancer Feb 2020  Left simple mastectomy on 02/25/2020:  A. Left breast, simple mastectomy:   - Invasive carcinoma of no special type (ductal), grade 2; tumor size 0.8 x 0.7 x 0.5 cm; LVI not identified.   - Ductal carcinoma in situ, intermediate to high nuclear grade,cribriform and solid types, with necrosis;   - Sclerosing fibroadenomatoid nodules and gynecomastia-like changes;   - Microcalcifications in DCIS;   - Scar and foreign body-type granuloma of prior biopsy site;  - Additional skin/breast tissue showing no pathologic alteration. B. Left breast, new inferior-medial margin, excision: Negative for malignancy. pT1b pNx  Breast Cancer Marker Studies:  Estrogen Receptors (ER): Positive >90%  Progesterone Receptors (AK): Positive:40%  Her-2/heidi (c-erb B-2) protein expression: Positive (score 3+)    Started Arimidex 1 mg po daily on 06/01/2020 with fair tolerance. Not on Herceptin due to CHF and CAD. EF 34% Feb 2020  No SLNB done. Left axillary U/S 06/01/2020:  No evidence of malignancy. DEXA scan 06/01/2020: normal bone density in LS and jasson hips. Tumor Board Discussion with Breast Team at [facility]: Defer LN evaluation and HER2 targeted therapies due to cardiac risks; Endocrine therapy only indicated. Today 01/12/2022 for f/u. She continues to tolerate Arimidex well. Denies fever, chills, chest pain, SOB. Fair appetite and energy level. Review of Systems;  CONSTITUTIONAL: No fever, chills. Fair appetite and energy level. ENMT: Eyes: No diplopia; Nose: No epistaxis. Mouth: No sore throat. RESPIRATORY: No hemoptysis, shortness of breath, cough. CARDIOVASCULAR: No chest pain, palpitations. GASTROINTESTINAL: No nausea/vomiting, abdominal pain, diarrhea/constipation.   GENITOURINARY: No dysuria, urinary frequency, hematuria. NEURO: No syncope, presyncope, headache. Remainder:  ROS NEGATIVE    Past Medical History:      Diagnosis Date    Breast cancer ([facility] 2000, 2005    right    Breast cancer ([facility] [address] 2020    left    CAD (coronary artery disease)     follows with Dr. Kristie Whitten Chronic ulcer of right leg, limited to breakdown of skin ([facility] [facility]     [facility]    no deficits    DM [facility]diabetes mellitus) [facility]     H/O: GI bleed     History of blood transfusion 02/2020    Hyperlipidemia     Hypertension     Ischemic cardiomyopathy     Valvular heart disease      Medications:  Reviewed and reconciled. Allergies: Allergies   Allergen Reactions    Penicillins Hives and Rash     Physical Exam:  BP (!) 161/93 (Site: Right Upper Arm, Position: Sitting, Cuff Size: Medium Adult)   Pulse 56   Temp 97.2 °F (36.2 °C) (Infrared)   Resp 16   Ht 5' 5\" (1.651 m)   Wt 231 lb (104.8 kg)   SpO2 98%   BMI 38.44 kg/m²   GENERAL: Alert, oriented x 3, not in acute distress. HEENT: PERRLA; EOMI. Oropharynx clear. NECK: Supple. Without lymphadenopathy. LUNGS: Good air entry bilaterally. No wheezing, crackles or ronchi. CARDIOVASCULAR: Regular rate. No murmurs, rubs or gallops. ABDOMEN: Soft. Non-tender, non-distended. Positive bowel sounds. EXTREMITIES: Without clubbing, cyanosis, or edema. NEUROLOGIC: No focal deficits. ECOG PS 1     Impression/Plan:  57 y.o. female with hx of right breast cancer with recurrence (s/p total right mastectomy with reconstruction/TRAM flap & XRT with Stage 1B1 Cervical Cancer    PET[facility]CT 10/12/2019 at [facility]:   There is no hypermetabolic abdominal or pelvic lymphadenopathy.    There is mild low-level activity in the cervical region (max SUV 2.9).       Evaluated by GYN ONC (Dr. Jamie Hernandez at [facility]) who recommended concurrent chemoRT with weekly Cisplatin followed by HDR brachytherapy for her Stage 1B1 Cervical Cancer.    Radiation therapy started on

## 2022-02-26 ENCOUNTER — APPOINTMENT (OUTPATIENT)
Dept: CT IMAGING | Age: 62
End: 2022-02-26
Payer: MEDICARE

## 2022-02-26 ENCOUNTER — HOSPITAL ENCOUNTER (EMERGENCY)
Age: 62
Discharge: HOME | End: 2022-02-26
Attending: EMERGENCY MEDICINE
Payer: MEDICARE

## 2022-02-26 VITALS
WEIGHT: 230 LBS | HEIGHT: 65 IN | BODY MASS INDEX: 38.32 KG/M2 | DIASTOLIC BLOOD PRESSURE: 76 MMHG | HEART RATE: 73 BPM | TEMPERATURE: 98 F | SYSTOLIC BLOOD PRESSURE: 130 MMHG | RESPIRATION RATE: 16 BRPM | OXYGEN SATURATION: 98 %

## 2022-02-26 DIAGNOSIS — K57.32 DIVERTICULITIS OF COLON: Primary | ICD-10-CM

## 2022-02-26 LAB
ALBUMIN SERPL-MCNC: 4.1 G/DL (ref 3.5–5.2)
ALP BLD-CCNC: 86 U/L (ref 35–104)
ALT SERPL-CCNC: 15 U/L (ref 0–32)
ANION GAP SERPL CALCULATED.3IONS-SCNC: 12 MMOL/L (ref 7–16)
AST SERPL-CCNC: 17 U/L (ref 0–31)
BACTERIA: ABNORMAL /HPF
BASOPHILS ABSOLUTE: 0.01 E9/L (ref 0–0.2)
BASOPHILS RELATIVE PERCENT: 0.1 % (ref 0–2)
BILIRUB SERPL-MCNC: 0.8 MG/DL (ref 0–1.2)
BILIRUBIN URINE: NEGATIVE
BLOOD, URINE: NEGATIVE
BUN BLDV-MCNC: 25 MG/DL (ref 6–23)
CALCIUM SERPL-MCNC: 9.7 MG/DL (ref 8.6–10.2)
CHLORIDE BLD-SCNC: 100 MMOL/L (ref 98–107)
CLARITY: CLEAR
CO2: 27 MMOL/L (ref 22–29)
COLOR: YELLOW
CREAT SERPL-MCNC: 1.8 MG/DL (ref 0.5–1)
EOSINOPHILS ABSOLUTE: 0.04 E9/L (ref 0.05–0.5)
EOSINOPHILS RELATIVE PERCENT: 0.5 % (ref 0–6)
EPITHELIAL CELLS, UA: ABNORMAL /HPF
GFR AFRICAN AMERICAN: 34
GFR NON-AFRICAN AMERICAN: 34 ML/MIN/1.73
GLUCOSE BLD-MCNC: 111 MG/DL (ref 74–99)
GLUCOSE URINE: NEGATIVE MG/DL
HCT VFR BLD CALC: 46.1 % (ref 34–48)
HEMOGLOBIN: 14.5 G/DL (ref 11.5–15.5)
IMMATURE GRANULOCYTES #: 0.03 E9/L
IMMATURE GRANULOCYTES %: 0.4 % (ref 0–5)
KETONES, URINE: NEGATIVE MG/DL
LACTIC ACID, SEPSIS: 1.3 MMOL/L (ref 0.5–1.9)
LEUKOCYTE ESTERASE, URINE: ABNORMAL
LIPASE: 22 U/L (ref 13–60)
LYMPHOCYTES ABSOLUTE: 0.61 E9/L (ref 1.5–4)
LYMPHOCYTES RELATIVE PERCENT: 8.1 % (ref 20–42)
MCH RBC QN AUTO: 27.9 PG (ref 26–35)
MCHC RBC AUTO-ENTMCNC: 31.5 % (ref 32–34.5)
MCV RBC AUTO: 88.8 FL (ref 80–99.9)
MONOCYTES ABSOLUTE: 0.95 E9/L (ref 0.1–0.95)
MONOCYTES RELATIVE PERCENT: 12.7 % (ref 2–12)
NEUTROPHILS ABSOLUTE: 5.86 E9/L (ref 1.8–7.3)
NEUTROPHILS RELATIVE PERCENT: 78.2 % (ref 43–80)
NITRITE, URINE: NEGATIVE
PDW BLD-RTO: 14 FL (ref 11.5–15)
PH UA: 7 (ref 5–9)
PLATELET # BLD: 155 E9/L (ref 130–450)
PMV BLD AUTO: 12.6 FL (ref 7–12)
POTASSIUM REFLEX MAGNESIUM: 4 MMOL/L (ref 3.5–5)
PROTEIN UA: NEGATIVE MG/DL
RBC # BLD: 5.19 E12/L (ref 3.5–5.5)
RBC UA: ABNORMAL /HPF (ref 0–2)
SODIUM BLD-SCNC: 139 MMOL/L (ref 132–146)
SPECIFIC GRAVITY UA: 1.01 (ref 1–1.03)
TOTAL PROTEIN: 7.1 G/DL (ref 6.4–8.3)
UROBILINOGEN, URINE: 0.2 E.U./DL
WBC # BLD: 7.5 E9/L (ref 4.5–11.5)
WBC UA: ABNORMAL /HPF (ref 0–5)

## 2022-02-26 PROCEDURE — 2580000003 HC RX 258: Performed by: GENERAL PRACTICE

## 2022-02-26 PROCEDURE — 85025 COMPLETE CBC W/AUTO DIFF WBC: CPT

## 2022-02-26 PROCEDURE — 83605 ASSAY OF LACTIC ACID: CPT

## 2022-02-26 PROCEDURE — 96376 TX/PRO/DX INJ SAME DRUG ADON: CPT

## 2022-02-26 PROCEDURE — 83690 ASSAY OF LIPASE: CPT

## 2022-02-26 PROCEDURE — 74176 CT ABD & PELVIS W/O CONTRAST: CPT

## 2022-02-26 PROCEDURE — 96375 TX/PRO/DX INJ NEW DRUG ADDON: CPT

## 2022-02-26 PROCEDURE — 6360000002 HC RX W HCPCS: Performed by: GENERAL PRACTICE

## 2022-02-26 PROCEDURE — 87088 URINE BACTERIA CULTURE: CPT

## 2022-02-26 PROCEDURE — 81001 URINALYSIS AUTO W/SCOPE: CPT

## 2022-02-26 PROCEDURE — 36415 COLL VENOUS BLD VENIPUNCTURE: CPT

## 2022-02-26 PROCEDURE — 80053 COMPREHEN METABOLIC PANEL: CPT

## 2022-02-26 PROCEDURE — 87077 CULTURE AEROBIC IDENTIFY: CPT

## 2022-02-26 PROCEDURE — 6370000000 HC RX 637 (ALT 250 FOR IP): Performed by: GENERAL PRACTICE

## 2022-02-26 PROCEDURE — 6360000002 HC RX W HCPCS: Performed by: EMERGENCY MEDICINE

## 2022-02-26 PROCEDURE — 96374 THER/PROPH/DIAG INJ IV PUSH: CPT

## 2022-02-26 PROCEDURE — 87186 SC STD MICRODIL/AGAR DIL: CPT

## 2022-02-26 PROCEDURE — 99285 EMERGENCY DEPT VISIT HI MDM: CPT

## 2022-02-26 RX ORDER — FENTANYL CITRATE 50 UG/ML
50 INJECTION, SOLUTION INTRAMUSCULAR; INTRAVENOUS ONCE
Status: COMPLETED | OUTPATIENT
Start: 2022-02-26 | End: 2022-02-26

## 2022-02-26 RX ORDER — METRONIDAZOLE 500 MG/1
500 TABLET ORAL 3 TIMES DAILY
Qty: 30 TABLET | Refills: 0 | Status: SHIPPED | OUTPATIENT
Start: 2022-02-26 | End: 2022-03-08

## 2022-02-26 RX ORDER — CEFDINIR 300 MG/1
300 CAPSULE ORAL 2 TIMES DAILY
Qty: 20 CAPSULE | Refills: 0 | Status: SHIPPED | OUTPATIENT
Start: 2022-02-26 | End: 2022-03-08

## 2022-02-26 RX ORDER — HYDROCODONE BITARTRATE AND ACETAMINOPHEN 5; 325 MG/1; MG/1
1 TABLET ORAL EVERY 6 HOURS PRN
Qty: 12 TABLET | Refills: 0 | Status: SHIPPED | OUTPATIENT
Start: 2022-02-26 | End: 2022-03-01

## 2022-02-26 RX ORDER — ONDANSETRON 2 MG/ML
4 INJECTION INTRAMUSCULAR; INTRAVENOUS ONCE
Status: COMPLETED | OUTPATIENT
Start: 2022-02-26 | End: 2022-02-26

## 2022-02-26 RX ORDER — METRONIDAZOLE 500 MG/1
500 TABLET ORAL ONCE
Status: COMPLETED | OUTPATIENT
Start: 2022-02-26 | End: 2022-02-26

## 2022-02-26 RX ORDER — 0.9 % SODIUM CHLORIDE 0.9 %
1000 INTRAVENOUS SOLUTION INTRAVENOUS ONCE
Status: COMPLETED | OUTPATIENT
Start: 2022-02-26 | End: 2022-02-26

## 2022-02-26 RX ORDER — CEFDINIR 300 MG/1
300 CAPSULE ORAL EVERY 12 HOURS SCHEDULED
Status: DISCONTINUED | OUTPATIENT
Start: 2022-02-26 | End: 2022-02-27 | Stop reason: HOSPADM

## 2022-02-26 RX ADMIN — FENTANYL CITRATE 50 MCG: 50 INJECTION INTRAMUSCULAR; INTRAVENOUS at 22:16

## 2022-02-26 RX ADMIN — FENTANYL CITRATE 50 MCG: 50 INJECTION INTRAMUSCULAR; INTRAVENOUS at 19:45

## 2022-02-26 RX ADMIN — SODIUM CHLORIDE 1000 ML: 9 INJECTION, SOLUTION INTRAVENOUS at 18:17

## 2022-02-26 RX ADMIN — ONDANSETRON 4 MG: 2 INJECTION INTRAMUSCULAR; INTRAVENOUS at 19:06

## 2022-02-26 RX ADMIN — CEFDINIR 300 MG: 300 CAPSULE ORAL at 21:50

## 2022-02-26 RX ADMIN — FENTANYL CITRATE 50 MCG: 50 INJECTION, SOLUTION INTRAMUSCULAR; INTRAVENOUS at 18:17

## 2022-02-26 RX ADMIN — METRONIDAZOLE 500 MG: 500 TABLET ORAL at 21:50

## 2022-02-26 ASSESSMENT — PAIN DESCRIPTION - ORIENTATION: ORIENTATION: MID

## 2022-02-26 ASSESSMENT — ENCOUNTER SYMPTOMS
VOMITING: 0
BACK PAIN: 0
EYE DISCHARGE: 0
EYE REDNESS: 0
NAUSEA: 0
SINUS PRESSURE: 0
EYE PAIN: 0
WHEEZING: 0
SORE THROAT: 0
SHORTNESS OF BREATH: 0
DIARRHEA: 1
COUGH: 0
ABDOMINAL DISTENTION: 1

## 2022-02-26 ASSESSMENT — PAIN DESCRIPTION - LOCATION
LOCATION: ABDOMEN
LOCATION: ABDOMEN

## 2022-02-26 ASSESSMENT — PAIN DESCRIPTION - FREQUENCY: FREQUENCY: INTERMITTENT

## 2022-02-26 ASSESSMENT — PAIN - FUNCTIONAL ASSESSMENT
PAIN_FUNCTIONAL_ASSESSMENT: 0-10
PAIN_FUNCTIONAL_ASSESSMENT: PREVENTS OR INTERFERES SOME ACTIVE ACTIVITIES AND ADLS

## 2022-02-26 ASSESSMENT — PAIN DESCRIPTION - PAIN TYPE
TYPE: ACUTE PAIN
TYPE: ACUTE PAIN

## 2022-02-26 ASSESSMENT — PAIN SCALES - GENERAL
PAINLEVEL_OUTOF10: 9
PAINLEVEL_OUTOF10: 10
PAINLEVEL_OUTOF10: 10

## 2022-02-26 ASSESSMENT — PAIN DESCRIPTION - DESCRIPTORS: DESCRIPTORS: CRAMPING

## 2022-02-26 ASSESSMENT — PAIN DESCRIPTION - ONSET: ONSET: SUDDEN

## 2022-02-26 NOTE — ED PROVIDER NOTES
ED  Provider Note  Admit Date/RoomTime: 2/26/2022  5:16 PM  ED Room: 26/26     HPI:   Justyna Dawson is a 57 y.o. female presenting to the ED for abdominal pain, beginning 2 days ago. History comes primarily from the patient. Patient Active Problem List:     Chest pain     Hx of breast cancer     Hypertension     Breast cancer ([facility] [address]     Left-sided weakness     Controlled type 2 diabetes mellitus without complication, without long-term current use of insulin (HCC)     History of CVA (cerebrovascular accident)     Hypertensive left ventricular hypertrophy, without heart failure     Abnormal EKG     Chronic midline low back pain with left-sided sciatica     Obesity     Cervical cancer (HCC)     Malignant neoplasm of endocervix (HCC)     Acute GI bleeding     Gastrointestinal hemorrhage     Anemia due to acute blood loss     Type 2 diabetes mellitus without complication, without long-term current use of insulin (HCC)     Shortness of breath     Wide-complex tachycardia (HCC)     Intraductal carcinoma of left breast     Postoperative anemia due to acute blood loss     NSTEMI (non-ST elevated myocardial infarction) ([facility]     Metabolic acidosis     Hyperlipidemia     Coronary artery disease involving native coronary artery of native heart with unstable angina pectoris (HCC)     CAD in native artery     COVID-19     NSVT (nonsustained ventricular tachycardia) (HCC)     LV dysfunction     Acute postoperative respiratory insufficiency     ARTURO (acute kidney injury) ([facility] [address]  . The complaint has been persistent, moderate in severity, improved by nothing and worsened by nothing. Associated symptoms include none. Kelle Ye states that over the course the last 2 days that she has been having crampy abdominal pain with loose, bloody stools with bright red blood. Patient states that she does have a prior history of gastritis as well as a history of prior gastric bypass.  She has been attempting to avoid coming in but, however due to the persistent nature of her symptoms she presented to [facility] emergency department for further evaluation and treatment. On arrival, the patient was assessed with history, physical exam, imaging studies, laboratory studies and ekg, vital signs. Vital signs were stable on arrival and the patient was afebrile. Review of Systems   Constitutional: Positive for activity change and appetite change. Negative for chills and fever. HENT: Negative for ear pain, sinus pressure and sore throat. Eyes: Negative for pain, discharge and redness. Respiratory: Negative for cough, shortness of breath and wheezing. Cardiovascular: Negative for chest pain. Gastrointestinal: Positive for abdominal distention and diarrhea. Negative for nausea and vomiting. Genitourinary: Negative for dysuria and frequency. Musculoskeletal: Negative for arthralgias and back pain. Skin: Negative for rash and wound. Neurological: Negative for weakness and headaches. Hematological: Negative for adenopathy. All other systems reviewed and are negative. Physical Exam  Vitals and nursing note reviewed. Constitutional:       Appearance: She is well-developed. HENT:      Head: Normocephalic and atraumatic. Eyes:      Pupils: Pupils are equal, round, and reactive to light. Cardiovascular:      Rate and Rhythm: Normal rate and regular rhythm. Pulmonary:      Effort: Pulmonary effort is normal. No respiratory distress. Breath sounds: Normal breath sounds. No wheezing or rales. Abdominal:      General: Bowel sounds are normal.      Palpations: Abdomen is soft. Tenderness: There is no abdominal tenderness. There is no guarding or rebound. Musculoskeletal:      Cervical back: Normal range of motion and neck supple. Skin:     General: Skin is warm and dry. Neurological:      Mental Status: She is alert and oriented to person, place, and time. Cranial Nerves:  No cranial nerve deficit. Coordination: Coordination normal.          Procedures     MDM  Number of Diagnoses or Management Options  Diverticulitis of colon  Diagnosis management comments: Emergency Department evaluation was notable for findings consistent with uncomplicated diverticulitis. Patient was given initial antibiotic treatment in the emergency department today and will be given prescriptions for both pain medication as well as antibiotics to the outpatient setting. Patient was advised return should her symptoms worsen despite these therapies. Vital signs been stable throughout the entirety of her emergency department stay, remainder of her laboratory studies were essentially unremarkable, she was considered stable for discharge to home with outpatient follow-up. They were advised to return to the emergency department should they develop fever, chills, night sweats, nausea, vomiting, diarrhea, chest pain, shortness of breath, or worsening of their symptoms despite treatment from this emergency department visit. They were instructed to follow-up with their primary care provider in 2 days. This information was relayed to the patient who understood this plan of care and was amenable to the plan. Amount and/or Complexity of Data Reviewed  Decide to obtain previous medical records or to obtain history from someone other than the patient: yes         ED Course as of 02/26/22 2349   Sat Feb 26, 2022 1824 Patient is a 77-year-old female presenting with a 2-day history of diffuse abdominal pain and bright red blood in her rectum. She denies any hematemesis but states has been nauseous. No fevers. She is hemodynamically stable and afebrile on examination. She has diffuse abdominal tenderness without peritoneal signs.  Work-up is pending [JA]      ED Course User Index  [JA] Mykel Bernal MD       ED Course as of 02/26/22 2349   Sat Feb 26, 2022 1824 Patient is a 77-year-old female presenting with a 2-day history of diffuse abdominal pain and bright red blood in her rectum. She denies any hematemesis but states has been nauseous. No fevers. She is hemodynamically stable and afebrile on examination. She has diffuse abdominal tenderness without peritoneal signs. Work-up is pending [JA]      ED Course User Index  [JA] Dewayne Nuñez MD       --------------------------------------------- PAST HISTORY ---------------------------------------------  Past Medical History:  has a past medical history of Breast cancer ([facility] [address] Breast cancer ([facility] [address] CAD (coronary artery disease), Chronic ulcer of right leg, limited to breakdown of skin ([facility] [facility] CVA (cerebral vascular accident) ([facility] [facility] DM (diabetes mellitus) ([facility] [address] H/O: GI bleed, History of blood transfusion, Hyperlipidemia, Hypertension, Ischemic cardiomyopathy, and Valvular heart disease. Past Surgical History:  has a past surgical history that includes Breast surgery (2000); Hysterectomy; Appendectomy; Knee arthroscopy; Foot surgery; Tonsillectomy; Breast reconstruction (Right); Mastectomy (Right, 2000); Colonoscopy; Upper gastrointestinal endoscopy; Upper gastrointestinal endoscopy (N/A, 2/5/2020); Mastectomy, modified radical (Left, 2/25/2020); Mastectomy, modified radical (Left, 2/25/2020); Coronary angioplasty with stent (03/04/2020); Upper gastrointestinal endoscopy (N/A, 6/12/2020); Coronary artery bypass graft (N/A, 6/16/2020); Abdomen surgery; and Endoscopy, colon, diagnostic. Social History:  reports that she has never smoked. She has never used smokeless tobacco. She reports current alcohol use of about 6.0 standard drinks of alcohol per week. She reports previous drug use. Drug: Marijuana Kelton Piper). Family History: family history includes Breast Cancer in her mother; Heart Failure in her father and mother; Hypertension in her sister; Other in her mother; Pacemaker in her father; Stroke in her mother.      The patients home medications have been reviewed.     Allergies: Penicillins    -------------------------------------------------- RESULTS -------------------------------------------------  Labs:  Results for orders placed or performed during the hospital encounter of 02/26/22   CBC with Auto Differential   Result Value Ref Range    WBC 7.5 4.5 - 11.5 E9/L    RBC 5.19 3.50 - 5.50 E12/L    Hemoglobin 14.5 11.5 - 15.5 g/dL    Hematocrit 46.1 34.0 - 48.0 %    MCV 88.8 80.0 - 99.9 fL    MCH 27.9 26.0 - 35.0 pg    MCHC 31.5 (L) 32.0 - 34.5 %    RDW 14.0 11.5 - 15.0 fL    Platelets 827 792 - 033 E9/L    MPV 12.6 (H) 7.0 - 12.0 fL    Neutrophils % 78.2 43.0 - 80.0 %    Immature Granulocytes % 0.4 0.0 - 5.0 %    Lymphocytes % 8.1 (L) 20.0 - 42.0 %    Monocytes % 12.7 (H) 2.0 - 12.0 %    Eosinophils % 0.5 0.0 - 6.0 %    Basophils % 0.1 0.0 - 2.0 %    Neutrophils Absolute 5.86 1.80 - 7.30 E9/L    Immature Granulocytes # 0.03 E9/L    Lymphocytes Absolute 0.61 (L) 1.50 - 4.00 E9/L    Monocytes Absolute 0.95 0.10 - 0.95 E9/L    Eosinophils Absolute 0.04 (L) 0.05 - 0.50 E9/L    Basophils Absolute 0.01 0.00 - 0.20 E9/L   Comprehensive Metabolic Panel w/ Reflex to MG   Result Value Ref Range    Sodium 139 132 - 146 mmol/L    Potassium reflex Magnesium 4.0 3.5 - 5.0 mmol/L    Chloride 100 98 - 107 mmol/L    CO2 27 22 - 29 mmol/L    Anion Gap 12 7 - 16 mmol/L    Glucose 111 (H) 74 - 99 mg/dL    BUN 25 (H) 6 - 23 mg/dL    CREATININE 1.8 (H) 0.5 - 1.0 mg/dL    GFR Non-African American 34 >=60 mL/min/1.73    GFR African American 34     Calcium 9.7 8.6 - 10.2 mg/dL    Total Protein 7.1 6.4 - 8.3 g/dL    Albumin 4.1 3.5 - 5.2 g/dL    Total Bilirubin 0.8 0.0 - 1.2 mg/dL    Alkaline Phosphatase 86 35 - 104 U/L    ALT 15 0 - 32 U/L    AST 17 0 - 31 U/L   Lipase   Result Value Ref Range    Lipase 22 13 - 60 U/L   Urinalysis with Microscopic   Result Value Ref Range    Color, UA Yellow Straw/Yellow    Clarity, UA Clear Clear    Glucose, Ur Negative Negative mg/dL    Bilirubin Urine Negative Negative    Ketones, Urine Negative Negative mg/dL    Specific Gravity, UA 1.015 1.005 - 1.030    Blood, Urine Negative Negative    pH, UA 7.0 5.0 - 9.0    Protein, UA Negative Negative mg/dL    Urobilinogen, Urine 0.2 <2.0 E.U./dL    Nitrite, Urine Negative Negative    Leukocyte Esterase, Urine TRACE (A) Negative    WBC, UA 1-3 0 - 5 /HPF    RBC, UA NONE 0 - 2 /HPF    Epithelial Cells, UA RARE /HPF    Bacteria, UA RARE (A) None Seen /HPF   Lactate, Sepsis   Result Value Ref Range    Lactic Acid, Sepsis 1.3 0.5 - 1.9 mmol/L       Radiology:  CT ABDOMEN PELVIS WO CONTRAST Additional Contrast? None   Final Result   1. Acute uncomplicated diverticulitis   2. Left urolithiasis without obstructive uropathy. 3. Tiny volume of ascites within the pelvis      RECOMMENDATIONS:   Unavailable             ------------------------- NURSING NOTES AND VITALS REVIEWED ---------------------------  Date / Time Roomed:  2/26/2022  5:16 PM  ED Bed Assignment:  26/26    The nursing notes within the ED encounter and vital signs as below have been reviewed. /76   Pulse 73   Temp 98 °F (36.7 °C) (Oral)   Resp 16   Ht 5' 5\" (1.651 m)   Wt 230 lb (104.3 kg)   SpO2 98%   BMI 38.27 kg/m²   Oxygen Saturation Interpretation: Normal      ------------------------------------------ PROGRESS NOTES ------------------------------------------  5:25 PM EST  I have spoken with the patient and discussed todays results, in addition to providing specific details for the plan of care and counseling regarding the diagnosis and prognosis. Their questions are answered at this time and they are agreeable with the plan. I discussed at length with them reasons for immediate return here for re evaluation. They will followup with their primary care physician by calling their office tomorrow.       --------------------------------- ADDITIONAL PROVIDER NOTES ---------------------------------  At this time the patient is without objective evidence of an acute process requiring hospitalization or inpatient management. They have remained hemodynamically stable throughout their entire ED visit and are stable for discharge with outpatient follow-up. The plan has been discussed in detail and they are aware of the specific conditions for emergent return, as well as the importance of follow-up. Discharge Medication List as of 2/26/2022 10:05 PM      START taking these medications    Details   metroNIDAZOLE (FLAGYL) 500 MG tablet Take 1 tablet by mouth 3 times daily for 10 days, Disp-30 tablet, R-0Print      cefdinir (OMNICEF) 300 MG capsule Take 1 capsule by mouth 2 times daily for 10 days, Disp-20 capsule, R-0Print      HYDROcodone-acetaminophen (NORCO) 5-325 MG per tablet Take 1 tablet by mouth every 6 hours as needed for Pain for up to 3 days. , Disp-12 tablet, R-0Print             Diagnosis:  1. Diverticulitis of colon        Disposition:  Patient's disposition: Discharge to home  Patient's condition is stable.        Marry  43., DO  Resident  02/26/22 0306

## 2022-03-01 LAB
ORGANISM: ABNORMAL
URINE CULTURE, ROUTINE: ABNORMAL

## 2022-05-18 NOTE — PROGRESS NOTES
[facility] Cardiology Progress Note  Dr. Yoder Credit      Referring Physician: KRISTEN Sandoval  CHIEF COMPLAINT:   Chief Complaint   Patient presents with    Coronary Artery Disease     6 month ov- pt has complaints of chest pain       HISTORY OF PRESENT ILLNESS:   Patient is 60years old female with history of CAD status post PCI to LAD, s/p CABG, CVA, type 2 diabetes mellitus, hypertension, hyperlipidemia, anemia,is here for follow-up appointment. Patient had chest pain when she argues with her mom, otherwise, no chest pain with exertion, no shortness of breath, no lightheadedness, no dizziness, no palpitations, no pedal edema, no PND, no orthopnea, no syncope, no presyncopal episodes.     Functional capacity is at baseline      Past Medical History:   Diagnosis Date    Breast cancer ([address] 2000, 2005    right    Breast cancer ([address] 2020    left    CAD (coronary artery disease)     follows with Dr. Linda Comment Chronic ulcer of right leg, limited to breakdown of skin ([address]     CVA (cerebral vascular accident) ([address] 2015    no deficits    DM (diabetes mellitus) ([address]     H/O: GI bleed     History of blood transfusion 02/2020    Hyperlipidemia     Hypertension     Ischemic cardiomyopathy     Valvular heart disease          Past Surgical History:   Procedure Laterality Date    ABDOMEN SURGERY      APPENDECTOMY      BREAST RECONSTRUCTION Right     BREAST SURGERY  2000    right masectomy    COLONOSCOPY      CORONARY ANGIOPLASTY WITH STENT PLACEMENT  03/04/2020    Mini Vision 2.5 x 18 stent deployed proximal LAD by DR Meryl Smith    CORONARY ARTERY BYPASS GRAFT N/A 6/16/2020    CORONARY ARTERY BYPASS, MARY JO performed by Carlos Eduardo Hurd MD at [address] [place] [address] Rd, COLON, DIAGNOSTIC      FOOT SURGERY      right    HYSTERECTOMY      fibroids    KNEE ARTHROSCOPY      left    MASTECTOMY Right 2000    MASTECTOMY, MODIFIED RADICAL Left 2/25/2020    LEFT BREAST SIMPLE MASTECTOMY performed by Kostas Young MD at [facility]    MASTECTOMY, MODIFIED RADICAL Left 2/25/2020    LEFT BREAST MASTECTOMY POST OP BLEED CONTROL, EVACUATION OF LEFT CHEST WALL HEMATOMA performed by Emilia Rob MD at [address] ENDOSCOPY      UPPER GASTROINTESTINAL ENDOSCOPY N/A 2/5/2020    EGD BIOPSY performed by Emilia Rob MD at [address] [facility] N/A 6/12/2020    EGD BIOPSY performed by Emilia Rob MD at [facility] ENDOSCOPY         Current Outpatient Medications   Medication Sig Dispense Refill    allopurinol (ZYLOPRIM) 100 MG tablet Take 100 mg by mouth daily      anastrozole (ARIMIDEX) 1 MG tablet Take 1 tablet by mouth daily 90 tablet 1    colchicine (COLCRYS) 0.6 MG tablet Take 0.6 mg by mouth daily      spironolactone (ALDACTONE) 50 MG tablet Take 50 mg by mouth 2 times daily       sodium bicarbonate 650 MG tablet Take 650 mg by mouth 2 times daily      vitamin C (ASCORBIC ACID) 500 MG tablet Take 500 mg by mouth 2 times daily      ferrous sulfate (IRON 325) 325 (65 Fe) MG tablet Take 1 tablet by mouth 2 times daily (with meals) 60 tablet 0    metoprolol succinate (TOPROL XL) 25 MG extended release tablet Take 1 tablet by mouth daily 30 tablet 2    furosemide (LASIX) 20 MG tablet Take 0.5 tablets by mouth daily (Patient taking differently: Take 40 mg by mouth 2 times daily ) 60 tablet 2    clopidogrel (PLAVIX) 75 MG tablet Take 1 tablet by mouth daily 60 tablet 0    pantoprazole (PROTONIX) 40 MG tablet Take 1 tablet by mouth every morning (before breakfast) (Patient not taking: Reported on 1/12/2022) 30 tablet 0    atorvastatin (LIPITOR) 40 MG tablet Take 1 tablet by mouth daily (Patient not taking: Reported on 5/19/2022) 90 tablet 3    glimepiride (AMARYL) 1 MG tablet Take 1 mg by mouth every morning (before breakfast) (Patient not taking: Reported on 5/19/2022)       No current facility-administered medications for this visit.          Allergies as of 05/19/2022 - Fully Reviewed 05/19/2022   Allergen Reaction Noted    Penicillins Hives and Rash 02/23/2012       Social History     Socioeconomic History    Marital status: Single     Spouse name: Not on file    Number of children: Not on file    Years of education: Not on file    Highest education level: Not on file   Occupational History    Not on file   Tobacco Use    Smoking status: Never Smoker    Smokeless tobacco: Never Used    Tobacco comment: per pt   Vaping Use    Vaping Use: Never used   Substance and Sexual Activity    Alcohol use: Yes     Alcohol/week: 6.0 standard drinks     Types: 6 Standard drinks or equivalent per week     Comment: rare    Drug use: Not Currently     Types: Marijuana Zollie Axe)     Comment: used briefly when she turned \"58\"    Sexual activity: Not Currently   Other Topics Concern    Not on file   Social History Narrative    Denies caffeine. Social Determinants of Health     Financial Resource Strain:     Difficulty of Paying Living Expenses: Not on file   Food Insecurity:     Worried About Running Out of Food in the Last Year: Not on file    Nereida of Food in the Last Year: Not on file   Transportation Needs:     Lack of Transportation (Medical): Not on file    Lack of Transportation (Non-Medical):  Not on file   Physical Activity:     Days of Exercise per Week: Not on file    Minutes of Exercise per Session: Not on file   Stress:     Feeling of Stress : Not on file   Social Connections:     Frequency of Communication with Friends and Family: Not on file    Frequency of Social Gatherings with Friends and Family: Not on file    Attends [employer] Services: Not on file    Active Member of Clubs or Organizations: Not on file    Attends Club or Organization Meetings: Not on file    Marital Status: Not on file   Intimate Partner Violence:     Fear of Current or Ex-Partner: Not on file    Emotionally Abused: Not on file    Physically Abused: Not on file   Lito Loser Sexually Abused: Not on file   Housing Stability:     Unable to Pay for Housing in the Last Year: Not on file    Number of Places Lived in the Last Year: Not on file    Unstable Housing in the Last Year: Not on file       Family History   Problem Relation Age of Onset    Stroke Mother     Heart Failure Mother     Other Mother         ICD    Breast Cancer Mother     Pacemaker Father     Heart Failure Father     Hypertension Sister        REVIEW OF SYSTEMS:     CONSTITUTIONAL:  negative for  fevers, chills, sweats and fatigue  HEENT:  negative for  tinnitus, earaches, nasal congestion and epistaxis  RESPIRATORY:  negative for  dry cough, cough with sputum, wheezing and hemoptysis  GASTROINTESTINAL:  negative for nausea, vomiting, diarrhea, constipation, pruritus and jaundice  HEMATOLOGIC/LYMPHATIC:  negative for easy bruising, bleeding, lymphadenopathy and petechiae  ENDOCRINE:  negative for heat intolerance, cold intolerance, tremor, hair loss and diabetic symptoms including neither polyuria nor polydipsia nor blurred vision  MUSCULOSKELETAL:  negative for  myalgias, arthralgias, joint swelling, stiff joints and decreased range of motion  NEUROLOGICAL:  negative for memory problems, speech problems, visual disturbance, dysphagia, weakness and numbness      PHYSICAL EXAM:   CONSTITUTIONAL:  awake, alert, cooperative, no apparent distress, and appears stated age  HEAD:  normocepalic, without obvious abnormality, atraumatic, pink, moist mucous membranes.   NECK:  Supple, symmetrical, trachea midline, no adenopathy, thyroid symmetric, not enlarged and no tenderness, skin normal  LUNGS:  No increased work of breathing, good air exchange, clear to auscultation bilaterally, no crackles or wheezing  CARDIOVASCULAR:  Normal apical impulse, regular rate and rhythm, normal S1 and S2, no S3 or S4, 2/6 systolic murmur at the apex, 3/6 systolic murmur at the left lower sternal border, no JVD, no carotid bruit, no pedal edema, good carotid upstroke bilaterally. ABDOMEN:  Soft, nontender, no masses, no hepatomegaly or splenomegaly, BS+  CHEST: nontender to palpation, expands symmetrically, well-healed midsternal incision wound, s/p left mastectomy  MUSCULOSKELETAL:  No clubbing no cyanosis. there is no redness, warmth, or swelling of the joints, full range of motion noted  NEUROLOGIC:  Alert, awake,oriented x3  SKIN:  no bruising or bleeding, normal skin color, texture, turgor and no redness, warmth, or swelling      /80   Pulse 66   Resp 18   Ht 5' 5\" (1.651 m)   Wt 222 lb 4.8 oz (100.8 kg)   SpO2 98%   BMI 36.99 kg/m²     DATA:   I personally reviewed the visit EKG with the following interpretation: Sinus rhythm, PACs, LVH by voltage criteria, old anterior wall MI age undetermined, T wave changes in the inferior lateral leads    EKG 7/6/21 Sinus bradycardia, old anterior wall MI age undetermined, LVH by voltage criteria, nonspecific T wave changes    ECHO: 10/21/20 Summary   Compared to prior echo, no significant changes noted. Technically adequate   study. Left ventricle is mildly enlarged . Ejection fraction is visually estimated at 35-40%. global wall hypokinesis with apical akinesis   There is doppler evidence of stage III diastolic dysfunction. Mild mitral regurgitation is present. Trace aortic valve regurgitation. Mild tricuspid regurgitation. RVSP is 31 mmHg. Pulmonary hypertension is mild . Stress Test: 2/7/20   FINDINGS:    Perfusion images demonstrate no no definite reversible perfusion    defect. There is a fixed perfusion defect centered at the apex    centered at the apex. Wall motion is globally hypokinetic with particular severity at the    septum. The end diastolic volume is 675 ml. The end systolic volume is 74 ml. The estimated ejection fraction is 34 %.           Impression    1. No definite reversible perfusion defect. See above. 2. Ejection fraction is 34 %. 3. Global hypokinesis which is most severe at the septum.             Angiography: 3/4/20 IMPRESSION:  1. Totally occluded left anterior descending artery with successful PCI  using 2.5 x 18 mm mini Vision bare metal stent (bare metal stent was  used due to the patient's recent history of GI bleed, recent mastectomy  surgery two days ago, and the plan for possible CABG down the road when  she heals from her mastectomy surgery). 2.  At least 50% distal left main disease. 3.  Mild-to-moderate ostial left circumflex disease with moderate  disease involving the vessel distally. 4.  Moderate disease involving the mid segment of the RCA. 5.  Moderate disease involving second OM branch and second diagonal  Branch.     6/16/20 CABG  coronary artery bypass grafting x2; left internal mammary artery to the LAD, saphenous vein graft to the obtuse marginal    Cardiology Labs: BMP:    Lab Results   Component Value Date     02/26/2022    K 4.0 02/26/2022     02/26/2022    CO2 27 02/26/2022    BUN 25 02/26/2022    CREATININE 1.8 02/26/2022     CMP:    Lab Results   Component Value Date     02/26/2022    K 4.0 02/26/2022     02/26/2022    CO2 27 02/26/2022    BUN 25 02/26/2022    CREATININE 1.8 02/26/2022    PROT 7.1 02/26/2022     CBC:    Lab Results   Component Value Date    WBC 7.5 02/26/2022    RBC 5.19 02/26/2022    HGB 14.5 02/26/2022    HCT 46.1 02/26/2022    MCV 88.8 02/26/2022    RDW 14.0 02/26/2022     02/26/2022     PT/INR:  No results found for: PTINR  PT/INR Warfarin:  No components found for: PTPATWAR, PTINRWAR  PTT:    Lab Results   Component Value Date    APTT 33.4 11/29/2021     PTT Heparin:  No components found for: APTTHEP  Magnesium:    Lab Results   Component Value Date    MG 2.1 09/14/2020     TSH:    Lab Results   Component Value Date    TSH 1.500 02/04/2020     TROPONIN:  No components found for: TROP  BNP:  No results found for: BNP  FASTING LIPID PANEL:    Lab Results Component Value Date    CHOL 69 03/04/2020    HDL 26 03/04/2020    TRIG 107 03/04/2020     No orders to display     I have personally reviewed the laboratory, cardiac diagnostic and radiographic testing as outlined above:      IMPRESSION:  1.  CAD: S/p PCI to LAD, s/p CABG with a LIMA to LAD, SVG to OM1, doing fine, will continue current treatment  2. History of nonsustained ventricular tachycardia: On beta-blockers, no recurrence  3. Ischemic cardiomyopathy: Last documented ejection fraction was 35 to 40%  4. Mitral valve regurgitation: Mild  5. Tricuspid valve regurgitation: Mild  6. Pulmonary hypertension: Mild  7. Hypertension: Controlled  8. Type 2 diabetes mellitus  9. History of CVA  10. History of left mastectomy     RECOMMENDATIONS:   1. Continue current treatment  2. Preventive cardiology: Low-salt, low-cholesterol diet, daily exercise, total cholesterol of less than 200, LDL of less than 70, adherence to diabetic diet and diabetic medications,were all advised. 3.  CHF: Daily weight, take an extra Lasix for weight gain of more than 2-3 pounds in 24 hours, compliance with diuretics, low-salt diet were all advised. 4.  Follow-up with KRISTEN García as scheduled  5. Follow-up with Dr. Alessandro Cruz in 6 months, sooner if symptomatic for any reason    I have reviewed my findings and recommendations with patient    Electronically signed by Michoacano Palmer MD on 5/19/2022 at 12:07 PM    NOTE: This report was transcribed using voice recognition software.  Every effort was made to ensure accuracy; however, inadvertent computerized transcription errors may be present

## 2022-05-19 ENCOUNTER — OFFICE VISIT (OUTPATIENT)
Dept: CARDIOLOGY CLINIC | Age: 62
End: 2022-05-19
Payer: MEDICARE

## 2022-05-19 VITALS
RESPIRATION RATE: 18 BRPM | OXYGEN SATURATION: 98 % | WEIGHT: 222.3 LBS | SYSTOLIC BLOOD PRESSURE: 118 MMHG | BODY MASS INDEX: 37.04 KG/M2 | DIASTOLIC BLOOD PRESSURE: 80 MMHG | HEIGHT: 65 IN | HEART RATE: 66 BPM

## 2022-05-19 DIAGNOSIS — I25.110 CORONARY ARTERY DISEASE INVOLVING NATIVE CORONARY ARTERY OF NATIVE HEART WITH UNSTABLE ANGINA PECTORIS (HCC): Primary | ICD-10-CM

## 2022-05-19 PROCEDURE — 93000 ELECTROCARDIOGRAM COMPLETE: CPT | Performed by: INTERNAL MEDICINE

## 2022-05-19 PROCEDURE — 99214 OFFICE O/P EST MOD 30 MIN: CPT | Performed by: INTERNAL MEDICINE

## 2022-05-19 PROCEDURE — 3017F COLORECTAL CA SCREEN DOC REV: CPT | Performed by: INTERNAL MEDICINE

## 2022-05-19 PROCEDURE — 1036F TOBACCO NON-USER: CPT | Performed by: INTERNAL MEDICINE

## 2022-05-19 PROCEDURE — G8417 CALC BMI ABV UP PARAM F/U: HCPCS | Performed by: INTERNAL MEDICINE

## 2022-05-19 PROCEDURE — G8427 DOCREV CUR MEDS BY ELIG CLIN: HCPCS | Performed by: INTERNAL MEDICINE

## 2022-05-19 RX ORDER — ALLOPURINOL 100 MG/1
100 TABLET ORAL DAILY
COMMUNITY

## 2022-05-19 RX ORDER — SPIRONOLACTONE 50 MG/1
50 TABLET, FILM COATED ORAL DAILY
Qty: 90 TABLET | Refills: 2 | Status: SHIPPED
Start: 2022-05-19

## 2022-06-06 ENCOUNTER — TELEPHONE (OUTPATIENT)
Dept: INFUSION THERAPY | Age: 62
End: 2022-06-06

## 2022-06-06 DIAGNOSIS — Z17.0 MALIGNANT NEOPLASM OF RIGHT BREAST IN FEMALE, ESTROGEN RECEPTOR POSITIVE, UNSPECIFIED SITE OF BREAST (HCC): Primary | ICD-10-CM

## 2022-06-06 DIAGNOSIS — C50.911 MALIGNANT NEOPLASM OF RIGHT BREAST IN FEMALE, ESTROGEN RECEPTOR POSITIVE, UNSPECIFIED SITE OF BREAST (HCC): Primary | ICD-10-CM

## 2022-06-06 NOTE — TELEPHONE ENCOUNTER
Called patient and left message regarding dexa scan due prior to upcoming visit 6-8-22 appointment is same day 6-8-22 @0351  Encouraged patient to contact office if unable to keep these scheduled appointments

## 2022-06-08 ENCOUNTER — HOSPITAL ENCOUNTER (OUTPATIENT)
Dept: INFUSION THERAPY | Age: 62
Discharge: HOME | End: 2022-06-08
Payer: MEDICARE

## 2022-06-08 ENCOUNTER — HOSPITAL ENCOUNTER (OUTPATIENT)
Dept: GENERAL RADIOLOGY | Age: 62
Discharge: HOME | End: 2022-06-10
Payer: MEDICARE

## 2022-06-08 ENCOUNTER — OFFICE VISIT (OUTPATIENT)
Dept: ONCOLOGY | Age: 62
End: 2022-06-08
Payer: MEDICARE

## 2022-06-08 VITALS
SYSTOLIC BLOOD PRESSURE: 142 MMHG | BODY MASS INDEX: 36.49 KG/M2 | TEMPERATURE: 98.3 F | DIASTOLIC BLOOD PRESSURE: 92 MMHG | HEIGHT: 65 IN | WEIGHT: 219 LBS | RESPIRATION RATE: 18 BRPM | OXYGEN SATURATION: 97 % | HEART RATE: 66 BPM

## 2022-06-08 DIAGNOSIS — C50.911 MALIGNANT NEOPLASM OF RIGHT BREAST IN FEMALE, ESTROGEN RECEPTOR POSITIVE, UNSPECIFIED SITE OF BREAST (HCC): ICD-10-CM

## 2022-06-08 DIAGNOSIS — Z17.0 MALIGNANT NEOPLASM OF RIGHT BREAST IN FEMALE, ESTROGEN RECEPTOR POSITIVE, UNSPECIFIED SITE OF BREAST (HCC): ICD-10-CM

## 2022-06-08 DIAGNOSIS — Z79.811 AROMATASE INHIBITOR USE: ICD-10-CM

## 2022-06-08 DIAGNOSIS — Z85.3 PERSONAL HISTORY OF BREAST CANCER: Primary | ICD-10-CM

## 2022-06-08 LAB
ALBUMIN SERPL-MCNC: 4.1 G/DL (ref 3.5–5.2)
ALP BLD-CCNC: 93 U/L (ref 35–104)
ALT SERPL-CCNC: 17 U/L (ref 0–32)
ANION GAP SERPL CALCULATED.3IONS-SCNC: 12 MMOL/L (ref 7–16)
AST SERPL-CCNC: 19 U/L (ref 0–31)
BASOPHILS ABSOLUTE: 0.01 E9/L (ref 0–0.2)
BASOPHILS RELATIVE PERCENT: 0.3 % (ref 0–2)
BILIRUB SERPL-MCNC: 0.4 MG/DL (ref 0–1.2)
BUN BLDV-MCNC: 25 MG/DL (ref 6–23)
CALCIUM SERPL-MCNC: 9.6 MG/DL (ref 8.6–10.2)
CHLORIDE BLD-SCNC: 108 MMOL/L (ref 98–107)
CO2: 22 MMOL/L (ref 22–29)
CREAT SERPL-MCNC: 1.8 MG/DL (ref 0.5–1)
EOSINOPHILS ABSOLUTE: 0.05 E9/L (ref 0.05–0.5)
EOSINOPHILS RELATIVE PERCENT: 1.6 % (ref 0–6)
GFR AFRICAN AMERICAN: 34
GFR NON-AFRICAN AMERICAN: 34 ML/MIN/1.73
GLUCOSE BLD-MCNC: 87 MG/DL (ref 74–99)
HCT VFR BLD CALC: 44.8 % (ref 34–48)
HEMOGLOBIN: 14.5 G/DL (ref 11.5–15.5)
IMMATURE GRANULOCYTES #: 0.01 E9/L
IMMATURE GRANULOCYTES %: 0.3 % (ref 0–5)
LYMPHOCYTES ABSOLUTE: 0.96 E9/L (ref 1.5–4)
LYMPHOCYTES RELATIVE PERCENT: 30.8 % (ref 20–42)
MCH RBC QN AUTO: 28.2 PG (ref 26–35)
MCHC RBC AUTO-ENTMCNC: 32.4 % (ref 32–34.5)
MCV RBC AUTO: 87 FL (ref 80–99.9)
MONOCYTES ABSOLUTE: 0.5 E9/L (ref 0.1–0.95)
MONOCYTES RELATIVE PERCENT: 16 % (ref 2–12)
NEUTROPHILS ABSOLUTE: 1.59 E9/L (ref 1.8–7.3)
NEUTROPHILS RELATIVE PERCENT: 51 % (ref 43–80)
PDW BLD-RTO: 15.3 FL (ref 11.5–15)
PLATELET # BLD: 216 E9/L (ref 130–450)
PMV BLD AUTO: 12.1 FL (ref 7–12)
POTASSIUM SERPL-SCNC: 4.3 MMOL/L (ref 3.5–5)
RBC # BLD: 5.15 E12/L (ref 3.5–5.5)
SODIUM BLD-SCNC: 142 MMOL/L (ref 132–146)
TOTAL PROTEIN: 7 G/DL (ref 6.4–8.3)
WBC # BLD: 3.1 E9/L (ref 4.5–11.5)

## 2022-06-08 PROCEDURE — G8427 DOCREV CUR MEDS BY ELIG CLIN: HCPCS | Performed by: INTERNAL MEDICINE

## 2022-06-08 PROCEDURE — 3017F COLORECTAL CA SCREEN DOC REV: CPT | Performed by: INTERNAL MEDICINE

## 2022-06-08 PROCEDURE — 1036F TOBACCO NON-USER: CPT | Performed by: INTERNAL MEDICINE

## 2022-06-08 PROCEDURE — 80053 COMPREHEN METABOLIC PANEL: CPT

## 2022-06-08 PROCEDURE — 99212 OFFICE O/P EST SF 10 MIN: CPT

## 2022-06-08 PROCEDURE — 77080 DXA BONE DENSITY AXIAL: CPT

## 2022-06-08 PROCEDURE — 85025 COMPLETE CBC W/AUTO DIFF WBC: CPT

## 2022-06-08 PROCEDURE — G8417 CALC BMI ABV UP PARAM F/U: HCPCS | Performed by: INTERNAL MEDICINE

## 2022-06-08 PROCEDURE — 36415 COLL VENOUS BLD VENIPUNCTURE: CPT

## 2022-06-08 PROCEDURE — 99214 OFFICE O/P EST MOD 30 MIN: CPT | Performed by: INTERNAL MEDICINE

## 2022-06-08 NOTE — PROGRESS NOTES
Department of [facility] Oncology    Attending Clinic Note    Reason for Visit: Follow-up on a patient with Cervical Cancer and Breast Cancer    PCP:  ANNA MARIE Ralph - CNP    History of Present Illness  60 y.o. female with past medical hx of right breast cancer with recurrence (s/p total mastectomy with reconstruction/TRAM flap & XRT, HTN, chest pain, CVA, left sided weakness, low back pain with sciatica, type II diabetes, hypertensive left ventricular hypertrophy. PAP smear on 04/11/2019 revealing HSIL and endometrial cells. She then underwent a colposcopy on 07/10/2019 and a LEEP on 08/22/2019. PATH REVIEW [facility]:  FINAL DIAGNOSIS   1. Cervix, LEEP specimen ([id number]) - Invasive poorly-differentiated adenocarcinoma. - The invasive adenocarcinoma involves the deep (radial) excision margin. 2. Endocervix, LEEP specimen ([id number]) - Focus of cauterized adenocarcinoma invovling excision magin. COMMENT   In the cervix LEEP specimen ([id number]) the adenocarcinoma invades the cervical stroma to a thickness of at least 2.5 mm. However, since the invasive adenocarcinoma involves the deep (radial) excision margin, the full extent of invasion cannot be determined. The invasive adenocarcinoma involves five tissue sections. Therefore, the width (circumferential measurement) is estimated at approximately 13 mm. Vascular invasion is not identified. PET/CT 10/12/2019 at [facility]:   There is no hypermetabolic abdominal or pelvic lymphadenopathy.    There is mild low-level activity in the cervical region (max SUV 2.9).       Evaluated by GYN ONC (Dr. Cristin Hirsch at [facility]) who recommended ChemoRT with weekly Cisplatin  followed by HDR brachytherapy for Stage 1B1 Cervical Cancer. Patient would like to receive treatments locally. EBRT was started on 11/20/2019. Cycle # 1 weekly Cisplatin was on 11/20/2019. Cycle # 1 weekly Cisplatin was on 11/20/2019.   Cycle # 5 weekly Cisplatin was on 12/19/2019. EBRT was completed 12/27/2019. HDR brachytherapy (1/14/2020-1/16/2020)  PET/CT scan 05/18/2020 noted no evidence of disease. Follows locally with Dr. Chava Gray in [facility]    Left Breast Cancer Feb 2020  Left simple mastectomy on 02/25/2020:  A. Left breast, simple mastectomy:   - Invasive carcinoma of no special type (ductal), grade 2; tumor size 0.8 x 0.7 x 0.5 cm; LVI not identified.   - Ductal carcinoma in situ, intermediate to high nuclear grade,cribriform and solid types, with necrosis;   - Sclerosing fibroadenomatoid nodules and gynecomastia-like changes;   - Microcalcifications in DCIS;   - Scar and foreign body-type granuloma of prior biopsy site;  - Additional skin/breast tissue showing no pathologic alteration. B. Left breast, new inferior-medial margin, excision: Negative for malignancy. pT1b pNx  Breast Cancer Marker Studies:  Estrogen Receptors (ER): Positive >90%  Progesterone Receptors (DC): Positive:40%  Her-2/heidi (c-erb B-2) protein expression: Positive (score 3+)    Started Arimidex 1 mg po daily on 06/01/2020 with fair tolerance. Not on Herceptin due to CHF and CAD. EF 34% Feb 2020  No SLNB done. Left axillary U/S 06/01/2020:  No evidence of malignancy. DEXA scan 06/01/2020: normal bone density in LS and jasson hips. Tumor Board Discussion with [facility] at [facility]: Defer LN evaluation and HER2 targeted therapies due to cardiac risks; Endocrine therapy only indicated. Today 06/08/2022 for f/u. She continues to tolerate Arimidex well. Fair appetite and energy level. Review of Systems;  CONSTITUTIONAL: No fever, chills. Fair appetite and energy level. ENMT: Eyes: No diplopia; Nose: No epistaxis. Mouth: No sore throat. RESPIRATORY: No hemoptysis, shortness of breath, cough. CARDIOVASCULAR: No chest pain, palpitations. GASTROINTESTINAL: No nausea/vomiting, abdominal pain, diarrhea/constipation.   GENITOURINARY: No dysuria, urinary frequency, hematuria. NEURO: No syncope, presyncope, headache. Remainder:  ROS NEGATIVE    Past Medical History:      Diagnosis Date    Breast cancer ([facility] 2000, 2005    right    Breast cancer ([facility] 2020    left    CAD (coronary artery disease)     follows with Dr. Margareth Peguero Chronic ulcer of right leg, limited to breakdown of skin ([facility]     CVA (cerebral vascular accident) ([facility] 2015    no deficits    DM (diabetes mellitus) ([facility]     H/O: GI bleed     History of blood transfusion 02/2020    Hyperlipidemia     Hypertension     Ischemic cardiomyopathy     Valvular heart disease      Medications:  Reviewed and reconciled. Allergies: Allergies   Allergen Reactions    Penicillins Hives and Rash     Physical Exam:  BP (!) 142/92   Pulse 66   Temp 98.3 °F (36.8 °C) (Infrared)   Resp 18   Ht 5' 5\" (1.651 m)   Wt 219 lb (99.3 kg)   SpO2 97%   BMI 36.44 kg/m²   GENERAL: Alert, oriented x 3, not in acute distress. HEENT: PERRLA; EOMI. Oropharynx clear. NECK: Supple. Without lymphadenopathy. LUNGS: Good air entry bilaterally. No wheezing, crackles or ronchi. CARDIOVASCULAR: Regular rate. No murmurs, rubs or gallops. ABDOMEN: Soft. Non-tender, non-distended. EXTREMITIES: Without clubbing, cyanosis, or edema. NEUROLOGIC: No focal deficits.    ECOG PS 1    Lab Results   Component Value Date    WBC 3.1 (L) 06/08/2022    HGB 14.5 06/08/2022    HCT 44.8 06/08/2022    MCV 87.0 06/08/2022     06/08/2022     Lab Results   Component Value Date     06/08/2022    K 4.3 06/08/2022     (H) 06/08/2022    CO2 22 06/08/2022    BUN 25 (H) 06/08/2022    CREATININE 1.8 (H) 06/08/2022    GLUCOSE 87 06/08/2022    CALCIUM 9.6 06/08/2022    PROT 7.0 06/08/2022    LABALBU 4.1 06/08/2022    BILITOT 0.4 06/08/2022    ALKPHOS 93 06/08/2022    AST 19 06/08/2022    ALT 17 06/08/2022    LABGLOM 34 06/08/2022    GFRAA 34 06/08/2022      Impression/Plan:  60 y.o. female with hx of right breast cancer with recurrence (s/p total right mastectomy with reconstruction/TRAM flap & XRT with Stage 1B1 Cervical Cancer    PET[facility] 10/12/2019 at [facility]:   There is no hypermetabolic abdominal or pelvic lymphadenopathy.    There is mild low-level activity in the cervical region (max SUV 2.9).       Evaluated by GYN ONC (Dr. Jeannine Corey at [facility]) who recommended concurrent chemoRT with weekly Cisplatin followed by HDR brachytherapy for her Stage 1B1 Cervical Cancer. Radiation therapy started on 11/20/2019. Cycle # 1 weekly Cisplatin was on 11/20/2019. Cycle # 5 weekly Cisplatin was on 12/19/2019. EBRT was completed 12/27/2019. HDR brachytherapy (1/14/2020-1/16/2020)  PET/CT scan 05/18/2020 noted no evidence of disease. Follows locally with Dr. Claudia Wong in [facility]    Left Breast Cancer Feb 2020  Left simple mastectomy on 02/25/2020:  A. Left breast, simple mastectomy:   - Invasive carcinoma of no special type (ductal), grade 2; tumor size 0.8 x 0.7 x 0.5 cm; Negative margins. LVI not identified.   - Ductal carcinoma in situ, intermediate to high nuclear grade,cribriform and solid types, with necrosis;   - Sclerosing fibroadenomatoid nodules and gynecomastia-like changes;   - Microcalcifications in DCIS;   - Scar and foreign body-type granuloma of prior biopsy site;  - Additional skin/breast tissue showing no pathologic alteration. B. Left breast, new inferior-medial margin, excision: Negative for malignancy. pT1b pNx  Breast Cancer Marker Studies:  Estrogen Receptors (ER): Positive >90%  Progesterone Receptors (FL): Positive:40%  Her-2/heidi (c-erb B-2) protein expression: Positive (score 3+)    Started Arimidex 1 mg po daily on 06/01/2020 with fair tolerance. Not on Herceptin due to CHF and CAD. EF 34% Feb 2020  No SLNB done. Left axillary U/S 06/01/2020:  No evidence of malignancy. DEXA scan 06/01/2020: normal bone density in LS and jasson hips.    Tumor Board Discussion with Breast Team at [facility]: Defer LN evaluation and HER2 targeted therapies due to cardiac risks; Endocrine therapy only indicated. Continues to tolerate Arimidex well. Left Hip X-Ray 11/29/2021 grossly normal left hip  Lumbar spine X-Ray 11/29/2021: Facet joint degenerative changes. CT abdomen/pelvis 02/26/2022 no evidence of metastatic disease. DEXA scan 06/08/2022 pending. On Ca. VitD  Imaging reviewed. Labs reviewed. LORI  Continue Arimidex and Ca/VitD. RTC 6 months. Continue to follow with nephrology for CKD    Genetics: My-Risk testing-negative. No clinically significant mutation identified. No VUS identified.     Carmelo Fry MD   06/08/2022

## 2022-08-09 RX ORDER — ATORVASTATIN CALCIUM 40 MG/1
TABLET, FILM COATED ORAL
Qty: 90 TABLET | Refills: 3 | Status: SHIPPED | OUTPATIENT
Start: 2022-08-09

## 2022-09-18 ENCOUNTER — HOSPITAL ENCOUNTER (EMERGENCY)
Age: 62
Discharge: HOME | End: 2022-09-18
Attending: EMERGENCY MEDICINE
Payer: MEDICARE

## 2022-09-18 VITALS
BODY MASS INDEX: 35.65 KG/M2 | WEIGHT: 214 LBS | HEART RATE: 77 BPM | OXYGEN SATURATION: 100 % | SYSTOLIC BLOOD PRESSURE: 110 MMHG | HEIGHT: 65 IN | RESPIRATION RATE: 16 BRPM | DIASTOLIC BLOOD PRESSURE: 76 MMHG | TEMPERATURE: 97.5 F

## 2022-09-18 DIAGNOSIS — K92.1 STOOL COLOR BLACK: Primary | ICD-10-CM

## 2022-09-18 LAB
ABO/RH: NORMAL
ALBUMIN SERPL-MCNC: 4.2 G/DL (ref 3.5–5.2)
ALP BLD-CCNC: 85 U/L (ref 35–104)
ALT SERPL-CCNC: 13 U/L (ref 0–32)
ANION GAP SERPL CALCULATED.3IONS-SCNC: 13 MMOL/L (ref 7–16)
ANTIBODY SCREEN: NORMAL
AST SERPL-CCNC: 12 U/L (ref 0–31)
BASOPHILS ABSOLUTE: 0.01 E9/L (ref 0–0.2)
BASOPHILS RELATIVE PERCENT: 0.2 % (ref 0–2)
BILIRUB SERPL-MCNC: 0.4 MG/DL (ref 0–1.2)
BUN BLDV-MCNC: 45 MG/DL (ref 6–23)
CALCIUM SERPL-MCNC: 9.8 MG/DL (ref 8.6–10.2)
CHLORIDE BLD-SCNC: 108 MMOL/L (ref 98–107)
CO2: 18 MMOL/L (ref 22–29)
CREAT SERPL-MCNC: 2.2 MG/DL (ref 0.5–1)
EOSINOPHILS ABSOLUTE: 0.02 E9/L (ref 0.05–0.5)
EOSINOPHILS RELATIVE PERCENT: 0.4 % (ref 0–6)
GFR AFRICAN AMERICAN: 27
GFR NON-AFRICAN AMERICAN: 27 ML/MIN/1.73
GLUCOSE BLD-MCNC: 113 MG/DL (ref 74–99)
HCT VFR BLD CALC: 48.8 % (ref 34–48)
HEMOGLOBIN: 16 G/DL (ref 11.5–15.5)
IMMATURE GRANULOCYTES #: 0.04 E9/L
IMMATURE GRANULOCYTES %: 0.8 % (ref 0–5)
LACTIC ACID: 1 MMOL/L (ref 0.5–2.2)
LIPASE: 41 U/L (ref 13–60)
LYMPHOCYTES ABSOLUTE: 0.94 E9/L (ref 1.5–4)
LYMPHOCYTES RELATIVE PERCENT: 18.5 % (ref 20–42)
MCH RBC QN AUTO: 28 PG (ref 26–35)
MCHC RBC AUTO-ENTMCNC: 32.8 % (ref 32–34.5)
MCV RBC AUTO: 85.5 FL (ref 80–99.9)
MONOCYTES ABSOLUTE: 0.57 E9/L (ref 0.1–0.95)
MONOCYTES RELATIVE PERCENT: 11.2 % (ref 2–12)
NEUTROPHILS ABSOLUTE: 3.51 E9/L (ref 1.8–7.3)
NEUTROPHILS RELATIVE PERCENT: 68.9 % (ref 43–80)
PDW BLD-RTO: 16.1 FL (ref 11.5–15)
PLATELET # BLD: 308 E9/L (ref 130–450)
PMV BLD AUTO: 11.5 FL (ref 7–12)
POTASSIUM SERPL-SCNC: 4.3 MMOL/L (ref 3.5–5)
RBC # BLD: 5.71 E12/L (ref 3.5–5.5)
SODIUM BLD-SCNC: 139 MMOL/L (ref 132–146)
TOTAL PROTEIN: 7.8 G/DL (ref 6.4–8.3)
WBC # BLD: 5.1 E9/L (ref 4.5–11.5)

## 2022-09-18 PROCEDURE — 96374 THER/PROPH/DIAG INJ IV PUSH: CPT

## 2022-09-18 PROCEDURE — C9113 INJ PANTOPRAZOLE SODIUM, VIA: HCPCS | Performed by: EMERGENCY MEDICINE

## 2022-09-18 PROCEDURE — 86900 BLOOD TYPING SEROLOGIC ABO: CPT

## 2022-09-18 PROCEDURE — 80053 COMPREHEN METABOLIC PANEL: CPT

## 2022-09-18 PROCEDURE — 2580000003 HC RX 258: Performed by: EMERGENCY MEDICINE

## 2022-09-18 PROCEDURE — 86850 RBC ANTIBODY SCREEN: CPT

## 2022-09-18 PROCEDURE — 99284 EMERGENCY DEPT VISIT MOD MDM: CPT

## 2022-09-18 PROCEDURE — 86901 BLOOD TYPING SEROLOGIC RH(D): CPT

## 2022-09-18 PROCEDURE — 85025 COMPLETE CBC W/AUTO DIFF WBC: CPT

## 2022-09-18 PROCEDURE — 83605 ASSAY OF LACTIC ACID: CPT

## 2022-09-18 PROCEDURE — 83690 ASSAY OF LIPASE: CPT

## 2022-09-18 PROCEDURE — 96361 HYDRATE IV INFUSION ADD-ON: CPT

## 2022-09-18 PROCEDURE — 6360000002 HC RX W HCPCS: Performed by: EMERGENCY MEDICINE

## 2022-09-18 RX ORDER — SODIUM CHLORIDE 9 MG/ML
1000 INJECTION, SOLUTION INTRAVENOUS CONTINUOUS
Status: DISCONTINUED | OUTPATIENT
Start: 2022-09-18 | End: 2022-09-18 | Stop reason: HOSPADM

## 2022-09-18 RX ORDER — PANTOPRAZOLE SODIUM 40 MG/10ML
40 INJECTION, POWDER, LYOPHILIZED, FOR SOLUTION INTRAVENOUS ONCE
Status: COMPLETED | OUTPATIENT
Start: 2022-09-18 | End: 2022-09-18

## 2022-09-18 RX ADMIN — PANTOPRAZOLE SODIUM 40 MG: 40 INJECTION, POWDER, FOR SOLUTION INTRAVENOUS at 12:38

## 2022-09-18 RX ADMIN — SODIUM CHLORIDE 1000 ML: 9 INJECTION, SOLUTION INTRAVENOUS at 12:38

## 2022-09-18 ASSESSMENT — ENCOUNTER SYMPTOMS
NAUSEA: 1
SHORTNESS OF BREATH: 0
VOICE CHANGE: 0
BLOOD IN STOOL: 1
PHOTOPHOBIA: 0
TROUBLE SWALLOWING: 0
VOMITING: 1
ABDOMINAL PAIN: 1
COUGH: 0
DIARRHEA: 0

## 2022-09-18 NOTE — ED PROVIDER NOTES
HPI   Patient is a 24-year-old female with past medical history of hypertension, hyperlipidemia, CAD, diabetes, CVA and breast cancer presenting the emergency department due to dark-colored stools. Patient symptoms started acutely several days ago but are worsened today. She denies any inciting factors for her symptoms. Patient endorsing gradual onset of symptoms with progressive worsening. She is also reporting vague abdominal pain along with nausea and vomiting. Patient localizes abdominal pain to her lower abdomen. It is dull, intermittent and nonradiating. Nothing particular makes it better or worse. It is mild in severity when present. She has also been nauseous and reports 4 episodes of emesis today. Patient is on Plavix currently. She states that her stools have been dark almost black in color today. She otherwise denies other symptoms including fever, chills, cough, congestion, sore throat, headache, lightheadedness, syncope, paresis, chest pain, shortness of breath, urinary symptoms, constipation or diarrhea. Her symptoms are mild to moderate with no remitting or exacerbating factors. Patient did state that she tried taking Pepto-Bismol 2 days ago without much relief in her symptoms. Patient is also taking iron currently. She denies any recent sick contacts or abnormal food ingestions. Patient thought that she had a stomach bug initially. Review of Systems   Constitutional:  Negative for chills and fever. HENT:  Negative for trouble swallowing and voice change. Eyes:  Negative for photophobia and visual disturbance. Respiratory:  Negative for cough and shortness of breath. Cardiovascular:  Negative for chest pain and palpitations. Gastrointestinal:  Positive for abdominal pain, blood in stool, nausea and vomiting. Negative for diarrhea. Dark black stools. Genitourinary:  Negative for dysuria. Musculoskeletal:  Negative for arthralgias.    Skin:  Negative for rash and wound. Neurological:  Negative for dizziness and headaches. Psychiatric/Behavioral:  Negative for behavioral problems and confusion. Physical Exam  Constitutional:       General: She is not in acute distress. Appearance: Normal appearance. She is not ill-appearing. HENT:      Head: Normocephalic and atraumatic. Right Ear: External ear normal.      Left Ear: External ear normal.      Nose: Nose normal.      Mouth/Throat:      Mouth: Mucous membranes are moist.      Pharynx: Oropharynx is clear. Eyes:      Conjunctiva/sclera: Conjunctivae normal.      Pupils: Pupils are equal, round, and reactive to light. Cardiovascular:      Rate and Rhythm: Normal rate and regular rhythm. Pulses: Normal pulses. Heart sounds: Normal heart sounds. Pulmonary:      Effort: Pulmonary effort is normal. No respiratory distress. Breath sounds: Normal breath sounds. No wheezing or rales. Abdominal:      General: Abdomen is flat. Palpations: Abdomen is soft. Tenderness: There is no abdominal tenderness. There is no guarding or rebound. Musculoskeletal:         General: Swelling present. Cervical back: Normal range of motion and neck supple. Right lower leg: No edema. Left lower leg: No edema. Comments: Bilateral lower extremity swelling, chronic and likely worsened by body habitus. Skin:     General: Skin is warm and dry. Capillary Refill: Capillary refill takes less than 2 seconds. Neurological:      General: No focal deficit present. Mental Status: She is alert and oriented to person, place, and time. Cranial Nerves: No cranial nerve deficit.       Coordination: Coordination normal.   Psychiatric:         Mood and Affect: Mood normal.         Behavior: Behavior normal.        MDM  Patient is a 70-year-old female with past medical history of hypertension, hyperlipidemia, CAD, diabetes, CVA and breast cancer resenting the emergency department due to dark-colored stools. On initial evaluation, patient is well-appearing, hemodynamically stable in no acute distress. Physical exam is benign. Abdomen is soft, nontender nondistended. Hemoccult negative in the ED. Work-up is generally unremarkable. No clinically significant lab abnormalities seen. Hemoglobin 16 and stable. Patient possibly slightly dehydrated considering hemoconcentrated CBC. Renal function poor but close to baseline with creatinine 2.2/BUN 45. Baseline creatinine around 1.8. Patient given IV fluids in the emergency department. Also given Protonix. Reevaluation, she was noting improvement in presenting symptoms. Work-up results were discussed with her and she is agreeable to discharge with outpatient follow-up as needed. Patient remained stable in the ED. Given return precautions in case of new or worsening symptoms. ED Course as of 09/18/22 2032   Marixa Angel Sep 18, 2022   1245 Hemoccult negative. [PP]      ED Course User Index  [PP] Van Wert Patches, DO      --------------------------------------------- PAST HISTORY ---------------------------------------------  Past Medical History:  has a past medical history of Breast cancer ([facility] ([facility] CAD (coronary artery disease), Chronic ulcer of right leg, limited to breakdown of skin ([facility] CVA (cerebral vascular accident) ([facility] DM (diabetes mellitus) ([facility] H/O: GI bleed, History of blood transfusion, Hyperlipidemia, Hypertension, Ischemic cardiomyopathy, and Valvular heart disease. Past Surgical History:  has a past surgical history that includes Breast surgery (2000); Hysterectomy; Appendectomy; Knee arthroscopy; Foot surgery; Tonsillectomy; Breast reconstruction (Right); Mastectomy (Right, 2000); Colonoscopy; Upper gastrointestinal endoscopy; Upper gastrointestinal endoscopy (N/A, 2/5/2020); Mastectomy, modified radical (Left, 2/25/2020); Mastectomy, modified radical (Left, 2/25/2020);  Coronary angioplasty with stent (03/04/2020); Upper gastrointestinal endoscopy (N/A, 6/12/2020); Coronary artery bypass graft (N/A, 6/16/2020); Abdomen surgery; and Endoscopy, colon, diagnostic. Social History:  reports that she has never smoked. She has never used smokeless tobacco. She reports current alcohol use of about 6.0 standard drinks per week. She reports that she does not currently use drugs after having used the following drugs: Marijuana [place]). Family History: family history includes Breast Cancer in her mother; Heart Failure in her father and mother; Hypertension in her sister; Other in her mother; Pacemaker in her father; Stroke in her mother. The patients home medications have been reviewed.     Allergies: Penicillins    -------------------------------------------------- RESULTS -------------------------------------------------  Labs:  Results for orders placed or performed during the hospital encounter of 09/18/22   CBC with Auto Differential   Result Value Ref Range    WBC 5.1 4.5 - 11.5 E9/L    RBC 5.71 (H) 3.50 - 5.50 E12/L    Hemoglobin 16.0 (H) 11.5 - 15.5 g/dL    Hematocrit 48.8 (H) 34.0 - 48.0 %    MCV 85.5 80.0 - 99.9 fL    MCH 28.0 26.0 - 35.0 pg    MCHC 32.8 32.0 - 34.5 %    RDW 16.1 (H) 11.5 - 15.0 fL    Platelets 193 654 - 114 E9/L    MPV 11.5 7.0 - 12.0 fL    Neutrophils % 68.9 43.0 - 80.0 %    Immature Granulocytes % 0.8 0.0 - 5.0 %    Lymphocytes % 18.5 (L) 20.0 - 42.0 %    Monocytes % 11.2 2.0 - 12.0 %    Eosinophils % 0.4 0.0 - 6.0 %    Basophils % 0.2 0.0 - 2.0 %    Neutrophils Absolute 3.51 1.80 - 7.30 E9/L    Immature Granulocytes # 0.04 E9/L    Lymphocytes Absolute 0.94 (L) 1.50 - 4.00 E9/L    Monocytes Absolute 0.57 0.10 - 0.95 E9/L    Eosinophils Absolute 0.02 (L) 0.05 - 0.50 E9/L    Basophils Absolute 0.01 0.00 - 0.20 E9/L   Comprehensive Metabolic Panel   Result Value Ref Range    Sodium 139 132 - 146 mmol/L    Potassium 4.3 3.5 - 5.0 mmol/L    Chloride 108 (H) 98 - 107 mmol/L    CO2 18 (L) 22 - 29 mmol/L    Anion Gap 13 7 - 16 mmol/L    Glucose 113 (H) 74 - 99 mg/dL    BUN 45 (H) 6 - 23 mg/dL    Creatinine 2.2 (H) 0.5 - 1.0 mg/dL    GFR Non-African American 27 >=60 mL/min/1.73    GFR African American 27     Calcium 9.8 8.6 - 10.2 mg/dL    Total Protein 7.8 6.4 - 8.3 g/dL    Albumin 4.2 3.5 - 5.2 g/dL    Total Bilirubin 0.4 0.0 - 1.2 mg/dL    Alkaline Phosphatase 85 35 - 104 U/L    ALT 13 0 - 32 U/L    AST 12 0 - 31 U/L   Lactic Acid   Result Value Ref Range    Lactic Acid 1.0 0.5 - 2.2 mmol/L   Lipase   Result Value Ref Range    Lipase 41 13 - 60 U/L   TYPE AND SCREEN   Result Value Ref Range    ABO/Rh O POS     Antibody Screen NEG        Radiology:  No orders to display       ------------------------- NURSING NOTES AND VITALS REVIEWED ---------------------------  Date / Time Roomed:  9/18/2022 12:03 PM  ED Bed Assignment:  05/05    The nursing notes within the ED encounter and vital signs as below have been reviewed. /76   Pulse 77   Temp 97.5 °F (36.4 °C) (Oral)   Resp 16   Ht 5' 5\" (1.651 m)   Wt 214 lb (97.1 kg)   SpO2 100%   BMI 35.61 kg/m²   Oxygen Saturation Interpretation: Normal      ------------------------------------------ PROGRESS NOTES ------------------------------------------  I have spoken with the patient and discussed todays results, in addition to providing specific details for the plan of care and counseling regarding the diagnosis and prognosis. Their questions are answered at this time and they are agreeable with the plan. I discussed at length with them reasons for immediate return here for re evaluation. They will followup with primary care by calling their office tomorrow. --------------------------------- ADDITIONAL PROVIDER NOTES ---------------------------------  At this time the patient is without objective evidence of an acute process requiring hospitalization or inpatient management.   They have remained hemodynamically stable throughout their entire ED visit and are stable for discharge with outpatient follow-up. The plan has been discussed in detail and they are aware of the specific conditions for emergent return, as well as the importance of follow-up. Discharge Medication List as of 9/18/2022  3:06 PM          Diagnosis:  1. Stool color black        Disposition:  Patient's disposition: Discharge to home  Patient's condition is stable.          Yaa Daniels,   Resident  09/18/22 2039

## 2022-12-05 ENCOUNTER — OFFICE VISIT (OUTPATIENT)
Dept: ONCOLOGY | Age: 62
End: 2022-12-05
Payer: MEDICARE

## 2022-12-05 ENCOUNTER — HOSPITAL ENCOUNTER (OUTPATIENT)
Dept: INFUSION THERAPY | Age: 62
Discharge: HOME | End: 2022-12-05
Payer: MEDICARE

## 2022-12-05 VITALS
HEIGHT: 65 IN | BODY MASS INDEX: 35.49 KG/M2 | SYSTOLIC BLOOD PRESSURE: 153 MMHG | WEIGHT: 213 LBS | RESPIRATION RATE: 16 BRPM | HEART RATE: 68 BPM | TEMPERATURE: 96.6 F | DIASTOLIC BLOOD PRESSURE: 87 MMHG | OXYGEN SATURATION: 97 %

## 2022-12-05 DIAGNOSIS — C50.911 MALIGNANT NEOPLASM OF RIGHT BREAST IN FEMALE, ESTROGEN RECEPTOR POSITIVE, UNSPECIFIED SITE OF BREAST (HCC): ICD-10-CM

## 2022-12-05 DIAGNOSIS — Z17.0 MALIGNANT NEOPLASM OF RIGHT BREAST IN FEMALE, ESTROGEN RECEPTOR POSITIVE, UNSPECIFIED SITE OF BREAST (HCC): ICD-10-CM

## 2022-12-05 LAB
ALBUMIN SERPL-MCNC: 3.9 G/DL (ref 3.5–5.2)
ALP BLD-CCNC: 86 U/L (ref 35–104)
ALT SERPL-CCNC: 16 U/L (ref 0–32)
ANION GAP SERPL CALCULATED.3IONS-SCNC: 13 MMOL/L (ref 7–16)
AST SERPL-CCNC: 17 U/L (ref 0–31)
BASOPHILS ABSOLUTE: 0.01 E9/L (ref 0–0.2)
BASOPHILS RELATIVE PERCENT: 0.3 % (ref 0–2)
BILIRUB SERPL-MCNC: 0.4 MG/DL (ref 0–1.2)
BUN BLDV-MCNC: 35 MG/DL (ref 6–23)
CALCIUM SERPL-MCNC: 10 MG/DL (ref 8.6–10.2)
CHLORIDE BLD-SCNC: 109 MMOL/L (ref 98–107)
CO2: 21 MMOL/L (ref 22–29)
CREAT SERPL-MCNC: 1.5 MG/DL (ref 0.5–1)
EOSINOPHILS ABSOLUTE: 0.06 E9/L (ref 0.05–0.5)
EOSINOPHILS RELATIVE PERCENT: 1.5 % (ref 0–6)
GFR SERPL CREATININE-BSD FRML MDRD: 39 ML/MIN/1.73
GLUCOSE BLD-MCNC: 178 MG/DL (ref 74–99)
HCT VFR BLD CALC: 45.8 % (ref 34–48)
HEMOGLOBIN: 14.9 G/DL (ref 11.5–15.5)
IMMATURE GRANULOCYTES #: 0.01 E9/L
IMMATURE GRANULOCYTES %: 0.3 % (ref 0–5)
LYMPHOCYTES ABSOLUTE: 0.98 E9/L (ref 1.5–4)
LYMPHOCYTES RELATIVE PERCENT: 25.3 % (ref 20–42)
MCH RBC QN AUTO: 28.7 PG (ref 26–35)
MCHC RBC AUTO-ENTMCNC: 32.5 % (ref 32–34.5)
MCV RBC AUTO: 88.2 FL (ref 80–99.9)
MONOCYTES ABSOLUTE: 0.51 E9/L (ref 0.1–0.95)
MONOCYTES RELATIVE PERCENT: 13.1 % (ref 2–12)
NEUTROPHILS ABSOLUTE: 2.31 E9/L (ref 1.8–7.3)
NEUTROPHILS RELATIVE PERCENT: 59.5 % (ref 43–80)
PDW BLD-RTO: 15 FL (ref 11.5–15)
PLATELET # BLD: 180 E9/L (ref 130–450)
PMV BLD AUTO: 12.7 FL (ref 7–12)
POTASSIUM SERPL-SCNC: 4.2 MMOL/L (ref 3.5–5)
RBC # BLD: 5.19 E12/L (ref 3.5–5.5)
SODIUM BLD-SCNC: 143 MMOL/L (ref 132–146)
TOTAL PROTEIN: 6.9 G/DL (ref 6.4–8.3)
WBC # BLD: 3.9 E9/L (ref 4.5–11.5)

## 2022-12-05 PROCEDURE — 3078F DIAST BP <80 MM HG: CPT | Performed by: INTERNAL MEDICINE

## 2022-12-05 PROCEDURE — 85025 COMPLETE CBC W/AUTO DIFF WBC: CPT

## 2022-12-05 PROCEDURE — 99212 OFFICE O/P EST SF 10 MIN: CPT

## 2022-12-05 PROCEDURE — 1036F TOBACCO NON-USER: CPT | Performed by: INTERNAL MEDICINE

## 2022-12-05 PROCEDURE — 80053 COMPREHEN METABOLIC PANEL: CPT

## 2022-12-05 PROCEDURE — G8427 DOCREV CUR MEDS BY ELIG CLIN: HCPCS | Performed by: INTERNAL MEDICINE

## 2022-12-05 PROCEDURE — 3017F COLORECTAL CA SCREEN DOC REV: CPT | Performed by: INTERNAL MEDICINE

## 2022-12-05 PROCEDURE — 3074F SYST BP LT 130 MM HG: CPT | Performed by: INTERNAL MEDICINE

## 2022-12-05 PROCEDURE — G8417 CALC BMI ABV UP PARAM F/U: HCPCS | Performed by: INTERNAL MEDICINE

## 2022-12-05 PROCEDURE — 36415 COLL VENOUS BLD VENIPUNCTURE: CPT

## 2022-12-05 PROCEDURE — 99214 OFFICE O/P EST MOD 30 MIN: CPT | Performed by: INTERNAL MEDICINE

## 2022-12-05 PROCEDURE — G8484 FLU IMMUNIZE NO ADMIN: HCPCS | Performed by: INTERNAL MEDICINE

## 2022-12-05 RX ORDER — ANASTROZOLE 1 MG/1
1 TABLET ORAL DAILY
Qty: 90 TABLET | Refills: 1 | Status: SHIPPED | OUTPATIENT
Start: 2022-12-05

## 2022-12-05 NOTE — PROGRESS NOTES
Department of [facility] Oncology    Attending Clinic Note    Reason for Visit: Follow-up on a patient with Cervical Cancer and Breast Cancer    PCP:  ANNA MARIE Burgess - CNP    History of Present Illness  60 y.o. female with past medical hx of right breast cancer with recurrence (s/p total mastectomy with reconstruction/TRAM flap & XRT, HTN, chest pain, CVA, left sided weakness, low back pain with sciatica, type II diabetes, hypertensive left ventricular hypertrophy. PAP smear on 04/11/2019 revealing HSIL and endometrial cells. She then underwent a colposcopy on 07/10/2019 and a LEEP on 08/22/2019. PATH REVIEW [facility]:  FINAL DIAGNOSIS   1. Cervix, LEEP specimen ([id number]) - Invasive poorly-differentiated adenocarcinoma. - The invasive adenocarcinoma involves the deep (radial) excision margin. 2. Endocervix, LEEP specimen ([id number]) - Focus of cauterized adenocarcinoma invovling excision magin. COMMENT   In the cervix LEEP specimen ([id number]) the adenocarcinoma invades the cervical stroma to a thickness of at least 2.5 mm. However, since the invasive adenocarcinoma involves the deep (radial) excision margin, the full extent of invasion cannot be determined. The invasive adenocarcinoma involves five tissue sections. Therefore, the width (circumferential measurement) is estimated at approximately 13 mm. Vascular invasion is not identified. PET[facility]CT 10/12/2019 at [facility]:   There is no hypermetabolic abdominal or pelvic lymphadenopathy. There is mild low-level activity in the cervical region (max SUV 2.9). Evaluated by GYN ONC (Dr. Sohan Dao at [facility]) who recommended ChemoRT with weekly Cisplatin  followed by HDR brachytherapy for Stage 1B1 Cervical Cancer. Patient would like to receive treatments locally. EBRT was started on 11/20/2019. Cycle # 1 weekly Cisplatin was on 11/20/2019. Cycle # 1 weekly Cisplatin was on 11/20/2019.   Cycle # 5 weekly Cisplatin was on 12/19/2019. EBRT was completed 12/27/2019. HDR brachytherapy (1/14/2020-1/16/2020)  PET/CT scan 05/18/2020 noted no evidence of disease. Follows locally with Dr. Kulwant Gilliam in [facility], [facility]    Left Breast Cancer Feb 2020  Left simple mastectomy on 02/25/2020:  A. Left breast, simple mastectomy:   - Invasive carcinoma of no special type (ductal), grade 2; tumor size 0.8 x 0.7 x 0.5 cm; LVI not identified.   - Ductal carcinoma in situ, intermediate to high nuclear grade,cribriform and solid types, with necrosis;   - Sclerosing fibroadenomatoid nodules and gynecomastia-like changes;   - Microcalcifications in DCIS;   - Scar and foreign body-type granuloma of prior biopsy site;  - Additional skin/breast tissue showing no pathologic alteration. B. Left breast, new inferior-medial margin, excision: Negative for malignancy. pT1b pNx  Breast Cancer Marker Studies:  Estrogen Receptors (ER): Positive >90%  Progesterone Receptors (GA): Positive:40%  Her-2/heidi (c-erb B-2) protein expression: Positive (score 3+)    Started Arimidex 1 mg po daily on 06/01/2020 with fair tolerance. Not on Herceptin due to CHF and CAD. EF 34% Feb 2020  No SLNB done. Left axillary U/S 06/01/2020:  No evidence of malignancy. DEXA scan 06/01/2020: normal bone density in LS and jasson hips. Tumor Board Discussion with Breast Team at [facility]: Defer LN evaluation and HER2 targeted therapies due to cardiac risks; Endocrine therapy only indicated. Today 12/05/2022 for f/u. No fever, chills. Fair appetite and energy level. No chest pain or SOB. She continues to tolerate Arimidex well. Review of Systems;  CONSTITUTIONAL: No fever, chills. Fair appetite and energy level. ENMT: Eyes: No diplopia; Nose: No epistaxis. Mouth: No sore throat. RESPIRATORY: No hemoptysis, shortness of breath, cough. CARDIOVASCULAR: No chest pain, palpitations.   GASTROINTESTINAL: No nausea/vomiting, abdominal pain  GENITOURINARY: No dysuria, urinary frequency, hematuria. NEURO: No syncope, presyncope, headache. Remainder:ROS NEGATIVE    Past Medical History:      Diagnosis Date    [facility], 2005    right    Breast cancer ([facility] 2020    left    CAD (coronary artery disease)     follows with Dr. Farnaz Mendez     Chronic ulcer of right leg, limited to breakdown of skin [facility])     CVA (cerebral vascular accident) ([facility] 2015    no deficits    DM (diabetes mellitus) ([facility]     H/O: GI bleed     History of blood transfusion 02/2020    Hyperlipidemia     Hypertension     Ischemic cardiomyopathy     Valvular heart disease      Medications:  Reviewed and reconciled. Allergies: Allergies   Allergen Reactions    Penicillins Hives and Rash     Physical Exam:  BP (!) 153/87   Pulse 68   Temp (!) 96.6 °F (35.9 °C) (Infrared)   Resp 16   Ht 5' 5\" (1.651 m)   Wt 213 lb (96.6 kg)   SpO2 97%   BMI 35.45 kg/m²   GENERAL: Alert, oriented x 3, not in acute distress. HEENT: PERRLA; EOMI. Oropharynx clear. NECK: Supple. Without lymphadenopathy. LUNGS: Good air entry bilaterally. No wheezing, crackles or ronchi. CARDIOVASCULAR: Regular rate. No murmurs, rubs or gallops. ABDOMEN: Soft. Non-tender, non-distended. EXTREMITIES: Without clubbing, cyanosis, or edema. NEUROLOGIC: No focal deficits.    ECOG PS 1    Lab Results   Component Value Date    WBC 3.9 (L) 12/05/2022    HGB 14.9 12/05/2022    HCT 45.8 12/05/2022    MCV 88.2 12/05/2022     12/05/2022     Lab Results   Component Value Date     12/05/2022    K 4.2 12/05/2022     (H) 12/05/2022    CO2 21 (L) 12/05/2022    BUN 35 (H) 12/05/2022    CREATININE 1.5 (H) 12/05/2022    GLUCOSE 178 (H) 12/05/2022    CALCIUM 10.0 12/05/2022    PROT 6.9 12/05/2022    LABALBU 3.9 12/05/2022    BILITOT 0.4 12/05/2022    ALKPHOS 86 12/05/2022    AST 17 12/05/2022    ALT 16 12/05/2022    LABGLOM 39 12/05/2022    GFRAA 27 09/18/2022     Impression/Plan:  60 y.o. female with hx of right breast cancer with recurrence (s/p total right mastectomy with reconstruction/TRAM flap & XRT with Stage 1B1 Cervical Cancer    PET/CT 10/12/2019 at [facility]:   There is no hypermetabolic abdominal or pelvic lymphadenopathy. There is mild low-level activity in the cervical region (max SUV 2.9). Evaluated by GYN ONC (Dr. Bryan Griffin at [facility]) who recommended concurrent chemoRT with weekly Cisplatin followed by HDR brachytherapy for her Stage 1B1 Cervical Cancer. Radiation therapy started on 11/20/2019. Cycle # 1 weekly Cisplatin was on 11/20/2019. Cycle # 5 weekly Cisplatin was on 12/19/2019. EBRT was completed 12/27/2019. HDR brachytherapy (1/14/2020-1/16/2020)  PET/CT scan 05/18/2020 noted no evidence of disease. Follows locally with Dr. Margret Britt in [facility]    Left Breast Cancer Feb 2020  Left simple mastectomy on 02/25/2020:  A. Left breast, simple mastectomy:   - Invasive carcinoma of no special type (ductal), grade 2; tumor size 0.8 x 0.7 x 0.5 cm; Negative margins. LVI not identified.   - Ductal carcinoma in situ, intermediate to high nuclear grade,cribriform and solid types, with necrosis;   - Sclerosing fibroadenomatoid nodules and gynecomastia-like changes;   - Microcalcifications in DCIS;   - Scar and foreign body-type granuloma of prior biopsy site;  - Additional skin/breast tissue showing no pathologic alteration. B. Left breast, new inferior-medial margin, excision: Negative for malignancy. pT1b pNx  Breast Cancer Marker Studies:  Estrogen Receptors (ER): Positive >90%  Progesterone Receptors (PA): Positive:40%  Her-2/heidi (c-erb B-2) protein expression: Positive (score 3+)    Started Arimidex 1 mg po daily on 06/01/2020 with fair tolerance. Not on Herceptin due to CHF and CAD. EF 34% Feb 2020  No SLNB done. Left axillary U/S 06/01/2020:  No evidence of malignancy. DEXA scan 06/01/2020: normal bone density in LS and jasson hips.    Tumor Board Discussion with Breast Team at [facility]: Defer LN evaluation and HER2 targeted therapies due to cardiac risks; Endocrine therapy only indicated. Continues to tolerate Arimidex well. Left Hip X-Ray 11/29/2021 grossly normal left hip  Lumbar spine X-Ray 11/29/2021: Facet joint degenerative changes. CT abdomen/pelvis 02/26/2022 no evidence of metastatic disease. DEXA scan 06/08/2022 normal in LS and bilateral femoral necks. On Ca. VitD  Imaging reviewed. Labs reviewed. LORI    Continue Arimidex. Ca,VitD    RTC 6 months. Continue to follow with nephrology for CKD    Genetics: My-Risk testing-negative. No clinically significant mutation identified. No VUS identified.     Brenda Roca MD   12/05/2022

## 2023-01-30 ENCOUNTER — OFFICE VISIT (OUTPATIENT)
Dept: CARDIOLOGY CLINIC | Age: 63
End: 2023-01-30
Payer: MEDICARE

## 2023-01-30 VITALS
BODY MASS INDEX: 35.75 KG/M2 | OXYGEN SATURATION: 99 % | SYSTOLIC BLOOD PRESSURE: 138 MMHG | RESPIRATION RATE: 18 BRPM | WEIGHT: 214.6 LBS | HEIGHT: 65 IN | DIASTOLIC BLOOD PRESSURE: 70 MMHG | HEART RATE: 70 BPM

## 2023-01-30 DIAGNOSIS — R07.9 CHEST PAIN, UNSPECIFIED TYPE: Primary | ICD-10-CM

## 2023-01-30 PROCEDURE — G8484 FLU IMMUNIZE NO ADMIN: HCPCS | Performed by: INTERNAL MEDICINE

## 2023-01-30 PROCEDURE — G8417 CALC BMI ABV UP PARAM F/U: HCPCS | Performed by: INTERNAL MEDICINE

## 2023-01-30 PROCEDURE — G8427 DOCREV CUR MEDS BY ELIG CLIN: HCPCS | Performed by: INTERNAL MEDICINE

## 2023-01-30 PROCEDURE — 93000 ELECTROCARDIOGRAM COMPLETE: CPT | Performed by: INTERNAL MEDICINE

## 2023-01-30 PROCEDURE — 3078F DIAST BP <80 MM HG: CPT | Performed by: INTERNAL MEDICINE

## 2023-01-30 PROCEDURE — 1036F TOBACCO NON-USER: CPT | Performed by: INTERNAL MEDICINE

## 2023-01-30 PROCEDURE — 99214 OFFICE O/P EST MOD 30 MIN: CPT | Performed by: INTERNAL MEDICINE

## 2023-01-30 PROCEDURE — 3074F SYST BP LT 130 MM HG: CPT | Performed by: INTERNAL MEDICINE

## 2023-01-30 PROCEDURE — 3017F COLORECTAL CA SCREEN DOC REV: CPT | Performed by: INTERNAL MEDICINE

## 2023-01-30 NOTE — PROGRESS NOTES
[facility] Cardiology Progress Note  Dr. Mika Chan      Referring Physician: Rodrigo Garcia, APRN - CNP  CHIEF COMPLAINT:   Chief Complaint   Patient presents with    Chest Pain     6mo ov. Patient has chest pain. HISTORY OF PRESENT ILLNESS:   Patient is 60years old female with history of CAD status post PCI to LAD, s/p CABG, CVA, type 2 diabetes mellitus, hypertension, hyperlipidemia, anemia,is here for follow-up appointment. Chest pain when she is stressed out, last for couple of minutes, no chest pain with exertion, no shortness of breath, no lightheadedness, no dizziness, no palpitations, no pedal edema, no PND, no orthopnea, no syncope, no presyncopal episodes.     Functional capacity is at baseline    Past Medical History:   Diagnosis Date    Breast cancer ([facility] [address] 2000, 2005    right    Breast cancer ([facility] [address] 2020    left    CAD (coronary artery disease)     follows with Dr. Tacho Elliott     Chronic ulcer of right leg, limited to breakdown of skin [facility])     CVA (cerebral vascular accident) ([facility] [address] 2015    no deficits    DM (diabetes mellitus) ([facility] [address]     H/O: GI bleed     History of blood transfusion 02/2020    Hyperlipidemia     Hypertension     Ischemic cardiomyopathy     Valvular heart disease          Past Surgical History:   Procedure Laterality Date    ABDOMEN SURGERY      APPENDECTOMY      BREAST RECONSTRUCTION Right     BREAST SURGERY  2000    right masectomy    COLONOSCOPY      CORONARY ANGIOPLASTY WITH STENT PLACEMENT  03/04/2020    Mini Vision 2.5 x 18 stent deployed proximal LAD by DR Jordan [facility]    CORONARY ARTERY BYPASS GRAFT N/A 6/16/2020    CORONARY ARTERY BYPASS, MARY JO performed by Maryjo Balderas MD at [facility] COLON, DIAGNOSTIC      FOOT SURGERY      right    HYSTERECTOMY (CERVIX STATUS UNKNOWN)      fibroids    KNEE ARTHROSCOPY      left    MASTECTOMY Right 2000    MASTECTOMY, MODIFIED RADICAL Left 2/25/2020    LEFT BREAST SIMPLE MASTECTOMY performed by Sherita Garrido MD at [address] [facility] MASTECTOMY, MODIFIED RADICAL Left 2/25/2020    LEFT BREAST MASTECTOMY POST OP BLEED CONTROL, EVACUATION OF LEFT CHEST WALL HEMATOMA performed by Harris Muñiz MD at [facility] ENDOSCOPY N/A 2/5/2020    EGD BIOPSY performed by Harris Muñiz MD at [facility] N/A 6/12/2020    EGD BIOPSY performed by Harris Muñiz MD at [facility]         Current Outpatient Medications   Medication Sig Dispense Refill    anastrozole (ARIMIDEX) 1 MG tablet Take 1 tablet by mouth daily 90 tablet 1    atorvastatin (LIPITOR) 40 MG tablet take 1 tablet by mouth once daily 90 tablet 3    allopurinol (ZYLOPRIM) 100 MG tablet Take 100 mg by mouth daily      spironolactone (ALDACTONE) 50 MG tablet Take 1 tablet by mouth daily 90 tablet 2    pantoprazole (PROTONIX) 40 MG tablet Take 1 tablet by mouth every morning (before breakfast) 30 tablet 0    colchicine (COLCRYS) 0.6 MG tablet Take 0.6 mg by mouth daily      sodium bicarbonate 650 MG tablet Take 650 mg by mouth 2 times daily      vitamin C (ASCORBIC ACID) 500 MG tablet Take 500 mg by mouth 2 times daily      ferrous sulfate (IRON 325) 325 (65 Fe) MG tablet Take 1 tablet by mouth 2 times daily (with meals) 60 tablet 0    metoprolol succinate (TOPROL XL) 25 MG extended release tablet Take 1 tablet by mouth daily 30 tablet 2    furosemide (LASIX) 20 MG tablet Take 0.5 tablets by mouth daily (Patient taking differently: Take 40 mg by mouth 2 times daily) 60 tablet 2    clopidogrel (PLAVIX) 75 MG tablet Take 1 tablet by mouth daily 60 tablet 0    glimepiride (AMARYL) 1 MG tablet Take 1 mg by mouth every morning (before breakfast) (Patient not taking: Reported on 1/30/2023)       No current facility-administered medications for this visit.          Allergies as of 01/30/2023 - Fully Reviewed 01/30/2023   Allergen Reaction Noted    Penicillins Hives and Rash 02/23/2012 Social History     Socioeconomic History    Marital status: Single     Spouse name: Not on file    Number of children: Not on file    Years of education: Not on file    Highest education level: Not on file   Occupational History    Not on file   Tobacco Use    Smoking status: Never    Smokeless tobacco: Never    Tobacco comments:     per pt   Vaping Use    Vaping Use: Never used   Substance and Sexual Activity    Alcohol use: Yes     Alcohol/week: 6.0 standard drinks     Types: 6 Standard drinks or equivalent per week     Comment: rare    Drug use: Not Currently     Types: Marijuana Primo Ion)     Comment: used briefly when she turned \"58\"    Sexual activity: Not Currently   Other Topics Concern    Not on file   Social History Narrative    Denies caffeine.       Social Determinants of Health     Financial Resource Strain: Not on file   Food Insecurity: Not on file   Transportation Needs: Not on file   Physical Activity: Not on file   Stress: Not on file   Social Connections: Not on file   Intimate Partner Violence: Not on file   Housing Stability: Not on file       Family History   Problem Relation Age of Onset    Stroke Mother     Heart Failure Mother     Other Mother         ICD    Breast Cancer Mother     Pacemaker Father     Heart Failure Father     Hypertension Sister        REVIEW OF SYSTEMS:     CONSTITUTIONAL:  negative for  fevers, chills, sweats and fatigue  HEENT:  negative for  tinnitus, earaches, nasal congestion and epistaxis  RESPIRATORY:  negative for  dry cough, cough with sputum, wheezing and hemoptysis  GASTROINTESTINAL:  negative for nausea, vomiting, diarrhea, constipation, pruritus and jaundice  HEMATOLOGIC/LYMPHATIC:  negative for easy bruising, bleeding, lymphadenopathy and petechiae  ENDOCRINE:  negative for heat intolerance, cold intolerance, tremor, hair loss and diabetic symptoms including neither polyuria nor polydipsia nor blurred vision  MUSCULOSKELETAL:  negative for  myalgias, arthralgias, joint swelling, stiff joints and decreased range of motion  NEUROLOGICAL:  negative for memory problems, speech problems, visual disturbance, dysphagia, weakness and numbness      PHYSICAL EXAM:   CONSTITUTIONAL:  awake, alert, cooperative, no apparent distress, and appears stated age  HEAD:  normocepalic, without obvious abnormality, atraumatic, pink, moist mucous membranes. NECK:  Supple, symmetrical, trachea midline, no adenopathy, thyroid symmetric, not enlarged and no tenderness, skin normal  LUNGS:  No increased work of breathing, good air exchange, clear to auscultation bilaterally, no crackles or wheezing  CARDIOVASCULAR:  Normal apical impulse, regular rate and rhythm, normal S1 and S2, no S3 or S4, 2/6 systolic murmur at the apex, 3/6 systolic murmur at the left lower sternal border, no JVD, no carotid bruit, no pedal edema, good carotid upstroke bilaterally. ABDOMEN:  Soft, nontender, no masses, no hepatomegaly or splenomegaly, BS+  CHEST: nontender to palpation, expands symmetrically, well-healed midsternal incision wound, s/p left mastectomy  MUSCULOSKELETAL:  No clubbing no cyanosis. there is no redness, warmth, or swelling of the joints, full range of motion noted  NEUROLOGIC:  Alert, awake,oriented x3  SKIN:  no bruising or bleeding, normal skin color, texture, turgor and no redness, warmth, or swelling      /70   Pulse 70   Resp 18   Ht 5' 5\" (1.651 m)   Wt 214 lb 9.6 oz (97.3 kg)   SpO2 99%   BMI 35.71 kg/m²     DATA:   I personally reviewed the visit EKG with the following interpretation: Sinus rhythm, LVH by voltage criteria, old anterior wall MI age undetermined, T wave changes in the inferior lateral leads    5/9/2022 EKG sinus rhythm, PACs, LVH by voltage criteria, old anterior wall MI age undetermined, T wave changes in the inferior lateral leads    EKG 7/6/21 Sinus bradycardia, old anterior wall MI age undetermined, LVH by voltage criteria, nonspecific T wave changes    ECHO: 10/21/20 Summary   Compared to prior echo, no significant changes noted. Technically adequate   study.   Left ventricle is mildly enlarged .   Ejection fraction is visually estimated at 35-40%.   global wall hypokinesis with apical akinesis   There is doppler evidence of stage III diastolic dysfunction.   Mild mitral regurgitation is present.   Trace aortic valve regurgitation.   Mild tricuspid regurgitation.   RVSP is 31 mmHg.   Pulmonary hypertension is mild .       Stress Test: 2/7/20   FINDINGS:    Perfusion images demonstrate no no definite reversible perfusion    defect. There is a fixed perfusion defect centered at the apex    centered at the apex.    Wall motion is globally hypokinetic with particular severity at the    septum.    The end diastolic volume is 113 ml.    The end systolic volume is 74 ml.    The estimated ejection fraction is 34 %.              Impression    1. No definite reversible perfusion defect. See above.    2. Ejection fraction is 34 %.    3. Global hypokinesis which is most severe at the septum.             Angiography: 3/4/20 IMPRESSION:  1.  Totally occluded left anterior descending artery with successful PCI  using 2.5 x 18 mm mini Vision bare metal stent (bare metal stent was  used due to the patient's recent history of GI bleed, recent mastectomy  surgery two days ago, and the plan for possible CABG down the road when  she heals from her mastectomy surgery).  2.  At least 50% distal left main disease.  3.  Mild-to-moderate ostial left circumflex disease with moderate  disease involving the vessel distally.  4.  Moderate disease involving the mid segment of the RCA.  5.  Moderate disease involving second OM branch and second diagonal  Branch.    6/16/20 CABG  coronary artery bypass grafting x2; left internal mammary artery to the LAD, saphenous vein graft to the obtuse marginal    Cardiology Labs: BMP:    Lab Results   Component Value Date/Time     12/05/2022  02:51 PM    K 4.2 12/05/2022 02:51 PM    K 4.0 02/26/2022 06:04 PM     12/05/2022 02:51 PM    CO2 21 12/05/2022 02:51 PM    BUN 35 12/05/2022 02:51 PM    CREATININE 1.5 12/05/2022 02:51 PM     CMP:    Lab Results   Component Value Date/Time     12/05/2022 02:51 PM    K 4.2 12/05/2022 02:51 PM    K 4.0 02/26/2022 06:04 PM     12/05/2022 02:51 PM    CO2 21 12/05/2022 02:51 PM    BUN 35 12/05/2022 02:51 PM    CREATININE 1.5 12/05/2022 02:51 PM    PROT 6.9 12/05/2022 02:51 PM     CBC:    Lab Results   Component Value Date/Time    WBC 3.9 12/05/2022 02:51 PM    RBC 5.19 12/05/2022 02:51 PM    HGB 14.9 12/05/2022 02:51 PM    HCT 45.8 12/05/2022 02:51 PM    MCV 88.2 12/05/2022 02:51 PM    RDW 15.0 12/05/2022 02:51 PM     12/05/2022 02:51 PM     PT/INR:  No results found for: PTINR  PT/INR Warfarin:  No components found for: PTPATWAR, PTINRWAR  PTT:    Lab Results   Component Value Date/Time    APTT 33.4 11/29/2021 04:10 PM     PTT Heparin:  No components found for: APTTHEP  Magnesium:    Lab Results   Component Value Date/Time    MG 2.1 09/14/2020 07:18 AM     TSH:    Lab Results   Component Value Date/Time    TSH 1.500 02/04/2020 06:58 AM     TROPONIN:  No components found for: TROP  BNP:  No results found for: BNP  FASTING LIPID PANEL:    Lab Results   Component Value Date/Time    CHOL 69 03/04/2020 05:00 AM    HDL 26 03/04/2020 05:00 AM    TRIG 107 03/04/2020 05:00 AM     No orders to display     I have personally reviewed the laboratory, cardiac diagnostic and radiographic testing as outlined above:      IMPRESSION:  1.  CAD: S/p PCI to LAD, s/p CABG with LIMA to LAD, SVG to OM1, doing fine, will continue current treatment  2. History of nonsustained ventricular tachycardia: On beta-blockers, no recurrence  3. Ischemic cardiomyopathy: Last documented ejection fraction was 35 to 40%  4. Mitral valve regurgitation: Mild  5. Tricuspid valve regurgitation: Mild  6.   Pulmonary hypertension: Mild  7. Hypertension: Controlled  8. Type 2 diabetes mellitus  9. History of CVA  10. History of left mastectomy     RECOMMENDATIONS:   1. Continue current treatment  2. Preventive cardiology: Low-salt, low-cholesterol diet, daily exercise, total cholesterol of less than 200, LDL of less than 70, adherence to diabetic diet and diabetic medications,were all advised. 3.  CHF: Daily weight, take an extra Lasix for weight gain of more than 2-3 pounds in 24 hours, compliance with diuretics, low-salt diet were all advised. 4.  Follow-up with KRISTEN Lopez as scheduled  5. Follow-up with Dr. Frank Hatch in 6 months, sooner if symptomatic for any reason    I have reviewed my findings and recommendations with patient    Electronically signed by Delvis Poon MD on 1/30/2023 at 3:55 PM    NOTE: This report was transcribed using voice recognition software.  Every effort was made to ensure accuracy; however, inadvertent computerized transcription errors may be present

## 2023-03-28 ENCOUNTER — HOSPITAL ENCOUNTER (OUTPATIENT)
Dept: MRI IMAGING | Age: 63
Discharge: HOME | End: 2023-03-30
Payer: MEDICARE

## 2023-03-28 DIAGNOSIS — B34.9 VIRAL CEPHALGIA: ICD-10-CM

## 2023-03-28 DIAGNOSIS — R51.9 VIRAL CEPHALGIA: ICD-10-CM

## 2023-03-28 DIAGNOSIS — G44.059 SHORT LASTING UNILATERAL NEURALGIFORM HEADACHE WITH CONJUNCTIVAL INJECTION AND TEARING (SUNCT), NOT INTRACTABLE: ICD-10-CM

## 2023-03-28 PROCEDURE — 72141 MRI NECK SPINE W/O DYE: CPT

## 2023-03-28 PROCEDURE — 70551 MRI BRAIN STEM W/O DYE: CPT

## 2023-06-05 ENCOUNTER — HOSPITAL ENCOUNTER (OUTPATIENT)
Dept: INFUSION THERAPY | Age: 63
Discharge: HOME | End: 2023-06-05
Payer: MEDICARE

## 2023-06-05 ENCOUNTER — OFFICE VISIT (OUTPATIENT)
Dept: ONCOLOGY | Age: 63
End: 2023-06-05
Payer: MEDICARE

## 2023-06-05 VITALS
WEIGHT: 214 LBS | BODY MASS INDEX: 35.65 KG/M2 | SYSTOLIC BLOOD PRESSURE: 164 MMHG | HEART RATE: 74 BPM | DIASTOLIC BLOOD PRESSURE: 98 MMHG | HEIGHT: 65 IN | TEMPERATURE: 97.8 F | OXYGEN SATURATION: 96 %

## 2023-06-05 DIAGNOSIS — C50.911 MALIGNANT NEOPLASM OF RIGHT BREAST IN FEMALE, ESTROGEN RECEPTOR POSITIVE, UNSPECIFIED SITE OF BREAST (HCC): Primary | ICD-10-CM

## 2023-06-05 DIAGNOSIS — C50.911 MALIGNANT NEOPLASM OF RIGHT BREAST IN FEMALE, ESTROGEN RECEPTOR POSITIVE, UNSPECIFIED SITE OF BREAST (HCC): ICD-10-CM

## 2023-06-05 DIAGNOSIS — Z85.3 PERSONAL HISTORY OF BREAST CANCER: ICD-10-CM

## 2023-06-05 DIAGNOSIS — Z79.811 USE OF AROMATASE INHIBITORS: ICD-10-CM

## 2023-06-05 DIAGNOSIS — Z17.0 MALIGNANT NEOPLASM OF RIGHT BREAST IN FEMALE, ESTROGEN RECEPTOR POSITIVE, UNSPECIFIED SITE OF BREAST (HCC): Primary | ICD-10-CM

## 2023-06-05 DIAGNOSIS — Z17.0 MALIGNANT NEOPLASM OF RIGHT BREAST IN FEMALE, ESTROGEN RECEPTOR POSITIVE, UNSPECIFIED SITE OF BREAST (HCC): ICD-10-CM

## 2023-06-05 LAB
ALBUMIN SERPL-MCNC: 4.1 G/DL (ref 3.5–5.2)
ALP SERPL-CCNC: 90 U/L (ref 35–104)
ALT SERPL-CCNC: 23 U/L (ref 0–32)
ANION GAP SERPL CALCULATED.3IONS-SCNC: 11 MMOL/L (ref 7–16)
AST SERPL-CCNC: 31 U/L (ref 0–31)
BASOPHILS # BLD: 0.01 E9/L (ref 0–0.2)
BASOPHILS NFR BLD: 0.2 % (ref 0–2)
BILIRUB SERPL-MCNC: <0.2 MG/DL (ref 0–1.2)
BUN SERPL-MCNC: 37 MG/DL (ref 6–23)
CALCIUM SERPL-MCNC: 9.5 MG/DL (ref 8.6–10.2)
CHLORIDE SERPL-SCNC: 110 MMOL/L (ref 98–107)
CO2 SERPL-SCNC: 18 MMOL/L (ref 22–29)
CREAT SERPL-MCNC: 1.7 MG/DL (ref 0.5–1)
EOSINOPHIL # BLD: 0.06 E9/L (ref 0.05–0.5)
EOSINOPHIL NFR BLD: 1.5 % (ref 0–6)
ERYTHROCYTE [DISTWIDTH] IN BLOOD BY AUTOMATED COUNT: 14.3 FL (ref 11.5–15)
GLUCOSE SERPL-MCNC: 125 MG/DL (ref 74–99)
HCT VFR BLD AUTO: 47.4 % (ref 34–48)
HGB BLD-MCNC: 15.1 G/DL (ref 11.5–15.5)
IMM GRANULOCYTES # BLD: 0.01 E9/L
IMM GRANULOCYTES NFR BLD: 0.2 % (ref 0–5)
LYMPHOCYTES # BLD: 1.21 E9/L (ref 1.5–4)
LYMPHOCYTES NFR BLD: 29.7 % (ref 20–42)
MCH RBC QN AUTO: 29.2 PG (ref 26–35)
MCHC RBC AUTO-ENTMCNC: 31.9 % (ref 32–34.5)
MCV RBC AUTO: 91.7 FL (ref 80–99.9)
MONOCYTES # BLD: 0.55 E9/L (ref 0.1–0.95)
MONOCYTES NFR BLD: 13.5 % (ref 2–12)
NEUTROPHILS # BLD: 2.23 E9/L (ref 1.8–7.3)
NEUTS SEG NFR BLD: 54.9 % (ref 43–80)
PLATELET # BLD AUTO: 188 E9/L (ref 130–450)
PMV BLD AUTO: 12.3 FL (ref 7–12)
POTASSIUM SERPL-SCNC: 4.7 MMOL/L (ref 3.5–5)
PROT SERPL-MCNC: 6.9 G/DL (ref 6.4–8.3)
RBC # BLD AUTO: 5.17 E12/L (ref 3.5–5.5)
SODIUM SERPL-SCNC: 139 MMOL/L (ref 132–146)
WBC # BLD: 4.1 E9/L (ref 4.5–11.5)

## 2023-06-05 PROCEDURE — 99212 OFFICE O/P EST SF 10 MIN: CPT

## 2023-06-05 PROCEDURE — 99214 OFFICE O/P EST MOD 30 MIN: CPT | Performed by: INTERNAL MEDICINE

## 2023-06-05 PROCEDURE — 3077F SYST BP >= 140 MM HG: CPT | Performed by: INTERNAL MEDICINE

## 2023-06-05 PROCEDURE — G8417 CALC BMI ABV UP PARAM F/U: HCPCS | Performed by: INTERNAL MEDICINE

## 2023-06-05 PROCEDURE — 3080F DIAST BP >= 90 MM HG: CPT | Performed by: INTERNAL MEDICINE

## 2023-06-05 PROCEDURE — 36415 COLL VENOUS BLD VENIPUNCTURE: CPT

## 2023-06-05 PROCEDURE — 1036F TOBACCO NON-USER: CPT | Performed by: INTERNAL MEDICINE

## 2023-06-05 PROCEDURE — 3017F COLORECTAL CA SCREEN DOC REV: CPT | Performed by: INTERNAL MEDICINE

## 2023-06-05 PROCEDURE — G8427 DOCREV CUR MEDS BY ELIG CLIN: HCPCS | Performed by: INTERNAL MEDICINE

## 2023-06-05 RX ORDER — ALLOPURINOL 300 MG/1
300 TABLET ORAL DAILY
COMMUNITY
Start: 2023-05-18

## 2023-06-05 NOTE — PROGRESS NOTES
Department of [facility] Oncology    Attending Clinic Note    Reason for Visit: Follow-up on a patient with Cervical Cancer and Breast Cancer    PCP:  ANNA MARIE Jaime - CNP    History of Present Illness  58 y.o. female with past medical hx of right breast cancer with recurrence (s/p total mastectomy with reconstruction/TRAM flap & XRT, HTN, chest pain, CVA, left sided weakness, low back pain with sciatica, type II diabetes, hypertensive left ventricular hypertrophy. PAP smear on 04/11/2019 revealing HSIL and endometrial cells. She then underwent a colposcopy on 07/10/2019 and a LEEP on 08/22/2019. PATH REVIEW [facility]:  FINAL DIAGNOSIS   1. Cervix, LEEP specimen ([id number]) - Invasive poorly-differentiated adenocarcinoma. - The invasive adenocarcinoma involves the deep (radial) excision margin. 2. Endocervix, LEEP specimen ([id number]) - Focus of cauterized adenocarcinoma invovling excision magin. COMMENT   In the cervix LEEP specimen ([id number]) the adenocarcinoma invades the cervical stroma to a thickness of at least 2.5 mm. However, since the invasive adenocarcinoma involves the deep (radial) excision margin, the full extent of invasion cannot be determined. The invasive adenocarcinoma involves five tissue sections. Therefore, the width (circumferential measurement) is estimated at approximately 13 mm. Vascular invasion is not identified. PET/CT 10/12/2019 at [facility]:   There is no hypermetabolic abdominal or pelvic lymphadenopathy. There is mild low-level activity in the cervical region (max SUV 2.9). Evaluated by GYN ONC (Dr. Amanda Titus at [facility]) who recommended ChemoRT with weekly Cisplatin  followed by [facility] brachytherapy for Stage 1B1 Cervical Cancer. Patient would like to receive treatments locally. EBRT was started on 11/20/2019. Cycle # 1 weekly Cisplatin was on 11/20/2019. Cycle # 1 weekly Cisplatin was on 11/20/2019.   Cycle # 5 weekly Cisplatin was on

## 2023-06-05 NOTE — PROGRESS NOTES
Patient did stop at check out window, did not want to wait for AVS.  Patient requested phone call. Called patient with next follow up appointment.

## 2023-11-21 DIAGNOSIS — C50.911 MALIGNANT NEOPLASM OF RIGHT BREAST IN FEMALE, ESTROGEN RECEPTOR POSITIVE, UNSPECIFIED SITE OF BREAST (HCC): ICD-10-CM

## 2023-11-21 DIAGNOSIS — Z17.0 MALIGNANT NEOPLASM OF RIGHT BREAST IN FEMALE, ESTROGEN RECEPTOR POSITIVE, UNSPECIFIED SITE OF BREAST (HCC): ICD-10-CM

## 2023-11-21 RX ORDER — ANASTROZOLE 1 MG/1
1 TABLET ORAL DAILY
Qty: 90 TABLET | Refills: 1 | Status: SHIPPED | OUTPATIENT
Start: 2023-11-21

## 2023-12-02 ENCOUNTER — HOSPITAL ENCOUNTER (EMERGENCY)
Age: 63
Discharge: HOME | End: 2023-12-02
Attending: EMERGENCY MEDICINE
Payer: MEDICARE

## 2023-12-02 VITALS
WEIGHT: 214 LBS | BODY MASS INDEX: 35.61 KG/M2 | TEMPERATURE: 97.5 F | RESPIRATION RATE: 18 BRPM | SYSTOLIC BLOOD PRESSURE: 139 MMHG | OXYGEN SATURATION: 98 % | DIASTOLIC BLOOD PRESSURE: 95 MMHG | HEART RATE: 67 BPM

## 2023-12-02 DIAGNOSIS — K08.89 DENTALGIA: Primary | ICD-10-CM

## 2023-12-02 PROCEDURE — 99283 EMERGENCY DEPT VISIT LOW MDM: CPT

## 2023-12-02 RX ORDER — HYDROCODONE BITARTRATE AND ACETAMINOPHEN 5; 325 MG/1; MG/1
1 TABLET ORAL EVERY 6 HOURS PRN
Qty: 10 TABLET | Refills: 0 | Status: SHIPPED | OUTPATIENT
Start: 2023-12-02 | End: 2023-12-02 | Stop reason: SDUPTHER

## 2023-12-02 RX ORDER — HYDROCODONE BITARTRATE AND ACETAMINOPHEN 5; 325 MG/1; MG/1
1 TABLET ORAL EVERY 6 HOURS PRN
Qty: 10 TABLET | Refills: 0 | Status: SHIPPED | OUTPATIENT
Start: 2023-12-02 | End: 2023-12-05

## 2023-12-14 DIAGNOSIS — Z17.0 MALIGNANT NEOPLASM OF RIGHT BREAST IN FEMALE, ESTROGEN RECEPTOR POSITIVE, UNSPECIFIED SITE OF BREAST (HCC): Primary | ICD-10-CM

## 2023-12-14 DIAGNOSIS — C50.911 MALIGNANT NEOPLASM OF RIGHT BREAST IN FEMALE, ESTROGEN RECEPTOR POSITIVE, UNSPECIFIED SITE OF BREAST (HCC): Primary | ICD-10-CM

## 2023-12-18 ENCOUNTER — HOSPITAL ENCOUNTER (OUTPATIENT)
Dept: INFUSION THERAPY | Age: 63
Discharge: HOME | End: 2023-12-18
Payer: MEDICARE

## 2023-12-18 DIAGNOSIS — C50.911 MALIGNANT NEOPLASM OF RIGHT BREAST IN FEMALE, ESTROGEN RECEPTOR POSITIVE, UNSPECIFIED SITE OF BREAST (HCC): ICD-10-CM

## 2023-12-18 DIAGNOSIS — Z17.0 MALIGNANT NEOPLASM OF RIGHT BREAST IN FEMALE, ESTROGEN RECEPTOR POSITIVE, UNSPECIFIED SITE OF BREAST (HCC): ICD-10-CM

## 2023-12-18 LAB
ALBUMIN SERPL-MCNC: 4 G/DL (ref 3.5–5.2)
ALP SERPL-CCNC: 81 U/L (ref 35–104)
ALT SERPL-CCNC: 19 U/L (ref 0–32)
ANION GAP SERPL CALCULATED.3IONS-SCNC: 14 MMOL/L (ref 7–16)
AST SERPL-CCNC: 19 U/L (ref 0–31)
BASOPHILS # BLD: 0.01 K/UL (ref 0–0.2)
BASOPHILS NFR BLD: 0 % (ref 0–2)
BILIRUB SERPL-MCNC: 0.3 MG/DL (ref 0–1.2)
BUN SERPL-MCNC: 21 MG/DL (ref 6–23)
CALCIUM SERPL-MCNC: 9.6 MG/DL (ref 8.6–10.2)
CHLORIDE SERPL-SCNC: 107 MMOL/L (ref 98–107)
CO2 SERPL-SCNC: 21 MMOL/L (ref 22–29)
CREAT SERPL-MCNC: 1.3 MG/DL (ref 0.5–1)
EOSINOPHIL # BLD: 0.04 K/UL (ref 0.05–0.5)
EOSINOPHILS RELATIVE PERCENT: 1 % (ref 0–6)
ERYTHROCYTE [DISTWIDTH] IN BLOOD BY AUTOMATED COUNT: 14.4 % (ref 11.5–15)
GFR SERPL CREATININE-BSD FRML MDRD: 46 ML/MIN/1.73M2
GLUCOSE SERPL-MCNC: 107 MG/DL (ref 74–99)
HCT VFR BLD AUTO: 45.8 % (ref 34–48)
HGB BLD-MCNC: 14.9 G/DL (ref 11.5–15.5)
IMM GRANULOCYTES # BLD AUTO: <0.03 K/UL (ref 0–0.58)
IMM GRANULOCYTES NFR BLD: 1 % (ref 0–5)
LYMPHOCYTES NFR BLD: 1.07 K/UL (ref 1.5–4)
LYMPHOCYTES RELATIVE PERCENT: 26 % (ref 20–42)
MCH RBC QN AUTO: 28.4 PG (ref 26–35)
MCHC RBC AUTO-ENTMCNC: 32.5 G/DL (ref 32–34.5)
MCV RBC AUTO: 87.4 FL (ref 80–99.9)
MONOCYTES NFR BLD: 0.6 K/UL (ref 0.1–0.95)
MONOCYTES NFR BLD: 15 % (ref 2–12)
NEUTROPHILS NFR BLD: 57 % (ref 43–80)
NEUTS SEG NFR BLD: 2.32 K/UL (ref 1.8–7.3)
PLATELET # BLD AUTO: 150 K/UL (ref 130–450)
PMV BLD AUTO: 12.8 FL (ref 7–12)
POTASSIUM SERPL-SCNC: 4.3 MMOL/L (ref 3.5–5)
PROT SERPL-MCNC: 6.7 G/DL (ref 6.4–8.3)
RBC # BLD AUTO: 5.24 M/UL (ref 3.5–5.5)
SODIUM SERPL-SCNC: 142 MMOL/L (ref 132–146)
WBC OTHER # BLD: 4.1 K/UL (ref 4.5–11.5)

## 2023-12-18 PROCEDURE — 85025 COMPLETE CBC W/AUTO DIFF WBC: CPT

## 2023-12-18 PROCEDURE — 80053 COMPREHEN METABOLIC PANEL: CPT

## 2023-12-18 PROCEDURE — 36415 COLL VENOUS BLD VENIPUNCTURE: CPT

## 2024-06-24 ENCOUNTER — HOSPITAL ENCOUNTER (OUTPATIENT)
Dept: INFUSION THERAPY | Age: 64
Discharge: HOME | End: 2024-06-24
Payer: MEDICARE

## 2024-06-24 ENCOUNTER — OFFICE VISIT (OUTPATIENT)
Dept: ONCOLOGY | Age: 64
End: 2024-06-24
Payer: MEDICARE

## 2024-06-24 VITALS
OXYGEN SATURATION: 98 % | TEMPERATURE: 97.3 F | WEIGHT: 201 LBS | SYSTOLIC BLOOD PRESSURE: 135 MMHG | HEIGHT: 65 IN | HEART RATE: 67 BPM | DIASTOLIC BLOOD PRESSURE: 88 MMHG | BODY MASS INDEX: 33.49 KG/M2

## 2024-06-24 DIAGNOSIS — Z79.811 USE OF AROMATASE INHIBITORS: Primary | ICD-10-CM

## 2024-06-24 DIAGNOSIS — Z17.0 MALIGNANT NEOPLASM OF RIGHT BREAST IN FEMALE, ESTROGEN RECEPTOR POSITIVE, UNSPECIFIED SITE OF BREAST (HCC): ICD-10-CM

## 2024-06-24 DIAGNOSIS — C50.911 MALIGNANT NEOPLASM OF RIGHT BREAST IN FEMALE, ESTROGEN RECEPTOR POSITIVE, UNSPECIFIED SITE OF BREAST (HCC): ICD-10-CM

## 2024-06-24 LAB
ALBUMIN SERPL-MCNC: 4 G/DL (ref 3.5–5.2)
ALP SERPL-CCNC: 82 U/L (ref 35–104)
ALT SERPL-CCNC: 18 U/L (ref 0–32)
ANION GAP SERPL CALCULATED.3IONS-SCNC: 12 MMOL/L (ref 7–16)
AST SERPL-CCNC: 23 U/L (ref 0–31)
BASOPHILS # BLD: 0.01 K/UL (ref 0–0.2)
BASOPHILS NFR BLD: 0 % (ref 0–2)
BILIRUB SERPL-MCNC: 0.4 MG/DL (ref 0–1.2)
BUN SERPL-MCNC: 29 MG/DL (ref 6–23)
CALCIUM SERPL-MCNC: 9.5 MG/DL (ref 8.6–10.2)
CHLORIDE SERPL-SCNC: 108 MMOL/L (ref 98–107)
CO2 SERPL-SCNC: 21 MMOL/L (ref 22–29)
CREAT SERPL-MCNC: 1.7 MG/DL (ref 0.5–1)
EOSINOPHIL # BLD: 0.04 K/UL (ref 0.05–0.5)
EOSINOPHILS RELATIVE PERCENT: 1 % (ref 0–6)
ERYTHROCYTE [DISTWIDTH] IN BLOOD BY AUTOMATED COUNT: 15.3 % (ref 11.5–15)
GFR, ESTIMATED: 32 ML/MIN/1.73M2
GLUCOSE SERPL-MCNC: 120 MG/DL (ref 74–99)
HCT VFR BLD AUTO: 46.4 % (ref 34–48)
HGB BLD-MCNC: 15.2 G/DL (ref 11.5–15.5)
IMM GRANULOCYTES # BLD AUTO: <0.03 K/UL (ref 0–0.58)
IMM GRANULOCYTES NFR BLD: 0 % (ref 0–5)
LYMPHOCYTES NFR BLD: 1.08 K/UL (ref 1.5–4)
LYMPHOCYTES RELATIVE PERCENT: 31 % (ref 20–42)
MCH RBC QN AUTO: 29.7 PG (ref 26–35)
MCHC RBC AUTO-ENTMCNC: 32.8 G/DL (ref 32–34.5)
MCV RBC AUTO: 90.8 FL (ref 80–99.9)
MONOCYTES NFR BLD: 0.51 K/UL (ref 0.1–0.95)
MONOCYTES NFR BLD: 15 % (ref 2–12)
NEUTROPHILS NFR BLD: 53 % (ref 43–80)
NEUTS SEG NFR BLD: 1.88 K/UL (ref 1.8–7.3)
PLATELET # BLD AUTO: 152 K/UL (ref 130–450)
PMV BLD AUTO: 12.3 FL (ref 7–12)
POTASSIUM SERPL-SCNC: 4.5 MMOL/L (ref 3.5–5)
PROT SERPL-MCNC: 7.1 G/DL (ref 6.4–8.3)
RBC # BLD AUTO: 5.11 M/UL (ref 3.5–5.5)
SODIUM SERPL-SCNC: 141 MMOL/L (ref 132–146)
WBC OTHER # BLD: 3.5 K/UL (ref 4.5–11.5)

## 2024-06-24 PROCEDURE — 85025 COMPLETE CBC W/AUTO DIFF WBC: CPT

## 2024-06-24 PROCEDURE — 1123F ACP DISCUSS/DSCN MKR DOCD: CPT | Performed by: CLINICAL NURSE SPECIALIST

## 2024-06-24 PROCEDURE — G8399 PT W/DXA RESULTS DOCUMENT: HCPCS | Performed by: CLINICAL NURSE SPECIALIST

## 2024-06-24 PROCEDURE — 3075F SYST BP GE 130 - 139MM HG: CPT | Performed by: CLINICAL NURSE SPECIALIST

## 2024-06-24 PROCEDURE — 99213 OFFICE O/P EST LOW 20 MIN: CPT

## 2024-06-24 PROCEDURE — G8417 CALC BMI ABV UP PARAM F/U: HCPCS | Performed by: CLINICAL NURSE SPECIALIST

## 2024-06-24 PROCEDURE — G8427 DOCREV CUR MEDS BY ELIG CLIN: HCPCS | Performed by: CLINICAL NURSE SPECIALIST

## 2024-06-24 PROCEDURE — 99213 OFFICE O/P EST LOW 20 MIN: CPT | Performed by: CLINICAL NURSE SPECIALIST

## 2024-06-24 PROCEDURE — 99212 OFFICE O/P EST SF 10 MIN: CPT

## 2024-06-24 PROCEDURE — 3079F DIAST BP 80-89 MM HG: CPT | Performed by: CLINICAL NURSE SPECIALIST

## 2024-06-24 PROCEDURE — 1036F TOBACCO NON-USER: CPT | Performed by: CLINICAL NURSE SPECIALIST

## 2024-06-24 PROCEDURE — 1090F PRES/ABSN URINE INCON ASSESS: CPT | Performed by: CLINICAL NURSE SPECIALIST

## 2024-06-24 PROCEDURE — 36415 COLL VENOUS BLD VENIPUNCTURE: CPT

## 2024-06-24 PROCEDURE — 3017F COLORECTAL CA SCREEN DOC REV: CPT | Performed by: CLINICAL NURSE SPECIALIST

## 2024-06-24 PROCEDURE — 80053 COMPREHEN METABOLIC PANEL: CPT

## 2024-06-24 RX ORDER — ANASTROZOLE 1 MG/1
1 TABLET ORAL DAILY
Qty: 90 TABLET | Refills: 1 | Status: SHIPPED | OUTPATIENT
Start: 2024-06-24

## 2024-06-24 NOTE — PROGRESS NOTES
Patient refused printed AVS.   Pt verbalized understanding of follow up plan of care.  All questions answered.     
joint degenerative changes.   CT abdomen/pelvis 02/26/2022 no evidence of metastatic disease.  DEXA scan 06/08/2022 normal in LS and bilateral femoral necks. On Ca.VitD  MRI Brain 03/28/2023: No evidence of metastatic disease.  Imaging reviewed.    12/18/2023   Labs reviewed.   LORI  Continue Arimidex, Ca.VitD  Tooth extraction 2 weeks , dry pocket last week , taking ibuprofen , recommend she f/u with PCP for HTN , she does not want to proceed to the ER for further evaluation of her HTN    6/24/2024   Continue Arimidex tolerating well  Pt reports her gyn is asking about mammogram , she has right mastectomy with reconstruction/TRAM flap and left simple mastectomy with no reconstruction , mammogram is not typically done for flap as it is not breast tissue , physical exam and US if needed .  Breast exam today revealed no lumps or masses on left chest wall or right flap area , pt reports right area has had some hardness at 12 o clock position for  many years , likely fat necrosis that have calcified over time . Patient does have pocket fat on the left near the axillary area she has had since surgery this was US in 2020 LORI  Patient was encouraged for SBE monthly and to call office with any changes  Labs reviewed  Dexa scan ordered    RTC 4 months with labs. Continue to follow with nephrology team for CKD    Genetics: My-Risk testing-negative. No clinically significant mutation identified.   No VUS identified.    ANNA MARIE David,ACNS-BC,AGACNP-BC  HEMATOLOGY/MEDICAL ONCOLOGY  [facility] PHYSIC[facility] CARE [facility] MED ONCOLOGY  [facility] [facility] [address]  Dept: [phone]  Loc: [phone]

## 2024-07-01 ENCOUNTER — APPOINTMENT (OUTPATIENT)
Dept: GENERAL RADIOLOGY | Age: 64
End: 2024-07-01
Payer: MEDICARE

## 2024-07-01 ENCOUNTER — HOSPITAL ENCOUNTER (EMERGENCY)
Age: 64
Discharge: LEFT BEFORE DISCHARGE | End: 2024-07-01
Payer: MEDICARE

## 2024-07-01 VITALS
BODY MASS INDEX: 33.15 KG/M2 | HEIGHT: 65 IN | DIASTOLIC BLOOD PRESSURE: 102 MMHG | OXYGEN SATURATION: 100 % | TEMPERATURE: 98.3 F | SYSTOLIC BLOOD PRESSURE: 154 MMHG | WEIGHT: 199 LBS | HEART RATE: 62 BPM | RESPIRATION RATE: 16 BRPM

## 2024-07-01 PROCEDURE — 93005 ELECTROCARDIOGRAM TRACING: CPT | Performed by: EMERGENCY MEDICINE

## 2024-07-01 PROCEDURE — 73130 X-RAY EXAM OF HAND: CPT

## 2024-07-01 PROCEDURE — 99284 EMERGENCY DEPT VISIT MOD MDM: CPT

## 2024-07-01 PROCEDURE — 71046 X-RAY EXAM CHEST 2 VIEWS: CPT

## 2024-07-01 ASSESSMENT — PAIN DESCRIPTION - LOCATION: LOCATION: CHEST

## 2024-07-01 ASSESSMENT — PAIN - FUNCTIONAL ASSESSMENT: PAIN_FUNCTIONAL_ASSESSMENT: 0-10

## 2024-07-01 ASSESSMENT — PAIN SCALES - GENERAL: PAINLEVEL_OUTOF10: 10

## 2024-07-01 ASSESSMENT — LIFESTYLE VARIABLES
HOW OFTEN DO YOU HAVE A DRINK CONTAINING ALCOHOL: NEVER
HOW MANY STANDARD DRINKS CONTAINING ALCOHOL DO YOU HAVE ON A TYPICAL DAY: PATIENT DOES NOT DRINK

## 2024-07-02 NOTE — ED NOTES
Patient called in to triage desk from home at this time requesting Xray results, KRISTEN Palencia spoke with patient, reviewed xray results.  Encouraged patient to return if symptoms persist.

## 2024-07-04 LAB
EKG ATRIAL RATE: 68 BPM
EKG P AXIS: 60 DEGREES
EKG P-R INTERVAL: 120 MS
EKG Q-T INTERVAL: 432 MS
EKG QRS DURATION: 92 MS
EKG QTC CALCULATION (BAZETT): 459 MS
EKG R AXIS: -9 DEGREES
EKG T AXIS: -159 DEGREES
EKG VENTRICULAR RATE: 68 BPM

## 2024-07-11 ENCOUNTER — HOSPITAL ENCOUNTER (OUTPATIENT)
Dept: MAMMOGRAPHY | Age: 64
Discharge: HOME | End: 2024-07-13
Payer: MEDICARE

## 2024-07-11 DIAGNOSIS — Z79.811 USE OF AROMATASE INHIBITORS: ICD-10-CM

## 2024-07-11 PROCEDURE — 77080 DXA BONE DENSITY AXIAL: CPT

## 2024-08-16 ENCOUNTER — HOSPITAL ENCOUNTER (OUTPATIENT)
Age: 64
Discharge: HOME | End: 2024-08-18

## 2024-08-16 PROCEDURE — 88305 TISSUE EXAM BY PATHOLOGIST: CPT

## 2024-08-23 LAB — SURGICAL PATHOLOGY REPORT: NORMAL

## 2024-10-28 ENCOUNTER — HOSPITAL ENCOUNTER (OUTPATIENT)
Dept: INFUSION THERAPY | Age: 64
Discharge: HOME | End: 2024-10-28
Payer: MEDICARE

## 2024-10-28 ENCOUNTER — OFFICE VISIT (OUTPATIENT)
Dept: ONCOLOGY | Age: 64
End: 2024-10-28
Payer: MEDICARE

## 2024-10-28 VITALS
TEMPERATURE: 97.4 F | SYSTOLIC BLOOD PRESSURE: 155 MMHG | DIASTOLIC BLOOD PRESSURE: 96 MMHG | BODY MASS INDEX: 34.49 KG/M2 | HEART RATE: 80 BPM | HEIGHT: 65 IN | OXYGEN SATURATION: 97 % | WEIGHT: 207 LBS

## 2024-10-28 DIAGNOSIS — Z17.0 MALIGNANT NEOPLASM OF RIGHT BREAST IN FEMALE, ESTROGEN RECEPTOR POSITIVE, UNSPECIFIED SITE OF BREAST (HCC): ICD-10-CM

## 2024-10-28 DIAGNOSIS — C50.911 MALIGNANT NEOPLASM OF RIGHT BREAST IN FEMALE, ESTROGEN RECEPTOR POSITIVE, UNSPECIFIED SITE OF BREAST (HCC): ICD-10-CM

## 2024-10-28 LAB
ALBUMIN SERPL-MCNC: 4.1 G/DL (ref 3.5–5.2)
ALP SERPL-CCNC: 98 U/L (ref 35–104)
ALT SERPL-CCNC: 15 U/L (ref 0–32)
ANION GAP SERPL CALCULATED.3IONS-SCNC: 9 MMOL/L (ref 7–16)
AST SERPL-CCNC: 18 U/L (ref 0–31)
BASOPHILS # BLD: 0.01 K/UL (ref 0–0.2)
BASOPHILS NFR BLD: 0 % (ref 0–2)
BILIRUB SERPL-MCNC: 0.3 MG/DL (ref 0–1.2)
BUN SERPL-MCNC: 31 MG/DL (ref 6–23)
CALCIUM SERPL-MCNC: 9.8 MG/DL (ref 8.6–10.2)
CHLORIDE SERPL-SCNC: 106 MMOL/L (ref 98–107)
CO2 SERPL-SCNC: 24 MMOL/L (ref 22–29)
CREAT SERPL-MCNC: 1.8 MG/DL (ref 0.5–1)
EOSINOPHIL # BLD: 0.04 K/UL (ref 0.05–0.5)
EOSINOPHILS RELATIVE PERCENT: 1 % (ref 0–6)
ERYTHROCYTE [DISTWIDTH] IN BLOOD BY AUTOMATED COUNT: 13.9 % (ref 11.5–15)
GFR, ESTIMATED: 32 ML/MIN/1.73M2
GLUCOSE SERPL-MCNC: 94 MG/DL (ref 74–99)
HCT VFR BLD AUTO: 46.5 % (ref 34–48)
HGB BLD-MCNC: 15 G/DL (ref 11.5–15.5)
IMM GRANULOCYTES # BLD AUTO: <0.03 K/UL (ref 0–0.58)
IMM GRANULOCYTES NFR BLD: 1 % (ref 0–5)
LYMPHOCYTES NFR BLD: 0.91 K/UL (ref 1.5–4)
LYMPHOCYTES RELATIVE PERCENT: 22 % (ref 20–42)
MCH RBC QN AUTO: 28.5 PG (ref 26–35)
MCHC RBC AUTO-ENTMCNC: 32.3 G/DL (ref 32–34.5)
MCV RBC AUTO: 88.4 FL (ref 80–99.9)
MONOCYTES NFR BLD: 0.52 K/UL (ref 0.1–0.95)
MONOCYTES NFR BLD: 13 % (ref 2–12)
NEUTROPHILS NFR BLD: 64 % (ref 43–80)
NEUTS SEG NFR BLD: 2.65 K/UL (ref 1.8–7.3)
PLATELET # BLD AUTO: 204 K/UL (ref 130–450)
PMV BLD AUTO: 12.3 FL (ref 7–12)
POTASSIUM SERPL-SCNC: 5.2 MMOL/L (ref 3.5–5)
PROT SERPL-MCNC: 7.1 G/DL (ref 6.4–8.3)
RBC # BLD AUTO: 5.26 M/UL (ref 3.5–5.5)
SODIUM SERPL-SCNC: 139 MMOL/L (ref 132–146)
WBC OTHER # BLD: 4.2 K/UL (ref 4.5–11.5)

## 2024-10-28 PROCEDURE — 80053 COMPREHEN METABOLIC PANEL: CPT

## 2024-10-28 PROCEDURE — 85025 COMPLETE CBC W/AUTO DIFF WBC: CPT

## 2024-10-28 PROCEDURE — 99212 OFFICE O/P EST SF 10 MIN: CPT

## 2024-10-28 PROCEDURE — 99213 OFFICE O/P EST LOW 20 MIN: CPT | Performed by: NURSE PRACTITIONER

## 2024-10-28 PROCEDURE — 1123F ACP DISCUSS/DSCN MKR DOCD: CPT | Performed by: NURSE PRACTITIONER

## 2024-10-28 PROCEDURE — 3080F DIAST BP >= 90 MM HG: CPT | Performed by: NURSE PRACTITIONER

## 2024-10-28 PROCEDURE — 3077F SYST BP >= 140 MM HG: CPT | Performed by: NURSE PRACTITIONER

## 2024-10-28 PROCEDURE — 36415 COLL VENOUS BLD VENIPUNCTURE: CPT

## 2024-10-28 RX ORDER — CALCITRIOL 0.25 UG/1
0.25 CAPSULE, LIQUID FILLED ORAL DAILY
COMMUNITY

## 2024-10-28 RX ORDER — ANASTROZOLE 1 MG/1
1 TABLET ORAL DAILY
Qty: 90 TABLET | Refills: 1 | Status: SHIPPED | OUTPATIENT
Start: 2024-10-28

## 2024-10-28 NOTE — PROGRESS NOTES
Department of [facility] Med Oncology    Attending Clinic Note    Reason for Visit: Follow-up on a patient with Cervical Cancer and Breast Cancer    PCP:  Dr. mSith     History of Present Illness  64 y.o. female with past medical hx of right breast cancer with recurrence (s/p total mastectomy with reconstruction/TRAM flap & XRT, HTN, chest pain, CVA, left sided weakness, low back pain with sciatica, type II diabetes, hypertensive left ventricular hypertrophy.     PAP smear on 04/11/2019 revealing HSIL and endometrial cells.   She then underwent a colposcopy on 07/10/2019 and a LEEP on 08/22/2019.   PATH REVIEW [facility]:  FINAL DIAGNOSIS   1. Cervix, LEEP specimen ([id number]) - Invasive poorly-differentiated adenocarcinoma.   - The invasive adenocarcinoma involves the deep (radial) excision margin.   2. Endocervix, LEEP specimen ([id number]) - Focus of cauterized adenocarcinoma invovling excision magin.      COMMENT   In the cervix LEEP specimen ([id number]) the adenocarcinoma invades the cervical stroma to a thickness of at least 2.5 mm. However, since the invasive adenocarcinoma involves the deep (radial) excision margin, the full extent of invasion cannot be determined. The invasive adenocarcinoma involves five tissue sections. Therefore, the width (circumferential measurement) is estimated at approximately 13 mm. Vascular invasion is not identified.      PET/CT 10/12/2019 at [facility]:   There is no hypermetabolic abdominal or pelvic lymphadenopathy.    There is mild low-level activity in the cervical region (max SUV 2.9).      Evaluated by GYN ONC (Dr. Lemus at [facility]) who recommended ChemoRT with weekly Cisplatin  followed by [facility] brachytherapy for Stage 1B1 Cervical Cancer. Patient would like to receive treatments locally.  EBRT was started on 11/20/2019.   Cycle # 1 weekly Cisplatin was on 11/20/2019.  Cycle # 1 weekly Cisplatin was on 11/20/2019.  Cycle # 5 weekly Cisplatin was on 12/19/2019. EBRT was

## 2024-12-16 ENCOUNTER — APPOINTMENT (OUTPATIENT)
Dept: GENERAL RADIOLOGY | Age: 64
End: 2024-12-16
Payer: MEDICARE

## 2024-12-16 ENCOUNTER — APPOINTMENT (OUTPATIENT)
Dept: ULTRASOUND IMAGING | Age: 64
End: 2024-12-16
Payer: MEDICARE

## 2024-12-16 ENCOUNTER — HOSPITAL ENCOUNTER (EMERGENCY)
Age: 64
Discharge: HOME | End: 2024-12-16
Attending: EMERGENCY MEDICINE
Payer: MEDICARE

## 2024-12-16 VITALS
BODY MASS INDEX: 34.49 KG/M2 | HEIGHT: 65 IN | OXYGEN SATURATION: 100 % | RESPIRATION RATE: 18 BRPM | WEIGHT: 207 LBS | HEART RATE: 56 BPM | SYSTOLIC BLOOD PRESSURE: 158 MMHG | DIASTOLIC BLOOD PRESSURE: 84 MMHG | TEMPERATURE: 97.7 F

## 2024-12-16 DIAGNOSIS — M54.42 ACUTE LEFT-SIDED LOW BACK PAIN WITH LEFT-SIDED SCIATICA: Primary | ICD-10-CM

## 2024-12-16 DIAGNOSIS — M25.511 CHRONIC PAIN OF BOTH SHOULDERS: ICD-10-CM

## 2024-12-16 DIAGNOSIS — G89.29 CHRONIC PAIN OF BOTH SHOULDERS: ICD-10-CM

## 2024-12-16 DIAGNOSIS — M25.512 CHRONIC PAIN OF BOTH SHOULDERS: ICD-10-CM

## 2024-12-16 LAB
ALBUMIN SERPL-MCNC: 3.6 G/DL (ref 3.5–5.2)
ALP SERPL-CCNC: 87 U/L (ref 35–104)
ALT SERPL-CCNC: 17 U/L (ref 0–32)
ANION GAP SERPL CALCULATED.3IONS-SCNC: 11 MMOL/L (ref 7–16)
AST SERPL-CCNC: 21 U/L (ref 0–31)
BACTERIA URNS QL MICRO: ABNORMAL
BASOPHILS # BLD: 0.01 K/UL (ref 0–0.2)
BASOPHILS NFR BLD: 0 % (ref 0–2)
BILIRUB SERPL-MCNC: 0.2 MG/DL (ref 0–1.2)
BILIRUB UR QL STRIP: NEGATIVE
BUN SERPL-MCNC: 24 MG/DL (ref 6–23)
CALCIUM SERPL-MCNC: 10 MG/DL (ref 8.6–10.2)
CASTS #/AREA URNS LPF: ABNORMAL /LPF
CHLORIDE SERPL-SCNC: 106 MMOL/L (ref 98–107)
CK SERPL-CCNC: 67 U/L (ref 20–180)
CLARITY UR: CLEAR
CO2 SERPL-SCNC: 21 MMOL/L (ref 22–29)
COLOR UR: YELLOW
CREAT SERPL-MCNC: 1.4 MG/DL (ref 0.5–1)
EOSINOPHIL # BLD: 0.04 K/UL (ref 0.05–0.5)
EOSINOPHILS RELATIVE PERCENT: 1 % (ref 0–6)
EPI CELLS #/AREA URNS HPF: ABNORMAL /HPF
ERYTHROCYTE [DISTWIDTH] IN BLOOD BY AUTOMATED COUNT: 14.1 % (ref 11.5–15)
GFR, ESTIMATED: 43 ML/MIN/1.73M2
GLUCOSE SERPL-MCNC: 113 MG/DL (ref 74–99)
GLUCOSE UR STRIP-MCNC: NEGATIVE MG/DL
HCT VFR BLD AUTO: 43.4 % (ref 34–48)
HGB BLD-MCNC: 13.9 G/DL (ref 11.5–15.5)
HGB UR QL STRIP.AUTO: NEGATIVE
IMM GRANULOCYTES # BLD AUTO: <0.03 K/UL (ref 0–0.58)
IMM GRANULOCYTES NFR BLD: 0 % (ref 0–5)
KETONES UR STRIP-MCNC: NEGATIVE MG/DL
LEUKOCYTE ESTERASE UR QL STRIP: NEGATIVE
LYMPHOCYTES NFR BLD: 0.79 K/UL (ref 1.5–4)
LYMPHOCYTES RELATIVE PERCENT: 18 % (ref 20–42)
MCH RBC QN AUTO: 28.3 PG (ref 26–35)
MCHC RBC AUTO-ENTMCNC: 32 G/DL (ref 32–34.5)
MCV RBC AUTO: 88.2 FL (ref 80–99.9)
MONOCYTES NFR BLD: 0.6 K/UL (ref 0.1–0.95)
MONOCYTES NFR BLD: 13 % (ref 2–12)
NEUTROPHILS NFR BLD: 68 % (ref 43–80)
NEUTS SEG NFR BLD: 3.05 K/UL (ref 1.8–7.3)
NITRITE UR QL STRIP: NEGATIVE
PH UR STRIP: 6 [PH] (ref 5–9)
PLATELET # BLD AUTO: 246 K/UL (ref 130–450)
PMV BLD AUTO: 11.5 FL (ref 7–12)
POTASSIUM SERPL-SCNC: 4.6 MMOL/L (ref 3.5–5)
PROT SERPL-MCNC: 7.5 G/DL (ref 6.4–8.3)
PROT UR STRIP-MCNC: NEGATIVE MG/DL
RBC # BLD AUTO: 4.92 M/UL (ref 3.5–5.5)
RBC #/AREA URNS HPF: ABNORMAL /HPF
SODIUM SERPL-SCNC: 138 MMOL/L (ref 132–146)
SP GR UR STRIP: 1.02 (ref 1–1.03)
TROPONIN I SERPL HS-MCNC: 15 NG/L (ref 0–9)
TROPONIN I SERPL HS-MCNC: 16 NG/L (ref 0–9)
UROBILINOGEN UR STRIP-ACNC: 0.2 EU/DL (ref 0–1)
WBC #/AREA URNS HPF: ABNORMAL /HPF
WBC OTHER # BLD: 4.5 K/UL (ref 4.5–11.5)

## 2024-12-16 PROCEDURE — 73552 X-RAY EXAM OF FEMUR 2/>: CPT

## 2024-12-16 PROCEDURE — 93971 EXTREMITY STUDY: CPT

## 2024-12-16 PROCEDURE — 6370000000 HC RX 637 (ALT 250 FOR IP): Performed by: EMERGENCY MEDICINE

## 2024-12-16 PROCEDURE — 93005 ELECTROCARDIOGRAM TRACING: CPT | Performed by: EMERGENCY MEDICINE

## 2024-12-16 PROCEDURE — 84484 ASSAY OF TROPONIN QUANT: CPT

## 2024-12-16 PROCEDURE — 99285 EMERGENCY DEPT VISIT HI MDM: CPT

## 2024-12-16 PROCEDURE — 72110 X-RAY EXAM L-2 SPINE 4/>VWS: CPT

## 2024-12-16 PROCEDURE — 87077 CULTURE AEROBIC IDENTIFY: CPT

## 2024-12-16 PROCEDURE — 87086 URINE CULTURE/COLONY COUNT: CPT

## 2024-12-16 PROCEDURE — 85025 COMPLETE CBC W/AUTO DIFF WBC: CPT

## 2024-12-16 PROCEDURE — 81001 URINALYSIS AUTO W/SCOPE: CPT

## 2024-12-16 PROCEDURE — 82550 ASSAY OF CK (CPK): CPT

## 2024-12-16 PROCEDURE — 80053 COMPREHEN METABOLIC PANEL: CPT

## 2024-12-16 RX ORDER — HYDROCODONE BITARTRATE AND ACETAMINOPHEN 5; 325 MG/1; MG/1
1 TABLET ORAL EVERY 8 HOURS PRN
Qty: 6 TABLET | Refills: 0 | Status: SHIPPED | OUTPATIENT
Start: 2024-12-16 | End: 2024-12-18

## 2024-12-16 RX ORDER — METHYLPREDNISOLONE 4 MG/1
TABLET ORAL
Qty: 1 KIT | Refills: 0 | Status: SHIPPED | OUTPATIENT
Start: 2024-12-16 | End: 2024-12-22

## 2024-12-16 RX ORDER — HYDROCODONE BITARTRATE AND ACETAMINOPHEN 5; 325 MG/1; MG/1
1 TABLET ORAL ONCE
Status: COMPLETED | OUTPATIENT
Start: 2024-12-16 | End: 2024-12-16

## 2024-12-16 RX ADMIN — HYDROCODONE BITARTRATE AND ACETAMINOPHEN 1 TABLET: 5; 325 TABLET ORAL at 11:23

## 2024-12-16 ASSESSMENT — PAIN SCALES - GENERAL: PAINLEVEL_OUTOF10: 10

## 2024-12-16 ASSESSMENT — PAIN DESCRIPTION - LOCATION: LOCATION: ARM;SHOULDER;LEG

## 2024-12-16 NOTE — ED PROVIDER NOTES
HPI:  12/16/24, Time: 10:34 AM BERNY Hall is a 64 y.o. female presenting to the ED for body pain especially in her bilateral shoulders and lower back beginning 1 month ago.  She is currently in therapy on Monday/Wednesday/Friday.  She denies any falls or trauma.  She states that about 3 weeks ago she woke up with pain in her left lateral thigh which she describes as sharp, constant but worsened with certain movements, walking, and getting up from a sitting position.  She denies bowel or bladder incontinence or retention, saddle anesthesia, or new extremity numbness or weakness.  She does report some tingling in her left leg.  She states she has residual weakness to the left side of her body from a prior stroke.  She also reports frequent urination.  She denies fevers, chest pain, shortness of breath, abdominal pain, nausea, vomiting, and diarrhea.  She is not anticoagulated.  Denies any history of DVT/PE.    Review of Systems:  Pertinent positives and negatives as per HPI.    --------------------------------------------- PAST HISTORY ---------------------------------------------  Past Medical History:  has a past medical history of Breast cancer (HCC), Breast cancer (HCC), CAD (coronary artery disease), Chronic ulcer of right leg, limited to breakdown of skin (HCC), CVA (cerebral vascular accident) (HCC), DM (diabetes mellitus) ([facility]), H/O: GI bleed, History of blood transfusion, Hyperlipidemia, Hypertension, Ischemic cardiomyopathy, and Valvular heart disease.    Past Surgical History:  has a past surgical history that includes Breast surgery (2000); Hysterectomy; Appendectomy; Knee arthroscopy; Foot surgery; Tonsillectomy; Breast reconstruction (Right); Mastectomy (Right, 2000); Colonoscopy; Upper gastrointestinal endoscopy; Upper gastrointestinal endoscopy (N/A, 2/5/2020); Mastectomy, modified radical (Left, 2/25/2020); Mastectomy, modified radical (Left, 2/25/2020); Coronary angioplasty with stent  (03/04/2020); Upper gastrointestinal endoscopy (N/A, 6/12/2020); Coronary artery bypass graft (N/A, 6/16/2020); Abdomen surgery; and Endoscopy, colon, diagnostic.    Social History:  reports that she has never smoked. She has never used smokeless tobacco. She reports current alcohol use of about 6.0 standard drinks of alcohol per week. She reports that she does not currently use drugs after having used the following drugs: Marijuana (Weed).    Family History: family history includes Breast Cancer in her mother; Heart Failure in her father and mother; Hypertension in her sister; Other in her mother; Pacemaker in her father; Stroke in her mother.     The patient’s home medications have been reviewed.    Allergies: Adhesive tape and Penicillins    -------------------------------------------------- RESULTS -------------------------------------------------  All laboratory and radiology results have been personally reviewed by myself   LABS:  Results for orders placed or performed during the hospital encounter of 12/16/24   CBC with Auto Differential   Result Value Ref Range    WBC 4.5 4.5 - 11.5 k/uL    RBC 4.92 3.50 - 5.50 m/uL    Hemoglobin 13.9 11.5 - 15.5 g/dL    Hematocrit 43.4 34.0 - 48.0 %    MCV 88.2 80.0 - 99.9 fL    MCH 28.3 26.0 - 35.0 pg    MCHC 32.0 32.0 - 34.5 g/dL    RDW 14.1 11.5 - 15.0 %    Platelets 246 130 - 450 k/uL    MPV 11.5 7.0 - 12.0 fL    Neutrophils % 68 43.0 - 80.0 %    Lymphocytes % 18 (L) 20.0 - 42.0 %    Monocytes % 13 (H) 2.0 - 12.0 %    Eosinophils % 1 0 - 6 %    Basophils % 0 0.0 - 2.0 %    Immature Granulocytes % 0 0.0 - 5.0 %    Neutrophils Absolute 3.05 1.80 - 7.30 k/uL    Lymphocytes Absolute 0.79 (L) 1.50 - 4.00 k/uL    Monocytes Absolute 0.60 0.10 - 0.95 k/uL    Eosinophils Absolute 0.04 (L) 0.05 - 0.50 k/uL    Basophils Absolute 0.01 0.00 - 0.20 k/uL    Immature Granulocytes Absolute <0.03 0.00 - 0.58 k/uL   CMP   Result Value Ref Range    Sodium 138 132 - 146 mmol/L    Potassium

## 2024-12-17 LAB
EKG ATRIAL RATE: 71 BPM
EKG P AXIS: 54 DEGREES
EKG P-R INTERVAL: 138 MS
EKG Q-T INTERVAL: 402 MS
EKG QRS DURATION: 86 MS
EKG QTC CALCULATION (BAZETT): 436 MS
EKG R AXIS: -28 DEGREES
EKG T AXIS: 176 DEGREES
EKG VENTRICULAR RATE: 71 BPM

## 2024-12-17 PROCEDURE — 93010 ELECTROCARDIOGRAM REPORT: CPT | Performed by: INTERNAL MEDICINE

## 2024-12-18 LAB
MICROORGANISM SPEC CULT: ABNORMAL
SERVICE CMNT-IMP: ABNORMAL
SPECIMEN DESCRIPTION: ABNORMAL

## 2025-02-03 ENCOUNTER — APPOINTMENT (OUTPATIENT)
Dept: ULTRASOUND IMAGING | Age: 65
End: 2025-02-03
Payer: MEDICARE

## 2025-02-03 ENCOUNTER — HOSPITAL ENCOUNTER (EMERGENCY)
Age: 65
Discharge: LEFT BEFORE DISCHARGE | End: 2025-02-04
Attending: EMERGENCY MEDICINE
Payer: MEDICARE

## 2025-02-03 ENCOUNTER — APPOINTMENT (OUTPATIENT)
Dept: GENERAL RADIOLOGY | Age: 65
End: 2025-02-03
Payer: MEDICARE

## 2025-02-03 VITALS
OXYGEN SATURATION: 100 % | SYSTOLIC BLOOD PRESSURE: 127 MMHG | RESPIRATION RATE: 14 BRPM | DIASTOLIC BLOOD PRESSURE: 109 MMHG | HEART RATE: 88 BPM | WEIGHT: 207 LBS | HEIGHT: 65 IN | TEMPERATURE: 98.5 F | BODY MASS INDEX: 34.49 KG/M2

## 2025-02-03 DIAGNOSIS — M79.605 LEFT LEG PAIN: Primary | ICD-10-CM

## 2025-02-03 PROCEDURE — 6370000000 HC RX 637 (ALT 250 FOR IP): Performed by: EMERGENCY MEDICINE

## 2025-02-03 PROCEDURE — 93971 EXTREMITY STUDY: CPT

## 2025-02-03 PROCEDURE — 73552 X-RAY EXAM OF FEMUR 2/>: CPT

## 2025-02-03 PROCEDURE — 99284 EMERGENCY DEPT VISIT MOD MDM: CPT

## 2025-02-03 RX ORDER — OXYCODONE AND ACETAMINOPHEN 5; 325 MG/1; MG/1
1 TABLET ORAL ONCE
Status: COMPLETED | OUTPATIENT
Start: 2025-02-03 | End: 2025-02-03

## 2025-02-03 RX ADMIN — OXYCODONE HYDROCHLORIDE AND ACETAMINOPHEN 1 TABLET: 5; 325 TABLET ORAL at 23:20

## 2025-02-03 ASSESSMENT — PAIN SCALES - GENERAL: PAINLEVEL_OUTOF10: 10

## 2025-02-03 ASSESSMENT — PAIN DESCRIPTION - ORIENTATION
ORIENTATION: LEFT
ORIENTATION: LEFT

## 2025-02-03 ASSESSMENT — PAIN DESCRIPTION - PAIN TYPE: TYPE: ACUTE PAIN

## 2025-02-03 ASSESSMENT — PAIN DESCRIPTION - DESCRIPTORS: DESCRIPTORS: DISCOMFORT

## 2025-02-03 ASSESSMENT — PAIN DESCRIPTION - LOCATION
LOCATION: LEG
LOCATION: LEG

## 2025-02-03 ASSESSMENT — PAIN DESCRIPTION - FREQUENCY: FREQUENCY: CONTINUOUS

## 2025-02-03 ASSESSMENT — PAIN - FUNCTIONAL ASSESSMENT: PAIN_FUNCTIONAL_ASSESSMENT: 0-10

## 2025-02-03 ASSESSMENT — PAIN DESCRIPTION - ONSET: ONSET: ON-GOING

## 2025-02-04 ENCOUNTER — TRANSCRIBE ORDERS (OUTPATIENT)
Dept: ADMINISTRATIVE | Age: 65
End: 2025-02-04

## 2025-02-04 DIAGNOSIS — M79.605 LEFT LEG PAIN: Primary | ICD-10-CM

## 2025-02-04 ASSESSMENT — ENCOUNTER SYMPTOMS
EYE REDNESS: 0
VOMITING: 0
NAUSEA: 0
SHORTNESS OF BREATH: 0
ABDOMINAL PAIN: 0

## 2025-02-04 NOTE — ED PROVIDER NOTES
MG tablet Take 1 tablet by mouth every morning (before breakfast), Disp-30 tablet, R-0Print      colchicine (COLCRYS) 0.6 MG tablet Take 0.6 mg by mouth dailyHistorical Med      sodium bicarbonate 650 MG tablet Take 1 tablet by mouth 2 times dailyHistorical Med      vitamin C (ASCORBIC ACID) 500 MG tablet Take 1 tablet by mouth 2 times dailyHistorical Med      ferrous sulfate (IRON 325) 325 (65 Fe) MG tablet Take 1 tablet by mouth 2 times daily (with meals), Disp-60 tablet, R-0Normal      metoprolol succinate (TOPROL XL) 25 MG extended release tablet Take 1 tablet by mouth daily, Disp-30 tablet, R-2Print      furosemide (LASIX) 20 MG tablet Take 0.5 tablets by mouth daily, Disp-60 tablet, R-2Print      clopidogrel (PLAVIX) 75 MG tablet Take 1 tablet by mouth daily, Disp-60 tablet, R-0Print      glimepiride (AMARYL) 1 MG tablet Take 1 tablet by mouth every morning (before breakfast)Historical Med       !! - Potential duplicate medications found. Please discuss with provider.          ALLERGIES     Adhesive tape and Penicillins    FAMILYHISTORY       Family History   Problem Relation Age of Onset    Stroke Mother     Heart Failure Mother     Other Mother         ICD    Breast Cancer Mother     Pacemaker Father     Heart Failure Father     Hypertension Sister         SOCIAL HISTORY       Social History     Tobacco Use    Smoking status: Never    Smokeless tobacco: Never    Tobacco comments:     per pt   Vaping Use    Vaping status: Never Used   Substance Use Topics    Alcohol use: Yes     Alcohol/week: 6.0 standard drinks of alcohol     Types: 6 Standard drinks or equivalent per week     Comment: rare    Drug use: Not Currently     Types: Marijuana (Weed)     Comment: used briefly when she turned \"59\"       SCREENINGS        Latasha Coma Scale  Eye Opening: Spontaneous  Best Verbal Response: Oriented  Best Motor Response: Obeys commands  Latasha Coma Scale Score: 15                CIWA Assessment  BP: (!)

## 2025-02-18 ENCOUNTER — OFFICE VISIT (OUTPATIENT)
Dept: PRIMARY CARE CLINIC | Age: 65
End: 2025-02-18
Payer: MEDICARE

## 2025-02-18 VITALS
HEIGHT: 65 IN | OXYGEN SATURATION: 96 % | WEIGHT: 207 LBS | HEART RATE: 53 BPM | SYSTOLIC BLOOD PRESSURE: 122 MMHG | BODY MASS INDEX: 34.49 KG/M2 | DIASTOLIC BLOOD PRESSURE: 78 MMHG | RESPIRATION RATE: 16 BRPM | TEMPERATURE: 97.7 F

## 2025-02-18 DIAGNOSIS — I25.10 CAD IN NATIVE ARTERY: ICD-10-CM

## 2025-02-18 DIAGNOSIS — E11.22 TYPE 2 DIABETES MELLITUS WITH STAGE 3B CHRONIC KIDNEY DISEASE, WITHOUT LONG-TERM CURRENT USE OF INSULIN (HCC): Primary | ICD-10-CM

## 2025-02-18 DIAGNOSIS — I51.9 LV DYSFUNCTION: ICD-10-CM

## 2025-02-18 DIAGNOSIS — M1A.09X0 CHRONIC GOUT OF MULTIPLE SITES, UNSPECIFIED CAUSE: ICD-10-CM

## 2025-02-18 DIAGNOSIS — I47.29 NSVT (NONSUSTAINED VENTRICULAR TACHYCARDIA) (HCC): ICD-10-CM

## 2025-02-18 DIAGNOSIS — N18.32 TYPE 2 DIABETES MELLITUS WITH STAGE 3B CHRONIC KIDNEY DISEASE, WITHOUT LONG-TERM CURRENT USE OF INSULIN (HCC): Primary | ICD-10-CM

## 2025-02-18 DIAGNOSIS — M79.605 ANTERIOR LEG PAIN, LEFT: ICD-10-CM

## 2025-02-18 DIAGNOSIS — Z86.73 HISTORY OF CVA (CEREBROVASCULAR ACCIDENT): ICD-10-CM

## 2025-02-18 DIAGNOSIS — K21.9 GASTROESOPHAGEAL REFLUX DISEASE WITHOUT ESOPHAGITIS: ICD-10-CM

## 2025-02-18 PROBLEM — N17.9 AKI (ACUTE KIDNEY INJURY): Status: RESOLVED | Noted: 2020-09-11 | Resolved: 2025-02-18

## 2025-02-18 PROCEDURE — G8399 PT W/DXA RESULTS DOCUMENT: HCPCS | Performed by: FAMILY MEDICINE

## 2025-02-18 PROCEDURE — 1036F TOBACCO NON-USER: CPT | Performed by: FAMILY MEDICINE

## 2025-02-18 PROCEDURE — G8417 CALC BMI ABV UP PARAM F/U: HCPCS | Performed by: FAMILY MEDICINE

## 2025-02-18 PROCEDURE — 2022F DILAT RTA XM EVC RTNOPTHY: CPT | Performed by: FAMILY MEDICINE

## 2025-02-18 PROCEDURE — G8427 DOCREV CUR MEDS BY ELIG CLIN: HCPCS | Performed by: FAMILY MEDICINE

## 2025-02-18 PROCEDURE — 3046F HEMOGLOBIN A1C LEVEL >9.0%: CPT | Performed by: FAMILY MEDICINE

## 2025-02-18 PROCEDURE — 3017F COLORECTAL CA SCREEN DOC REV: CPT | Performed by: FAMILY MEDICINE

## 2025-02-18 PROCEDURE — 3078F DIAST BP <80 MM HG: CPT | Performed by: FAMILY MEDICINE

## 2025-02-18 PROCEDURE — 99204 OFFICE O/P NEW MOD 45 MIN: CPT | Performed by: FAMILY MEDICINE

## 2025-02-18 PROCEDURE — 1090F PRES/ABSN URINE INCON ASSESS: CPT | Performed by: FAMILY MEDICINE

## 2025-02-18 PROCEDURE — 1123F ACP DISCUSS/DSCN MKR DOCD: CPT | Performed by: FAMILY MEDICINE

## 2025-02-18 PROCEDURE — 3074F SYST BP LT 130 MM HG: CPT | Performed by: FAMILY MEDICINE

## 2025-02-18 RX ORDER — GABAPENTIN 100 MG/1
100 CAPSULE ORAL NIGHTLY
Qty: 30 CAPSULE | Refills: 1 | Status: SHIPPED | OUTPATIENT
Start: 2025-02-18 | End: 2025-04-19

## 2025-02-18 RX ORDER — METOPROLOL SUCCINATE 25 MG/1
25 TABLET, EXTENDED RELEASE ORAL DAILY
Qty: 90 TABLET | Refills: 1 | Status: SHIPPED | OUTPATIENT
Start: 2025-02-18

## 2025-02-18 RX ORDER — COLCHICINE 0.6 MG/1
0.6 TABLET ORAL DAILY
Qty: 90 TABLET | Refills: 1 | Status: SHIPPED | OUTPATIENT
Start: 2025-02-18

## 2025-02-18 RX ORDER — PANTOPRAZOLE SODIUM 40 MG/1
40 TABLET, DELAYED RELEASE ORAL DAILY PRN
Qty: 30 TABLET | Refills: 0
Start: 2025-02-18

## 2025-02-18 RX ORDER — FUROSEMIDE 20 MG/1
20 TABLET ORAL DAILY PRN
Qty: 30 TABLET | Refills: 0
Start: 2025-02-18

## 2025-02-18 RX ORDER — ASPIRIN 81 MG/1
81 TABLET ORAL DAILY
COMMUNITY

## 2025-02-18 SDOH — ECONOMIC STABILITY: FOOD INSECURITY: WITHIN THE PAST 12 MONTHS, THE FOOD YOU BOUGHT JUST DIDN'T LAST AND YOU DIDN'T HAVE MONEY TO GET MORE.: NEVER TRUE

## 2025-02-18 SDOH — ECONOMIC STABILITY: FOOD INSECURITY: WITHIN THE PAST 12 MONTHS, YOU WORRIED THAT YOUR FOOD WOULD RUN OUT BEFORE YOU GOT MONEY TO BUY MORE.: NEVER TRUE

## 2025-02-18 ASSESSMENT — PATIENT HEALTH QUESTIONNAIRE - PHQ9
SUM OF ALL RESPONSES TO PHQ QUESTIONS 1-9: 0
1. LITTLE INTEREST OR PLEASURE IN DOING THINGS: NOT AT ALL
SUM OF ALL RESPONSES TO PHQ9 QUESTIONS 1 & 2: 0
SUM OF ALL RESPONSES TO PHQ QUESTIONS 1-9: 0
2. FEELING DOWN, DEPRESSED OR HOPELESS: NOT AT ALL

## 2025-02-18 NOTE — PROGRESS NOTES
Lisa VILLARREAL Rafael  : 1960    Chief Complaint:     Chief Complaint   Patient presents with    New Patient     Establish care.     HPI  CKD3b with MBD now on calcitriol and sodium bicarbonate. Recent note Dr Glover reviewed.  Left anterior thigh pain, sharp nerve pain. Previous prescriber rx mm relaxer which did not help.  Cardiology Dr Crockett  Nephro Dr Glover - will see in 3 weeks   Podiatry [facility]  Oncology breast and cervical s/p bilat mastectomy s/p reconstruction R only. right breast cancer with recurrence (s/p total mastectomy with reconstruction/TRAM flap & XRT, HTN, chest pain, CVA, left sided weakness, low back pain with sciatica, type II diabetes, hypertensive left ventricular hypertrophy. Today 10/28/2024: Tolerating Arimidex. Instructed to continue vitamin d and calcium supplements. DEXA scan 2024 negative for osteopenia/osteoporosis. Labs reviewed. Creatinine 1.8, potassium 5.2. Patient follows with nephrology. She is taking Colchicine, sodium bicarb. Instructed to make appointment with provider. Verbalized understanding.   Hx renal stones   Dr Thakkar GYN  Dr Staton, R trigger finger    Past medical, surgical, family and social histories reviewed and updated today as appropriate    Health Maintenance Due   Topic Date Due    Diabetic foot exam  Never done    HIV screen  Never done    Diabetic retinal exam  Never done    Hepatitis C screen  Never done    Cervical cancer screen  Never done    Breast cancer screen  Never done    Colorectal Cancer Screen  Never done    Shingles vaccine (1 of 2) Never done    Respiratory Syncytial Virus (RSV) Pregnant or age 60 yrs+ (1 - Risk 60-74 years 1-dose series) Never done    Lipids  2021    A1C test (Diabetic or Prediabetic)  2021    Diabetic Alb to Cr ratio (uACR) test  2021    Pneumococcal 50+ years Vaccine (2 of 2 - PCV) 2021    Flu vaccine (1) 2024    COVID-19 Vaccine (4 - - season) 2024    Annual Wellness

## 2025-02-21 ENCOUNTER — HOSPITAL ENCOUNTER (OUTPATIENT)
Dept: ULTRASOUND IMAGING | Age: 65
Discharge: HOME | End: 2025-02-23
Payer: MEDICARE

## 2025-02-21 DIAGNOSIS — M79.605 LEFT LEG PAIN: ICD-10-CM

## 2025-02-21 PROCEDURE — 93971 EXTREMITY STUDY: CPT

## 2025-02-24 PROBLEM — M1A.09X0 CHRONIC GOUT OF MULTIPLE SITES: Status: ACTIVE | Noted: 2025-02-24

## 2025-02-24 PROBLEM — Z85.41 HISTORY OF CERVICAL CANCER: Status: ACTIVE | Noted: 2019-11-07

## 2025-02-24 PROBLEM — E11.9 CONTROLLED TYPE 2 DIABETES MELLITUS WITHOUT COMPLICATION, WITHOUT LONG-TERM CURRENT USE OF INSULIN (HCC): Status: RESOLVED | Noted: 2019-01-25 | Resolved: 2025-02-24

## 2025-02-24 PROBLEM — C53.0 MALIGNANT NEOPLASM OF ENDOCERVIX (HCC): Status: RESOLVED | Noted: 2019-12-04 | Resolved: 2025-02-24

## 2025-02-24 PROBLEM — I25.2 HISTORY OF NON-ST ELEVATION MYOCARDIAL INFARCTION (NSTEMI): Status: ACTIVE | Noted: 2020-03-03

## 2025-02-24 ASSESSMENT — ENCOUNTER SYMPTOMS
ABDOMINAL PAIN: 0
COLOR CHANGE: 0
CHEST TIGHTNESS: 0
DIARRHEA: 0
BLOOD IN STOOL: 0
NAUSEA: 0
CONSTIPATION: 0
COUGH: 0
WHEEZING: 0
BACK PAIN: 0
SHORTNESS OF BREATH: 0
VOMITING: 0

## 2025-03-05 ENCOUNTER — OFFICE VISIT (OUTPATIENT)
Dept: PAIN MANAGEMENT | Age: 65
End: 2025-03-05
Payer: MEDICARE

## 2025-03-05 VITALS
SYSTOLIC BLOOD PRESSURE: 134 MMHG | DIASTOLIC BLOOD PRESSURE: 88 MMHG | HEIGHT: 65 IN | WEIGHT: 199 LBS | HEART RATE: 74 BPM | RESPIRATION RATE: 16 BRPM | OXYGEN SATURATION: 95 % | BODY MASS INDEX: 33.15 KG/M2 | TEMPERATURE: 97.7 F

## 2025-03-05 DIAGNOSIS — M47.817 LUMBOSACRAL SPONDYLOSIS WITHOUT MYELOPATHY: ICD-10-CM

## 2025-03-05 DIAGNOSIS — Z79.01 CHRONIC ANTICOAGULATION: ICD-10-CM

## 2025-03-05 DIAGNOSIS — M51.369 DEGENERATION OF INTERVERTEBRAL DISC OF LUMBAR REGION, UNSPECIFIED WHETHER PAIN PRESENT: ICD-10-CM

## 2025-03-05 DIAGNOSIS — M79.2 NEURALGIA AND NEURITIS: Primary | ICD-10-CM

## 2025-03-05 DIAGNOSIS — M79.605 ANTERIOR LEG PAIN, LEFT: ICD-10-CM

## 2025-03-05 DIAGNOSIS — G57.12 MERALGIA PARESTHETICA OF LEFT SIDE: ICD-10-CM

## 2025-03-05 PROCEDURE — 99204 OFFICE O/P NEW MOD 45 MIN: CPT

## 2025-03-05 PROCEDURE — 3017F COLORECTAL CA SCREEN DOC REV: CPT | Performed by: ANESTHESIOLOGY

## 2025-03-05 PROCEDURE — G8417 CALC BMI ABV UP PARAM F/U: HCPCS | Performed by: ANESTHESIOLOGY

## 2025-03-05 PROCEDURE — 1090F PRES/ABSN URINE INCON ASSESS: CPT | Performed by: ANESTHESIOLOGY

## 2025-03-05 PROCEDURE — G8427 DOCREV CUR MEDS BY ELIG CLIN: HCPCS | Performed by: ANESTHESIOLOGY

## 2025-03-05 PROCEDURE — 1036F TOBACCO NON-USER: CPT | Performed by: ANESTHESIOLOGY

## 2025-03-05 PROCEDURE — 3079F DIAST BP 80-89 MM HG: CPT | Performed by: ANESTHESIOLOGY

## 2025-03-05 PROCEDURE — G8399 PT W/DXA RESULTS DOCUMENT: HCPCS | Performed by: ANESTHESIOLOGY

## 2025-03-05 PROCEDURE — 1123F ACP DISCUSS/DSCN MKR DOCD: CPT | Performed by: ANESTHESIOLOGY

## 2025-03-05 PROCEDURE — 99204 OFFICE O/P NEW MOD 45 MIN: CPT | Performed by: ANESTHESIOLOGY

## 2025-03-05 PROCEDURE — 3075F SYST BP GE 130 - 139MM HG: CPT | Performed by: ANESTHESIOLOGY

## 2025-03-05 RX ORDER — GABAPENTIN 100 MG/1
100 CAPSULE ORAL 3 TIMES DAILY
Qty: 90 CAPSULE | Refills: 1 | Status: SHIPPED | OUTPATIENT
Start: 2025-03-05 | End: 2025-05-04

## 2025-03-05 NOTE — PROGRESS NOTES
3/5/2025    I was present for the physical exam and/or procedure of Lisa Hall by Andrae Giron M.D.

## 2025-03-05 NOTE — PROGRESS NOTES
[facility]        [address] [place] [id number]  Dept: [phone]          Consult Note      Patient:  Lisa Hall,  1960    Date of Service:  25     Requesting Physician:  Valerio Amador MD    Reason for Consult:      Patient presents with complaints of left LE pain    HISTORY OF PRESENT ILLNESS:      Ms. Lisa Hall is a 64 y.o. female presented today to [facility] [place] for evaluation of  left LE pain mainly over the left thigh.    Started suddenly in Dec 2025.    Notices intermittent low back pain and but left anterior/ lateral thigh.    Denies skin rash/ fever/ chills in that area.    Pain is constant and is described as aching, burning, sharp, and throbbing.     She  does not have numbness, tingling, weakness of the LE.     Patient does not have bladder or bowel dysfunction.    Alleviating factors include: rest.  Aggravating factors include: movement, bending, lifting.     Pain causes functional limitations/ limits Adl's : Yes    Nursing notes and details of the pain history reviewed. Please see intake notes for details.    Chronic left sided weakness from prior CVA.    Previous treatments:   Physical Therapy / HEP: yes,      Medications: - NSAID's : yes             - Membrane stabilizers : yes - gabapentin            - Opioids : no            - Adjuvants or Others : yes,    Spine Surgeries: no    She has been on anticoagulation medications yes and include ASA and Plavix.    H/O Smoking: no  H/O alcohol abuse : no  H/O Illicit drug use : H/o occ THC use +    Imaging:   Xray LS spine: 2024:  FINDINGS:  There are 5 lumbar type vertebral bodies.  Moderate to severe facet  arthropathy at the lower 4 levels.  Slight grade 1 anterolisthesis of L3 on a  4 due to facet arthropathy.  No acute compression fractures.  Sacral  alignment appears appropriate.  ASVD of the abdominal aorta without evidence  of aneurysm.  Pedicles appear intact.  Bowel

## 2025-03-05 NOTE — PROGRESS NOTES
Patient:  Lisa Hall,  1960  Date of Service:  3/5/25      Patient presents to [facility] with complaints of left thigh pain that started 3 months ago and has been getting worse.     She states the pain began following No specific cause    Pain is constant and is described as aching and burning. She rates the pain as a 9/10 on her worst day , 8/10 on her best day, and a 8/10 on average on the VAS scale.     Pain does radiate to left leg. She  has burning of the left leg.    Alleviating factors include: nothing.  Aggravating factors include:  nothing. She states that the pain does keep her from sleeping at night. She took her last dose of Neurontin today.     She is  on NSAIDS and  is  on anticoagulation medications to include ASA and Plavix and is managed by Dr. Crockett.     Previous treatments: medications..      Personal Expectations from this treatment: increase activity and decrease pain    Do you want someone present when the provider examines you? No    /88   Pulse 74   Temp 97.7 °F (36.5 °C) (Infrared)   Resp 16   Ht 1.651 m (5' 5\")   Wt 90.3 kg (199 lb)   SpO2 95%   BMI 33.12 kg/m²     No LMP recorded. Patient has had a hysterectomy.

## 2025-03-20 ENCOUNTER — HOSPITAL ENCOUNTER (OUTPATIENT)
Dept: MRI IMAGING | Age: 65
Discharge: HOME | End: 2025-03-22
Attending: ANESTHESIOLOGY
Payer: MEDICARE

## 2025-03-20 DIAGNOSIS — M79.2 NEURALGIA AND NEURITIS: ICD-10-CM

## 2025-03-20 DIAGNOSIS — M51.369 DEGENERATION OF INTERVERTEBRAL DISC OF LUMBAR REGION, UNSPECIFIED WHETHER PAIN PRESENT: ICD-10-CM

## 2025-03-20 DIAGNOSIS — M47.817 LUMBOSACRAL SPONDYLOSIS WITHOUT MYELOPATHY: ICD-10-CM

## 2025-03-20 PROCEDURE — 72148 MRI LUMBAR SPINE W/O DYE: CPT

## 2025-03-26 DIAGNOSIS — Z17.0 MALIGNANT NEOPLASM OF RIGHT BREAST IN FEMALE, ESTROGEN RECEPTOR POSITIVE, UNSPECIFIED SITE OF BREAST: Primary | ICD-10-CM

## 2025-03-26 DIAGNOSIS — C50.911 MALIGNANT NEOPLASM OF RIGHT BREAST IN FEMALE, ESTROGEN RECEPTOR POSITIVE, UNSPECIFIED SITE OF BREAST: Primary | ICD-10-CM

## 2025-04-02 ENCOUNTER — HOSPITAL ENCOUNTER (OUTPATIENT)
Dept: INFUSION THERAPY | Age: 65
Discharge: HOME | End: 2025-04-02
Payer: MEDICARE

## 2025-04-02 ENCOUNTER — OFFICE VISIT (OUTPATIENT)
Dept: ONCOLOGY | Age: 65
End: 2025-04-02
Payer: MEDICARE

## 2025-04-02 VITALS
BODY MASS INDEX: 32.49 KG/M2 | TEMPERATURE: 97.2 F | HEART RATE: 86 BPM | OXYGEN SATURATION: 98 % | HEIGHT: 65 IN | WEIGHT: 195 LBS | SYSTOLIC BLOOD PRESSURE: 120 MMHG | DIASTOLIC BLOOD PRESSURE: 78 MMHG

## 2025-04-02 DIAGNOSIS — C50.911 MALIGNANT NEOPLASM OF RIGHT BREAST IN FEMALE, ESTROGEN RECEPTOR POSITIVE, UNSPECIFIED SITE OF BREAST: ICD-10-CM

## 2025-04-02 DIAGNOSIS — Z17.0 MALIGNANT NEOPLASM OF RIGHT BREAST IN FEMALE, ESTROGEN RECEPTOR POSITIVE, UNSPECIFIED SITE OF BREAST: ICD-10-CM

## 2025-04-02 LAB
ALBUMIN SERPL-MCNC: 4.6 G/DL (ref 3.5–5.2)
ALP SERPL-CCNC: 82 U/L (ref 35–104)
ALT SERPL-CCNC: 14 U/L (ref 0–32)
ANION GAP SERPL CALCULATED.3IONS-SCNC: 17 MMOL/L (ref 7–16)
AST SERPL-CCNC: 18 U/L (ref 0–31)
BASOPHILS # BLD: 0.02 K/UL (ref 0–0.2)
BASOPHILS NFR BLD: 1 % (ref 0–2)
BILIRUB SERPL-MCNC: 0.3 MG/DL (ref 0–1.2)
BUN SERPL-MCNC: 36 MG/DL (ref 6–23)
CALCIUM SERPL-MCNC: 10.3 MG/DL (ref 8.6–10.2)
CHLORIDE SERPL-SCNC: 103 MMOL/L (ref 98–107)
CO2 SERPL-SCNC: 19 MMOL/L (ref 22–29)
CREAT SERPL-MCNC: 1.9 MG/DL (ref 0.5–1)
EOSINOPHIL # BLD: 0.11 K/UL (ref 0.05–0.5)
EOSINOPHILS RELATIVE PERCENT: 3 % (ref 0–6)
ERYTHROCYTE [DISTWIDTH] IN BLOOD BY AUTOMATED COUNT: 15.7 % (ref 11.5–15)
GFR, ESTIMATED: 28 ML/MIN/1.73M2
GLUCOSE SERPL-MCNC: 114 MG/DL (ref 74–99)
HCT VFR BLD AUTO: 43.1 % (ref 34–48)
HGB BLD-MCNC: 14 G/DL (ref 11.5–15.5)
IMM GRANULOCYTES # BLD AUTO: <0.03 K/UL (ref 0–0.58)
IMM GRANULOCYTES NFR BLD: 1 % (ref 0–5)
LYMPHOCYTES NFR BLD: 1.28 K/UL (ref 1.5–4)
LYMPHOCYTES RELATIVE PERCENT: 30 % (ref 20–42)
MCH RBC QN AUTO: 28.7 PG (ref 26–35)
MCHC RBC AUTO-ENTMCNC: 32.5 G/DL (ref 32–34.5)
MCV RBC AUTO: 88.3 FL (ref 80–99.9)
MONOCYTES NFR BLD: 0.6 K/UL (ref 0.1–0.95)
MONOCYTES NFR BLD: 14 % (ref 2–12)
NEUTROPHILS NFR BLD: 53 % (ref 43–80)
NEUTS SEG NFR BLD: 2.31 K/UL (ref 1.8–7.3)
PLATELET # BLD AUTO: 268 K/UL (ref 130–450)
PMV BLD AUTO: 11.6 FL (ref 7–12)
POTASSIUM SERPL-SCNC: 5.4 MMOL/L (ref 3.5–5)
PROT SERPL-MCNC: 7.5 G/DL (ref 6.4–8.3)
RBC # BLD AUTO: 4.88 M/UL (ref 3.5–5.5)
SODIUM SERPL-SCNC: 139 MMOL/L (ref 132–146)
WBC OTHER # BLD: 4.3 K/UL (ref 4.5–11.5)

## 2025-04-02 PROCEDURE — 36415 COLL VENOUS BLD VENIPUNCTURE: CPT

## 2025-04-02 PROCEDURE — 99212 OFFICE O/P EST SF 10 MIN: CPT

## 2025-04-02 PROCEDURE — 99213 OFFICE O/P EST LOW 20 MIN: CPT | Performed by: CLINICAL NURSE SPECIALIST

## 2025-04-02 PROCEDURE — G8399 PT W/DXA RESULTS DOCUMENT: HCPCS | Performed by: CLINICAL NURSE SPECIALIST

## 2025-04-02 PROCEDURE — 1090F PRES/ABSN URINE INCON ASSESS: CPT | Performed by: CLINICAL NURSE SPECIALIST

## 2025-04-02 PROCEDURE — 1036F TOBACCO NON-USER: CPT | Performed by: CLINICAL NURSE SPECIALIST

## 2025-04-02 PROCEDURE — 3074F SYST BP LT 130 MM HG: CPT | Performed by: CLINICAL NURSE SPECIALIST

## 2025-04-02 PROCEDURE — 3078F DIAST BP <80 MM HG: CPT | Performed by: CLINICAL NURSE SPECIALIST

## 2025-04-02 PROCEDURE — 3017F COLORECTAL CA SCREEN DOC REV: CPT | Performed by: CLINICAL NURSE SPECIALIST

## 2025-04-02 PROCEDURE — G8427 DOCREV CUR MEDS BY ELIG CLIN: HCPCS | Performed by: CLINICAL NURSE SPECIALIST

## 2025-04-02 PROCEDURE — 1123F ACP DISCUSS/DSCN MKR DOCD: CPT | Performed by: CLINICAL NURSE SPECIALIST

## 2025-04-02 PROCEDURE — 80053 COMPREHEN METABOLIC PANEL: CPT

## 2025-04-02 PROCEDURE — 85025 COMPLETE CBC W/AUTO DIFF WBC: CPT

## 2025-04-02 PROCEDURE — G8417 CALC BMI ABV UP PARAM F/U: HCPCS | Performed by: CLINICAL NURSE SPECIALIST

## 2025-04-02 RX ORDER — ANASTROZOLE 1 MG/1
1 TABLET ORAL DAILY
Qty: 90 TABLET | Refills: 1 | Status: SHIPPED | OUTPATIENT
Start: 2025-04-02

## 2025-04-02 NOTE — PROGRESS NOTES
Department of [facility]    Attending Clinic Note    Reason for Visit: Follow-up on a patient with Cervical Cancer and Breast Cancer    PCP:  Dr. Smith     History of Present Illness  64 y.o. female with past medical hx of right breast cancer with recurrence (s/p total mastectomy with reconstruction/TRAM flap & XRT, HTN, chest pain, CVA, left sided weakness, low back pain with sciatica, type II diabetes, hypertensive left ventricular hypertrophy.     PAP smear on 04/11/2019 revealing HSIL and endometrial cells.   She then underwent a colposcopy on 07/10/2019 and a LEEP on 08/22/2019.   PATH REVIEW [facility]:  FINAL DIAGNOSIS   1. Cervix, LEEP specimen ([id number]) - Invasive poorly-differentiated adenocarcinoma.   - The invasive adenocarcinoma involves the deep (radial) excision margin.   2. Endocervix, LEEP specimen ([id number]) - Focus of cauterized adenocarcinoma invovling excision magin.      COMMENT   In the cervix LEEP specimen ([id number]) the adenocarcinoma invades the cervical stroma to a thickness of at least 2.5 mm. However, since the invasive adenocarcinoma involves the deep (radial) excision margin, the full extent of invasion cannot be determined. The invasive adenocarcinoma involves five tissue sections. Therefore, the width (circumferential measurement) is estimated at approximately 13 mm. Vascular invasion is not identified.      PET/CT 10/12/2019 at [facility]:   There is no hypermetabolic abdominal or pelvic lymphadenopathy.    There is mild low-level activity in the cervical region (max SUV 2.9).      Evaluated by GYN ONC (Dr. Lemsu at [facility]) who recommended ChemoRT with weekly Cisplatin  followed by [facility] brachytherapy for Stage 1B1 Cervical Cancer. Patient would like to receive treatments locally.  EBRT was started on 11/20/2019.   Cycle # 1 weekly Cisplatin was on 11/20/2019.  Cycle # 1 weekly Cisplatin was on 11/20/2019.  Cycle # 5 weekly Cisplatin was on 12/19/2019. EBRT was

## 2025-04-30 DIAGNOSIS — M79.605 ANTERIOR LEG PAIN, LEFT: ICD-10-CM

## 2025-04-30 RX ORDER — GABAPENTIN 100 MG/1
100 CAPSULE ORAL 3 TIMES DAILY
Qty: 90 CAPSULE | Refills: 1 | OUTPATIENT
Start: 2025-04-30

## 2025-05-15 ENCOUNTER — PROCEDURE VISIT (OUTPATIENT)
Dept: PHYSICAL MEDICINE AND REHAB | Age: 65
End: 2025-05-15
Payer: MEDICARE

## 2025-05-15 VITALS — HEIGHT: 65 IN | BODY MASS INDEX: 32.49 KG/M2 | WEIGHT: 195 LBS

## 2025-05-15 DIAGNOSIS — M79.2 NEURALGIA AND NEURITIS: ICD-10-CM

## 2025-05-15 DIAGNOSIS — G57.12 MERALGIA PARESTHETICA OF LEFT SIDE: ICD-10-CM

## 2025-05-15 PROCEDURE — 95886 MUSC TEST DONE W/N TEST COMP: CPT | Performed by: STUDENT IN AN ORGANIZED HEALTH CARE EDUCATION/TRAINING PROGRAM

## 2025-05-15 PROCEDURE — 95911 NRV CNDJ TEST 9-10 STUDIES: CPT | Performed by: STUDENT IN AN ORGANIZED HEALTH CARE EDUCATION/TRAINING PROGRAM

## 2025-05-15 NOTE — PROGRESS NOTES
[facility] [place]  Electrodiagnostic Laboratory  *Accredited [facility] the [facility] with exemplary status  [address] [place] [address] [address]  Phone: [phone]  Fax: [phone]    Referring Provider: Andrae Giron MD  Primary Care Physician: Valerio Amador MD  Patient Name: Lisa Hall  Patient YOB: 1960  Gender: female  BMI: Body mass index is 32.45 kg/m².  Height 1.651 m (5' 5\"), weight 88.5 kg (195 lb), not currently breastfeeding.    5/15/2025    Description of clinical problem:   Chief Complaint   Patient presents with    Extremity Pain     Left leg pain from the groin to the knee. Burning on the outer thigh. Symptoms for 6 months.     Numbness     none    Extremity Weakness     At times patient has difficulty picking up feet.      Pain Yes   ; Numbness/tingling  No; Weakness  Yes       Brief physical exam:   Sensory deficit No; Weakness No; Atrophy  yes, diffuse; Reflex abnormality Yes    Study Limitations:  None    Sensory NCS      Nerve / Sites Rec. Site Peak Lat PP Amp Segments Distance Velocity Temp.     ms µV  cm m/s °C   L Sural - Ankle (Calf)      Calf Ankle 3.28 6.9 Calf - Ankle 14 55 31   L Superficial peroneal - Ankle      Lat leg Ankle 2.86 14.2 Lat leg - Ankle 10 46 31   R Superficial peroneal - Ankle      Lat leg Ankle 2.81 7.6 Lat leg - Ankle 10 41 31   L Lateral femoral cutaneous - Thigh (Inguinal ligament)      A. Ing ligament Thigh 3.80 5.6 A. Ing ligament - Thigh 17 54 31   R Lateral femoral cutaneous - Thigh (Inguinal ligament)      A. Ing ligament Thigh 4.22 5.6 A. Ing ligament - Thigh 15 55 31       Motor NCS      Nerve / Sites Muscle Onset Amplitude Segments Distance Velocity Temp.     ms mV  cm m/s °C   L Peroneal - EDB      Ankle EDB 4.27 6.4 Ankle - EDB 8  32      Fib head EDB 12.03 6.0 Fib head - Ankle 30 39 32      Above Knee EDB 14.27 6.0 Above Knee - Fib head 12 54 32   L Tibial - AH      Ankle AH 4.74 9.0 Ankle - AH 8  31      Pop

## 2025-05-15 NOTE — PROGRESS NOTES
Electrodiagnostic Laboratory  *Accredited [facility] the [facility] with exemplary status  [facility]ICINE [facility] RE[facility]AB[facility]ITA[address] [address]  Dept: [phone]  Loc: [phone]      Date of Examination: 05/15/25    Patient Name: Lisa Hall    An independent historian was not needed.     Lisa Hall  is a 65 y.o. year old female who was seen today regarding   Chief Complaint   Patient presents with    Extremity Pain     Left leg pain from the groin to the knee. Burning on the outer thigh. Symptoms for 6 months.     Numbness     none    Extremity Weakness     At times patient has difficulty picking up feet.    .        on the visual analog scale where 0 is no pain and 10 is the worst pain possible.   The symptoms are {Desc; intermittent/persistent/constant:21169}.       I have reviewed the referring provider's office note.    There is not a family history of neuromuscular conditions.     Physical Exam: General: The patient is in no apparent distress.  MSK: There is no joint effusion, deformity, instability, swelling, erythema or warmth.  AROM is full in the spine and extremities. ***. Neurologic:  No focal sensorimotor deficit.  Reflexes 2+ and symmetric. Gait is normal.    Impression:     1. Neuralgia and neuritis    2. Meralgia paresthetica of left side        Plan:   EMG is indicated to evaluate the above diagnosis.    No orders of the defined types were placed in this encounter.    EMG was done today and showed ***.     The patient was educated about the diagnosis and the prognosis.   Advised patient to follow up with referring provider.       Thank you for allowing me to participate in the care of your patient.      Sincerely,     Alba Cardozo MD  Board Certified Physical Medicine and Rehabilitation

## 2025-05-15 NOTE — PATIENT INSTRUCTIONS
Today you had an electrodiagnostic exam which included nerve conduction studies (NCS) and electromyography (EMG). This test evaluated the electrical activity of your nerves and muscles to help determine if you have a nerve or muscle disease.  This test can help determine the location and type of a nerve or muscle problem. This will help your referring doctor diagnose your condition and determine the appropriate next step in your treatment plan.      After your test:     1. There are no long lasting side effects of the test.      2. You may resume your normal activities without restrictions.      3.  Resume any medications that were stopped for the test.     4  If you have sore areas or bruising in your muscles where the needle was placed, apply a cold pack to the sore area for 15-20 minutes three to four times a day as needed for pain.  The soreness should go away in about 1-2 days.      5. Your results were provided  Briefly at the end of your test and the final detailed report will be provided to your referring physician, and/or primary care physician and any other parties you requested within 1-2 days of the examination. You may wish to contact your referring provider after a few days to determine what they would like you to do next.      6.  Please call [phone] with any questions or concerns and if you develop increased body temperature/fever, swelling, tenderness, increased pain and/or drainage from the sites where the needle was placed.      Thank you for choosing us for your health care needs.

## 2025-05-20 ENCOUNTER — TELEPHONE (OUTPATIENT)
Dept: PRIMARY CARE CLINIC | Age: 65
End: 2025-05-20

## 2025-05-20 NOTE — TELEPHONE ENCOUNTER
Patient called inquiring about letter that she requested this AM. I informed patient that Dr has been with patients all day and he has 48-72 hours to respond. Once he does we will give her a call.   She said her former Dr had it for her the same day. She said the nurse just wrote it and he signed it. I informed her we could not do that, the letter has to come from Dr. Amador.

## 2025-05-20 NOTE — TELEPHONE ENCOUNTER
Patient came in today requesting a prescription for a walk in shower.  She is requesting this because she is unable to lift her legs or  the shower.

## 2025-05-20 NOTE — TELEPHONE ENCOUNTER
Last Appointment:  2/18/2025  Future Appointments   Date Time Provider Department Center   5/22/2025 10:15 AM Andrae Giron MD [facility] PAIN [facility]   6/3/2025  2:30 PM [facility]   8/4/2025  2:00 PM [facility] MED ONC FAST TRACK 1 [facility] Med Onc [facility]   8/4/2025  2:15 PM Carolin Das APRN MED ONC [facility]

## 2025-05-21 NOTE — TELEPHONE ENCOUNTER
Patient notified that Dr Amador does not have much history being that she was just seen once. Patient instructed to call her cardiology office and see if they can fill form.

## 2025-05-21 NOTE — TELEPHONE ENCOUNTER
If she got one from her previous physician why does she need another? For another shower installation? I do not have documentation to support this or her inability to lift her legs. Would she like a home PT lashell?

## 2025-05-22 ENCOUNTER — HOSPITAL ENCOUNTER (OUTPATIENT)
Dept: GENERAL RADIOLOGY | Age: 65
Discharge: HOME | End: 2025-05-24
Payer: MEDICARE

## 2025-05-22 ENCOUNTER — TELEPHONE (OUTPATIENT)
Age: 65
End: 2025-05-22

## 2025-05-22 ENCOUNTER — OFFICE VISIT (OUTPATIENT)
Age: 65
End: 2025-05-22
Payer: MEDICARE

## 2025-05-22 ENCOUNTER — PREP FOR PROCEDURE (OUTPATIENT)
Age: 65
End: 2025-05-22

## 2025-05-22 VITALS
TEMPERATURE: 97.3 F | DIASTOLIC BLOOD PRESSURE: 85 MMHG | SYSTOLIC BLOOD PRESSURE: 136 MMHG | RESPIRATION RATE: 18 BRPM | WEIGHT: 195 LBS | HEIGHT: 65 IN | BODY MASS INDEX: 32.49 KG/M2 | OXYGEN SATURATION: 97 % | HEART RATE: 65 BPM

## 2025-05-22 DIAGNOSIS — M47.817 LUMBOSACRAL SPONDYLOSIS WITHOUT MYELOPATHY: ICD-10-CM

## 2025-05-22 DIAGNOSIS — M79.2 NEURALGIA AND NEURITIS: ICD-10-CM

## 2025-05-22 DIAGNOSIS — M54.16 LUMBAR RADICULAR PAIN: Primary | ICD-10-CM

## 2025-05-22 DIAGNOSIS — M51.369 DEGENERATION OF INTERVERTEBRAL DISC OF LUMBAR REGION, UNSPECIFIED WHETHER PAIN PRESENT: Primary | ICD-10-CM

## 2025-05-22 DIAGNOSIS — M54.16 LUMBAR RADICULAR PAIN: ICD-10-CM

## 2025-05-22 DIAGNOSIS — M43.16 SPONDYLOLISTHESIS OF LUMBAR REGION: ICD-10-CM

## 2025-05-22 DIAGNOSIS — M51.369 DEGENERATION OF INTERVERTEBRAL DISC OF LUMBAR REGION, UNSPECIFIED WHETHER PAIN PRESENT: ICD-10-CM

## 2025-05-22 PROCEDURE — 1160F RVW MEDS BY RX/DR IN RCRD: CPT | Performed by: ANESTHESIOLOGY

## 2025-05-22 PROCEDURE — 99214 OFFICE O/P EST MOD 30 MIN: CPT | Performed by: ANESTHESIOLOGY

## 2025-05-22 PROCEDURE — 1159F MED LIST DOCD IN RCRD: CPT | Performed by: ANESTHESIOLOGY

## 2025-05-22 PROCEDURE — 3075F SYST BP GE 130 - 139MM HG: CPT | Performed by: ANESTHESIOLOGY

## 2025-05-22 PROCEDURE — G8399 PT W/DXA RESULTS DOCUMENT: HCPCS | Performed by: ANESTHESIOLOGY

## 2025-05-22 PROCEDURE — G8417 CALC BMI ABV UP PARAM F/U: HCPCS | Performed by: ANESTHESIOLOGY

## 2025-05-22 PROCEDURE — 1123F ACP DISCUSS/DSCN MKR DOCD: CPT | Performed by: ANESTHESIOLOGY

## 2025-05-22 PROCEDURE — 72120 X-RAY BEND ONLY L-S SPINE: CPT

## 2025-05-22 PROCEDURE — G8427 DOCREV CUR MEDS BY ELIG CLIN: HCPCS | Performed by: ANESTHESIOLOGY

## 2025-05-22 PROCEDURE — 3017F COLORECTAL CA SCREEN DOC REV: CPT | Performed by: ANESTHESIOLOGY

## 2025-05-22 PROCEDURE — 3079F DIAST BP 80-89 MM HG: CPT | Performed by: ANESTHESIOLOGY

## 2025-05-22 PROCEDURE — 1036F TOBACCO NON-USER: CPT | Performed by: ANESTHESIOLOGY

## 2025-05-22 PROCEDURE — 1090F PRES/ABSN URINE INCON ASSESS: CPT | Performed by: ANESTHESIOLOGY

## 2025-05-22 RX ORDER — SODIUM CHLORIDE 9 MG/ML
INJECTION, SOLUTION INTRAVENOUS PRN
Status: CANCELLED | OUTPATIENT
Start: 2025-05-22

## 2025-05-22 RX ORDER — SODIUM CHLORIDE 0.9 % (FLUSH) 0.9 %
5-40 SYRINGE (ML) INJECTION PRN
Status: CANCELLED | OUTPATIENT
Start: 2025-05-22

## 2025-05-22 RX ORDER — SODIUM CHLORIDE 0.9 % (FLUSH) 0.9 %
5-40 SYRINGE (ML) INJECTION EVERY 12 HOURS SCHEDULED
Status: CANCELLED | OUTPATIENT
Start: 2025-05-22

## 2025-05-22 NOTE — PROGRESS NOTES
[facility]        [address] [place] [id number]  Dept: [phone]    Follow up Note      Lisa Hall     Date of Visit:  5/22/2025    CC:  Patient presents for follow up   Chief Complaint   Patient presents with    Follow-up     Left leg pain       HPI:  left LE pain mainly over the left thigh.     Started suddenly in Dec 2025.     Notices intermittent low back pain and but left anterior/ lateral thigh.     Denies skin rash/ fever/ chills in that area.    Pain causes functional limitations/ limits Adl's : Yes     Nursing notes and details of the pain history reviewed. Please see intake notes for details.     Chronic left sided weakness from prior CVA.     Previous treatments:   Physical Therapy / HEP: yes,       Medications: - NSAID's : yes                        - Membrane stabilizers : yes - gabapentin                       - Opioids : no                       - Adjuvants or Others : yes,     Spine Surgeries: no     She has been on anticoagulation medications yes and include ASA and Plavix.     H/O Smoking: no  H/O alcohol abuse : no  H/O Illicit drug use : H/o occ THC use +    EMG of the LE : 5/15/2025- Dr. Cardozo  Diagnostic Interpretation:      This study was abnormal.      Electrodiagnosis:   There is electrodiagnostic evidence of a left lumbar radiculopathy affecting the L5 and S1 myotomes with active denervation in the gastrocnemius muscle  There is no electrodiagnostic evidence of a bilateral lateral femoral cutaneous neuropathy (meralgia paresthetica), generalized peripheral polyneuropathy on testing today.       Imaging:   MRI of LS spine: 3/20/2025:  FINDINGS:  BONES/ALIGNMENT: There is normal alignment of the spine. The vertebral body  heights are maintained. The bone marrow signal appears unremarkable.     SPINAL CORD: The conus terminates normally.     SOFT TISSUES: No paraspinal mass identified.     L1-L2: There is no significant disc herniation, spinal canal

## 2025-05-22 NOTE — PROGRESS NOTES
Lisa Hall presents to the [facility] on 5/22/2025. Lisa is complaining of pain in left leg. The pain is constant. The pain is described as aching, shooting, burning and stabbing. Pain is rated on her best day at a 10, on her worst day at a 10, and on average at a 10 on the VAS scale. She took her last dose of Neurontin yesterday.     Any procedures since your last visit: No    Pacemaker or defibrillator: No     She is not on NSAIDS and is  on anticoagulation medications to include ASA and is managed by Valerio Amador MD .     Do you want someone present when the provider examines you? No    Medication Contract and Consent for Opioid Use Documents Filed        No documents found                    /85   Pulse 65   Temp 97.3 °F (36.3 °C) (Infrared)   Resp 18   Ht 1.651 m (5' 5\")   Wt 88.5 kg (195 lb)   SpO2 97%   BMI 32.45 kg/m²      No LMP recorded. Patient has had a hysterectomy.

## 2025-05-22 NOTE — TELEPHONE ENCOUNTER
Lisa Hall called and asked if she can have pain medication to cover her until her injection on 6-19-25.  Please advise.

## 2025-05-22 NOTE — H&P (VIEW-ONLY)
[facility]        [address] [place] [address]  Dept: [phone]    Follow up Note      Lisa Hall     Date of Visit:  5/22/2025    CC:  Patient presents for follow up   Chief Complaint   Patient presents with    Follow-up     Left leg pain       HPI:  left LE pain mainly over the left thigh.     Started suddenly in Dec 2025.     Notices intermittent low back pain and but left anterior/ lateral thigh.     Denies skin rash/ fever/ chills in that area.    Pain causes functional limitations/ limits Adl's : Yes     Nursing notes and details of the pain history reviewed. Please see intake notes for details.     Chronic left sided weakness from prior CVA.     Previous treatments:   Physical Therapy / HEP: yes,       Medications: - NSAID's : yes                        - Membrane stabilizers : yes - gabapentin                       - Opioids : no                       - Adjuvants or Others : yes,     Spine Surgeries: no     She has been on anticoagulation medications yes and include ASA and Plavix.     H/O Smoking: no  H/O alcohol abuse : no  H/O Illicit drug use : H/o occ THC use +    EMG of the LE : 5/15/2025- Dr. Cardozo  Diagnostic Interpretation:      This study was abnormal.      Electrodiagnosis:   There is electrodiagnostic evidence of a left lumbar radiculopathy affecting the L5 and S1 myotomes with active denervation in the gastrocnemius muscle  There is no electrodiagnostic evidence of a bilateral lateral femoral cutaneous neuropathy (meralgia paresthetica), generalized peripheral polyneuropathy on testing today.       Imaging:   MRI of LS spine: 3/20/2025:  FINDINGS:  BONES/ALIGNMENT: There is normal alignment of the spine. The vertebral body  heights are maintained. The bone marrow signal appears unremarkable.     SPINAL CORD: The conus terminates normally.     SOFT TISSUES: No paraspinal mass identified.     L1-L2: There is no significant disc herniation, spinal canal

## 2025-05-23 RX ORDER — HYDROCODONE BITARTRATE AND ACETAMINOPHEN 5; 325 MG/1; MG/1
.5-1 TABLET ORAL 2 TIMES DAILY PRN
Qty: 14 TABLET | Refills: 0 | Status: SHIPPED | OUTPATIENT
Start: 2025-05-23 | End: 2025-05-30

## 2025-06-02 ENCOUNTER — TELEPHONE (OUTPATIENT)
Age: 65
End: 2025-06-02

## 2025-06-02 NOTE — TELEPHONE ENCOUNTER
Lisa is scheduled to have an upcoming Lumbar Transforaminal Epidural Steroid Injection with Dr. Giron. Would it be acceptable for patient to hold Plavix & Aspirin 7 days prior to procedure? Please advise. Thank you

## 2025-06-03 ENCOUNTER — TELEPHONE (OUTPATIENT)
Age: 65
End: 2025-06-03

## 2025-06-03 NOTE — TELEPHONE ENCOUNTER
Lisa is scheduled to have an upcoming Lumbar Transforaminal Epidural Steroid Injection with Dr Giron. Would it be acceptable for patient to hold Plavix & Aspirin 7 days prior to procedure? Please advise. Thank you

## 2025-06-11 ENCOUNTER — TELEPHONE (OUTPATIENT)
Age: 65
End: 2025-06-11

## 2025-06-11 DIAGNOSIS — M54.16 LUMBAR RADICULOPATHY: ICD-10-CM

## 2025-06-11 NOTE — TELEPHONE ENCOUNTER
Call to Lisa Hall that procedure was scheduled for 06/19/2025 and that [facility] should call her a few days before for the pre op call and between 2:00 PM and 4:00 PM  the business day before with the arrival time. Instructed Lisa to hold ibuprofen for 24 hours, Celebrex, Mobic, and naprosyn for 4 days and any aspirin containing products, CoQ 10, or fish oil for 7 days. Instructed to call office back if any questions. Lisa verbalized understanding.    **Instructed to hold Plavix & Aspirin 7 days prior to procedure per med clearance in media**    Electronically signed by Kari Connor RN on 6/11/2025 at 4:38 PM

## 2025-06-16 NOTE — PROGRESS NOTES
[facility] PAIN MANAGEMENT  INSTRUCTIONS  ...........................................................................................................................................    [x] Parking the day of Surgery is located in the Main Entrance lot.  Entrance A, make immediate right into the surgery reception room    [x]  Bring photo ID and insurance card    [x] You may have a light breakfast day of procedure    [x]  Wear loose comfortable clothing    [x]  Please follow instructions for medications as given per your doctors office    [x] You can expect a call the business day prior to procedure between 2PM and 4PM to notify you of your arrival time.       [x] Please arrange for     ALL QUESTIONS ANSWERED AT THIS TIME.

## 2025-06-19 ENCOUNTER — HOSPITAL ENCOUNTER (OUTPATIENT)
Age: 65
Setting detail: OUTPATIENT SURGERY
Discharge: HOME | End: 2025-06-19
Attending: ANESTHESIOLOGY | Admitting: ANESTHESIOLOGY
Payer: MEDICARE

## 2025-06-19 ENCOUNTER — HOSPITAL ENCOUNTER (OUTPATIENT)
Dept: GENERAL RADIOLOGY | Age: 65
Setting detail: OUTPATIENT SURGERY
Discharge: HOME | End: 2025-06-21
Attending: ANESTHESIOLOGY
Payer: MEDICARE

## 2025-06-19 VITALS
OXYGEN SATURATION: 98 % | RESPIRATION RATE: 16 BRPM | TEMPERATURE: 97.2 F | HEART RATE: 68 BPM | BODY MASS INDEX: 32.82 KG/M2 | SYSTOLIC BLOOD PRESSURE: 137 MMHG | DIASTOLIC BLOOD PRESSURE: 70 MMHG | WEIGHT: 197 LBS | HEIGHT: 65 IN

## 2025-06-19 DIAGNOSIS — R52 PAIN MANAGEMENT: ICD-10-CM

## 2025-06-19 LAB — GLUCOSE BLD-MCNC: 123 MG/DL (ref 74–99)

## 2025-06-19 PROCEDURE — 64483 NJX AA&/STRD TFRM EPI L/S 1: CPT | Performed by: ANESTHESIOLOGY

## 2025-06-19 PROCEDURE — 7100000011 HC PHASE II RECOVERY - ADDTL 15 MIN: Performed by: ANESTHESIOLOGY

## 2025-06-19 PROCEDURE — 7100000010 HC PHASE II RECOVERY - FIRST 15 MIN: Performed by: ANESTHESIOLOGY

## 2025-06-19 PROCEDURE — 6360000004 HC RX CONTRAST MEDICATION: Performed by: ANESTHESIOLOGY

## 2025-06-19 PROCEDURE — 3600000002 HC SURGERY LEVEL 2 BASE: Performed by: ANESTHESIOLOGY

## 2025-06-19 PROCEDURE — 6360000002 HC RX W HCPCS: Performed by: ANESTHESIOLOGY

## 2025-06-19 PROCEDURE — 64484 NJX AA&/STRD TFRM EPI L/S EA: CPT | Performed by: ANESTHESIOLOGY

## 2025-06-19 PROCEDURE — 2709999900 HC NON-CHARGEABLE SUPPLY: Performed by: ANESTHESIOLOGY

## 2025-06-19 PROCEDURE — 82962 GLUCOSE BLOOD TEST: CPT

## 2025-06-19 RX ORDER — SODIUM CHLORIDE 0.9 % (FLUSH) 0.9 %
5-40 SYRINGE (ML) INJECTION EVERY 12 HOURS SCHEDULED
Status: DISCONTINUED | OUTPATIENT
Start: 2025-06-19 | End: 2025-06-19 | Stop reason: HOSPADM

## 2025-06-19 RX ORDER — SODIUM CHLORIDE 9 MG/ML
INJECTION, SOLUTION INTRAVENOUS PRN
Status: DISCONTINUED | OUTPATIENT
Start: 2025-06-19 | End: 2025-06-19 | Stop reason: HOSPADM

## 2025-06-19 RX ORDER — SODIUM CHLORIDE 0.9 % (FLUSH) 0.9 %
5-40 SYRINGE (ML) INJECTION PRN
Status: DISCONTINUED | OUTPATIENT
Start: 2025-06-19 | End: 2025-06-19 | Stop reason: HOSPADM

## 2025-06-19 RX ORDER — IOPAMIDOL 612 MG/ML
INJECTION, SOLUTION INTRATHECAL PRN
Status: DISCONTINUED | OUTPATIENT
Start: 2025-06-19 | End: 2025-06-19 | Stop reason: ALTCHOICE

## 2025-06-19 RX ORDER — DEXAMETHASONE SODIUM PHOSPHATE 10 MG/ML
INJECTION, SOLUTION INTRAMUSCULAR; INTRAVENOUS PRN
Status: DISCONTINUED | OUTPATIENT
Start: 2025-06-19 | End: 2025-06-19 | Stop reason: ALTCHOICE

## 2025-06-19 RX ORDER — LIDOCAINE HYDROCHLORIDE 5 MG/ML
INJECTION, SOLUTION INFILTRATION; INTRAVENOUS PRN
Status: DISCONTINUED | OUTPATIENT
Start: 2025-06-19 | End: 2025-06-19 | Stop reason: ALTCHOICE

## 2025-06-19 ASSESSMENT — PAIN SCALES - GENERAL: PAINLEVEL_OUTOF10: 6

## 2025-06-19 ASSESSMENT — PAIN DESCRIPTION - LOCATION: LOCATION: BACK

## 2025-06-19 ASSESSMENT — PAIN DESCRIPTION - DESCRIPTORS
DESCRIPTORS: ACHING
DESCRIPTORS: ACHING

## 2025-06-19 ASSESSMENT — PAIN - FUNCTIONAL ASSESSMENT: PAIN_FUNCTIONAL_ASSESSMENT: 0-10

## 2025-06-19 ASSESSMENT — PAIN DESCRIPTION - ORIENTATION: ORIENTATION: LOWER

## 2025-06-19 ASSESSMENT — PAIN DESCRIPTION - PAIN TYPE: TYPE: CHRONIC PAIN

## 2025-06-19 NOTE — DISCHARGE INSTRUCTIONS
[facility] Department  [place]-[phone]  Dr. Vikas Xiong   Post-Pain Block/Radiofrequency  Home Going Instructions    1-Go home, rest for the remainder of the day  2-Please do not lift over 20 pounds the day of the injection  3-If you received sedation No: alcohol, driving, operating lawn mowers, plows, tractors or other dangerous equipment until next morning. Do not make important decisions or sign legal documents for 24 hours. You may experience light headedness, dizziness, nausea or sleepiness after sedation. Do not stay alone. A responsible adult must be with you for 24 hours. You could be nauseated from the medications you have received. Your IV site may be sore and bruised.    4-No dietary restrictions     5-Resume all medications the same day, blood thinners to be resumed 24 hours after injection if you were instructed to stop any.    6-Keep the surgical site clean and dry, you may shower the next morning and remove the      dressing.     7- No sitz baths, tub baths or hot tubs/swimming for 24 hours.       8- If you have any pain at the injection site(s), application of an ice pack to the area should be       helpful, 20 minutes on/20 minutes off for next 48 hours.  9- Call [facility] immediately at if you develop.  Fever greater than 100.4 F  Have bleeding or drainage from the puncture site  Have progressive Leg/arm numbness and or weakness  Loss of control of bowel and or bladder (wet/soil yourself)  Severe headache with inability to lift head  10-You may return to work the next day

## 2025-06-19 NOTE — OP NOTE
Operative Note      Patient: Lisa Hall  YOB: 1960  MRN: [id number]    Date of Procedure: 2025    Pre-Op Diagnosis Codes:      * Lumbar radiculopathy [M54.16]    Post-Op Diagnosis: Same       Procedure(s):  LEFT LUMBAR 3 AND LUMBAR 5 TRANSFORAMINAL EPIDURAL STEROID INJECTION UNDER FLUOROSCOPIC GUIDANCE    Surgeon(s):  Andrae Giron MD    Assistant:   * No surgical staff found *    Anesthesia: Local    Estimated Blood Loss (mL): Minimal    Complications: None    Specimens:   * No specimens in log *    Implants:  * No implants in log *      Drains: * No LDAs found *    Findings:  Infection Present At Time Of Surgery (PATOS) (choose all levels that have infection present):  No infection present  Other Findings: good needle placement    Detailed Description of Procedure:   2025    Patient: Lisa Hall  :  1960  Age:  65 y.o.  Sex:  female     PRE-OPERATIVE DIAGNOSIS: Lumbar disc displacement, lumbar neural foraminal stenosis, lumbar radiculopathy.     POST-OPERATIVE DIAGNOSIS: Same.    PROCEDURE: Left Transforaminal epidural steroid injection under fluoroscopic guidance at foraminal level L3 & L5.    SURGEON: Andrae Giron MD    ANESTHESIA: Local    ESTIMATED BLOOD LOSS: None.  ______________________________________________________________________  BRIEF HISTORY: Lisa Hall comes in today for the  Left transforaminal epidural steroid injection under fluoroscopic guidance at foraminal level L3 & L5 . The potential complications of this procedure were discussed with her again today.  She has elected to undergo the aforementioned procedure.     Lisa’s complete History & Physical examination were reviewed in depth, a copy of which is in the chart.      DESCRIPTION OF PROCEDURE:    After confirming written and informed consent, a time-out was performed and Lisa’s name and date of birth, the procedure to be performed as well as the plan for the location of the needle

## 2025-06-19 NOTE — INTERVAL H&P NOTE
Update History & Physical    The patient's History and Physical of May 22, 2025 was reviewed with the patient and I examined the patient. There was no change. The surgical site was confirmed by the patient and me.     Plan: The risks, benefits, expected outcome, and alternative to the recommended procedure have been discussed with the patient. Patient understands and wants to proceed with the procedure.     Electronically signed by Andrae Giron MD on 6/19/2025

## 2025-07-01 ENCOUNTER — TELEPHONE (OUTPATIENT)
Age: 65
End: 2025-07-01

## 2025-07-01 NOTE — TELEPHONE ENCOUNTER
Patient called in stating she is having excruciating pain in her left hip (which she has addressed before.) She had a L KELLY done on 6/19/25. Her back pain is mild but the hip is still bothering her. She is wondering if anything can be prescribed to alleviate some of this pain. Her f/u isn't until 8/25/25. Please advise.

## 2025-07-02 RX ORDER — TIZANIDINE 2 MG/1
2 TABLET ORAL 2 TIMES DAILY PRN
Qty: 14 TABLET | Refills: 0 | Status: SHIPPED | OUTPATIENT
Start: 2025-07-02 | End: 2025-07-09

## 2025-07-02 NOTE — TELEPHONE ENCOUNTER
Zanaflex 2 mg BID ordered.  Please let the patient know that this medication can cause drowsiness and needs to be very careful when taking it.

## 2025-07-31 ENCOUNTER — TELEPHONE (OUTPATIENT)
Dept: CARDIOLOGY CLINIC | Age: 65
End: 2025-07-31

## 2025-07-31 NOTE — TELEPHONE ENCOUNTER
Pt called for appt, she hasn't seen Dr Crockett in 2-3 years. Appt made for 10/9/25 @ 1:15 p[m. I called [facility] [phone] and spoke to Yu- I requested referral, last office note and heart monitor and other test results/reports.

## 2025-08-04 ENCOUNTER — OFFICE VISIT (OUTPATIENT)
Age: 65
End: 2025-08-04
Payer: MEDICARE

## 2025-08-04 ENCOUNTER — HOSPITAL ENCOUNTER (OUTPATIENT)
Dept: INFUSION THERAPY | Age: 65
Discharge: HOME | End: 2025-08-04
Payer: MEDICARE

## 2025-08-04 VITALS
HEIGHT: 65 IN | WEIGHT: 196 LBS | DIASTOLIC BLOOD PRESSURE: 90 MMHG | BODY MASS INDEX: 32.65 KG/M2 | HEART RATE: 69 BPM | OXYGEN SATURATION: 98 % | TEMPERATURE: 97.3 F | SYSTOLIC BLOOD PRESSURE: 141 MMHG

## 2025-08-04 DIAGNOSIS — Z17.0 MALIGNANT NEOPLASM OF RIGHT BREAST IN FEMALE, ESTROGEN RECEPTOR POSITIVE, UNSPECIFIED SITE OF BREAST (HCC): ICD-10-CM

## 2025-08-04 DIAGNOSIS — C50.911 MALIGNANT NEOPLASM OF RIGHT BREAST IN FEMALE, ESTROGEN RECEPTOR POSITIVE, UNSPECIFIED SITE OF BREAST (HCC): ICD-10-CM

## 2025-08-04 LAB
ALBUMIN SERPL-MCNC: 4 G/DL (ref 3.5–5.2)
ALP SERPL-CCNC: 103 U/L (ref 35–104)
ALT SERPL-CCNC: 19 U/L (ref 0–35)
ANION GAP SERPL CALCULATED.3IONS-SCNC: 13 MMOL/L (ref 7–16)
AST SERPL-CCNC: 28 U/L (ref 0–35)
BASOPHILS # BLD: 0.01 K/UL (ref 0–0.2)
BASOPHILS NFR BLD: 0 % (ref 0–2)
BILIRUB SERPL-MCNC: 0.3 MG/DL (ref 0–1.2)
BUN SERPL-MCNC: 30 MG/DL (ref 8–23)
CALCIUM SERPL-MCNC: 9.6 MG/DL (ref 8.8–10.2)
CHLORIDE SERPL-SCNC: 110 MMOL/L (ref 98–107)
CO2 SERPL-SCNC: 22 MMOL/L (ref 22–29)
CREAT SERPL-MCNC: 1.7 MG/DL (ref 0.5–1)
EOSINOPHIL # BLD: 0.06 K/UL (ref 0.05–0.5)
EOSINOPHILS RELATIVE PERCENT: 2 % (ref 0–6)
ERYTHROCYTE [DISTWIDTH] IN BLOOD BY AUTOMATED COUNT: 14.8 % (ref 11.5–15)
GFR, ESTIMATED: 33 ML/MIN/1.73M2
GLUCOSE SERPL-MCNC: 134 MG/DL (ref 74–99)
HCT VFR BLD AUTO: 42.5 % (ref 34–48)
HGB BLD-MCNC: 14.2 G/DL (ref 11.5–15.5)
IMM GRANULOCYTES # BLD AUTO: <0.03 K/UL (ref 0–0.58)
IMM GRANULOCYTES NFR BLD: 0 % (ref 0–5)
LYMPHOCYTES NFR BLD: 1.25 K/UL (ref 1.5–4)
LYMPHOCYTES RELATIVE PERCENT: 35 % (ref 20–42)
MCH RBC QN AUTO: 30.1 PG (ref 26–35)
MCHC RBC AUTO-ENTMCNC: 33.4 G/DL (ref 32–34.5)
MCV RBC AUTO: 90.2 FL (ref 80–99.9)
MONOCYTES NFR BLD: 0.47 K/UL (ref 0.1–0.95)
MONOCYTES NFR BLD: 13 % (ref 2–12)
NEUTROPHILS NFR BLD: 50 % (ref 43–80)
NEUTS SEG NFR BLD: 1.77 K/UL (ref 1.8–7.3)
PLATELET # BLD AUTO: 173 K/UL (ref 130–450)
PMV BLD AUTO: 12.6 FL (ref 7–12)
POTASSIUM SERPL-SCNC: 4.5 MMOL/L (ref 3.5–5.1)
PROT SERPL-MCNC: 6.8 G/DL (ref 6.4–8.3)
RBC # BLD AUTO: 4.71 M/UL (ref 3.5–5.5)
SODIUM SERPL-SCNC: 144 MMOL/L (ref 136–145)
WBC OTHER # BLD: 3.6 K/UL (ref 4.5–11.5)

## 2025-08-04 PROCEDURE — 36415 COLL VENOUS BLD VENIPUNCTURE: CPT

## 2025-08-04 PROCEDURE — 1159F MED LIST DOCD IN RCRD: CPT | Performed by: STUDENT IN AN ORGANIZED HEALTH CARE EDUCATION/TRAINING PROGRAM

## 2025-08-04 PROCEDURE — 85025 COMPLETE CBC W/AUTO DIFF WBC: CPT

## 2025-08-04 PROCEDURE — 80053 COMPREHEN METABOLIC PANEL: CPT

## 2025-08-04 PROCEDURE — G8417 CALC BMI ABV UP PARAM F/U: HCPCS | Performed by: STUDENT IN AN ORGANIZED HEALTH CARE EDUCATION/TRAINING PROGRAM

## 2025-08-04 PROCEDURE — 99213 OFFICE O/P EST LOW 20 MIN: CPT | Performed by: STUDENT IN AN ORGANIZED HEALTH CARE EDUCATION/TRAINING PROGRAM

## 2025-08-04 PROCEDURE — G8399 PT W/DXA RESULTS DOCUMENT: HCPCS | Performed by: STUDENT IN AN ORGANIZED HEALTH CARE EDUCATION/TRAINING PROGRAM

## 2025-08-04 PROCEDURE — G8427 DOCREV CUR MEDS BY ELIG CLIN: HCPCS | Performed by: STUDENT IN AN ORGANIZED HEALTH CARE EDUCATION/TRAINING PROGRAM

## 2025-08-04 PROCEDURE — 3077F SYST BP >= 140 MM HG: CPT | Performed by: STUDENT IN AN ORGANIZED HEALTH CARE EDUCATION/TRAINING PROGRAM

## 2025-08-04 PROCEDURE — 3017F COLORECTAL CA SCREEN DOC REV: CPT | Performed by: STUDENT IN AN ORGANIZED HEALTH CARE EDUCATION/TRAINING PROGRAM

## 2025-08-04 PROCEDURE — 1090F PRES/ABSN URINE INCON ASSESS: CPT | Performed by: STUDENT IN AN ORGANIZED HEALTH CARE EDUCATION/TRAINING PROGRAM

## 2025-08-04 PROCEDURE — 1123F ACP DISCUSS/DSCN MKR DOCD: CPT | Performed by: STUDENT IN AN ORGANIZED HEALTH CARE EDUCATION/TRAINING PROGRAM

## 2025-08-04 PROCEDURE — 3080F DIAST BP >= 90 MM HG: CPT | Performed by: STUDENT IN AN ORGANIZED HEALTH CARE EDUCATION/TRAINING PROGRAM

## 2025-08-04 PROCEDURE — 1036F TOBACCO NON-USER: CPT | Performed by: STUDENT IN AN ORGANIZED HEALTH CARE EDUCATION/TRAINING PROGRAM

## 2025-08-04 PROCEDURE — 1126F AMNT PAIN NOTED NONE PRSNT: CPT | Performed by: STUDENT IN AN ORGANIZED HEALTH CARE EDUCATION/TRAINING PROGRAM

## 2025-08-04 RX ORDER — ANASTROZOLE 1 MG/1
1 TABLET ORAL DAILY
Qty: 90 TABLET | Refills: 1 | Status: SHIPPED | OUTPATIENT
Start: 2025-08-04

## 2025-08-08 ENCOUNTER — TELEPHONE (OUTPATIENT)
Dept: CASE MANAGEMENT | Age: 65
End: 2025-08-08

## 2025-08-08 PROBLEM — C50.912 INVASIVE DUCTAL CARCINOMA OF BREAST, FEMALE, LEFT (HCC): Status: ACTIVE | Noted: 2020-02-25

## 2025-08-14 ENCOUNTER — TELEPHONE (OUTPATIENT)
Dept: CASE MANAGEMENT | Age: 65
End: 2025-08-14

## 2025-08-25 ENCOUNTER — OFFICE VISIT (OUTPATIENT)
Age: 65
End: 2025-08-25
Payer: MEDICARE

## 2025-08-25 ENCOUNTER — HOSPITAL ENCOUNTER (OUTPATIENT)
Dept: GENERAL RADIOLOGY | Age: 65
Discharge: HOME | End: 2025-08-27
Payer: MEDICARE

## 2025-08-25 VITALS
BODY MASS INDEX: 32.65 KG/M2 | SYSTOLIC BLOOD PRESSURE: 136 MMHG | WEIGHT: 196 LBS | DIASTOLIC BLOOD PRESSURE: 84 MMHG | HEART RATE: 72 BPM | OXYGEN SATURATION: 97 % | HEIGHT: 65 IN | TEMPERATURE: 97.2 F

## 2025-08-25 DIAGNOSIS — M25.512 CHRONIC LEFT SHOULDER PAIN: ICD-10-CM

## 2025-08-25 DIAGNOSIS — M54.16 LUMBAR RADICULAR PAIN: ICD-10-CM

## 2025-08-25 DIAGNOSIS — G89.29 CHRONIC LEFT SHOULDER PAIN: ICD-10-CM

## 2025-08-25 DIAGNOSIS — M79.605 ANTERIOR LEG PAIN, LEFT: ICD-10-CM

## 2025-08-25 DIAGNOSIS — M47.817 LUMBOSACRAL SPONDYLOSIS WITHOUT MYELOPATHY: ICD-10-CM

## 2025-08-25 DIAGNOSIS — M51.369 DEGENERATION OF INTERVERTEBRAL DISC OF LUMBAR REGION, UNSPECIFIED WHETHER PAIN PRESENT: Primary | ICD-10-CM

## 2025-08-25 DIAGNOSIS — S43.402S SPRAIN OF LEFT SHOULDER, UNSPECIFIED SHOULDER SPRAIN TYPE, SEQUELA: ICD-10-CM

## 2025-08-25 PROCEDURE — G8399 PT W/DXA RESULTS DOCUMENT: HCPCS | Performed by: ANESTHESIOLOGY

## 2025-08-25 PROCEDURE — 3075F SYST BP GE 130 - 139MM HG: CPT | Performed by: ANESTHESIOLOGY

## 2025-08-25 PROCEDURE — 73030 X-RAY EXAM OF SHOULDER: CPT

## 2025-08-25 PROCEDURE — G8417 CALC BMI ABV UP PARAM F/U: HCPCS | Performed by: ANESTHESIOLOGY

## 2025-08-25 PROCEDURE — 1036F TOBACCO NON-USER: CPT | Performed by: ANESTHESIOLOGY

## 2025-08-25 PROCEDURE — 3079F DIAST BP 80-89 MM HG: CPT | Performed by: ANESTHESIOLOGY

## 2025-08-25 PROCEDURE — 3017F COLORECTAL CA SCREEN DOC REV: CPT | Performed by: ANESTHESIOLOGY

## 2025-08-25 PROCEDURE — 99214 OFFICE O/P EST MOD 30 MIN: CPT | Performed by: ANESTHESIOLOGY

## 2025-08-25 PROCEDURE — 1090F PRES/ABSN URINE INCON ASSESS: CPT | Performed by: ANESTHESIOLOGY

## 2025-08-25 PROCEDURE — 1159F MED LIST DOCD IN RCRD: CPT | Performed by: ANESTHESIOLOGY

## 2025-08-25 PROCEDURE — 1123F ACP DISCUSS/DSCN MKR DOCD: CPT | Performed by: ANESTHESIOLOGY

## 2025-08-25 PROCEDURE — G8427 DOCREV CUR MEDS BY ELIG CLIN: HCPCS | Performed by: ANESTHESIOLOGY

## 2025-08-25 RX ORDER — GABAPENTIN 100 MG/1
100 CAPSULE ORAL 3 TIMES DAILY
Qty: 90 CAPSULE | Refills: 1 | Status: SHIPPED | OUTPATIENT
Start: 2025-08-25 | End: 2025-10-24

## 2025-09-04 ENCOUNTER — SCHEDULED TELEPHONE ENCOUNTER (OUTPATIENT)
Age: 65
End: 2025-09-04
Payer: MEDICARE

## 2025-09-04 DIAGNOSIS — C50.911 MALIGNANT NEOPLASM OF RIGHT BREAST IN FEMALE, ESTROGEN RECEPTOR POSITIVE, UNSPECIFIED SITE OF BREAST (HCC): Primary | ICD-10-CM

## 2025-09-04 DIAGNOSIS — Z17.0 MALIGNANT NEOPLASM OF RIGHT BREAST IN FEMALE, ESTROGEN RECEPTOR POSITIVE, UNSPECIFIED SITE OF BREAST (HCC): Primary | ICD-10-CM

## 2025-09-04 DIAGNOSIS — Z79.811 USE OF AROMATASE INHIBITORS: ICD-10-CM

## 2025-09-04 PROCEDURE — 1123F ACP DISCUSS/DSCN MKR DOCD: CPT | Performed by: STUDENT IN AN ORGANIZED HEALTH CARE EDUCATION/TRAINING PROGRAM

## 2025-09-04 PROCEDURE — 99213 OFFICE O/P EST LOW 20 MIN: CPT | Performed by: STUDENT IN AN ORGANIZED HEALTH CARE EDUCATION/TRAINING PROGRAM

## (undated) DEVICE — SOLUTION IV IRRIG WATER 1000ML POUR BRL 2F7114

## (undated) DEVICE — PADDLE INTERN DEFIB CHILD

## (undated) DEVICE — PACK,UNIV, II AURORA: Brand: MEDLINE

## (undated) DEVICE — CLOTH SURG PREP PREOPERATIVE CHLORHEXIDINE GLUC 2% READYPREP

## (undated) DEVICE — TOWEL,OR,DSP,ST,WHITE,DLX,4/PK,20PK/CS: Brand: MEDLINE

## (undated) DEVICE — SPONGE LAP W18XL18IN WHT COT 4 PLY FLD STRUNG RADPQ DISP ST

## (undated) DEVICE — SOLUTION IV IRRIG POUR BRL 0.9% SODIUM CHL 2F7124

## (undated) DEVICE — SURGICAL PROCEDURE PACK CRD SEHC

## (undated) DEVICE — BLOWER COR ART L16.5CM PLAS SHFT MAL W/ MIST IV SET AXIUS

## (undated) DEVICE — DRESSING FOAM W22XL25CM FILVE LAYR FOAM DP DEF SAFETAC

## (undated) DEVICE — APPLICATOR MEDICATED 26 CC SOLUTION HI LT ORNG CHLORAPREP

## (undated) DEVICE — GLOVE ORANGE PI 7 1/2   MSG9075

## (undated) DEVICE — SWABSTICK SURG PREP BENZOIN TINCTURE SINGLE ST

## (undated) DEVICE — MPS® PRESSURE LINE W/TRANSDUCER: Brand: MPS

## (undated) DEVICE — STRIP,CLOSURE,WOUND,MEDI-STRIP,1/2X4: Brand: MEDLINE

## (undated) DEVICE — BANDAGE ADH W1XL3IN NAT FAB WVN FLX DURABLE N ADH PD SEAL

## (undated) DEVICE — GOWN,SIRUS,FABRNF,XL,20/CS: Brand: MEDLINE

## (undated) DEVICE — CHANNEL DRAIN, 28FR, HUBLESS: Brand: JACKSON-PRATT

## (undated) DEVICE — BLOCK BITE 60FR RUBBER ADLT DENTAL

## (undated) DEVICE — CANNULA NSL ORAL AD FOR CAPNOFLEX CO2 O2 AIRLFE

## (undated) DEVICE — AEGIS 1" DISK 4MM HOLE, PEEL OPEN: Brand: MEDLINE

## (undated) DEVICE — NON-DEHP CATHETER EXTENSION SET, MALE LUER LOCK ADAPTER

## (undated) DEVICE — CANNULA INJ L2.5IN BLNT TIP 3MM CLR BODY W/ 1 W VLV DLP

## (undated) DEVICE — Z DISCONTINUED PER MEDLINE USE 2425483 TAPE UMB L30IN DIA1/8IN WHT COT NONABSORBABLE W/O NDL FOR

## (undated) DEVICE — INTENDED FOR TISSUE SEPARATION, AND OTHER PROCEDURES THAT REQUIRE A SHARP SURGICAL BLADE TO PUNCTURE OR CUT.: Brand: BARD-PARKER ® STAINLESS STEEL BLADES

## (undated) DEVICE — DRAPE THER FLUID WARMING 66X44 IN FLAT SLUSH DBL DISC ORS

## (undated) DEVICE — 3M™ TRANSPORE™ WHITE SURGICAL TAPE 1534-2, 2 INCH X 10 YARD (5CM X 9,1M), 6 ROLLS/CARTON 10 CARTONS/CASE: Brand: 3M™ TRANSPORE™

## (undated) DEVICE — SPONGE,DRAIN,NONWVN,4"X4",6PLY,STRL,LF: Brand: MEDLINE

## (undated) DEVICE — 12 ML SYRINGE,LUER-LOCK TIP: Brand: MONOJECT

## (undated) DEVICE — GAUZE,SPONGE,POST-OP,4X3,STRL,LF: Brand: MEDLINE

## (undated) DEVICE — SOLUTION IV 50ML 0.9% SOD CHL PLAS CONT USP VIAFLX

## (undated) DEVICE — DRAIN SURG 15FR SIL RND CHN W/ TRCR FULL FLUT DBL WRP TRAD

## (undated) DEVICE — PACK PROCEDURE SURG GEN CUST

## (undated) DEVICE — AGENT HEMSTAT 3GM OXIDIZED REGENERATED CELOS ABSRB FOR CONT (ORDER MULTIPLES OF 5EA)

## (undated) DEVICE — DRAPE,CHEST,FENES,15X10,STERIL: Brand: MEDLINE

## (undated) DEVICE — SET CARDIAC I

## (undated) DEVICE — DRAIN SURG SGL COLL PT TB FOR ATS BG OASIS

## (undated) DEVICE — GOWN,SIRUS,FABRNF,L,20/CS: Brand: MEDLINE

## (undated) DEVICE — SUTURE VCRL + SZ 3-0 L18IN ABSRB UD PS-1 L24MM 3/8 CIR PRIM VCP683G

## (undated) DEVICE — VALVE SUCTION AIR H2O SET ORCA POD + DISP

## (undated) DEVICE — CATHETER IV 24GA L3/4IN PERIPH YEL S STL POLYUR PLAS HUB

## (undated) DEVICE — FORCEPS BX OVL CUP FEN DISPOSABLE CAP L 160CM RAD JAW 4

## (undated) DEVICE — ELECTRODE PT RET AD L9FT HI MOIST COND ADH HYDRGEL CORDED

## (undated) DEVICE — CONTAINER SPEC 60ML PH 7NEUTRAL BUFF FRMLN RDY TO USE

## (undated) DEVICE — INSUFFLATION TUBING SET WITH FILTER, FUNNEL CONNECTOR AND LUER LOCK: Brand: JOSNOE MEDICAL INC

## (undated) DEVICE — RETRACTOR RULTRACT INTERN MAMMARY ARTERY

## (undated) DEVICE — BLADE CLIPPER GEN PURP NS

## (undated) DEVICE — 6 FOOT DISPOSABLE EXTENSION CABLE WITH SAFE CONNECT / SCREW-DOWN

## (undated) DEVICE — TOWEL,OR,DSP,ST,BLUE,STD,6/PK,12PK/CS: Brand: MEDLINE

## (undated) DEVICE — GRADUATE TRIANG MEASURE 1000ML BLK PRNT

## (undated) DEVICE — 1.5L THIN WALL CAN: Brand: CRD

## (undated) DEVICE — GAUZE,SPONGE,4"X4",8PLY,STRL,LF,10/TRAY: Brand: MEDLINE

## (undated) DEVICE — STERILE LATEX POWDER FREE SURGICAL GLOVES WITH HYDROGEL COATING: Brand: PROTEXIS

## (undated) DEVICE — TUBING, SUCTION, 3/16" X 12', STRAIGHT: Brand: MEDLINE

## (undated) DEVICE — CARDIAC STRYKER STERNAL SAW

## (undated) DEVICE — Device

## (undated) DEVICE — SET SURG BASIN OPEN HEART NO  1 REUSABLE

## (undated) DEVICE — MAT SURG W36XL56IN GRN FOAM ANTIFATIGUE NONSLIP DRISAFE

## (undated) DEVICE — CATHETER THOR 32FR L23IN PVC 6 EYELET STR ATRAUM

## (undated) DEVICE — JP CHANNEL DRAIN, 24FR HUBLESS: Brand: CARDINAL HEALTH

## (undated) DEVICE — CANNULA PERF 7FR L5.5IN AORT ROOT RADPQ STD TIP W/ VENT LN

## (undated) DEVICE — GLOVE SURG SZ 65 THK91MIL LTX FREE SYN POLYISOPRENE

## (undated) DEVICE — Z DUP USE 2257490 ADHESIVE SKIN CLSRE 036ML TPCL 2CTL CNCRLTE HIGH VSCSTY DRMB

## (undated) DEVICE — GLOVE SURG SZ 7.5 L11.73IN FNGR THK9.8MIL STRW LTX POLYMER

## (undated) DEVICE — GLOVE SURG SZ 6 THK91MIL LTX FREE SYN POLYISOPRENE ANTI

## (undated) DEVICE — AVID DUAL STAGE VENOUS DRAINAGE CANNULA: Brand: AVID DUAL STAGE VENOUS DRAINAGE CANNULA

## (undated) DEVICE — SET ACB VEIN

## (undated) DEVICE — LABEL MED CARD SURG 4 IN PANEL STRL

## (undated) DEVICE — BITEBLOCK 54FR W/ DENT RIM BLOX

## (undated) DEVICE — NEEDLE HYPO 25GA L1.5IN BLU POLYPR HUB S STL REG BVL STR

## (undated) DEVICE — PACK OPEN HRT DRP

## (undated) DEVICE — EZ GLIDE AORTIC CANNULA: Brand: EDWARDS LIFESCIENCES EZ GLIDE AORTIC CANNULA

## (undated) DEVICE — FORCEPS BX L160CM JAW DIA2.4MM YEL L CAP W/ NDL DISP RAD

## (undated) DEVICE — 4-PORT MANIFOLD: Brand: NEPTUNE 2

## (undated) DEVICE — DOUBLE BASIN SET: Brand: MEDLINE INDUSTRIES, INC.

## (undated) DEVICE — MPS® DELIVERY SET W/ARREST AGENT AND ADDITIVE CASSETTES, HEAT EXCHANGER & 10 FT. DELIVERY TUBING: Brand: MPS

## (undated) DEVICE — MARKER A/C LOCATOR GRAFT U SILICONE

## (undated) DEVICE — AORTIC PUNCHES ARE USED TO CREATE A UNIFORM OPENING IN BLOOD VESSELS DURING CARDIOVASCULAR SURGERY. THE VESSEL GRAFT IS INSERTED INTO THE CREATED OPENING AND SUTURED TO THE VESSEL WALL. AORTIC LANCETS ARE USED TO MAKE A SMALL UNIFORM CUT IN A BLOOD VESSEL TO FACILITATE INSERTION OF AN AORTIC PUNCH.  PUNCHES COME IN VARIOUS LENGTHS, DIAMETERS AND TIP CONFIGURATIONS.: Brand: CLEANCUT ROTATING AORTIC PUNCH

## (undated) DEVICE — SYRINGE 20ML LL S/C 50

## (undated) DEVICE — CAMERA CARDIAC STRYKER 1488 HD

## (undated) DEVICE — SPONGE GZ W4XL4IN RAYON POLY FILL CVR W/ NONWOVEN FAB

## (undated) DEVICE — CLIP INT SM TI EZ LD LIG SYS WECK HORZ

## (undated) DEVICE — DISCONTINUED USE 320496 BATTERY 50-800-01 OR 50-800-03 SNGL USE

## (undated) DEVICE — CATHETER THOR 32FR L23IN PVC 5 EYELET STR ATRAUM

## (undated) DEVICE — 6 ML SYRINGE LUER-LOCK TIP: Brand: MONOJECT

## (undated) DEVICE — NEEDLE FLTR 18GA L1.5IN MEM THK5UM BLNT DISP

## (undated) DEVICE — ALCOHOL RUBBING ISO 16OZ 70%

## (undated) DEVICE — RETROGRADE CARDIOPLEGIA CATHETER: Brand: EDWARDS LIFESCIENCES RETROGRADE CARDIOPLEGIA CATHETER

## (undated) DEVICE — BRA SURG LG 40-42IN WHT LYCRA FR HK AND LOOP CLSR WIDE ADJ

## (undated) DEVICE — BLOOD TRANSFUSION FILTER: Brand: HAEMONETICS

## (undated) DEVICE — CHLORAPREP 26ML ORANGE

## (undated) DEVICE — GRADUATE

## (undated) DEVICE — NEEDLE HYPO 18GA L1.5IN PNK POLYPR HUB S STL REG BVL STR

## (undated) DEVICE — PERFUSION PACK CUST OPN HRT

## (undated) DEVICE — SUTURE VCRL SZ 2-0 L27IN ABSRB UD L26MM SH 1/2 CIR J417H

## (undated) DEVICE — BASIC SINGLE BASIN 1-LF: Brand: MEDLINE INDUSTRIES, INC.

## (undated) DEVICE — SET CARDIAC II

## (undated) DEVICE — TTL1LYR 16FR10ML 100%SIL TMPST TR: Brand: MEDLINE

## (undated) DEVICE — READY WET SKIN SCRUB TRAY-LF: Brand: MEDLINE INDUSTRIES, INC.

## (undated) DEVICE — TAPE ADH W2INXL10YD PLAS TRNSPAR H2O RESIST HYPOALRG CURAD

## (undated) DEVICE — GLOVE ORANGE PI 7   MSG9070

## (undated) DEVICE — PICO 7 10CM X 30CM: Brand: PICO™ 7

## (undated) DEVICE — Z INACTIVE USE 2662641 SOLUTION IV 1000ML 140MEQ/L SOD 5MEQ/L K 3MEQ/L MG 27MEQ/L

## (undated) DEVICE — CONNECTOR PERF W64XH64XL64MM W  LUERLOCK

## (undated) DEVICE — Z DUP USE 2701075 SYSTEM SKIN CLSR 42CM DERMBND PRINEO

## (undated) DEVICE — Device: Brand: PORTEX

## (undated) DEVICE — TAPE ADH W2INXL10YD WHT PAPR GENTLE BRTH FLX COMFORTABLE

## (undated) DEVICE — SYRINGE MED 5ML STD CLR PLAS LUERLOCK TIP N CTRL DISP

## (undated) DEVICE — AGENT HEMSTAT W4XL8IN OXIDIZED REGENERATED CELOS ABSRB

## (undated) DEVICE — 3M™ RED DOT™ MONITORING ELECTRODE WITH FOAM TAPE AND STICKY GEL 2560, 50/BAG, 20/CASE, 72/PLT: Brand: RED DOT™

## (undated) DEVICE — KIT PLT RATIO DISPNS KT 2IN CANN TIP SPRY TIP DISP MAGELLAN

## (undated) DEVICE — CONNECTOR DRNGE W3/8-0.5XH3/16XL3/16IN 2:1 SIL CARD STR

## (undated) DEVICE — SURGICAL BRA: Brand: DEROYAL